# Patient Record
Sex: MALE | Race: WHITE | Employment: OTHER | URBAN - NONMETROPOLITAN AREA
[De-identification: names, ages, dates, MRNs, and addresses within clinical notes are randomized per-mention and may not be internally consistent; named-entity substitution may affect disease eponyms.]

---

## 2021-12-03 ENCOUNTER — TRANSCRIBE ORDER (OUTPATIENT)
Dept: REGISTRATION | Age: 81
End: 2021-12-03

## 2021-12-03 ENCOUNTER — HOSPITAL ENCOUNTER (OUTPATIENT)
Dept: VASCULAR SURGERY | Age: 81
Discharge: HOME | End: 2021-12-03
Payer: MEDICARE

## 2021-12-03 DIAGNOSIS — R59.0 VIRCHOW'S NODE: Primary | ICD-10-CM

## 2021-12-03 DIAGNOSIS — R59.0 VIRCHOW'S NODE: ICD-10-CM

## 2021-12-03 PROCEDURE — 93971 EXTREMITY STUDY: CPT

## 2021-12-05 ENCOUNTER — APPOINTMENT (OUTPATIENT)
Dept: CT IMAGING | Age: 81
End: 2021-12-05
Attending: EMERGENCY MEDICINE
Payer: MEDICARE

## 2021-12-05 ENCOUNTER — HOSPITAL ENCOUNTER (EMERGENCY)
Age: 81
Discharge: ANOTHER HOSPITAL | End: 2021-12-06
Attending: EMERGENCY MEDICINE | Admitting: EMERGENCY MEDICINE
Payer: MEDICARE

## 2021-12-05 DIAGNOSIS — S70.11XA HEMATOMA OF ILIOPSOAS MUSCLE, RIGHT, INITIAL ENCOUNTER: Primary | ICD-10-CM

## 2021-12-05 DIAGNOSIS — R79.1 PROLONGED INR: ICD-10-CM

## 2021-12-05 LAB
ALBUMIN SERPL-MCNC: 3.3 G/DL (ref 3.5–5)
ALBUMIN/GLOB SERPL: 0.9 {RATIO} (ref 1.1–2.2)
ALP SERPL-CCNC: 87 U/L (ref 45–117)
ALT SERPL-CCNC: 18 U/L (ref 12–78)
ANION GAP SERPL CALC-SCNC: 9 MMOL/L (ref 5–15)
APPEARANCE UR: CLEAR
AST SERPL W P-5'-P-CCNC: 22 U/L (ref 15–37)
BASOPHILS # BLD: 0.1 K/UL (ref 0–0.2)
BASOPHILS NFR BLD: 1 % (ref 0–2.5)
BILIRUB SERPL-MCNC: 0.7 MG/DL (ref 0.2–1)
BILIRUB UR QL CFM: POSITIVE
BILIRUB UR QL CFM: POSITIVE
BUN SERPL-MCNC: 13 MG/DL (ref 6–20)
BUN/CREAT SERPL: 12 (ref 12–20)
CA-I BLD-MCNC: 8.6 MG/DL (ref 8.5–10.1)
CHLORIDE SERPL-SCNC: 96 MMOL/L (ref 97–108)
CO2 SERPL-SCNC: 32 MMOL/L (ref 21–32)
COLOR UR: ABNORMAL
CREAT SERPL-MCNC: 1.09 MG/DL (ref 0.7–1.3)
EOSINOPHIL # BLD: 0.2 K/UL (ref 0–0.7)
EOSINOPHIL NFR BLD: 1 % (ref 0.9–2.9)
ERYTHROCYTE [DISTWIDTH] IN BLOOD BY AUTOMATED COUNT: 12.3 % (ref 11.5–14.5)
GLOBULIN SER CALC-MCNC: 3.5 G/DL (ref 2–4)
GLUCOSE SERPL-MCNC: 115 MG/DL (ref 65–100)
GLUCOSE UR STRIP.AUTO-MCNC: NEGATIVE MG/DL
HCT VFR BLD AUTO: 37.1 % (ref 41–53)
HGB BLD-MCNC: 12.3 G/DL (ref 13–16)
HGB UR QL STRIP: NEGATIVE
KETONES UR QL STRIP.AUTO: NEGATIVE MG/DL
LEUKOCYTE ESTERASE UR QL STRIP.AUTO: NEGATIVE
LYMPHOCYTES # BLD: 1.4 K/UL (ref 1–4.8)
LYMPHOCYTES NFR BLD: 8 % (ref 20.5–51.1)
MCH RBC QN AUTO: 30.6 PG (ref 31–34)
MCHC RBC AUTO-ENTMCNC: 33.1 G/DL (ref 31–36)
MCV RBC AUTO: 92.4 FL (ref 80–100)
MONOCYTES # BLD: 0.9 K/UL (ref 0–0.8)
MONOCYTES NFR BLD: 5 % (ref 3.1–13.9)
NEUTS SEG # BLD: 14.5 K/UL (ref 1.8–7.7)
NEUTS SEG NFR BLD: 85 % (ref 42–75)
NITRITE UR QL STRIP.AUTO: NEGATIVE
PH UR STRIP: 6 [PH] (ref 5–8)
PLATELET # BLD AUTO: 457 K/UL
PMV BLD AUTO: 8.6 FL (ref 6.5–11.5)
POTASSIUM SERPL-SCNC: 3.1 MMOL/L (ref 3.5–5.1)
PROT SERPL-MCNC: 6.8 G/DL (ref 6.4–8.2)
PROT UR STRIP-MCNC: NEGATIVE MG/DL
RBC # BLD AUTO: 4.01 M/UL
SODIUM SERPL-SCNC: 137 MMOL/L (ref 136–145)
SP GR UR REFRACTOMETRY: 1.02 (ref 1–1.03)
UROBILINOGEN UR QL STRIP.AUTO: 1 EU/DL (ref 0.2–1)
WBC # BLD AUTO: 17.1 K/UL (ref 4.4–11.3)

## 2021-12-05 PROCEDURE — 74011250636 HC RX REV CODE- 250/636: Performed by: EMERGENCY MEDICINE

## 2021-12-05 PROCEDURE — 74011000636 HC RX REV CODE- 636: Performed by: EMERGENCY MEDICINE

## 2021-12-05 PROCEDURE — 80053 COMPREHEN METABOLIC PANEL: CPT

## 2021-12-05 PROCEDURE — 85025 COMPLETE CBC W/AUTO DIFF WBC: CPT

## 2021-12-05 PROCEDURE — 74177 CT ABD & PELVIS W/CONTRAST: CPT

## 2021-12-05 PROCEDURE — 99285 EMERGENCY DEPT VISIT HI MDM: CPT

## 2021-12-05 PROCEDURE — 74011250637 HC RX REV CODE- 250/637: Performed by: EMERGENCY MEDICINE

## 2021-12-05 PROCEDURE — 81003 URINALYSIS AUTO W/O SCOPE: CPT

## 2021-12-05 PROCEDURE — 96374 THER/PROPH/DIAG INJ IV PUSH: CPT

## 2021-12-05 RX ORDER — HYDROMORPHONE HYDROCHLORIDE 1 MG/ML
0.5 INJECTION, SOLUTION INTRAMUSCULAR; INTRAVENOUS; SUBCUTANEOUS ONCE
Status: COMPLETED | OUTPATIENT
Start: 2021-12-05 | End: 2021-12-05

## 2021-12-05 RX ORDER — ACETAMINOPHEN 325 MG/1
650 TABLET ORAL ONCE
Status: DISCONTINUED | OUTPATIENT
Start: 2021-12-05 | End: 2021-12-06 | Stop reason: HOSPADM

## 2021-12-05 RX ADMIN — HYDROMORPHONE HYDROCHLORIDE 0.5 MG: 1 INJECTION, SOLUTION INTRAMUSCULAR; INTRAVENOUS; SUBCUTANEOUS at 22:36

## 2021-12-05 RX ADMIN — HYDROMORPHONE HYDROCHLORIDE 0.5 MG: 1 INJECTION, SOLUTION INTRAMUSCULAR; INTRAVENOUS; SUBCUTANEOUS at 22:00

## 2021-12-05 RX ADMIN — IOPAMIDOL 75 ML: 755 INJECTION, SOLUTION INTRAVENOUS at 23:20

## 2021-12-06 ENCOUNTER — HOSPITAL ENCOUNTER (INPATIENT)
Age: 81
LOS: 4 days | Discharge: HOME | DRG: 813 | End: 2021-12-10
Attending: FAMILY MEDICINE | Admitting: HOSPITALIST
Payer: MEDICARE

## 2021-12-06 VITALS
TEMPERATURE: 98.1 F | WEIGHT: 158 LBS | RESPIRATION RATE: 18 BRPM | HEIGHT: 68 IN | SYSTOLIC BLOOD PRESSURE: 146 MMHG | BODY MASS INDEX: 23.95 KG/M2 | OXYGEN SATURATION: 95 % | DIASTOLIC BLOOD PRESSURE: 96 MMHG | HEART RATE: 63 BPM

## 2021-12-06 PROBLEM — T14.8XXA INTRAMUSCULAR HEMATOMA: Status: ACTIVE | Noted: 2021-12-06

## 2021-12-06 PROBLEM — D68.9 COAGULOPATHY (HCC): Status: ACTIVE | Noted: 2021-12-06

## 2021-12-06 LAB
INR PPP: 10.7 (ref 0.9–1.1)
INR PPP: 4.1 (ref 0.9–1.1)
PROTHROMBIN TIME: 103.2 SEC (ref 9–11.1)
PROTHROMBIN TIME: 39.8 SEC (ref 9–11.1)

## 2021-12-06 PROCEDURE — 96376 TX/PRO/DX INJ SAME DRUG ADON: CPT

## 2021-12-06 PROCEDURE — 85610 PROTHROMBIN TIME: CPT

## 2021-12-06 PROCEDURE — 65270000029 HC RM PRIVATE

## 2021-12-06 PROCEDURE — 74011250637 HC RX REV CODE- 250/637: Performed by: HOSPITALIST

## 2021-12-06 PROCEDURE — 36415 COLL VENOUS BLD VENIPUNCTURE: CPT

## 2021-12-06 PROCEDURE — 74011250636 HC RX REV CODE- 250/636: Performed by: HOSPITALIST

## 2021-12-06 PROCEDURE — 74011000258 HC RX REV CODE- 258: Performed by: HOSPITALIST

## 2021-12-06 PROCEDURE — 74011250636 HC RX REV CODE- 250/636: Performed by: EMERGENCY MEDICINE

## 2021-12-06 PROCEDURE — 74011250637 HC RX REV CODE- 250/637: Performed by: EMERGENCY MEDICINE

## 2021-12-06 RX ORDER — POTASSIUM CHLORIDE 750 MG/1
40 TABLET, FILM COATED, EXTENDED RELEASE ORAL 2 TIMES DAILY
Status: COMPLETED | OUTPATIENT
Start: 2021-12-06 | End: 2021-12-06

## 2021-12-06 RX ORDER — WARFARIN 2.5 MG/1
2.5 TABLET ORAL
COMMUNITY

## 2021-12-06 RX ORDER — METOPROLOL SUCCINATE 25 MG/1
25 TABLET, EXTENDED RELEASE ORAL DAILY
COMMUNITY

## 2021-12-06 RX ORDER — POTASSIUM CHLORIDE 750 MG/1
10 TABLET, FILM COATED, EXTENDED RELEASE ORAL DAILY
COMMUNITY

## 2021-12-06 RX ORDER — SODIUM CHLORIDE 0.9 % (FLUSH) 0.9 %
5-40 SYRINGE (ML) INJECTION EVERY 8 HOURS
Status: DISCONTINUED | OUTPATIENT
Start: 2021-12-06 | End: 2021-12-10 | Stop reason: HOSPADM

## 2021-12-06 RX ORDER — METOPROLOL TARTRATE 25 MG/1
25 TABLET, FILM COATED ORAL DAILY
Status: DISCONTINUED | OUTPATIENT
Start: 2021-12-06 | End: 2021-12-06

## 2021-12-06 RX ORDER — ONDANSETRON 2 MG/ML
4 INJECTION INTRAMUSCULAR; INTRAVENOUS
Status: DISCONTINUED | OUTPATIENT
Start: 2021-12-06 | End: 2021-12-10 | Stop reason: HOSPADM

## 2021-12-06 RX ORDER — DOFETILIDE 0.25 MG/1
250 CAPSULE ORAL 2 TIMES DAILY
COMMUNITY

## 2021-12-06 RX ORDER — MAGNESIUM CHLORIDE 71.5 G/G
143 TABLET ORAL DAILY
COMMUNITY

## 2021-12-06 RX ORDER — METOPROLOL SUCCINATE 25 MG/1
25 TABLET, EXTENDED RELEASE ORAL DAILY
Status: DISCONTINUED | OUTPATIENT
Start: 2021-12-07 | End: 2021-12-10 | Stop reason: HOSPADM

## 2021-12-06 RX ORDER — GEMFIBROZIL 600 MG/1
600 TABLET, FILM COATED ORAL 2 TIMES DAILY
COMMUNITY

## 2021-12-06 RX ORDER — DOFETILIDE 0.12 MG/1
250 CAPSULE ORAL 2 TIMES DAILY
Status: DISCONTINUED | OUTPATIENT
Start: 2021-12-06 | End: 2021-12-10 | Stop reason: HOSPADM

## 2021-12-06 RX ORDER — FUROSEMIDE 20 MG/1
20 TABLET ORAL DAILY
COMMUNITY

## 2021-12-06 RX ORDER — METOPROLOL TARTRATE 25 MG/1
TABLET, FILM COATED ORAL DAILY
Status: ON HOLD | COMMUNITY
End: 2021-12-06

## 2021-12-06 RX ORDER — WARFARIN 7.5 MG/1
7.5 TABLET ORAL AS NEEDED
Status: ON HOLD | COMMUNITY
End: 2021-12-06

## 2021-12-06 RX ORDER — GABAPENTIN 300 MG/1
300 CAPSULE ORAL 2 TIMES DAILY
Status: DISCONTINUED | OUTPATIENT
Start: 2021-12-06 | End: 2021-12-10 | Stop reason: HOSPADM

## 2021-12-06 RX ORDER — GEMFIBROZIL 600 MG/1
600 TABLET, FILM COATED ORAL 2 TIMES DAILY WITH MEALS
Status: DISCONTINUED | OUTPATIENT
Start: 2021-12-06 | End: 2021-12-10 | Stop reason: HOSPADM

## 2021-12-06 RX ORDER — NITROFURANTOIN 25; 75 MG/1; MG/1
100 CAPSULE ORAL 2 TIMES DAILY
Status: ON HOLD | COMMUNITY
End: 2021-12-06

## 2021-12-06 RX ORDER — SACUBITRIL AND VALSARTAN 49; 51 MG/1; MG/1
1 TABLET, FILM COATED ORAL DAILY
COMMUNITY

## 2021-12-06 RX ORDER — OXYCODONE AND ACETAMINOPHEN 5; 325 MG/1; MG/1
1 TABLET ORAL
Status: DISCONTINUED | OUTPATIENT
Start: 2021-12-06 | End: 2021-12-07

## 2021-12-06 RX ORDER — ACETAMINOPHEN 650 MG/1
650 SUPPOSITORY RECTAL
Status: DISCONTINUED | OUTPATIENT
Start: 2021-12-06 | End: 2021-12-10 | Stop reason: HOSPADM

## 2021-12-06 RX ORDER — ACETAMINOPHEN 325 MG/1
650 TABLET ORAL
Status: DISCONTINUED | OUTPATIENT
Start: 2021-12-06 | End: 2021-12-10 | Stop reason: HOSPADM

## 2021-12-06 RX ORDER — WARFARIN SODIUM 5 MG/1
5 TABLET ORAL
COMMUNITY

## 2021-12-06 RX ORDER — OXYCODONE HYDROCHLORIDE 5 MG/1
5 TABLET ORAL
COMMUNITY

## 2021-12-06 RX ORDER — FUROSEMIDE 20 MG/1
20 TABLET ORAL DAILY
Status: DISCONTINUED | OUTPATIENT
Start: 2021-12-07 | End: 2021-12-10 | Stop reason: HOSPADM

## 2021-12-06 RX ORDER — GABAPENTIN 300 MG/1
300 CAPSULE ORAL 2 TIMES DAILY
COMMUNITY

## 2021-12-06 RX ORDER — HYDROMORPHONE HYDROCHLORIDE 1 MG/ML
0.5 INJECTION, SOLUTION INTRAMUSCULAR; INTRAVENOUS; SUBCUTANEOUS ONCE
Status: COMPLETED | OUTPATIENT
Start: 2021-12-06 | End: 2021-12-06

## 2021-12-06 RX ORDER — POLYETHYLENE GLYCOL 3350 17 G/17G
17 POWDER, FOR SOLUTION ORAL DAILY PRN
Status: DISCONTINUED | OUTPATIENT
Start: 2021-12-06 | End: 2021-12-10 | Stop reason: HOSPADM

## 2021-12-06 RX ORDER — MORPHINE SULFATE 2 MG/ML
2 INJECTION, SOLUTION INTRAMUSCULAR; INTRAVENOUS
Status: DISCONTINUED | OUTPATIENT
Start: 2021-12-06 | End: 2021-12-10 | Stop reason: HOSPADM

## 2021-12-06 RX ORDER — MORPHINE SULFATE 2 MG/ML
1 INJECTION, SOLUTION INTRAMUSCULAR; INTRAVENOUS
Status: DISCONTINUED | OUTPATIENT
Start: 2021-12-06 | End: 2021-12-06

## 2021-12-06 RX ORDER — SODIUM CHLORIDE 0.9 % (FLUSH) 0.9 %
5-40 SYRINGE (ML) INJECTION AS NEEDED
Status: DISCONTINUED | OUTPATIENT
Start: 2021-12-06 | End: 2021-12-10 | Stop reason: HOSPADM

## 2021-12-06 RX ORDER — ONDANSETRON 4 MG/1
4 TABLET, ORALLY DISINTEGRATING ORAL
Status: DISCONTINUED | OUTPATIENT
Start: 2021-12-06 | End: 2021-12-10 | Stop reason: HOSPADM

## 2021-12-06 RX ADMIN — OXYCODONE AND ACETAMINOPHEN 1 TABLET: 5; 325 TABLET ORAL at 14:10

## 2021-12-06 RX ADMIN — POTASSIUM CHLORIDE 40 MEQ: 750 TABLET, FILM COATED, EXTENDED RELEASE ORAL at 19:23

## 2021-12-06 RX ADMIN — PHYTONADIONE 2.5 MG: 10 INJECTION, EMULSION INTRAMUSCULAR; INTRAVENOUS; SUBCUTANEOUS at 02:36

## 2021-12-06 RX ADMIN — DOFETILIDE 250 MCG: 0.12 CAPSULE ORAL at 19:23

## 2021-12-06 RX ADMIN — POTASSIUM CHLORIDE 40 MEQ: 750 TABLET, FILM COATED, EXTENDED RELEASE ORAL at 14:10

## 2021-12-06 RX ADMIN — Medication 10 ML: at 19:26

## 2021-12-06 RX ADMIN — SACUBITRIL AND VALSARTAN 1 TABLET: 24; 26 TABLET, FILM COATED ORAL at 19:24

## 2021-12-06 RX ADMIN — GABAPENTIN 300 MG: 300 CAPSULE ORAL at 21:18

## 2021-12-06 RX ADMIN — HYDROMORPHONE HYDROCHLORIDE 0.5 MG: 1 INJECTION, SOLUTION INTRAMUSCULAR; INTRAVENOUS; SUBCUTANEOUS at 05:43

## 2021-12-06 RX ADMIN — PHYTONADIONE 2.5 MG: 10 INJECTION, EMULSION INTRAMUSCULAR; INTRAVENOUS; SUBCUTANEOUS at 19:23

## 2021-12-06 RX ADMIN — OXYCODONE AND ACETAMINOPHEN 1 TABLET: 5; 325 TABLET ORAL at 19:24

## 2021-12-06 RX ADMIN — GEMFIBROZIL 600 MG: 600 TABLET ORAL at 19:24

## 2021-12-06 RX ADMIN — MORPHINE SULFATE 1 MG: 2 INJECTION, SOLUTION INTRAMUSCULAR; INTRAVENOUS at 12:51

## 2021-12-06 NOTE — H&P
History & Physical    Date of admission: 12/6/2021    Patient name: Chet Yang  MRN: [id number]  YOB: 1940  Age: 80 y.o. Primary care provider:  Rosita Kaur NP     Source of Information: patient, medical records                              Chief complain: right hip/groin pain       History of present illness  Chet Yang is a 80 y.o. male who presents with PMHx of CHF s/p ICD, Paroxysmal atrial fib, Mechanical valve on coumadin, Neuropathy presented to [facility] with right hip pain. Patient states that 1 week ago patient was seen by PCP for fever/chills/Hematuria. Patient was placed on Macrobid BID for 7 days which patient completed on 12/5/21. Patient states that hematuria stopped and was feeling better for 24-48 hours. Patient states since Thursday he started to have right hip pain and was becoming more and more difficult to walk due to severe pain. Pt went to [facility] ER and was found to have INR 10.7. CT abd/pel done showed large Rt Iliacus muscle hematoma and transferred to [facility] for further evaluation and treatment. Past Medical History:   Diagnosis Date    Neuropathy       Past Surgical History:   Procedure Laterality Date    HX HEART VALVE SURGERY      HX PACEMAKER       Prior to Admission medications    Medication Sig Start Date End Date Taking? Authorizing Provider   metoprolol succinate (Toprol XL) 25 mg XL tablet Take 25 mg by mouth daily. Yes Provider, Historical   sacubitriL-valsartan (Entresto) 49-51 mg tab tablet Take 0.5 Tablets by mouth two (2) times a day. Yes Provider, Historical   gemfibroziL (LOPID) 600 mg tablet Take 600 mg by mouth two (2) times a day. With orange juice    Other, MD Shay   magnesium chloride (Slow-Mag) 71.5 mg tablet Take 143 mg by mouth daily. Shay Mcnulty MD   gabapentin (NEURONTIN) 300 mg capsule Take 300 mg by mouth two (2) times a day.     Pricila MD Shay   oxyCODONE IR (ROXICODONE) 5 mg immediate release tablet Take 5 mg by mouth every six (6) hours as needed for Pain. Shay Mcnulty MD   furosemide (LASIX) 20 mg tablet Take  by mouth daily. Shay Mcnulty MD   warfarin (COUMADIN) 5 mg tablet Take 5 mg by mouth as needed. Based on patient lab results     Shay Mcnulty MD   warfarin (COUMADIN) 7.5 mg tablet Take 7.5 mg by mouth as needed. Based on patient lab results    Shay Mcnulty MD   nitrofurantoin, macrocrystal-monohydrate, (MACROBID) 100 mg capsule Take 100 mg by mouth two (2) times a day. Shay Mcnulty MD   potassium chloride SR (KLOR-CON 10) 10 mEq tablet Take 10 mEq by mouth daily. Shay Mcnulty MD   dofetilide (TIKOSYN) 250 mcg capsule Take 250 mcg by mouth two (2) times a day. Shay Mcnulty MD     No Known Allergies   No family history on file. Family history reviewed and non-contributory. Social history  Patient resides  X  Independently      With family care      Assisted living      SNF    Ambulates  X  Independently      With cane       Assisted walker         Alcohol history   X  None     Social     Chronic   Smoking history  X  None     Former smoker     Current smoker     Social History     Tobacco Use   Smoking Status Former Smoker   Smokeless Tobacco Current User   Tobacco Comment    chewing tobacco       Code status  X  Full code     DNR/DNI        Code status discussed with the patient/caregivers. Full Code    Review of systems  The patient denies any fever, chills, chest pain, cough, congestion, recent illness, palpitations, or dysuria. A comprehensive review of systems was negative except for that written in the History of Present Illness. The remainder of the review of systems was reviewed and is noncontributory.     Physical Examination   Visit Vitals  BP (!) 141/73 (BP 1 Location: Right upper arm, BP Patient Position: Semi fowlers)   Pulse 71   Temp 98.3 °F (36.8 °C)   Resp 18   SpO2 91%          O2 Device: None (Room air)    General:  Alert, cooperative, no distress   Head:  Normocephalic, without obvious abnormality, atraumatic   Eyes:  Conjunctivae/corneas clear.  PERRL, EOMs intact   E/N/M/T: Teeth and gums normal  Clear oropharynx   Neck: No carotid bruit   Normal JVP   Lungs:   Symmetrical chest expansion and respiratory effort  Clear to auscultation bilaterally   Chest wall:  No tenderness or deformity   Heart:  Regular rhythm   Sounds normal;   Abdomen:   Soft, no tenderness  Bowel sounds normal     Extremities: Extremities normal, atraumatic, decreased ROM in rt hip  No cyanosis or edema  No DVT signs   Pulses 2+ and symmetric all extremities   Skin: No rashes or ulcers   Musculo-      skeletal: Gait not tested  Normal symmetry, ROM, strength and tone   Neuro: Normal cranial nerves  Normal reflexes and sensation   Psych: Alert, oriented x3  Normal affect, judgement and insight   Geniturinary: deferred     Data Review      24 Hour Results:  Recent Results (from the past 24 hour(s))   URINALYSIS W/ RFLX MICROSCOPIC    Collection Time: 12/05/21  8:30 PM   Result Value Ref Range    Color Yellow/Straw      Appearance Clear Clear      Specific gravity 1.020 1.003 - 1.030      pH (UA) 6.0 5.0 - 8.0      Protein Negative Negative mg/dL    Glucose Negative Negative mg/dL    Ketone Negative Negative mg/dL    Blood Negative Negative      Urobilinogen 1.0 0.2 - 1.0 EU/dL    Nitrites Negative Negative      Leukocyte Esterase Negative Negative      Bilirubin UA, confirm Positive (A) Negative     BILIRUBIN, CONFIRM    Collection Time: 12/05/21  8:30 PM   Result Value Ref Range    Bilirubin UA, confirm Positive (A) Negative     CBC WITH AUTOMATED DIFF    Collection Time: 12/05/21  8:42 PM   Result Value Ref Range    WBC 17.1 (H) 4.4 - 11.3 K/uL    RBC 4.01 M/uL    HGB 12.3 (L) 13.0 - 16.0 g/dL    HCT 37.1 (L) 41 - 53 %    MCV 92.4 80 - 100 FL    MCH 30.6 (L) 31 - 34 PG    MCHC 33.1 31.0 - 36.0 g/dL    RDW 12.3 11.5 - 14.5 % PLATELET 570 K/uL    MPV 8.6 6.5 - 11.5 FL    NEUTROPHILS 85 (H) 42 - 75 %    LYMPHOCYTES 8 (L) 20.5 - 51.1 %    MONOCYTES 5 3.1 - 13.9 %    EOSINOPHILS 1 0.9 - 2.9 %    BASOPHILS 1 0.0 - 2.5 %    ABS. NEUTROPHILS 14.5 (H) 1.8 - 7.7 K/UL    ABS. LYMPHOCYTES 1.4 1.0 - 4.8 K/UL    ABS. MONOCYTES 0.9 (H) 0.0 - 0.8 K/UL    ABS. EOSINOPHILS 0.2 0.0 - 0.7 K/UL    ABS. BASOPHILS 0.1 0.0 - 0.2 K/UL   METABOLIC PANEL, COMPREHENSIVE    Collection Time: 12/05/21  8:42 PM   Result Value Ref Range    Sodium 137 136 - 145 mmol/L    Potassium 3.1 (L) 3.5 - 5.1 mmol/L    Chloride 96 (L) 97 - 108 mmol/L    CO2 32 21 - 32 mmol/L    Anion gap 9 5 - 15 mmol/L    Glucose 115 (H) 65 - 100 mg/dL    BUN 13 6 - 20 mg/dL    Creatinine 1.09 0.70 - 1.30 mg/dL    BUN/Creatinine ratio 12 12 - 20      GFR est AA >60 >60 ml/min/1.73m2    GFR est non-AA >60 >60 ml/min/1.73m2    Calcium 8.6 8.5 - 10.1 mg/dL    Bilirubin, total 0.7 0.2 - 1.0 mg/dL    AST (SGOT) 22 15 - 37 U/L    ALT (SGPT) 18 12 - 78 U/L    Alk. phosphatase 87 45 - 117 U/L    Protein, total 6.8 6.4 - 8.2 g/dL    Albumin 3.3 (L) 3.5 - 5.0 g/dL    Globulin 3.5 2.0 - 4.0 g/dL    A-G Ratio 0.9 (L) 1.1 - 2.2     PROTHROMBIN TIME + INR    Collection Time: 12/06/21  1:35 AM   Result Value Ref Range    Prothrombin time 103.2 (H) 9.0 - 11.1 sec    INR 10.7 (HH) 0.9 - 1.1       Recent Labs     12/05/21  2042   WBC 17.1*   HGB 12.3*   HCT 37.1*        Recent Labs     12/06/21  0135 12/05/21  2042   NA  --  137   K  --  3.1*   CL  --  96*   CO2  --  32   GLU  --  115*   BUN  --  13   CREA  --  1.09   CA  --  8.6   ALB  --  3.3*   TBILI  --  0.7   ALT  --  18   INR 10.7*  --        Imaging  CT abd/pel:    1. Large right iliacus muscle hematoma. Correlation with hematologic laboratory  evaluation/hematocrit recommended. 2.  Cholelithiasis. 3.  Bilateral pleural effusions. 4.  Cardiomegaly. 5.  Additional incidental and chronic findings as above.       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)  - s/p Vit K 2.5mg X 1 at [facility], will give another dose   - Monitor CBC q12  - check INR    Mechanical Valve  - INR 10.7  - hold coumadin until INR < 4    Hypokalemia  - replaced    Leukocytosis  - likely reactive   - monitor    Compensated CHF, systolic, s/p ICD  - c/w coreg/Entresto/lasix    P.AFib  - on Tikosyn   - hold coumadin due to INR >10      Diet: cardiac  Activity: as tolerated with PT/OT   DVT prophylaxis: SCDs  Isolation precautions: none  Consultations: none  Anticipated disposition: 3-4 days    NOK: Swathi Smallwoodner (Spouse) case discussed with patient and POA at bedtime.    FULL CODE         Signed by: Gómez Lopez MD    December 6, 2021 at 2:39 PM

## 2021-12-06 NOTE — ED NOTES
TRANSFER - OUT REPORT:    Verbal report given to Zackary RN(name) on Ryan Arshad  being transferred to [address] [place] [address] (unit) for urgent transfer       Report consisted of patients Situation, Background, Assessment and   Recommendations(SBAR). Information from the following report(s) ED Summary was reviewed with the receiving nurse. Lines:   Peripheral IV 12/05/21 Anterior; Right Forearm (Active)        Opportunity for questions and clarification was provided.       Patient transported with:   Monitor

## 2021-12-06 NOTE — ED NOTES
NAD noted at the present time; pt resting in bed with eyes open; bed in lowest position; call button within reach; denies any needs at the present time.

## 2021-12-06 NOTE — PROGRESS NOTES
Physical Therapy 12//6/21    PT order acknowledged, chart reviewed, INR 10.7 - admitted with lower abd pain into right hip/leg - large right illiacus muscle hematoma - will hold PT and complete PT eval when appropriate.     Min Fernández, PT

## 2021-12-06 NOTE — ED PROVIDER NOTES
EMERGENCY DEPARTMENT HISTORY AND PHYSICAL EXAM  ?    Date: 12/5/2021  Patient Name: Kendrick Lester    History of Presenting Illness    Patient presents with:  Groin Pain      History Provided By: Patient    HPI: Kendrick Lester, 80 y.o. male with a past medical history significant for mechanical heart valve presents to the ED with cc of lower abdominal pain for 1 to 2 weeks. Pain is progressively worsened to the point where it is difficult to ambulate. Even turning onto the right lateral decubitus position is painful. He feels pain radiating into the right hip and leg. He has been treated with an antibiotic for UTI. He has had scrotal swelling and left extremity swelling which prompted a duplex ultrasound, subsequently negative for DVT. He reports that the scrotal swelling is improved. There are no other complaints, changes, or physical findings at this time. PCP: Aries Moncada NP    Current Facility-Administered Medications:  acetaminophen (TYLENOL) tablet 650 mg, 650 mg, Oral, ONCE, , Paulino Harman MD        Past History    Past Medical History:  History reviewed. No pertinent past medical history. Past Surgical History:  History reviewed. No pertinent surgical history. Family History:  History reviewed. No pertinent family history.       Social History:  Social History   Tobacco Use     Smoking status: Former Smoker     Smokeless tobacco: Current User     Tobacco comment: chewing tobacco   Alcohol use: Not Currently   Drug use: Never      Allergies:  No Known Allergies      Review of Systems  [unfilled]    Physical Exam  [unfilled]    Diagnostic Study Results    Labs -  Recent Results (from the past 12 hour(s))  -URINALYSIS W/ RFLX MICROSCOPIC:   Collection Time: 12/05/21  8:30 PM      Result                      Value             Ref Range          Color                       Yellow/Straw                         Appearance                  Clear             Clear Specific gravity            1.020             1.003 - 1.03*      pH (UA)                     6.0               5.0 - 8.0          Protein                     Negative          Negative mg/*      Glucose                     Negative          Negative mg/*      Ketone                      Negative          Negative mg/*      Blood                       Negative          Negative           Urobilinogen                1.0               0.2 - 1.0 EU*      Nitrites                    Negative          Negative           Leukocyte Esterase          Negative          Negative           Bilirubin UA, confirm       Positive (A)      Negative       -BILIRUBIN, CONFIRM:   Collection Time: 12/05/21  8:30 PM      Result                      Value             Ref Range          Bilirubin UA, confirm       Positive (A)      Negative       -CBC WITH AUTOMATED DIFF:   Collection Time: 12/05/21  8:42 PM      Result                      Value             Ref Range          WBC                         17.1 (H)          4.4 - 11.3 K*      RBC                         4.01              M/uL               HGB                         12.3 (L)          13.0 - 16.0 *      HCT                         37.1 (L)          41 - 53 %          MCV                         92.4              80 - 100 FL        MCH                         30.6 (L)          31 - 34 PG         MCHC                        33.1              31.0 - 36.0 *      RDW                         12.3              11.5 - 14.5 %      PLATELET                    457               K/uL               MPV                         8.6               6.5 - 11.5 FL      NEUTROPHILS                 85 (H)            42 - 75 %          LYMPHOCYTES                 8 (L)             20.5 - 51.1 %      MONOCYTES                   5                 3.1 - 13.9 %       EOSINOPHILS                 1                 0.9 - 2.9 %        BASOPHILS                   1                 0.0 - 2.5 %        ABS. NEUTROPHILS            14.5 (H)          1.8 - 7.7 K/*      ABS. LYMPHOCYTES            1.4               1.0 - 4.8 K/*      ABS. MONOCYTES              0.9 (H)           0.0 - 0.8 K/*      ABS. EOSINOPHILS            0.2               0.0 - 0.7 K/*      ABS. BASOPHILS              0.1               0.0 - 0.2 K/*  -METABOLIC PANEL, COMPREHENSIVE:   Collection Time: 12/05/21  8:42 PM      Result                      Value             Ref Range          Sodium                      137               136 - 145 mm*      Potassium                   3.1 (L)           3.5 - 5.1 mm*      Chloride                    96 (L)            97 - 108 mmo*      CO2                         32                21 - 32 mmol*      Anion gap                   9                 5 - 15 mmol/L      Glucose                     115 (H)           65 - 100 mg/*      BUN                         13                6 - 20 mg/dL       Creatinine                  1.09              0.70 - 1.30 *      BUN/Creatinine ratio        12                12 - 20            GFR est AA                  >60               >60 ml/min/1*      GFR est non-AA              >60               >60 ml/min/1*      Calcium                     8.6               8.5 - 10.1 m*      Bilirubin, total            0.7               0.2 - 1.0 mg*      AST (SGOT)                  22                15 - 37 U/L        ALT (SGPT)                  18                12 - 78 U/L        Alk. phosphatase            87                45 - 117 U/L       Protein, total              6.8               6.4 - 8.2 g/*      Albumin                     3.3 (L)           3.5 - 5.0 g/*      Globulin                    3.5               2.0 - 4.0 g/*      A-G Ratio                   0.9 (L)           1.1 - 2.2        Radiologic Studies -   CT ABD PELV W CONT  Final Result      1. Large right iliacus muscle hematoma. Correlation with hematologic laboratory   evaluation/hematocrit recommended.    2. Cholelithiasis. 3.  Bilateral pleural effusions. 4.  Cardiomegaly. 5.  Additional incidental and chronic findings as above. CT Results  (Last 48 hours)             12/05/21 2310  CT ABD PELV W CONT Final result   Impression:      1. Large right iliacus muscle hematoma. Correlation with hematologic   laboratory   evaluation/hematocrit recommended. 2.  Cholelithiasis. 3.  Bilateral pleural effusions. 4.  Cardiomegaly. 5.  Additional incidental and chronic findings as above. Narrative:  CT ABD PELV W CONT     12/5/2021 11:10 PM ; SVR       Indication: 80years old; Male ; Pelvic pain ;      Technique: Axial CT imaging of the abdomen and pelvis was performed   according to   standard protocol. One or more of the following dose reduction techniques   were   used to: Automatic exposure control, adjustment of the mA and/or kV   according to   patient size, use of the aortic reconstruction techniques. Intravenous contrast: 100 ml of Isovue-370      Oral contrast: Oral contrast was given according to standard protocol. Dose reduction: All CT scans at this facility are performed using dose   reduction   optimization techniques as appropriate to the performed exam including the     following: Automatic exposure control; adjustment of the mA and/or kV   according   to patient size; or the use of reconstruction techniques. Comparison: None      Findings:      Lower thorax: Bilateral pleural effusions. Lower lobe atelectasis. Cardiomegaly. Electronic cardiac leads. Prosthetic valve. Liver: Unremarkable. Bile ducts: No biliary ductal dilatation is evident. Gallbladder: Cholelithiasis. Pancreas: Unremarkable. Spleen: Unremarkable. Adrenals: Unremarkable. Kidneys and ureters: Right renal cyst.      Bladder: Decompressed. Bowel: The gastric lumen is incompletely distended. There are no dilated   small   bowel loops.  The appendix is not identified however, there are no right   lower   quadrant inflammatory changes. There are no pericolonic inflammatory   changes. Lymph nodes: No pathologic adenopathy. Vessels: There are atherosclerotic calcifications. The remaining vascular   structures are otherwise unremarkable on noncontrast evaluation. Pelvic viscera: Unremarkable. Peritoneum/retroperitoneum: Right iliacus muscle hematoma. Abdominal wall/soft tissues: Unremarkable. Bones: There are age-appropriate osseous degenerative changes. There are   no   osseous destructive findings. Lines and devices: There are no indwelling lines or devices identified. CXR Results  (Last 48 hours)   None       Medical Decision Making and ED Course  I am the first provider for this patient. I reviewed the vital signs, available nursing notes, past medical history, past surgical history, family history and social history. Vital Signs-Reviewed the patient's vital signs. Empty flowsheet group. Records Reviewed: Nursing Notes    Provider Notes (Medical Decision Making): The patient presents with pelvic pain with a differential diagnosis of avascular necrosis of the femoral head, musculoskeletal pain, sciatica. ED Course:   Patient reports previous WBC was 23,000. Now WBC is 17,000. INR is 10. CT scan shows a large right iliopsoas muscle hematoma. Swelling of the scrotum and left leg may be due to the hematoma. Initial assessment performed. The patients presenting problems have been discussed, and they are in agreement with the care plan formulated and outlined with them. I have encouraged them to ask questions as they arise throughout their visit.               CRITICAL CARE NOTE :  1:26 AM  Amount of Critical Care Time: ___40_(minutes)__    IMPENDING DETERIORATION -Cardiovascular  ASSOCIATED RISK FACTORS - Vascular Compromise  MANAGEMENT- Bedside Assessment, Supervision of Care and Transfer  INTERPRETATION -  CT Scan  INTERVENTIONS - Metobolic interventions  CASE REVIEW - Hospitalist/general surgeon  TREATMENT RESPONSE -Stable  PERFORMED BY - Self    NOTES   :  I have spent critical care time involved in lab review, consultations with specialist, family decision- making, bedside attention and documentation. This time excludes time spent in any separate billed procedures. During this entire length of time I was immediately available to the patient . Zac Reed MD        Disposition    Transfer to another facility        Diagnosis    Clinical Impression: Right iliopsoas muscle hematoma, prolonged INR. Attestations:  I discussed case with Dr. Faisal Carroll, general surgery. Zac Reed MD    Please note that this dictation was completed with [email or web address], the computer voice recognition software. Quite often unanticipated grammatical, syntax, homophones, and other interpretive errors are inadvertently transcribed by the computer software. Please disregard these errors. Please excuse any errors that have escaped final proofreading. Thank you. History reviewed. No pertinent past medical history. History reviewed. No pertinent surgical history. History reviewed. No pertinent family history.     Social History     Socioeconomic History    Marital status: UNKNOWN     Spouse name: Not on file    Number of children: Not on file    Years of education: Not on file    Highest education level: Not on file   Occupational History    Not on file   Tobacco Use    Smoking status: Former Smoker    Smokeless tobacco: Current User    Tobacco comment: chewing tobacco   Substance and Sexual Activity    Alcohol use: Not Currently    Drug use: Never    Sexual activity: Not Currently   Other Topics Concern    Not on file   Social History Narrative    Not on file     Social Determinants of Health     Financial Resource Strain:     Difficulty of Paying Living Expenses: Not on file Food Insecurity:     Worried About Running Out of Food in the Last Year: Not on file    Rajendra of Food in the Last Year: Not on file   Transportation Needs:     Lack of Transportation (Medical): Not on file    Lack of Transportation (Non-Medical): Not on file   Physical Activity:     Days of Exercise per Week: Not on file    Minutes of Exercise per Session: Not on file   Stress:     Feeling of Stress : Not on file   Social Connections:     Frequency of Communication with Friends and Family: Not on file    Frequency of Social Gatherings with Friends and Family: Not on file    Attends Jain Services: Not on file    Active Member of [address] [place] [address] [employer] or Organizations: Not on file    Attends Club or Organization Meetings: Not on file    Marital Status: Not on file   Intimate Partner Violence:     Fear of Current or Ex-Partner: Not on file    Emotionally Abused: Not on file    Physically Abused: Not on file    Sexually Abused: Not on file   Housing Stability:     Unable to Pay for Housing in the Last Year: Not on file    Number of [place] in the Last Year: Not on file    Unstable Housing in the Last Year: Not on file         ALLERGIES: Patient has no known allergies. Review of Systems   Constitutional: Negative. HENT: Negative. Respiratory: Negative. Cardiovascular: Negative. Gastrointestinal: Negative. Genitourinary: Negative. Musculoskeletal:        Right hip pain and right thigh pain   Skin: Negative. Neurological: Negative. Hematological: Negative. Vitals:    12/05/21 2005 12/05/21 2151   BP: (!) 145/91 128/69   Pulse: 65    Resp: 19    Temp: 98.7 °F (37.1 °C)    SpO2: 96%    Weight: 71.7 kg (158 lb)    Height: 5' 8\" (1.727 m)             Physical Exam  Vitals and nursing note reviewed. Constitutional:       General: He is in acute distress. Appearance: He is not diaphoretic. HENT:      Head: Normocephalic and atraumatic.       Nose: Nose normal. Mouth/Throat:      Mouth: Mucous membranes are moist.      Pharynx: Oropharynx is clear. Eyes:      Conjunctiva/sclera: Conjunctivae normal.      Pupils: Pupils are equal, round, and reactive to light. Cardiovascular:      Rate and Rhythm: Regular rhythm. Bradycardia present. Pulses: Normal pulses. Heart sounds: Normal heart sounds. Pulmonary:      Effort: Pulmonary effort is normal.      Breath sounds: Normal breath sounds. Abdominal:      General: Abdomen is flat. Bowel sounds are normal.      Palpations: Abdomen is soft. Genitourinary:     Comments: Enlarged left testicle, mild tenderness  Musculoskeletal:      Cervical back: Normal range of motion and neck supple. Comments: Limited range of motion of the right hip   Skin:     General: Skin is warm and dry. Capillary Refill: Capillary refill takes less than 2 seconds. Neurological:      General: No focal deficit present. Mental Status: He is alert and oriented to person, place, and time.           MDM  Number of Diagnoses or Management Options     Amount and/or Complexity of Data Reviewed  Clinical lab tests: reviewed  Tests in the radiology section of CPT®: reviewed    Risk of Complications, Morbidity, and/or Mortality  Presenting problems: high  Diagnostic procedures: high  Management options: high    Patient Progress  Patient progress: stable         Procedures

## 2021-12-06 NOTE — ED NOTES
NAD noted at the present time; pt resting in bed with eyes closed; bed in lowest position; call button within reach; denies any needs at the present time.

## 2021-12-06 NOTE — PROGRESS NOTES
Admission Medication Reconciliation:    Information obtained from:  [facility]:  NO    Comments/Recommendations: Spoke with patient with wife and  present with patient permission. Discussed use of PTA medications including prescription, OTC, vitamins, supplements, inhaled, topical, nasal, injectable, otic and ophthalmic medications. Verified doses of medications with outpatient pharmacy where patient has them filled. Updated PTA meds/reviewed patient's allergies. 1)  Of note, patient confirmed his most recent warfarin regimen as 2.5 mg on Tues and Fri and 5 mg daily all other days of the week (Mon, Wed, Thu, Sat, Sun). Patient reports he was on Entresto PTA. Dose per outpt pharmacy 49-51 mg tabs BID. Pt reports he was recently told by outpt provider to cut dose in half, so has only been taking 1 tablet daily. Pt reports he takes dofetilide at 11 AM and 11 PM daily. 2)  Medication changes (since last review): Added  - None    Adjusted  - Warfarin (from 2.5 mg, 7.5 mg to 2.5 mg twice weekly and 5 mg rest of the week as above)    Removed  - Macrobid    3)  Wife reports patient previously had reaction to Percocet- \"made him crazy\". Patient recently prescribed oxycodone, states has had no issues with it. ¹[employer] pharmacy benefit data reflects medications filled and processed through the patient's insurance, however   this data does NOT capture whether the medication was picked up or is currently being taken by the patient. Allergies:  Patient has no known allergies. Significant PMH/Disease States:   Past Medical History:   Diagnosis Date    Neuropathy      Chief Complaint for this Admission:  No chief complaint on file. Prior to Admission Medications:   Prior to Admission Medications   Prescriptions Last Dose Informant Taking?    dofetilide (TIKOSYN) 250 mcg capsule   Yes   Sig: Take 250 mcg by mouth two (2) times a day.   furosemide (LASIX) 20 mg tablet   Yes   Sig: Take 20 mg by mouth daily. gabapentin (NEURONTIN) 300 mg capsule   Yes   Sig: Take 300 mg by mouth two (2) times a day. gemfibroziL (LOPID) 600 mg tablet   Yes   Sig: Take 600 mg by mouth two (2) times a day. With orange juice   magnesium chloride (Slow-Mag) 71.5 mg tablet   Yes   Sig: Take 143 mg by mouth daily. metoprolol succinate (Toprol XL) 25 mg XL tablet   Yes   Sig: Take 25 mg by mouth daily. oxyCODONE IR (ROXICODONE) 5 mg immediate release tablet   Yes   Sig: Take 5 mg by mouth every six (6) hours as needed for Pain.   potassium chloride SR (KLOR-CON 10) 10 mEq tablet   Yes   Sig: Take 10 mEq by mouth daily. sacubitriL-valsartan (Entresto) 49-51 mg tab tablet   Yes   Sig: Take 1 Tablet by mouth daily. warfarin (COUMADIN) 2.5 mg tablet   Yes   Sig: Take 2.5 mg by mouth. 2 x weekly on Tues, Fri   warfarin (COUMADIN) 5 mg tablet   Yes   Sig: Take 5 mg by mouth. 5 x weekly on Mon, Wed, Thur, Sat Sun      Facility-Administered Medications: None     Please contact the main inpatient pharmacy with any questions or concerns at [phone] and we will direct you to the clinical pharmacist covering this patient's care while in-house.    VIVIANA Matthews

## 2021-12-06 NOTE — ED NOTES
Pt is resting in room with eyes opened wife at bedside. NAD observed and no new complaints voiced at this time. Will continue to monitor.

## 2021-12-06 NOTE — PROGRESS NOTES
TRANSFER - IN REPORT:    Verbal report received from [address] M[address] Trejo RN(name) on Kendrick [place]  being received from [facility](unit) for routine progression of care      Report consisted of patients Situation, Background, Assessment and   Recommendations(SBAR). Information from the following report(s) ED Summary was reviewed with the receiving nurse. Opportunity for questions and clarification was provided. Assessment completed upon patients arrival to unit and care assumed.

## 2021-12-06 NOTE — PROGRESS NOTES
The following herbal, alternative, and/or nutritional/dietary supplement product(s) has been discontinued  per [employer] approved policy:    Magnesium chloride 143 mg daily (medication name/dose/frequency)    Please reorder upon discharge if appropriate.

## 2021-12-06 NOTE — PROGRESS NOTES
Occupational Therapy    Acknowledge OT order and completed chart review. Patient admitted with lower abd pain into R hip and leg. INR 10.7, contraindicated for participation in OT services. Will hold and continue to follow for OT eval as appropriate.        Thank you,  Darshan Jimenez, OT

## 2021-12-07 ENCOUNTER — APPOINTMENT (OUTPATIENT)
Dept: VASCULAR SURGERY | Age: 81
DRG: 813 | End: 2021-12-07
Attending: NURSE PRACTITIONER
Payer: MEDICARE

## 2021-12-07 LAB
ALBUMIN SERPL-MCNC: 2.6 G/DL (ref 3.5–5)
ALBUMIN/GLOB SERPL: 0.8 {RATIO} (ref 1.1–2.2)
ALP SERPL-CCNC: 78 U/L (ref 45–117)
ALT SERPL-CCNC: 21 U/L (ref 12–78)
ANION GAP SERPL CALC-SCNC: 8 MMOL/L (ref 5–15)
AST SERPL-CCNC: 36 U/L (ref 15–37)
BASOPHILS # BLD: 0.1 K/UL (ref 0–0.1)
BASOPHILS NFR BLD: 0 % (ref 0–1)
BILIRUB SERPL-MCNC: 1 MG/DL (ref 0.2–1)
BUN SERPL-MCNC: 16 MG/DL (ref 6–20)
BUN/CREAT SERPL: 18 (ref 12–20)
CALCIUM SERPL-MCNC: 8.6 MG/DL (ref 8.5–10.1)
CHLORIDE SERPL-SCNC: 102 MMOL/L (ref 97–108)
CO2 SERPL-SCNC: 26 MMOL/L (ref 21–32)
COMMENT, HOLDF: NORMAL
CREAT SERPL-MCNC: 0.89 MG/DL (ref 0.7–1.3)
DIFFERENTIAL METHOD BLD: ABNORMAL
EOSINOPHIL # BLD: 0 K/UL (ref 0–0.4)
EOSINOPHIL NFR BLD: 0 % (ref 0–7)
ERYTHROCYTE [DISTWIDTH] IN BLOOD BY AUTOMATED COUNT: 13.4 % (ref 11.5–14.5)
ERYTHROCYTE [DISTWIDTH] IN BLOOD BY AUTOMATED COUNT: 13.7 % (ref 11.5–14.5)
GLOBULIN SER CALC-MCNC: 3.3 G/DL (ref 2–4)
GLUCOSE SERPL-MCNC: 93 MG/DL (ref 65–100)
HCT VFR BLD AUTO: 29.3 % (ref 36.6–50.3)
HCT VFR BLD AUTO: 30.5 % (ref 36.6–50.3)
HGB BLD-MCNC: 10.2 G/DL (ref 12.1–17)
HGB BLD-MCNC: 9.8 G/DL (ref 12.1–17)
IMM GRANULOCYTES # BLD AUTO: 0.2 K/UL (ref 0–0.04)
IMM GRANULOCYTES NFR BLD AUTO: 1 % (ref 0–0.5)
INR PPP: 1.5 (ref 0.9–1.1)
LYMPHOCYTES # BLD: 1.1 K/UL (ref 0.8–3.5)
LYMPHOCYTES NFR BLD: 6 % (ref 12–49)
MCH RBC QN AUTO: 30.7 PG (ref 26–34)
MCH RBC QN AUTO: 30.9 PG (ref 26–34)
MCHC RBC AUTO-ENTMCNC: 33.4 G/DL (ref 30–36.5)
MCHC RBC AUTO-ENTMCNC: 33.4 G/DL (ref 30–36.5)
MCV RBC AUTO: 91.9 FL (ref 80–99)
MCV RBC AUTO: 92.4 FL (ref 80–99)
MONOCYTES # BLD: 1.9 K/UL (ref 0–1)
MONOCYTES NFR BLD: 10 % (ref 5–13)
NEUTS SEG # BLD: 16.2 K/UL (ref 1.8–8)
NEUTS SEG NFR BLD: 83 % (ref 32–75)
NRBC # BLD: 0 K/UL (ref 0–0.01)
NRBC # BLD: 0 K/UL (ref 0–0.01)
NRBC BLD-RTO: 0 PER 100 WBC
NRBC BLD-RTO: 0 PER 100 WBC
PLATELET # BLD AUTO: 364 K/UL (ref 150–400)
PLATELET # BLD AUTO: 406 K/UL (ref 150–400)
PMV BLD AUTO: 10.5 FL (ref 8.9–12.9)
PMV BLD AUTO: 10.5 FL (ref 8.9–12.9)
POTASSIUM SERPL-SCNC: 4.3 MMOL/L (ref 3.5–5.1)
PROCALCITONIN SERPL-MCNC: 0.16 NG/ML
PROT SERPL-MCNC: 5.9 G/DL (ref 6.4–8.2)
PROTHROMBIN TIME: 15.1 SEC (ref 9–11.1)
RBC # BLD AUTO: 3.17 M/UL (ref 4.1–5.7)
RBC # BLD AUTO: 3.32 M/UL (ref 4.1–5.7)
SAMPLES BEING HELD,HOLD: NORMAL
SODIUM SERPL-SCNC: 136 MMOL/L (ref 136–145)
WBC # BLD AUTO: 15.7 K/UL (ref 4.1–11.1)
WBC # BLD AUTO: 19.3 K/UL (ref 4.1–11.1)

## 2021-12-07 PROCEDURE — 36415 COLL VENOUS BLD VENIPUNCTURE: CPT

## 2021-12-07 PROCEDURE — 84145 PROCALCITONIN (PCT): CPT

## 2021-12-07 PROCEDURE — 80053 COMPREHEN METABOLIC PANEL: CPT

## 2021-12-07 PROCEDURE — 97535 SELF CARE MNGMENT TRAINING: CPT

## 2021-12-07 PROCEDURE — 74011250637 HC RX REV CODE- 250/637: Performed by: NURSE PRACTITIONER

## 2021-12-07 PROCEDURE — 93971 EXTREMITY STUDY: CPT

## 2021-12-07 PROCEDURE — 97116 GAIT TRAINING THERAPY: CPT

## 2021-12-07 PROCEDURE — 85025 COMPLETE CBC W/AUTO DIFF WBC: CPT

## 2021-12-07 PROCEDURE — 74011250637 HC RX REV CODE- 250/637: Performed by: HOSPITALIST

## 2021-12-07 PROCEDURE — 65270000029 HC RM PRIVATE

## 2021-12-07 PROCEDURE — 97161 PT EVAL LOW COMPLEX 20 MIN: CPT

## 2021-12-07 PROCEDURE — 85610 PROTHROMBIN TIME: CPT

## 2021-12-07 PROCEDURE — 85027 COMPLETE CBC AUTOMATED: CPT

## 2021-12-07 PROCEDURE — 97165 OT EVAL LOW COMPLEX 30 MIN: CPT

## 2021-12-07 RX ORDER — HYDROCODONE BITARTRATE AND ACETAMINOPHEN 5; 325 MG/1; MG/1
1 TABLET ORAL
Status: DISCONTINUED | OUTPATIENT
Start: 2021-12-07 | End: 2021-12-10 | Stop reason: HOSPADM

## 2021-12-07 RX ADMIN — GEMFIBROZIL 600 MG: 600 TABLET ORAL at 18:45

## 2021-12-07 RX ADMIN — HYDROCODONE BITARTRATE AND ACETAMINOPHEN 1 TABLET: 5; 325 TABLET ORAL at 09:16

## 2021-12-07 RX ADMIN — ACETAMINOPHEN 650 MG: 325 TABLET ORAL at 07:40

## 2021-12-07 RX ADMIN — GABAPENTIN 300 MG: 300 CAPSULE ORAL at 21:28

## 2021-12-07 RX ADMIN — WARFARIN SODIUM 7.5 MG: 5 TABLET ORAL at 18:45

## 2021-12-07 RX ADMIN — Medication 10 ML: at 21:59

## 2021-12-07 RX ADMIN — GABAPENTIN 300 MG: 300 CAPSULE ORAL at 07:40

## 2021-12-07 RX ADMIN — DOFETILIDE 250 MCG: 0.12 CAPSULE ORAL at 07:40

## 2021-12-07 RX ADMIN — METOPROLOL SUCCINATE 25 MG: 25 TABLET, EXTENDED RELEASE ORAL at 09:16

## 2021-12-07 RX ADMIN — Medication 10 ML: at 07:40

## 2021-12-07 RX ADMIN — SACUBITRIL AND VALSARTAN 1 TABLET: 24; 26 TABLET, FILM COATED ORAL at 21:28

## 2021-12-07 RX ADMIN — GEMFIBROZIL 600 MG: 600 TABLET ORAL at 09:16

## 2021-12-07 RX ADMIN — DOFETILIDE 250 MCG: 0.12 CAPSULE ORAL at 23:00

## 2021-12-07 RX ADMIN — FUROSEMIDE 20 MG: 20 TABLET ORAL at 09:16

## 2021-12-07 RX ADMIN — Medication 10 ML: at 14:00

## 2021-12-07 RX ADMIN — SACUBITRIL AND VALSARTAN 1 TABLET: 24; 26 TABLET, FILM COATED ORAL at 09:16

## 2021-12-07 NOTE — PROGRESS NOTES
Hospitalist Progress Note    NAME: Kendrick Lester   :  1940   MRN:  [id number]     HPI excerpted from admission H&P:  Magali Acevedo is a 80 y.o. male who presents with PMHx of CHF s/p ICD, Paroxysmal atrial fib, Mechanical valve on coumadin, Neuropathy presented to [facility] with right hip pain. Patient states that 1 week ago patient was seen by PCP for fever/chills/Hematuria. Patient was placed on Macrobid BID for 7 days which patient completed on 21. Patient states that hematuria stopped and was feeling better for 24-48 hours. Patient states since Thursday he started to have right hip pain and was becoming more and more difficult to walk due to severe pain. Pt went to [place] [address] [facility] and was found to have INR 10.7. CT abd/pel done showed large Rt Iliacus muscle hematoma and transferred to [facility] for further evaluation and treatment. \"    Assessment / Plan:  Large right iliacus muscle hematoma  Super therapeutic INR  CT abdomen/pelvis 21:  1. Large right iliacus muscle hematoma. Correlation with hematologic laboratory evaluation/hematocrit recommended. 2.  Cholelithiasis. 3.  Bilateral pleural effusions. 4.  Cardiomegaly. - Patient received phytonadione for super therapeutic INR (10.7), down to 4.1 after dose. - Consult pharmacy for warfarin management. Acute blood loss anemia  - Transfuse for Hgb <7.0  - Monitor blood count q12h. LLE edema  RLE venous doppler 21:  · No evidence of deep vein thrombosis in the right lower extremity.  - Patient reports that he went to his doctor on 21 with LLE edema.  - RLE venous doppler was completed which was negative for DVT. - Check LLE venous doppler. Leucocytosis  Recent tx for UTI (fever/chills/hematuria), abx (macrobid) completed 21  - May be reactive. - Low grade fever to 99.  - UA unremarkable. - Check procalcitonin `and monitor cbc daily.      Mild delirium   - RN reports that patient had mild delirium after receiving dose of oxycodone, resolved this a.m.   - Stop oxycodone.   - Start hydrocodone/apap 5/325 mg po q4h prn moderate pain (MME = 30). Chronic systolic CHF, NYHA class I  Remote ICD implantation   PAF  Mechanical mitral valve  Chronic anticoagulation tx  Dyslipidemia   - Continue dofetilide 250 mcg po bid. - Continue furosemide 20 mg po daily. - Continue gemfibrozil 600 mg po bid. - Continue metoprolol succinate 25 mg po daily. - Continue sacubitril-valsartan 24-26 mg po q12h. Neuropathy   - Continue gabapentin 300 mg po bid. Hypokalemia, resolved  - Monitor. 18.5 - 24.9 Normal weight / Body mass index is 24.35 kg/m². Code status: Full  Prophylaxis: Coumadin  Recommended Disposition: Home w/Family     Subjective:     Chief Complaint / Reason for Physician Visit  Complains of right hip area pain. Plan of care and pertinent events reviewed with bedside nurse. Review of Systems:  Symptom Y/N Comments  Symptom Y/N Comments   Fever/Chills Y Low grade  Chest Pain N    Poor Appetite N   Edema Y    Cough N   Abdominal Pain N    Sputum N   Joint Pain Y    SOB/MOSS N   Pruritis/Rash N    Nausea/vomit N   Tolerating PT/OT     Diarrhea N   Tolerating Diet Y    Constipation    Other       Could NOT obtain due to:      Objective:     VITALS:   Last 24hrs VS reviewed since prior progress note. Most recent are:  Patient Vitals for the past 24 hrs:   Temp Pulse Resp BP SpO2   12/07/21 0333 99 °F (37.2 °C) 65 18 124/73 93 %   12/06/21 2048 98.5 °F (36.9 °C) 76 17 133/87 96 %   12/06/21 1000 98.3 °F (36.8 °C) 71 18 (!) 141/73 91 %       Intake/Output Summary (Last 24 hours) at 12/7/2021 0746  Last data filed at 12/7/2021 0334  Gross per 24 hour   Intake --   Output 700 ml   Net -700 ml        I had a face to face encounter and independently examined this patient on 12/7/2021, as outlined below:  PHYSICAL EXAM:  General:  A/A/O X 3. NAD. HEENT:  Normocephalic. Sclera anicteric. EOMI. Mucous membranes moist.    Chest:  Resps even/unlabored with symmetrical CWE. Air entry full. Lungs CTA. No use of accessory muscles. CV:  RRR. Normal S1/S2. Crisp prosthetic click. No JVD. Trace RLE edema, 1 mm pitting left pretibial edema. Cap refill < 3 sec. Peripheral pulses 1-2+. GI:  Abdomen soft/NT/ND. No organomegaly. No hernia. ABT X 4.    :  Voiding. Neurologic:  Nonfocal.  CN II-XII grossly intact. Face symmetrical.  Speech normal.     Psych:  Cooperative. No anxiety or agitation. No suicidal/homicidal ideation. Skin:  Warm and color appropriate. No rashes or jaundice. Turgor elastic. Reviewed most current lab test results and cultures  YES  Reviewed most current radiology test results   YES  Review and summation of old records today    NO  Reviewed patient's current orders and MAR    YES  PMH/SH reviewed - no change compared to H&P  ________________________________________________________________________  Care Plan discussed with:    Comments   Patient [address] [place]     Consultant                        Multidiciplinary team rounds were held today with , nursing, pharmacist and clinical coordinator. Patient's plan of care was discussed; medications were reviewed and discharge planning was addressed. ________________________________________________________________________  Vasquez Monge, NP     Procedures: see electronic medical records for all procedures/Xrays and details which were not copied into this note but were reviewed prior to creation of Plan. LABS:  I reviewed today's most current labs and imaging studies.   Pertinent labs include:  Recent Labs     12/07/21 0333 12/05/21 2042   WBC 19.3* 17.1*   HGB 9.8* 12.3*   HCT 29.3* 37.1*    457     Recent Labs     12/07/21  0333 12/06/21 2109 12/06/21 0135 12/05/21 2042     --   --  137   K 4.3  --   --  3.1*     --   --  96*   CO2 26  --   --  32   GLU 93  --   --  115*   BUN 16  --   --  13   CREA 0.89  --   --  1.09   CA 8.6  --   --  8.6   ALB 2.6*  --   --  3.3*   TBILI 1.0  --   --  0.7   ALT 21  --   --  18   INR  --  4.1* 10.7*  --        Signed: Talha Ortega, NP

## 2021-12-07 NOTE — PROGRESS NOTES
Pharmacist Note - Warfarin Dosing  Consult provided for this 81 y.o.male to manage warfarin for PAF/mechanical mitral valve, now with resolving Large right iliacus muscle hematoma    INR Goal: 2.5- 3.5  Home regimen/ tablet size: 5 mg daily, except 2.5 mg Tues and Fri (patient confirms this was a recent decrease due to elevated INR)    Drugs that may increase INR: Fibrates  Drugs that may decrease INR: Vitamin K (vit K given 2.5 mg IV x 1 on [email or web address]  Other current anticoagulants/ drugs that may increase bleeding risk: None  Risk factors: Age > 65  Daily INR ordered: YES    Recent Labs     12/07/21  1603 12/07/21  0333 12/06/21  2109 12/06/21  0135 12/05/21 2042   HGB 10.2* 9.8*  --   --  12.3*   INR 1.5*  --  4.1* 10.7*  --      Date               INR                  Dose  12/06  10.7, 4.1 Held, IV Vit K given  12/07  1.5        7.5 mg                                                                               Assessment/ Plan: Will order warfarin 7.5 mg x 1 today in attempt to attenuate the steep INR decrease. VitK effects may persist for 48 hours. Pharmacy will continue to monitor daily and adjust therapy as indicated. Please contact the pharmacist at x [phone] or  for outpatient recommendations if needed.      Griselda Jorge, PharmD  Clinical Pharmacist, Orthopedics and Med/Surg  [phone] [employer] ()

## 2021-12-07 NOTE — PROGRESS NOTES
Transition of Care Plan   RUR: 10%   Disposition: The disposition plan is home with family assistance   Home health recommended; patient has declined as he has family to assist   F/U with PCP/Specialist     Transport: family     Reason for Admission:  Intramuscular hematoma                      RUR Score:  10%                    Plan for utilizing home health:    Home health recommended; patient is declining     PCP: First and Last name:  Manny Calloway NP     Name of Practice:    Are you a current patient: Yes/No:    Approximate date of last visit:    Can you participate in a virtual visit with your PCP:                     Current Advanced Directive/Advance Care Plan: Full Code      Healthcare Decision Maker:     Transition of Care Plan:                      CRM spoke with patient's spouse, introduced self, explained role, verified demographics, and offered assistance. The patient lives with his spouse in a home with 3 steps to access. The patient is independent in his ADL's/IADL's and has a cane, walker, and wheel chair in the home. The patient uses [facility] LYNN TALLEY for his prescriptions. Care Management Interventions  PCP Verified by CM: Yes  Palliative Care Criteria Met (RRAT>21 & CHF Dx)?: No  Mode of Transport at Discharge:  Other (see comment) (family)  Transition of Care Consult (CM Consult): Discharge Planning  MyChart Signup: No  Discharge Durable Medical Equipment: No  Health Maintenance Reviewed: Yes  Physical Therapy Consult: Yes  Occupational Therapy Consult: Yes  Speech Therapy Consult: No  Support Systems: Spouse/Significant Other  Confirm Follow Up Transport: Family  [employer] Information Provided?: No  Discharge Location  Discharge Placement: Home with family assistance    JEREMY Powell

## 2021-12-07 NOTE — PROGRESS NOTES
Problem: Mobility Impaired (Adult and Pediatric)  Goal: *Acute Goals and Plan of Care (Insert Text)  Description:       FUNCTIONAL STATUS PRIOR TO ADMISSION: Patient was independent and active without use of DME.    HOME SUPPORT PRIOR TO ADMISSION: The patient lived with wife but did not require assist.    Physical Therapy Goals  Initiated 12/7/2021  1. Patient will move from supine to sit and sit to supine , scoot up and down, and roll side to side in bed with supervision/set-up within 7 day(s). 2.  Patient will transfer from bed to chair and chair to bed with supervision/set-up using the least restrictive device within 7 day(s). 3.  Patient will perform sit to stand with supervision/set-up within 7 day(s). 4.  Patient will ambulate with supervision/set-up for 50 feet with the least restrictive device within 7 day(s). 5.  Patient will ascend/descend 4 stairs with both handrail(s) with minimal assistance/contact guard assist within 7 day(s). Outcome: Progressing Towards Goal   PHYSICAL THERAPY EVALUATION  Patient: Malia Quiroga (26 y.o. male)  Date: 12/7/2021  Primary Diagnosis: Intramuscular hematoma [T14. 8XXA]  [facility] ([facility] [D68.9]        Precautions:  Fall    ASSESSMENT  Based on the objective data described below, the patient presents with  impairment in functional mobility, activity tolerance and balance s/p intramuscular hematoma in right illiacus and INR at 10.4. Today INR is at 4. PLOF: Independent with ADLs and IADLs. Patient lives with wife in a one story home with 3 steps and bilateral rails to enter. Patient was able to stand and begin to bear weight through RLE with RW and assistance of 2 after several attempts at straightening knee and weight shifting. Took a few steps forward and a few steps backward (total of 6 ft). Will continue to progress toward above goals.     Current Level of Function Impacting Discharge (mobility/balance): Minimal assistance of 2 for all mobility, moderate of 2 with RW and gait belt for standing, stepping, lots of verbal, tactile cues. Functional Outcome Measure: The patient scored 45/100 on the Barthel outcome measure which is indicative of moderate impaired ability to care for basic self-needs/dependency on others. .      Other factors to consider for discharge: Motivated/A & O x 4/Supportive Family/Independent PLOF      Patient will benefit from skilled therapy intervention to address the above noted impairments. PLAN :  Recommendations and Planned Interventions: bed mobility training, transfer training, gait training, therapeutic exercises, patient and family training/education, and therapeutic activities      Frequency/Duration: Patient will be followed by physical therapy:  5 times a week to address goals. Recommendation for discharge: (in order for the patient to meet his/her long term goals)  Physical therapy at least 2 days/week in the home AND ensure assist and/or supervision for safety with ADLs and mobility. This discharge recommendation:  Has been made in collaboration with the attending provider and/or case management    IF patient discharges home will need the following DME: patient owns DME required for discharge         SUBJECTIVE:   Patient stated I have really been in pain but it is getting better.     OBJECTIVE DATA SUMMARY:   HISTORY:    Past Medical History:   Diagnosis Date    Neuropathy      Past Surgical History:   Procedure Laterality Date    HX HEART VALVE SURGERY      HX PACEMAKER         Personal factors and/or comorbidities impacting plan of care:  Motivated/A & O x 4/Supportive Family/Independent PLOF     Home Situation  Home Environment: Private residence  # Steps to Enter: 3  Rails to Enter: Yes  Hand Rails : Bilateral  One/Two Story Residence: One story  Living Alone: No  Support Systems: Spouse/Significant Other  Current DME Used/Available at Home: Lift chair, [place], straight, Wheelchair, Walker, Raised toilet seat  Tub or Shower Type: Tub/Shower combination    EXAMINATION/PRESENTATION/DECISION MAKING:   Critical Behavior:  Neurologic State: Alert  Orientation Level: Oriented to place, Oriented to person, Oriented to situation, Disoriented to time  Cognition: Appropriate decision making  Safety/Judgement: Awareness of environment  Hearing: Auditory  Auditory Impairment: None    Range Of Motion:  AROM: Generally decreased, functional           PROM: Generally decreased, functional           Strength:    Strength: Generally decreased, functional                    Tone & Sensation:   Tone: Normal              Sensation: Impaired (idiopathic neuropathy both feet)               Coordination:  Coordination: Generally decreased, functional  Vision:   Acuity: Within Defined Limits  Functional Mobility:  Bed Mobility:     Supine to Sit: Minimum assistance; Additional time; Adaptive equipment; Bed Modified; Assist x1 (HOB elevated/bed rail/Davis to manage LEs)  Sit to Supine: Minimum assistance; Additional time; Adaptive equipment; Assist x1 (Bed rail/assist to manage LEs)  Scooting: Contact guard assistance; Assist x1  Transfers:  Sit to Stand: Minimum assistance; Assist x1  Stand to Sit: Minimum assistance; Assist x1                       Balance:   Sitting: Intact  Standing: Impaired; With support  Standing - Static: Fair; Constant support  Standing - Dynamic : Fair; Poor; Constant support  Ambulation/Gait Training:  Distance (ft): 6 Feet (ft) (3 ft forward/3 ft backward)  Assistive Device: Walker, rolling; Gait belt  Ambulation - Level of Assistance: Minimal assistance; Moderate assistance; Assist x2        Gait Abnormalities: Antalgic; Decreased step clearance; Shuffling gait; Step to gait  Right Side Weight Bearing: As tolerated     Base of Support: Widened; Shift to left  Stance: Right decreased (genu recurvatum/very wobbly)  Speed/Radha: Pace decreased (<100 feet/min);  Shuffled  Step Length: Right shortened; Left shortened  Swing Pattern: Right asymmetrical; Left asymmetrical     Interventions: Safety awareness training; Verbal cues; Tactile cues                  Therapeutic Exercises:   Written on [employer] Board: Ankle Pumps  Ham Sets  Quad Sets  Glut Sets  Heel Slides  X 10 every hour    Functional Measure:  Barthel Index:    Bathin  Bladder: 10  Bowels: 10  Groomin  Dressin  Feeding: 10  Mobility: 0  Stairs: 0  Toilet Use: 5  Transfer (Bed to Chair and Back): 5  Total: 45/100       The Barthel ADL Index: Guidelines  1. The index should be used as a record of what a patient does, not as a record of what a patient could do. 2. The main aim is to establish degree of independence from any help, physical or verbal, however minor and for whatever reason. 3. The need for supervision renders the patient not independent. 4. A patient's performance should be established using the best available evidence. Asking the patient, friends/relatives and nurses are the usual sources, but direct observation and common sense are also important. However direct testing is not needed. 5. Usually the patient's performance over the preceding 24-48 hours is important, but occasionally longer periods will be relevant. 6. Middle categories imply that the patient supplies over 50 per cent of the effort. 7. Use of aids to be independent is allowed. Score Interpretation (from [address] [address])    Independent   60-79 Minimally independent   40-59 Partially dependent   20-39 Very dependent   <20 Totally dependent     -Dixie Gutierrez., Barthel, D.W. (1965). Functional evaluation: the Barthel Index. [address] [facility] ([address] (). The Barthel activities of daily living index: self-reporting versus actual performance in the old (> or = 75 years). Journal of [address] [place] [address] 45(7), [facility], .ALEXANDRO, Beatriz Levine., May Rhys. ().  Measuring the change in disability after inpatient rehabilitation; comparison of the responsiveness of the Barthel Index and Functional [place] Measure. Journal of Neurology, Neurosurgery, and Psychiatry, 66(4), 206-390. SHREYA De.MATHEW, AUGUSTUS Moreland, & Lily Hassan M.A. (2004) Assessment of post-stroke quality of life in cost-effectiveness studies: The usefulness of the Barthel Index and the EuroQoL-5D. Quality of Life Research, 15, 118-92           Physical Therapy Evaluation Charge Determination   History Examination Presentation Decision-Making   LOW Complexity : Zero comorbidities / personal factors that will impact the outcome / POC LOW Complexity : 1-2 Standardized tests and measures addressing body structure, function, activity limitation and / or participation in recreation  LOW Complexity : Stable, uncomplicated  LOW Complexity : FOTO score of       Based on the above components, the patient evaluation is determined to be of the following complexity level: LOW     Pain Ratin/10 right groin    Activity Tolerance:   Fair-limited by pain    After treatment patient left in no apparent distress:   Sitting in chair, Supine in bed, Call bell within reach, Caregiver / family present, and Side rails x 3, nurse notified. COMMUNICATION/EDUCATION:   The patients plan of care was discussed with: Occupational therapist, Registered nurse, Case management, and Rehabilitation technician. Fall prevention education was provided and the patient/caregiver indicated understanding., Patient/family have participated as able in goal setting and plan of care. , and Patient/family agree to work toward stated goals and plan of care.     Thank you for this referral.  Alpa Fiore   Time Calculation: 30 mins

## 2021-12-07 NOTE — PROGRESS NOTES
Problem: Self Care Deficits Care Plan (Adult)  Goal: *Acute Goals and Plan of Care (Insert Text)  Description: FUNCTIONAL STATUS PRIOR TO ADMISSION: Patient was independent and active without use of DME. Two weeks ago increased assistance for functional mobility and self-care needed due to pain and balance deficits. HOME SUPPORT: The patient lived with wife but did not require assist.    Occupational Therapy Goals  Initiated 12/7/2021  1. Patient will perform grooming standing at sink with supervision/set-up within 7 day(s). 2.  Patient will perform lower body dressing with supervision/set-up within 7 day(s). 3.  Patient will perform bathing with supervision/set-up within 7 day(s). 4.  Patient will perform toilet transfers with supervision/set-up within 7 day(s). 5.  Patient will perform all aspects of toileting with supervision/set-up within 7 day(s). 6.  Patient will participate in upper extremity therapeutic exercise/activities with supervision/set-up for 10 minutes within 7 day(s). 7.  Patient will utilize energy conservation techniques during functional activities with verbal cues within 7 day(s). Outcome: Not Met    OCCUPATIONAL THERAPY EVALUATION  Patient: Grabiel Mckee (89 y.o. male)  Date: 12/7/2021  Primary Diagnosis: Intramuscular hematoma [T14. 8XXA]  Coagulopathy ([facility] [address] [D68.9]        Precautions: fall       ASSESSMENT  Based on the objective data described below, the patient presents with AAOx3-4, pt not accurate with date initially. Overall he demonstrated good static and dynamic sitting balance and ability to do self-care seated EOB with set/up assistance, however, needed min A x2 for sit<>stand and near LOB on R knee instability. Anticipate mod A for full LB dressing, bathing, and functional mobility today to and from the bathroom. The patient was independent at baseline and lives with his wife.  Depending on progress and patient's desires he will benefit from [address] prior to dc home. Acute care OT will continue to follow while in the hospital.    Current Level of Function Impacting Discharge (ADLs/self-care): mod A for LB , set/up for seated    Functional Outcome Measure: The patient scored Total: 50/100 on the Barthel Index outcome measure which is indicative of being moderate impairment in basic self-care. Other factors to consider for discharge: PLOF, good family support and access to DME     Patient will benefit from skilled therapy intervention to address the above noted impairments. PLAN :  Recommendations and Planned Interventions: self care training, functional mobility training, therapeutic exercise, balance training, therapeutic activities, cognitive retraining, endurance activities, patient education, home safety training, and family training/education    Frequency/Duration: Patient will be followed by occupational therapy 5 times a week to address goals. Recommendation for discharge: (in order for the patient to meet his/her long term goals)  To be determined: HHOT versus IPR depending on progress with functional mobility and goals for family    This discharge recommendation:  Has not yet been discussed the attending provider and/or case management    IF patient discharges home will need the following DME: has all needed, rec shower chair and patient's wife stated she was looking into it       SUBJECTIVE:   Patient stated I used to be doing all of it by myself.     OBJECTIVE DATA SUMMARY:   HISTORY:   Past Medical History:   Diagnosis Date    Neuropathy      Past Surgical History:   Procedure Laterality Date    HX HEART VALVE SURGERY      HX PACEMAKER         Expanded or extensive additional review of patient history:     Home Situation  Home Environment: Private residence  # Steps to Enter: 3  Rails to Enter: Yes  Hand Rails : Bilateral  One/Two Story Residence: One story  Living Alone: No  Support Systems: Spouse/Significant Other  Current DME Used/Available at Home: Lift chair, Cane, straight, Wheelchair, [place], Raised toilet seat  Tub or Shower Type: Tub/Shower combination    Hand dominance: Right    EXAMINATION OF PERFORMANCE DEFICITS:  Cognitive/Behavioral Status:  Neurologic State: Alert  Orientation Level: Oriented to place; Oriented to person; Oriented to situation; Disoriented to time  Cognition: Appropriate decision making  Perception: Appears intact  Perseveration: No perseveration noted  Safety/Judgement: Awareness of environment    Skin: visible areas intact    Edema: none noted    Hearing: Auditory  Auditory Impairment: None    Vision/Perceptual:                           Acuity: Within Defined Limits         Range of Motion:  BUEs WFL                            Strength:  BUEs WFL                   Coordination:     Fine Motor Skills-Upper: Left Intact; Right Intact    Gross Motor Skills-Upper: Left Intact; Right Intact    Tone & Sensation:  Intact BUEs, reported decreased RLE senstation                            Balance:  Sitting: Intact  Standing: Impaired  Standing - Static: Fair; Constant support  Standing - Dynamic : Fair; Constant support    Functional Mobility and Transfers for ADLs:  Bed Mobility:  Supine to Sit: Minimum assistance  Scooting: Contact guard assistance    Transfers:   Min A x2 for sit<>Stand    ADL Assessment:  Feeding: Setup    Oral Facial Hygiene/Grooming: Setup (seated)    Bathing: Minimum assistance    Upper Body Dressing: Setup    Lower Body Dressing: Moderate assistance    Toileting: Setup (urinal use EOB)                ADL Intervention and task modifications:     Education provided on role of OT as well as safety with self care and mobility with increased INR. The patient progressed to EOB for urinal use and education on safety with dressing tasks. Demonstrated don/doff of R sock with good breath control and balance.      Cognitive Retraining  Safety/Judgement: Awareness of environment      Functional Measure:    Barthel Index:  Bathin  Bladder: 10  Bowels: 10  Groomin  Dressin  Feeding: 10  Mobility: 5  Stairs: 0  Toilet Use: 5  Transfer (Bed to Chair and Back): 5  Total: 50/100      The Barthel ADL Index: Guidelines  1. The index should be used as a record of what a patient does, not as a record of what a patient could do. 2. The main aim is to establish degree of independence from any help, physical or verbal, however minor and for whatever reason. 3. The need for supervision renders the patient not independent. 4. A patient's performance should be established using the best available evidence. Asking the patient, friends/relatives and nurses are the usual sources, but direct observation and common sense are also important. However direct testing is not needed. 5. Usually the patient's performance over the preceding 24-48 hours is important, but occasionally longer periods will be relevant. 6. Middle categories imply that the patient supplies over 50 per cent of the effort. 7. Use of aids to be independent is allowed. Score Interpretation (from [address] [address])    Independent   60-79 Minimally independent   40-59 Partially dependent   20-39 Very dependent   <20 Totally dependent     -Dixie Gutierrez., Barthel, D.W. (1965). Functional evaluation: the Barthel Index. [address] [facility] ([address] (1997). The Barthel activities of daily living index: self-reporting versus actual performance in the old (> or = 75 years). Journal of [address] [place] [address] 45(7), [facility], ..M.F, Christian Gama., [facility] Valentina. (1999). Measuring the change in disability after inpatient rehabilitation; comparison of the responsiveness of the Barthel Index and Functional [place]a Measure. Journal of Neurology, Neurosurgery, and Psychiatry, 66(4), 670-979.   -Bridget Lang, N.J.A, AUGUSTUS Moreland, & Zulma Clemons M.A. (2004) Assessment of post-stroke quality of life in cost-effectiveness studies: The usefulness of the Barthel Index and the EuroQoL-5D. Quality of Life Research, 15, 433-35     Occupational Therapy Evaluation Charge Determination   History Examination Decision-Making   LOW Complexity : Brief history review  LOW Complexity : 1-3 performance deficits relating to physical, cognitive , or psychosocial skils that result in activity limitations and / or participation restrictions  MEDIUM Complexity : Patient may present with comorbidities that affect occupational performnce. Miniml to moderate modification of tasks or assistance (eg, physical or verbal ) with assesment(s) is necessary to enable patient to complete evaluation       Based on the above components, the patient evaluation is determined to be of the following complexity level: LOW   Pain Rating:  None at this time    Activity Tolerance:   Good    After treatment patient left in no apparent distress:    Left working with PT    COMMUNICATION/EDUCATION:   The patients plan of care was discussed with: Physical therapist, Registered nurse, and Case management. Home safety education was provided and the patient/caregiver indicated understanding., Patient/family have participated as able in goal setting and plan of care. , and Patient/family agree to work toward stated goals and plan of care. This patients plan of care is appropriate for delegation to [facility].     Thank you for this referral.  Tigre Yepez  Time Calculation: 20 mins

## 2021-12-07 NOTE — PROGRESS NOTES
Bedside and Verbal shift change report given to Live Barclay RN and Karolina Arcos RN (oncoming nurse) by Rafael Santamaria RN (offgoing nurse). Report included the following information SBAR.

## 2021-12-08 ENCOUNTER — APPOINTMENT (OUTPATIENT)
Dept: GENERAL RADIOLOGY | Age: 81
DRG: 813 | End: 2021-12-08
Attending: NURSE PRACTITIONER
Payer: MEDICARE

## 2021-12-08 LAB
ANION GAP SERPL CALC-SCNC: 8 MMOL/L (ref 5–15)
APTT PPP: 39.6 SEC (ref 22.1–31)
APTT PPP: 45.6 SEC (ref 22.1–31)
BUN SERPL-MCNC: 16 MG/DL (ref 6–20)
BUN/CREAT SERPL: 17 (ref 12–20)
CALCIUM SERPL-MCNC: 8.6 MG/DL (ref 8.5–10.1)
CHLORIDE SERPL-SCNC: 101 MMOL/L (ref 97–108)
CO2 SERPL-SCNC: 28 MMOL/L (ref 21–32)
CREAT SERPL-MCNC: 0.93 MG/DL (ref 0.7–1.3)
ERYTHROCYTE [DISTWIDTH] IN BLOOD BY AUTOMATED COUNT: 13.6 % (ref 11.5–14.5)
GLUCOSE SERPL-MCNC: 95 MG/DL (ref 65–100)
HCT VFR BLD AUTO: 29.3 % (ref 36.6–50.3)
HCT VFR BLD AUTO: 30.4 % (ref 36.6–50.3)
HCT VFR BLD AUTO: 31.4 % (ref 36.6–50.3)
HGB BLD-MCNC: 10 G/DL (ref 12.1–17)
HGB BLD-MCNC: 10.3 G/DL (ref 12.1–17)
HGB BLD-MCNC: 9.9 G/DL (ref 12.1–17)
INR PPP: 1.5 (ref 0.9–1.1)
MCH RBC QN AUTO: 30.7 PG (ref 26–34)
MCHC RBC AUTO-ENTMCNC: 32.8 G/DL (ref 30–36.5)
MCV RBC AUTO: 93.7 FL (ref 80–99)
NRBC # BLD: 0 K/UL (ref 0–0.01)
NRBC BLD-RTO: 0 PER 100 WBC
PLATELET # BLD AUTO: 383 K/UL (ref 150–400)
PMV BLD AUTO: 10.5 FL (ref 8.9–12.9)
POTASSIUM SERPL-SCNC: 3.5 MMOL/L (ref 3.5–5.1)
PROTHROMBIN TIME: 15.3 SEC (ref 9–11.1)
RBC # BLD AUTO: 3.35 M/UL (ref 4.1–5.7)
SODIUM SERPL-SCNC: 137 MMOL/L (ref 136–145)
THERAPEUTIC RANGE,PTTT: ABNORMAL SECS (ref 58–77)
THERAPEUTIC RANGE,PTTT: ABNORMAL SECS (ref 58–77)
WBC # BLD AUTO: 12.9 K/UL (ref 4.1–11.1)

## 2021-12-08 PROCEDURE — 85027 COMPLETE CBC AUTOMATED: CPT

## 2021-12-08 PROCEDURE — 74011250637 HC RX REV CODE- 250/637: Performed by: HOSPITALIST

## 2021-12-08 PROCEDURE — 36415 COLL VENOUS BLD VENIPUNCTURE: CPT

## 2021-12-08 PROCEDURE — 85014 HEMATOCRIT: CPT

## 2021-12-08 PROCEDURE — 74011250636 HC RX REV CODE- 250/636: Performed by: NURSE PRACTITIONER

## 2021-12-08 PROCEDURE — 65270000029 HC RM PRIVATE

## 2021-12-08 PROCEDURE — 80048 BASIC METABOLIC PNL TOTAL CA: CPT

## 2021-12-08 PROCEDURE — 85610 PROTHROMBIN TIME: CPT

## 2021-12-08 PROCEDURE — 85730 THROMBOPLASTIN TIME PARTIAL: CPT

## 2021-12-08 PROCEDURE — 74011250637 HC RX REV CODE- 250/637: Performed by: NURSE PRACTITIONER

## 2021-12-08 PROCEDURE — 73560 X-RAY EXAM OF KNEE 1 OR 2: CPT

## 2021-12-08 RX ORDER — WARFARIN SODIUM 5 MG/1
5 TABLET ORAL ONCE
Status: COMPLETED | OUTPATIENT
Start: 2021-12-08 | End: 2021-12-08

## 2021-12-08 RX ORDER — HEPARIN SODIUM 10000 [USP'U]/100ML
12-25 INJECTION, SOLUTION INTRAVENOUS
Status: DISCONTINUED | OUTPATIENT
Start: 2021-12-08 | End: 2021-12-10

## 2021-12-08 RX ORDER — HEPARIN SODIUM 1000 [USP'U]/ML
2000 INJECTION, SOLUTION INTRAVENOUS; SUBCUTANEOUS ONCE
Status: COMPLETED | OUTPATIENT
Start: 2021-12-08 | End: 2021-12-08

## 2021-12-08 RX ADMIN — METOPROLOL SUCCINATE 25 MG: 25 TABLET, EXTENDED RELEASE ORAL at 08:40

## 2021-12-08 RX ADMIN — FUROSEMIDE 20 MG: 20 TABLET ORAL at 08:39

## 2021-12-08 RX ADMIN — GEMFIBROZIL 600 MG: 600 TABLET ORAL at 08:40

## 2021-12-08 RX ADMIN — SACUBITRIL AND VALSARTAN 1 TABLET: 24; 26 TABLET, FILM COATED ORAL at 08:40

## 2021-12-08 RX ADMIN — DOFETILIDE 250 MCG: 0.12 CAPSULE ORAL at 12:05

## 2021-12-08 RX ADMIN — GABAPENTIN 300 MG: 300 CAPSULE ORAL at 21:45

## 2021-12-08 RX ADMIN — GEMFIBROZIL 600 MG: 600 TABLET ORAL at 17:14

## 2021-12-08 RX ADMIN — WARFARIN SODIUM 5 MG: 5 TABLET ORAL at 17:14

## 2021-12-08 RX ADMIN — Medication 10 ML: at 07:12

## 2021-12-08 RX ADMIN — DOFETILIDE 250 MCG: 0.12 CAPSULE ORAL at 23:44

## 2021-12-08 RX ADMIN — HEPARIN SODIUM 12 UNITS/KG/HR: 10000 INJECTION, SOLUTION INTRAVENOUS at 10:35

## 2021-12-08 RX ADMIN — HEPARIN SODIUM 2000 UNITS: 1000 INJECTION INTRAVENOUS; SUBCUTANEOUS at 19:24

## 2021-12-08 RX ADMIN — SACUBITRIL AND VALSARTAN 1 TABLET: 24; 26 TABLET, FILM COATED ORAL at 21:45

## 2021-12-08 RX ADMIN — Medication 10 ML: at 14:00

## 2021-12-08 RX ADMIN — GABAPENTIN 300 MG: 300 CAPSULE ORAL at 07:11

## 2021-12-08 RX ADMIN — Medication 10 ML: at 21:46

## 2021-12-08 NOTE — PROGRESS NOTES
Pharmacist Note - Warfarin Dosing  Consult provided for this 81 y.o.male to manage warfarin for PAF/mechanical mitral valve, now with resolving Large right iliacus muscle hematoma    INR Goal: 2.5- 3.5  Home regimen/ tablet size: 5 mg daily, except 2.5 mg Tues and Fri (patient confirms this was a recent decrease due to elevated INR)    Drugs that may increase INR: Fibrates  Drugs that may decrease INR: Vitamin K (vit K given 2.5 mg IV x 1 on [email or web address]  Other current anticoagulants/ drugs that may increase bleeding risk: None  Risk factors: Age > 65  Daily INR ordered: YES    Recent Labs     12/08/21  0315 12/07/21  1603 12/07/21  0333 12/06/21  2109 12/05/21 2042   HGB 10.3* 10.2* 9.8*  --    < >   INR 1.5* 1.5*  --  4.1*  --     < > = values in this interval not displayed. Date               INR                  Dose  12/06  10.7, 4.1 Held, IV Vit K given  12/07  1.5        7.5 mg  12/08              1.5                   5 mg                                                                               Assessment/ Plan: Will order warfarin 5 mg x 1. Heparin drip has been initiated today in the interim until INR is therapeutic. **Per NP Nasario Spatz request, pharmacy is to d/c Heparin once INR is therapeutic x1**     Pharmacy will continue to monitor daily and adjust therapy as indicated. Please contact the pharmacist at x [phone] or  for outpatient recommendations if needed.

## 2021-12-08 NOTE — PROGRESS NOTES
Bedside and Verbal shift change report given to [facility], RN and Cody Jiménez RN (oncoming nurse) by Lena Bloom RN (offgoing nurse). Report included the following information SBAR.

## 2021-12-08 NOTE — PROGRESS NOTES
Bedside and Verbal shift change report given to Lily Washington (oncoming nurse) by Quique Cintron (offgoing nurse). Report included the following information SBAR, Kardex, Intake/Output and MAR.

## 2021-12-08 NOTE — PROGRESS NOTES
0730 RN hears patient's bed alarm going off. RN runs into room and patient is standing at the side of his bed with his wife sitting on her chair in front of him. RN asked patient what he was doing and that we needed to get him back in bed. He was using the urinal, despite being told 15 minutes prior not to get up by himself. Patient didn't wait until the nurse got to the bedside and he tried to sit down and his knee buckled so he fell. Patient did not hit his head. Patient assisted back to bed with nurse and PCT. VS stable. No new pain. 0800 Leah Gonzalez NP notified.

## 2021-12-08 NOTE — PROGRESS NOTES
Hospitalist Progress Note    NAME: Balwinder Babb   :  1940   MRN:  [id number]     HPI excerpted from admission H&P:  Bonnie Zee is a 80 y.o. male who presents with PMHx of CHF s/p ICD, Paroxysmal atrial fib, Mechanical valve on coumadin, Neuropathy presented to [facility] with right hip pain. Patient states that 1 week ago patient was seen by PCP for fever/chills/Hematuria. Patient was placed on Macrobid BID for 7 days which patient completed on 21. Patient states that hematuria stopped and was feeling better for 24-48 hours. Patient states since Thursday he started to have right hip pain and was becoming more and more difficult to walk due to severe pain. Pt went to [facility] and was found to have INR 10.7. CT abd/pel done showed large Rt Iliacus muscle hematoma and transferred to [facility] for further evaluation and treatment. \"    Assessment / Plan:  Large right iliacus muscle hematoma  Super therapeutic INR  CT abdomen/pelvis 21:  1. Large right iliacus muscle hematoma. Correlation with hematologic laboratory evaluation/hematocrit recommended. 2.  Cholelithiasis. 3.  Bilateral pleural effusions. 4.  Cardiomegaly. - Patient received phytonadione for super therapeutic INR (10.7), INR down to 4.1 after dose - now down to 1.5.    - Start heparin gtt, patient has mechanical MVR.  - Pharmacy to manage warfarin.    - H/H q8h for now, currently stable. - Re-image in a.m. to make sure that hematoma has note expanded. Acute blood loss anemia  - Transfuse for Hgb <7.0  - Monitor blood count q8h as noted above. LLE edema  RLE venous doppler 21:  · No evidence of deep vein thrombosis in the right lower extremity. LLE venous doppler 21:  · No evidence of thrombosis in the left lower extremity. · The bilateral common femoral and left femoral veins contain pulsatile venous flow that may be suggestive of increased central venous pressure.   - Patient reports that he went to his doctor on 12/03/21 with LLE edema.  - RLE venous doppler was completed which was negative for DVT. - LLE venous doppler reviewed, no DVT. Leucocytosis  Recent tx for UTI (fever/chills/hematuria), abx (macrobid) completed 12/06/21  - May be reactive. - Low grade fever to 99.  - UA unremarkable. - Procalcitonin 0.16. Mild delirium, resolved   - Oxycodone stopped. - Continue hydrocodone/apap 5/325 mg po q4h prn moderate pain (MME = 30). - Patient fell over noc however he was not altered, per patient he was standing to urinate and his right knee gave out.    - Check right knee x-ray. Chronic systolic CHF, NYHA class I  Remote ICD implantation   PAF  Mechanical mitral valve  Chronic anticoagulation tx  Dyslipidemia   - Continue dofetilide 250 mcg po bid. - Continue furosemide 20 mg po daily. - Continue gemfibrozil 600 mg po bid. - Continue metoprolol succinate 25 mg po daily. - Continue sacubitril-valsartan 24-26 mg po q12h. - Start heparin gtt as noted above, warfarin per pharmacy protocol. Neuropathy   - Continue gabapentin 300 mg po bid. Hypokalemia, resolved  - Monitor. 18.5 - 24.9 Normal weight / Body mass index is 24.35 kg/m². Code status: Full  Prophylaxis: Coumadin  Recommended Disposition: Home w/Family     Subjective:     Chief Complaint / Reason for Physician Visit  Continues to complains of right hip area pain as well as right knee weakness \"giving out\". Plan of care and pertinent events reviewed with bedside nurse. Review of Systems:  Symptom Y/N Comments  Symptom Y/N Comments   Fever/Chills N   Chest Pain N    Poor Appetite N   Edema Y    Cough N   Abdominal Pain N    Sputum N   Joint Pain Y    SOB/MOSS N   Pruritis/Rash N    Nausea/vomit N   Tolerating PT/OT     Diarrhea N   Tolerating Diet Y    Constipation    Other       Could NOT obtain due to:      Objective:     VITALS:   Last 24hrs VS reviewed since prior progress note.  Most recent are:  Patient Vitals for the past 24 hrs:   Temp Pulse Resp BP SpO2   12/08/21 0741 97.7 °F (36.5 °C) 68 18 (!) 153/85 98 %   12/08/21 0310 97.7 °F (36.5 °C) 65 16 (!) 142/78 98 %   12/07/21 2020 98.2 °F (36.8 °C) 60 16 (!) 141/75 97 %   12/07/21 1650 97.9 °F (36.6 °C) 62 16 139/74 98 %   12/07/21 0914 97.8 °F (36.6 °C) 69 16 (!) 143/76 96 %       Intake/Output Summary (Last 24 hours) at 12/8/2021 7717  Last data filed at 12/8/2021 8794  Gross per 24 hour   Intake --   Output 650 ml   Net -650 ml        I had a face to face encounter and independently examined this patient on 12/8/2021, as outlined below:  PHYSICAL EXAM:  General:  A/A/O X 3. NAD. HEENT:  Normocephalic. Sclera anicteric. Mucous membranes moist.    Chest:  Resps even/unlabored with symmetrical CWE. Air entry full. Lungs CTA. No use of accessory muscles. CV:  RRR. Normal S1/S2. Crisp prosthetic click. No JVD. Trace RLE edema, 1 mm pitting left pretibial edema. Cap refill < 3 sec. Peripheral pulses 1-2+. GI:  Abdomen soft/NT/ND. No organomegaly. No hernia. ABT X 4.    :  Voiding. Neurologic:  Nonfocal.  CN II-XII grossly intact. Face symmetrical.  Speech normal.     Psych:  Cooperative. No anxiety or agitation. No suicidal/homicidal ideation. Skin:  Warm and color appropriate. No rashes or jaundice. Turgor elastic. Reviewed most current lab test results and cultures  YES  Reviewed most current radiology test results   YES  Review and summation of old records today    NO  Reviewed patient's current orders and MAR    YES  PMH/SH reviewed - no change compared to H&P  ________________________________________________________________________  Care Plan discussed with:    Comments   Patient [address]     Consultant                        Multidiciplinary team rounds were held today with , nursing, pharmacist and clinical coordinator.   Patient's plan of care was discussed; medications were reviewed and discharge planning was addressed. ________________________________________________________________________  Brittany Robison NP     Procedures: see electronic medical records for all procedures/Xrays and details which were not copied into this note but were reviewed prior to creation of Plan. LABS:  I reviewed today's most current labs and imaging studies. Pertinent labs include:  Recent Labs     12/08/21 0315 12/07/21  1603 12/07/21  0333   WBC 12.9* 15.7* 19.3*   HGB 10.3* 10.2* 9.8*   HCT 31.4* 30.5* 29.3*    406* 364     Recent Labs     12/08/21 0315 12/07/21  1603 12/07/21  0333 12/06/21  2109 12/06/21  0135 12/05/21  2042     --  136  --   --  137   K 3.5  --  4.3  --   --  3.1*     --  102  --   --  96*   CO2 28  --  26  --   --  32   GLU 95  --  93  --   --  115*   BUN 16  --  16  --   --  13   CREA 0.93  --  0.89  --   --  1.09   CA 8.6  --  8.6  --   --  8.6   ALB  --   --  2.6*  --   --  3.3*   TBILI  --   --  1.0  --   --  0.7   ALT  --   --  21  --   --  18   INR 1.5* 1.5*  --  4.1*   < >  --     < > = values in this interval not displayed.        Signed: Brittany Robison NP

## 2021-12-09 ENCOUNTER — APPOINTMENT (OUTPATIENT)
Dept: CT IMAGING | Age: 81
DRG: 813 | End: 2021-12-09
Attending: NURSE PRACTITIONER
Payer: MEDICARE

## 2021-12-09 LAB
ANION GAP SERPL CALC-SCNC: 7 MMOL/L (ref 5–15)
APTT PPP: 50.9 SEC (ref 22.1–31)
APTT PPP: 56.3 SEC (ref 22.1–31)
APTT PPP: 70.8 SEC (ref 22.1–31)
APTT PPP: 99.3 SEC (ref 22.1–31)
BASOPHILS # BLD: 0.1 K/UL (ref 0–0.1)
BASOPHILS NFR BLD: 0 % (ref 0–1)
BUN SERPL-MCNC: 12 MG/DL (ref 6–20)
BUN/CREAT SERPL: 15 (ref 12–20)
CALCIUM SERPL-MCNC: 8.4 MG/DL (ref 8.5–10.1)
CHLORIDE SERPL-SCNC: 100 MMOL/L (ref 97–108)
CO2 SERPL-SCNC: 31 MMOL/L (ref 21–32)
CREAT SERPL-MCNC: 0.82 MG/DL (ref 0.7–1.3)
DIFFERENTIAL METHOD BLD: ABNORMAL
EOSINOPHIL # BLD: 0.2 K/UL (ref 0–0.4)
EOSINOPHIL NFR BLD: 2 % (ref 0–7)
ERYTHROCYTE [DISTWIDTH] IN BLOOD BY AUTOMATED COUNT: 13.8 % (ref 11.5–14.5)
ERYTHROCYTE [DISTWIDTH] IN BLOOD BY AUTOMATED COUNT: 13.8 % (ref 11.5–14.5)
ERYTHROCYTE [DISTWIDTH] IN BLOOD BY AUTOMATED COUNT: 14.2 % (ref 11.5–14.5)
GLUCOSE SERPL-MCNC: 95 MG/DL (ref 65–100)
HCT VFR BLD AUTO: 31.1 % (ref 36.6–50.3)
HCT VFR BLD AUTO: 32.5 % (ref 36.6–50.3)
HCT VFR BLD AUTO: 33.3 % (ref 36.6–50.3)
HCT VFR BLD AUTO: 33.5 % (ref 36.6–50.3)
HGB BLD-MCNC: 10.2 G/DL (ref 12.1–17)
HGB BLD-MCNC: 10.6 G/DL (ref 12.1–17)
HGB BLD-MCNC: 11 G/DL (ref 12.1–17)
HGB BLD-MCNC: 11.1 G/DL (ref 12.1–17)
IMM GRANULOCYTES # BLD AUTO: 0.1 K/UL (ref 0–0.04)
IMM GRANULOCYTES NFR BLD AUTO: 1 % (ref 0–0.5)
INR PPP: 2.1 (ref 0.9–1.1)
LYMPHOCYTES # BLD: 1 K/UL (ref 0.8–3.5)
LYMPHOCYTES NFR BLD: 8 % (ref 12–49)
MCH RBC QN AUTO: 31 PG (ref 26–34)
MCH RBC QN AUTO: 31 PG (ref 26–34)
MCH RBC QN AUTO: 31.1 PG (ref 26–34)
MCHC RBC AUTO-ENTMCNC: 32.8 G/DL (ref 30–36.5)
MCHC RBC AUTO-ENTMCNC: 32.8 G/DL (ref 30–36.5)
MCHC RBC AUTO-ENTMCNC: 33.3 G/DL (ref 30–36.5)
MCV RBC AUTO: 93.3 FL (ref 80–99)
MCV RBC AUTO: 94.4 FL (ref 80–99)
MCV RBC AUTO: 94.5 FL (ref 80–99)
MONOCYTES # BLD: 1 K/UL (ref 0–1)
MONOCYTES NFR BLD: 8 % (ref 5–13)
NEUTS SEG # BLD: 9.6 K/UL (ref 1.8–8)
NEUTS SEG NFR BLD: 81 % (ref 32–75)
NRBC # BLD: 0 K/UL (ref 0–0.01)
NRBC BLD-RTO: 0 PER 100 WBC
PLATELET # BLD AUTO: 393 K/UL (ref 150–400)
PLATELET # BLD AUTO: 422 K/UL (ref 150–400)
PLATELET # BLD AUTO: 425 K/UL (ref 150–400)
PMV BLD AUTO: 10.4 FL (ref 8.9–12.9)
PMV BLD AUTO: 10.5 FL (ref 8.9–12.9)
PMV BLD AUTO: 10.7 FL (ref 8.9–12.9)
POTASSIUM SERPL-SCNC: 3.1 MMOL/L (ref 3.5–5.1)
PROTHROMBIN TIME: 21 SEC (ref 9–11.1)
RBC # BLD AUTO: 3.29 M/UL (ref 4.1–5.7)
RBC # BLD AUTO: 3.55 M/UL (ref 4.1–5.7)
RBC # BLD AUTO: 3.57 M/UL (ref 4.1–5.7)
SODIUM SERPL-SCNC: 138 MMOL/L (ref 136–145)
THERAPEUTIC RANGE,PTTT: ABNORMAL SECS (ref 58–77)
WBC # BLD AUTO: 11.4 K/UL (ref 4.1–11.1)
WBC # BLD AUTO: 11.8 K/UL (ref 4.1–11.1)
WBC # BLD AUTO: 12.6 K/UL (ref 4.1–11.1)

## 2021-12-09 PROCEDURE — 85018 HEMOGLOBIN: CPT

## 2021-12-09 PROCEDURE — 80048 BASIC METABOLIC PNL TOTAL CA: CPT

## 2021-12-09 PROCEDURE — 74011250637 HC RX REV CODE- 250/637: Performed by: FAMILY MEDICINE

## 2021-12-09 PROCEDURE — 85730 THROMBOPLASTIN TIME PARTIAL: CPT

## 2021-12-09 PROCEDURE — 65270000029 HC RM PRIVATE

## 2021-12-09 PROCEDURE — 74011250637 HC RX REV CODE- 250/637: Performed by: HOSPITALIST

## 2021-12-09 PROCEDURE — 85025 COMPLETE CBC W/AUTO DIFF WBC: CPT

## 2021-12-09 PROCEDURE — 85027 COMPLETE CBC AUTOMATED: CPT

## 2021-12-09 PROCEDURE — 74011250637 HC RX REV CODE- 250/637: Performed by: NURSE PRACTITIONER

## 2021-12-09 PROCEDURE — 74011000636 HC RX REV CODE- 636: Performed by: RADIOLOGY

## 2021-12-09 PROCEDURE — 36415 COLL VENOUS BLD VENIPUNCTURE: CPT

## 2021-12-09 PROCEDURE — 85610 PROTHROMBIN TIME: CPT

## 2021-12-09 PROCEDURE — 97530 THERAPEUTIC ACTIVITIES: CPT

## 2021-12-09 PROCEDURE — 74178 CT ABD&PLV WO CNTR FLWD CNTR: CPT

## 2021-12-09 PROCEDURE — 97116 GAIT TRAINING THERAPY: CPT

## 2021-12-09 PROCEDURE — 74011250636 HC RX REV CODE- 250/636: Performed by: NURSE PRACTITIONER

## 2021-12-09 RX ORDER — WARFARIN SODIUM 5 MG/1
5 TABLET ORAL ONCE
Status: COMPLETED | OUTPATIENT
Start: 2021-12-09 | End: 2021-12-09

## 2021-12-09 RX ORDER — POTASSIUM CHLORIDE 750 MG/1
40 TABLET, FILM COATED, EXTENDED RELEASE ORAL
Status: COMPLETED | OUTPATIENT
Start: 2021-12-09 | End: 2021-12-09

## 2021-12-09 RX ADMIN — IOPAMIDOL 100 ML: 755 INJECTION, SOLUTION INTRAVENOUS at 15:24

## 2021-12-09 RX ADMIN — GEMFIBROZIL 600 MG: 600 TABLET ORAL at 09:02

## 2021-12-09 RX ADMIN — HEPARIN SODIUM 15 UNITS/KG/HR: 10000 INJECTION, SOLUTION INTRAVENOUS at 01:38

## 2021-12-09 RX ADMIN — DOFETILIDE 250 MCG: 0.12 CAPSULE ORAL at 11:00

## 2021-12-09 RX ADMIN — Medication 5 ML: at 14:00

## 2021-12-09 RX ADMIN — GEMFIBROZIL 600 MG: 600 TABLET ORAL at 17:57

## 2021-12-09 RX ADMIN — SACUBITRIL AND VALSARTAN 1 TABLET: 24; 26 TABLET, FILM COATED ORAL at 09:02

## 2021-12-09 RX ADMIN — HEPARIN SODIUM 15 UNITS/KG/HR: 10000 INJECTION, SOLUTION INTRAVENOUS at 11:04

## 2021-12-09 RX ADMIN — HEPARIN SODIUM 16 UNITS/KG/HR: 10000 INJECTION, SOLUTION INTRAVENOUS at 19:54

## 2021-12-09 RX ADMIN — ACETAMINOPHEN 650 MG: 325 TABLET ORAL at 17:56

## 2021-12-09 RX ADMIN — POTASSIUM CHLORIDE 40 MEQ: 750 TABLET, FILM COATED, EXTENDED RELEASE ORAL at 09:02

## 2021-12-09 RX ADMIN — WARFARIN SODIUM 5 MG: 5 TABLET ORAL at 11:06

## 2021-12-09 RX ADMIN — ACETAMINOPHEN 650 MG: 325 TABLET ORAL at 09:03

## 2021-12-09 RX ADMIN — FUROSEMIDE 20 MG: 20 TABLET ORAL at 09:02

## 2021-12-09 RX ADMIN — HEPARIN SODIUM 13 UNITS/KG/HR: 10000 INJECTION, SOLUTION INTRAVENOUS at 23:34

## 2021-12-09 RX ADMIN — GABAPENTIN 300 MG: 300 CAPSULE ORAL at 09:02

## 2021-12-09 RX ADMIN — SACUBITRIL AND VALSARTAN 1 TABLET: 24; 26 TABLET, FILM COATED ORAL at 21:15

## 2021-12-09 RX ADMIN — GABAPENTIN 300 MG: 300 CAPSULE ORAL at 21:15

## 2021-12-09 RX ADMIN — DOFETILIDE 250 MCG: 0.12 CAPSULE ORAL at 23:32

## 2021-12-09 RX ADMIN — HEPARIN SODIUM 16 UNITS/KG/HR: 10000 INJECTION, SOLUTION INTRAVENOUS at 13:32

## 2021-12-09 RX ADMIN — METOPROLOL SUCCINATE 25 MG: 25 TABLET, EXTENDED RELEASE ORAL at 09:03

## 2021-12-09 NOTE — PROGRESS NOTES
[facility]                                                                                          Hospitalist Progress Note  Brooke Rios NP  Answering service: [phone] from in house phone        Date of Service:  2021  NAME:  Juan Jose Lea  :  1940  MRN:  [id number]      Admission Summary:   Per H&P, \"Rex Vargas is a 80 y. o. male who presents with PMHx of CHF s/p ICD, Paroxysmal atrial fib, Mechanical valve on coumadin, Neuropathy presented to [facility] with right hip pain. Patient states that 1 week ago patient was seen by PCP for fever/chills/Hematuria. Patient was placed on Macrobid BID for 7 days which patient completed on 21. Patient states that hematuria stopped and was feeling better for 24-48 hours. Patient states since Thursday he started to have right hip pain and was becoming more and more difficult to walk due to severe pain. Pt went to [facility] ER and was found to have INR 10.7. CT abd/pel done showed large Rt Iliacus muscle hematoma and transferred to [facility] for further evaluation and treatment. \"    Interval history / Subjective:   I saw the patient this morning on rounds. No acute events overnight. Wife was at the bedside at the moment of the encounter, she was updated at that time as well     Assessment & Plan:     supratherapeutic INR  INR 2.1 today.   Was greater than 10 at presentation  Likely due to medication interaction  Pharmacy dosing warfarin    Large right iliac us muscle hematoma  Likely due to the above  Follow-up CT today    Acute blood loss anemia  Likely due to the above  Hemoglobin remains stable, 10.2 this morning, later follow-up was 11.1  We will continue to monitor    Left lower extremity edema  ultraSound studies negative for DVT    Delirium  Was likely due to opiates  Resolved  Oxycodone is stopped    Chronic systolic congestive heart failure, NYHA class I  Stable, not in exacerbation  Continuing furosemide  Continuing metoprolol  Continuing sacubitril/valsartan    Paroxysmal atrial fibrillation  Heart rate currently controlled  Continuing dofetilide  Continuing metoprolol  Continuing anticoagulation as outlined below  Mechanical mitral valve  Stable  Pharmacy dosing warfarin with goal 2.5-3.5  INR 2.1 today  Neuropathy  Stable  Continuing gabapentin    Code status: Full  DVT prophylaxis: Currently on unfractionated heparin infusion. Bridging back to warfarin, not yet at goal, INR this morning was 2.1. Due to heart valve his INR goal is 2.5-3.5      Care Plan discussed with: Patient/Family, Nurse and   Anticipated Disposition: Home w/Family  Anticipated Discharge: 24 hours to 48 hours     Hospital Problems  Never Reviewed          Codes Class Noted POA    * (Principal) Coagulopathy ([facility] [address] ICD-10-CM: D68.9  ICD-9-CM: 286.9  12/6/2021 Unknown        Intramuscular hematoma ICD-10-CM: T14. 8XXA  ICD-9-CM: 924.9  12/6/2021 Unknown                Review of Systems:   A comprehensive review of systems was negative except for that written in the HPI. Vital Signs:    Last 24hrs VS reviewed since prior progress note. Most recent are:  Visit Vitals  /65 (BP 1 Location: Right upper arm, BP Patient Position: At rest;Semi fowlers)   Pulse 60   Temp 97.8 °F (36.6 °C)   Resp 17   Ht 5' 8\" (1.727 m)   Wt 73 kg (160 lb 15 oz)   SpO2 97%   BMI 24.47 kg/m²       No intake or output data in the 24 hours ending 12/09/21 9855     Physical Examination:     I had a face to face encounter with this patient and independently examined them on 12/9/2021 as outlined below:          Constitutional:  No acute distress, cooperative, pleasant    ENT:  Oral mucosa moist, oropharynx benign. Resp:  CTA bilaterally. No wheezing/rhonchi/rales. No accessory muscle use   CV:  Regular rhythm, normal rate, no murmurs, gallops, rubs    GI:  Soft, non distended, non tender.  normoactive bowel sounds, no hepatosplenomegaly Musculoskeletal:  No edema, warm, 2+ pulses throughout    Neurologic:  Moves all extremities. AAOx3, CN II-XII reviewed            Data Review:    Review and/or order of clinical lab test  Review and/or order of tests in the radiology section of [facility]      Labs:     Recent Labs     12/09/21  1105 12/09/21  0627 12/09/21  0011 12/09/21  0011   WBC 12.6*  --   --  11.8*   HGB 11.1* 10.6*   < > 10.2*   HCT 33.3* 32.5*   < > 31.1*   *  --   --  393    < > = values in this interval not displayed. Recent Labs     12/09/21  0011 12/08/21  0315 12/07/21  0333    137 136   K 3.1* 3.5 4.3    101 102   CO2 31 28 26   BUN 12 16 16   CREA 0.82 0.93 0.89   GLU 95 95 93   CA 8.4* 8.6 8.6     Recent Labs     12/07/21  0333   ALT 21   AP 78   TBILI 1.0   TP 5.9*   ALB 2.6*   GLOB 3.3     Recent Labs     12/09/21  1105 12/09/21  0627 12/09/21  0011 12/09/21  0000 12/08/21  1003 12/08/21  0315 12/07/21  1603   INR  --   --  2.1*  --   --  1.5* 1.5*   PTP  --   --  21.0*  --   --  15.3* 15.1*   APTT 50.9* 70.8*  --  56.3*   < >  --   --     < > = values in this interval not displayed. No results for input(s): FE, TIBC, PSAT, FERR in the last 72 hours. No results found for: FOL, RBCF   No results for input(s): PH, PCO2, PO2 in the last 72 hours. No results for input(s): CPK, CKNDX, TROIQ in the last 72 hours.     No lab exists for component: CPKMB  No results found for: CHOL, CHOLX, CHLST, CHOLV, HDL, HDLP, LDL, LDLC, DLDLP, TGLX, TRIGL, TRIGP, CHHD, CHHDX  No results found for: GLUCPOC  Lab Results   Component Value Date/Time    Color Yellow/Straw 12/05/2021 08:30 PM    Appearance Clear 12/05/2021 08:30 PM    Specific gravity 1.020 12/05/2021 08:30 PM    pH (UA) 6.0 12/05/2021 08:30 PM    Protein Negative 12/05/2021 08:30 PM    Glucose Negative 12/05/2021 08:30 PM    Ketone Negative 12/05/2021 08:30 PM    Urobilinogen 1.0 12/05/2021 08:30 PM    Nitrites Negative 12/05/2021 08:30 PM    Leukocyte Esterase Negative 12/05/2021 08:30 PM         Medications Reviewed:     Current Facility-Administered Medications   Medication Dose Route Frequency    iopamidoL (ISOVUE-370) 76 % injection 100 mL  100 mL IntraVENous RAD ONCE    heparin 25,000 units in D5W 250 ml infusion  12-25 Units/kg/hr IntraVENous TITRATE    HYDROcodone-acetaminophen (NORCO) 5-325 mg per tablet 1 Tablet  1 Tablet Oral Q4H PRN    Warfarin - Pharmacy to Dose   Other Rx Dosing/Monitoring    sodium chloride (NS) flush 5-40 mL  5-40 mL IntraVENous Q8H    sodium chloride (NS) flush 5-40 mL  5-40 mL IntraVENous PRN    acetaminophen (TYLENOL) tablet 650 mg  650 mg Oral Q6H PRN    Or    acetaminophen (TYLENOL) suppository 650 mg  650 mg Rectal Q6H PRN    polyethylene glycol (MIRALAX) packet 17 g  17 g Oral DAILY PRN    ondansetron (ZOFRAN ODT) tablet 4 mg  4 mg Oral Q8H PRN    Or    ondansetron (ZOFRAN) injection 4 mg  4 mg IntraVENous Q6H PRN    dofetilide (TIKOSYN) capsule 250 mcg  250 mcg Oral BID    furosemide (LASIX) tablet 20 mg  20 mg Oral DAILY    gabapentin (NEURONTIN) capsule 300 mg  300 mg Oral BID    gemfibroziL (LOPID) tablet 600 mg  600 mg Oral BID WITH MEALS    morphine injection 2 mg  2 mg IntraVENous Q4H PRN    metoprolol succinate (TOPROL-XL) XL tablet 25 mg  25 mg Oral DAILY    sacubitriL-valsartan (ENTRESTO) 24-26 mg tablet 1 Tablet  1 Tablet Oral Q12H     ______________________________________________________________________  EXPECTED LENGTH OF STAY: 3d 14h  ACTUAL LENGTH OF STAY:          3                 Sahil Barnes NP

## 2021-12-09 NOTE — PROGRESS NOTES
Spiritual Care Partner Volunteer visited patient in [address] [facility] on 12.9.21.     Documented by Harjit Hernandez, Staff , on 12.9.21  Please call LETTY ([phone]) to page  if needed

## 2021-12-09 NOTE — PROGRESS NOTES
Bedside and Verbal shift change report given to [facility] (oncoming nurse) by Jaida Villagomez (offgoing nurse). Report included the following information SBAR, Kardex, Intake/Output and MAR.

## 2021-12-09 NOTE — PROGRESS NOTES
Problem: Mobility Impaired (Adult and Pediatric)  Goal: *Acute Goals and Plan of Care (Insert Text)  Description:       FUNCTIONAL STATUS PRIOR TO ADMISSION: Patient was independent and active without use of DME.    HOME SUPPORT PRIOR TO ADMISSION: The patient lived with wife but did not require assist.    Physical Therapy Goals  Initiated 12/7/2021  1. Patient will move from supine to sit and sit to supine , scoot up and down, and roll side to side in bed with supervision/set-up within 7 day(s). 2.  Patient will transfer from bed to chair and chair to bed with supervision/set-up using the least restrictive device within 7 day(s). 3.  Patient will perform sit to stand with supervision/set-up within 7 day(s). 4.  Patient will ambulate with supervision/set-up for 50 feet with the least restrictive device within 7 day(s). 5.  Patient will ascend/descend 4 stairs with both handrail(s) with minimal assistance/contact guard assist within 7 day(s). Outcome: Progressing Towards Goal  PHYSICAL THERAPY TREATMENT  Patient: Juan Burgess (32 y.o. male)  Date: 12/9/2021  Diagnosis: Intramuscular hematoma [T14. 8XXA]  Coagulopathy ([address] [D68.9]   Coagulopathy ([address]       Precautions: Fall  Chart, physical therapy assessment, plan of care and goals were reviewed. ASSESSMENT  Patient continues with skilled PT services and is progressing towards goals. Pt reports when he fell, his RLE gave out when he stood- did not have RW nor gait belt or NSG assist. He was able to stand at bedside w/ RW and complete pre-gait exercises w/ RW support and w/ CGA. Pt able to proceed to short gait w/in room approx 10 Ft w/CGA. No buckling of LE's noted. Continued to encourage pt to call for NSG assist when ready to get back to bed. Current Level of Function Impacting Discharge (mobility/balance): SBA-CGA. Other factors to consider for discharge: R Iliacus Hematoma; Medical. Mobility below baseline.            PLAN :  Patient continues to benefit from skilled intervention to address the above impairments. Continue treatment per established plan of care. to address goals. Recommendation for discharge: (in order for the patient to meet his/her long term goals)  Physical therapy at least 2 days/week in the home AND ensure assist and/or supervision for safety with mobility and I/ADL's    This discharge recommendation:  Has been made in collaboration with the attending provider and/or case management    IF patient discharges home will need the following DME: patient owns DME required for discharge       SUBJECTIVE:   Patient stated If I get any better I might run right out of here.     OBJECTIVE DATA SUMMARY:   Critical Behavior:  Neurologic State: Alert, Appropriate for age  Orientation Level: Oriented X4  Cognition: Appropriate decision making, Appropriate safety awareness, Follows commands  Safety/Judgement: Awareness of environment  Functional Mobility Training:  Bed Mobility:     Supine to Sit: Contact guard assistance; Minimum assistance; Assist x1  Sit to Supine: Stand-by assistance           Transfers:  Sit to Stand: Contact guard assistance; Assist x1  Stand to Sit: Contact guard assistance; Assist x1                             Balance:  Sitting: Intact  Standing: Impaired; With support  Standing - Static: Constant support; Good; Fair  Standing - Dynamic : Constant support; Fair  Ambulation/Gait Training:  Distance (ft): 10 Feet (ft)  Assistive Device: Gait belt; Walker, rolling  Ambulation - Level of Assistance: Contact guard assistance; Minimal assistance; Assist x1        Gait Abnormalities: Antalgic; Decreased step clearance;  Step to gait  Right Side Weight Bearing: As tolerated  Left Side Weight Bearing: Full  Base of Support: Widened  Stance: Right decreased  Speed/Radha: Slow  Step Length: Left shortened; Right shortened        Interventions: Safety awareness training         Therapeutic Exercises:   Seated: DF/PF, gentle seated marches (min ROM on R)  Pain Rating:  Reports pain/discomfort in R groin, per pt report    Activity Tolerance:   Good    After treatment patient left in no apparent distress:   Supine in bed, Call bell within reach, Bed / chair alarm activated, Caregiver / family present, and Side rails x 3    COMMUNICATION/COLLABORATION:   The patients plan of care was discussed with: Registered nurse.      Alycia Hackett,PTA   Time Calculation: 31 mins

## 2021-12-09 NOTE — PROGRESS NOTES
Pharmacist Note - Warfarin Dosing  Consult provided for this 81 y.o.male to manage warfarin for PAF/mechanical mitral valve, now with resolving Large right iliacus muscle hematoma    INR Goal: 2.5- 3.5  Home regimen/ tablet size: 5 mg daily, except 2.5 mg Tues and Fri (patient confirms this was a recent decrease due to elevated INR)    Drugs that may increase INR: Fibrates  Drugs that may decrease INR: Vitamin K (vit K given 2.5 mg IV x 1 on [email or web address]  Other current anticoagulants/ drugs that may increase bleeding risk: None  Risk factors: Age > 65  Daily INR ordered: YES    Recent Labs     12/09/21  0627 12/09/21  0011 12/08/21  1704 12/08/21  1003 12/08/21  0315 12/07/21  1603 12/07/21  1603   HGB 10.6* 10.2* 10.0*   < > 10.3*   < > 10.2*   INR  --  2.1*  --   --  1.5*  --  1.5*    < > = values in this interval not displayed. Date               INR                  Dose  12/06  10.7, 4.1 Held, IV Vit K given  12/07  1.5        7.5 mg  12/08              1.5                   5 mg  12/09              2.1                   5 mg                                                                               Assessment/ Plan: Will order warfarin 5 mg x 1. Heparin drip has been initiated in the interim until INR is therapeutic. **Per TAWANNA Miller request, pharmacy is to d/c Heparin once INR is therapeutic x1**     Pharmacy will continue to monitor daily and adjust therapy as indicated. Please contact the pharmacist at x [phone] or  for outpatient recommendations if needed.        Heraclio Zepeda, CandidaD

## 2021-12-09 NOTE — PROGRESS NOTES
Bedside and Verbal shift change report given to Brook Mccormack RN (oncoming nurse) by Juwan Allen RN (offgoing nurse). Report included the following information SBAR, Kardex and MAR.

## 2021-12-10 VITALS
OXYGEN SATURATION: 98 % | DIASTOLIC BLOOD PRESSURE: 75 MMHG | HEART RATE: 76 BPM | SYSTOLIC BLOOD PRESSURE: 125 MMHG | BODY MASS INDEX: 24.39 KG/M2 | RESPIRATION RATE: 18 BRPM | WEIGHT: 160.94 LBS | HEIGHT: 68 IN | TEMPERATURE: 97.7 F

## 2021-12-10 LAB
ALBUMIN SERPL-MCNC: 2.4 G/DL (ref 3.5–5)
ALBUMIN/GLOB SERPL: 0.7 {RATIO} (ref 1.1–2.2)
ALP SERPL-CCNC: 72 U/L (ref 45–117)
ALT SERPL-CCNC: 22 U/L (ref 12–78)
ANION GAP SERPL CALC-SCNC: 5 MMOL/L (ref 5–15)
APTT PPP: 83.2 SEC (ref 22.1–31)
AST SERPL-CCNC: 18 U/L (ref 15–37)
BASOPHILS # BLD: 0.1 K/UL (ref 0–0.1)
BASOPHILS NFR BLD: 1 % (ref 0–1)
BILIRUB SERPL-MCNC: 1 MG/DL (ref 0.2–1)
BUN SERPL-MCNC: 10 MG/DL (ref 6–20)
BUN/CREAT SERPL: 12 (ref 12–20)
CALCIUM SERPL-MCNC: 8.4 MG/DL (ref 8.5–10.1)
CHLORIDE SERPL-SCNC: 104 MMOL/L (ref 97–108)
CO2 SERPL-SCNC: 30 MMOL/L (ref 21–32)
CREAT SERPL-MCNC: 0.81 MG/DL (ref 0.7–1.3)
DIFFERENTIAL METHOD BLD: ABNORMAL
EOSINOPHIL # BLD: 0.2 K/UL (ref 0–0.4)
EOSINOPHIL NFR BLD: 2 % (ref 0–7)
ERYTHROCYTE [DISTWIDTH] IN BLOOD BY AUTOMATED COUNT: 14.7 % (ref 11.5–14.5)
GLOBULIN SER CALC-MCNC: 3.3 G/DL (ref 2–4)
GLUCOSE SERPL-MCNC: 95 MG/DL (ref 65–100)
HCT VFR BLD AUTO: 32 % (ref 36.6–50.3)
HGB BLD-MCNC: 10.5 G/DL (ref 12.1–17)
IMM GRANULOCYTES # BLD AUTO: 0.1 K/UL (ref 0–0.04)
IMM GRANULOCYTES NFR BLD AUTO: 1 % (ref 0–0.5)
INR PPP: 2.7 (ref 0.9–1.1)
LYMPHOCYTES # BLD: 0.9 K/UL (ref 0.8–3.5)
LYMPHOCYTES NFR BLD: 8 % (ref 12–49)
MCH RBC QN AUTO: 31 PG (ref 26–34)
MCHC RBC AUTO-ENTMCNC: 32.8 G/DL (ref 30–36.5)
MCV RBC AUTO: 94.4 FL (ref 80–99)
MONOCYTES # BLD: 0.8 K/UL (ref 0–1)
MONOCYTES NFR BLD: 8 % (ref 5–13)
NEUTS SEG # BLD: 8.7 K/UL (ref 1.8–8)
NEUTS SEG NFR BLD: 80 % (ref 32–75)
NRBC # BLD: 0 K/UL (ref 0–0.01)
NRBC BLD-RTO: 0 PER 100 WBC
PLATELET # BLD AUTO: 391 K/UL (ref 150–400)
PMV BLD AUTO: 10.2 FL (ref 8.9–12.9)
POTASSIUM SERPL-SCNC: 3.5 MMOL/L (ref 3.5–5.1)
PROT SERPL-MCNC: 5.7 G/DL (ref 6.4–8.2)
PROTHROMBIN TIME: 27.4 SEC (ref 9–11.1)
RBC # BLD AUTO: 3.39 M/UL (ref 4.1–5.7)
SODIUM SERPL-SCNC: 139 MMOL/L (ref 136–145)
THERAPEUTIC RANGE,PTTT: ABNORMAL SECS (ref 58–77)
WBC # BLD AUTO: 10.7 K/UL (ref 4.1–11.1)

## 2021-12-10 PROCEDURE — 85730 THROMBOPLASTIN TIME PARTIAL: CPT

## 2021-12-10 PROCEDURE — 80053 COMPREHEN METABOLIC PANEL: CPT

## 2021-12-10 PROCEDURE — 74011250637 HC RX REV CODE- 250/637: Performed by: NURSE PRACTITIONER

## 2021-12-10 PROCEDURE — 74011250637 HC RX REV CODE- 250/637: Performed by: HOSPITALIST

## 2021-12-10 PROCEDURE — 85610 PROTHROMBIN TIME: CPT

## 2021-12-10 PROCEDURE — 85025 COMPLETE CBC W/AUTO DIFF WBC: CPT

## 2021-12-10 PROCEDURE — 36415 COLL VENOUS BLD VENIPUNCTURE: CPT

## 2021-12-10 RX ORDER — WARFARIN 2.5 MG/1
2.5 TABLET ORAL ONCE
Status: COMPLETED | OUTPATIENT
Start: 2021-12-10 | End: 2021-12-10

## 2021-12-10 RX ADMIN — GEMFIBROZIL 600 MG: 600 TABLET ORAL at 07:36

## 2021-12-10 RX ADMIN — WARFARIN SODIUM 2.5 MG: 2.5 TABLET ORAL at 13:07

## 2021-12-10 RX ADMIN — DOFETILIDE 250 MCG: 0.12 CAPSULE ORAL at 13:07

## 2021-12-10 RX ADMIN — SACUBITRIL AND VALSARTAN 1 TABLET: 24; 26 TABLET, FILM COATED ORAL at 10:09

## 2021-12-10 RX ADMIN — HYDROCODONE BITARTRATE AND ACETAMINOPHEN 1 TABLET: 5; 325 TABLET ORAL at 10:09

## 2021-12-10 RX ADMIN — GABAPENTIN 300 MG: 300 CAPSULE ORAL at 07:35

## 2021-12-10 RX ADMIN — METOPROLOL SUCCINATE 25 MG: 25 TABLET, EXTENDED RELEASE ORAL at 10:09

## 2021-12-10 RX ADMIN — FUROSEMIDE 20 MG: 20 TABLET ORAL at 10:09

## 2021-12-10 NOTE — CONSULTS
Urology Consult    Patient: Rafael Mccracken MRN: [id number]  SSN: [id number]    YOB: 1940  Age: 80 y.o. Sex: male          Date of Consultation:  December 10, 2021  Requesting Physician: Alexandra Trinidad MD  Reason for Consultation:  Johnny renal masses          History of Present Illness:  Rafael Mccracken is a 80 y.o. male admitted 12/6/2021 to the hospital for Intramuscular hematoma [T14. 8XXA]  Coagulopathy ([address] [D68.9]. He presented to [address] with right hip pain. Patient states that 1 week ago patient was seen by PCP for fever/chills/Hematuria. Patient was placed on Macrobid BID for 7 days which patient completed on 12/5/21. Patient states that hematuria stopped and was feeling better for 24-48 hours. Patient states since Thursday he started to have right hip pain and was becoming more and more difficult to walk due to severe pain. Pt went to [facility] ER and was found to have INR 10.7. CT abd/pel done showed large Rt Iliacus muscle hematoma and transferred to [facility] for further evaluation and treatment. Pt also noted to have multiple bilateral renal masses concerning for neoplasm . Urology has been consulted for neoplasm     INR 2.7 ( 10.7)   Cr  0.81 (0.82)   WBC 10.7 (11.4)   CT abd showed Multiple bilateral enhancing renal masses suspicious for renal neoplasm. Given multiplicity, hereditary forms of papillary or clear cell renal carcinoma can be considered. Pt is not an established pt of  . He states several weeks ago he had scrotal swelling associated with a UTI and was placed on multiple abx . He had planned on seeing Dr. Mac Ledezma but was admitted here instead . He noted R Hip to RLQ pain which was noted to be a R Iliacus hematoma . Pt is on chronic  DOAC for valve replacement now held . Discussed with pt and wife finding on CT of bilateral renal masses  and concern for cancer .   Exp;lained to wife and pt that due to his hematoma and elevated INR  renal biopsy will be deferred until he is more stable . Pt agreed with plan  Will arrange appointment to follow up     Past Medical History:   Diagnosis Date    Neuropathy         Past Surgical History:   Procedure Laterality Date    HX HEART VALVE SURGERY      HX PACEMAKER         No Known Allergies     Prior to Admission medications    Medication Sig Start Date End Date Taking? Authorizing Provider   gemfibroziL (LOPID) 600 mg tablet Take 600 mg by mouth two (2) times a day. With orange juice   Yes Other, MD Shay   magnesium chloride (Slow-Mag) 71.5 mg tablet Take 143 mg by mouth daily. Yes Pricila, MD Shay   gabapentin (NEURONTIN) 300 mg capsule Take 300 mg by mouth two (2) times a day. Yes Pricila, MD Shay   oxyCODONE IR (ROXICODONE) 5 mg immediate release tablet Take 5 mg by mouth every six (6) hours as needed for Pain. Yes Pricila, MD Shay   furosemide (LASIX) 20 mg tablet Take 20 mg by mouth daily. Yes Pricila, MD Shay   warfarin (COUMADIN) 5 mg tablet Take 5 mg by mouth. 5 x weekly on Mon, Wed, Thur, Sat Sun   Yes Other, MD Shay   potassium chloride SR (KLOR-CON 10) 10 mEq tablet Take 10 mEq by mouth daily. Yes Shay Mcnulty MD   dofetilide (TIKOSYN) 250 mcg capsule Take 250 mcg by mouth two (2) times a day. Yes Pricila, MD Shay   metoprolol succinate (Toprol XL) 25 mg XL tablet Take 25 mg by mouth daily. Yes Provider, Historical   sacubitriL-valsartan (Entresto) 49-51 mg tab tablet Take 1 Tablet by mouth daily. Yes Provider, Historical   warfarin (COUMADIN) 2.5 mg tablet Take 2.5 mg by mouth. 2 x weekly on Tues, Fri   Yes Provider, Historical       Social History     Tobacco Use    Smoking status: Former Smoker    Smokeless tobacco: Current User    Tobacco comment: chewing tobacco   Substance Use Topics    Alcohol use: Not Currently        No family history on file. ROS:  Admission ROS by Onesimo Miranda MD from 12/6/2021 was reviewed and changes (other than per HPI) include: none.     Physical Exam:    Vital Signs:  Temp (24hrs), Av.9 °F (36.6 °C), Min:97.5 °F (36.4 °C), Max:98.4 °F (36.9 °C)   Blood pressure 124/71, pulse 60, temperature 97.5 °F (36.4 °C), resp. rate 18, height 5' 8\" (1.727 m), weight 73 kg (160 lb 15 oz), SpO2 97 %. I&O's:  No intake/output data recorded. Appearance: well-developed NAD   Conjunctiva/Lids: conjunctivae and lids normal   Pupil/Iris: pupils equal, round   External Ears/Nose: normal no lesions or deformities   Hearing: grossly intact   Neck: supple   Thyroid: no palpable enlargement   Respiratory Effort: breathing easily   Abd.  Aorta: no palpable enlargement   Lower Extremity: no edema   Abdomen/Flank: soft  RLQ tenderness  no CVA tenderness   Liver/Spleen: no organomegaly   Hernia: no ventral hernia   Lymph: no adenopathy   Gait/Station: normal   Digits/Nails: no clubbing cyanosis petechiae   Skin Inspection: warm and dry   Skin Palpation: no SQ nodularity   Sensation: no sensory deficits   Judgment/Insight: intact   Mood/Affect: normal      Lab Review:     CBC   Recent Labs     12/10/21  0549 21  1831 21  1105   WBC 10.7 11.4* 12.6*   HGB 10.5* 11.0* 11.1*   HCT 32.0* 33.5* 33.3*    422* 425*     CMP   Recent Labs     12/10/21  0549 21  0011 21  0315    138 137   K 3.5 3.1* 3.5    100 101   CO2 30 31 28   GLU 95 95 95   BUN 10 12 16   CREA 0.81 0.82 0.93   CA 8.4* 8.4* 8.6   ALB 2.4*  --   --    TBILI 1.0  --   --    ALT 22  --   --        Other   Recent Labs     12/10/21  0549 21  0011 21  0315   INR 2.7* 2.1* 1.5*       UA:   Lab Results   Component Value Date/Time    Color Yellow/Straw 2021 08:30 PM    Appearance Clear 2021 08:30 PM    Specific gravity 1.020 2021 08:30 PM    pH (UA) 6.0 2021 08:30 PM    Protein Negative 2021 08:30 PM    Glucose Negative 2021 08:30 PM    Ketone Negative 2021 08:30 PM    Urobilinogen 1.0 2021 08:30 PM    Nitrites Negative 12/05/2021 08:30 PM    Leukocyte Esterase Negative 12/05/2021 08:30 PM       Cultures:  NA    Imaging:      CT: Results for orders placed during the hospital encounter of 12/06/21    CT ABDOMEN W WO CONT AND PELVIS W CONT    Narrative  EXAM: CT ABDOMEN W WO CONT AND PELVIS W CONT    INDICATION:  Evaluate hematoma    COMPARISON: CT 12/5/2021    TECHNIQUE: Multislice helical CT was performed through the abdomen and pelvis  prior to and after uneventful rapid bolus intravenous contrast administration in  unenhanced, hepatic arterial, portal venous, and equilibrium phases. Oral  contrast was not administered. Contiguous 5 mm axial images were reconstructed  and lung and soft tissue windows were generated. Coronal and sagittal  reformations were generated. CT dose reduction was achieved through use of a  standardized protocol tailored for this examination and automatic exposure  control for dose modulation. CONTRAST:  100 mL Isovue 370 IV. FINDINGS:    VISUALIZED LOWER CHEST: Moderate bilateral pleural effusions with overlying  atelectasis. Aerated lungs are clear. Heart is mildly enlarged with chamber  dilation, near leads, and coronary artery calcifications. Post surgical changes  of mitral valve replacement noted. LIVER: Unremarkable. GALLBLADDER: Intraluminal dependent small calcific gallstones with otherwise  unremarkable appearance. SPLEEN: Unremarkable. PANCREAS: No mass or duct dilation. ADRENALS: Unremarkable. KIDNEYS AND URETERS: There are multiple enhancing masses in both kidneys. In the  right kidney there are 5 definite lesions: 2.3 x 2.4 cm partially exophytic  right upper pole lesion laterally, 0.8 x 1.2 cm cortical lesion upper pole  medially, 1.2 x 1.3 cm lateral interpolar cortical lesion, 1.0 x 1.3 cm lateral  lower pole lesion, and 1.1 x 1.4 cm anterior lower pole lesion. There is a  possible 1.0 x 1.0 cm lesion in the posterior upper pole right kidney as well.   In the left kidney there are at least 4 lesions: 0.8 x 0.8 anterior interpolar  lateral lesion, 0.6 x 0.6 posterior interpolar lateral lesion, 0.6 x 0.8  posterior lower pole lesion, and 0.6 x 0.7 posterior lower pole lesion. Additionally there are likely 3 additional tiny lateral cortical lesions present  (5/62 and 70). Exophytic posterior medial lower pole right renal cyst noted. No  calculus, hydronephrosis, or other abnormality. STOMACH: Unremarkable. SMALL BOWEL: No dilatation or wall thickening. COLON: No dilation or wall thickening. APPENDIX: Unremarkable. PERITONEUM: No ascites or pneumoperitoneum. RETROPERITONEUM: No lymphadenopathy or aortic aneurysm. REPRODUCTIVE ORGANS: Prostate and seminal vesicles are unremarkable. URINARY BLADDER: No mucosal abnormality, mass, or calculus. BONES: No acute fracture or aggressive lesion. ADDITIONAL COMMENTS: Right iliacus hematoma seen previously is unchanged  measuring 6.3 x 8.5 cm (6/111) , previously 6.4 x 8.7 cm (4/4). Impression  1. No significant interval change in right iliacus hematoma. 2. Multiple bilateral enhancing renal masses suspicious for renal neoplasm. Given multiplicity, hereditary forms of papillary or clear cell renal carcinoma  can be considered. 3. Moderate bilateral pleural effusions with overlying atelectasis. Assessment:     Principal Problem:    Coagulopathy ([address] (12/6/2021)    Active Problems:    Intramuscular hematoma (12/6/2021)          Plan:     1. Johnny renal mass - will need further evaluation but due to abnormal coagulapathy and Illacus  Hematoma  Will defer renal biopsy for op follow up     Office to arrange follow up  1/12/22 @ 4 pm [place] office Dr. Fred Healy  Urology will sign off for now     Supervising MD DR. Her     Signed By: Emmie Marroquin.  Sylvie Vergara, TAWANNA  - December 10, 2021

## 2021-12-10 NOTE — PROGRESS NOTES
Bedside and Verbal shift change report given to Leah Bonner RN (oncoming nurse) by Thaddeus Freeman RN (offgoing nurse). Report included the following information SBAR, Kardex and MAR.

## 2021-12-10 NOTE — PROGRESS NOTES
Physical Therapy  12/10/2021    Chart reviewed. Attempted to see pt this afternoon d/t noted d/c. Pt received sitting up in chair, dressed. He and his wife have no questions regarding mobility. Pt reports he having appropriate DME for home use (w/c, RW) and feels comfortable w/ ability to do 3 steps at home. Will follow if there are any changes to d/c.      Alycia Means, PTA

## 2021-12-10 NOTE — DISCHARGE SUMMARY
Discharge Summary       PATIENT ID: Chet Yang  MRN: [id number]   YOB: 1940    DATE OF ADMISSION: 12/6/2021  9:48 AM    DATE OF DISCHARGE: 12/10/2021   PRIMARY CARE PROVIDER: Rosita Kaur NP     ATTENDING PHYSICIAN: Maye Artis MD  DISCHARGING PROVIDER: Bernice Graham NP    To contact this individual call [phone] and ask the  to page. If unavailable ask to be transferred the Adult Hospitalist Department. CONSULTATIONS: IP CONSULT TO UROLOGY    PROCEDURES/SURGERIES: * No surgery found *    [id number] [facility] COURSE:   Per H&P, \"Rex Vargas is a 80 y. o. male who presents with PMHx of CHF s/p ICD, Paroxysmal atrial fib, Mechanical valve on coumadin, Neuropathy presented to [facility] with right hip pain. Patient states that 1 week ago patient was seen by PCP for fever/chills/Hematuria. Patient was placed on Macrobid BID for 7 days which patient completed on 12/5/21. Patient states that hematuria stopped and was feeling better for 24-48 hours. Patient states since Thursday he started to have right hip pain and was becoming more and more difficult to walk due to severe pain. Pt went to [facility] ER and was found to have INR 10.7. CT abd/pel done showed large Rt Iliacus muscle hematoma and transferred to [facility] for further evaluation and treatment. \"  In the course of his time at our facility hematoma remained stable. Patient was started on heparin infusion, warfarin was held. With stability of hematoma he was restarted on warfarin with INR improved to 2.7. Heparin was discontinued. Follow-up CT revealed hematoma stable, unchanged from 5 days previous. Otherwise however there were small masses noted on kidneys, mostly in the right.   This was discussed with radiology why these were not seen on previous CT, likely explanation was timing of the contrast.  Urology was consulted, for outpatient follow-up, appointment was established        Zoey Moraes / PLAN: supratherapeutic INR  INR 2.7 today. Was greater than 10 at presentation  Likely due to medication interaction  Resolved     Large right iliac us muscle hematoma  Likely due to the above  Follow-up CT with no change in hematoma. KIDNEYS AND URETERS: There are multiple enhancing masses in both kidneys. In the  right kidney there are 5 definite lesions: 2.3 x 2.4 cm partially exophytic  right upper pole lesion laterally, 0.8 x 1.2 cm cortical lesion upper pole  medially, 1.2 x 1.3 cm lateral interpolar cortical lesion, 1.0 x 1.3 cm lateral  lower pole lesion, and 1.1 x 1.4 cm anterior lower pole lesion. There is a  possible 1.0 x 1.0 cm lesion in the posterior upper pole right kidney as well. In the left kidney there are at least 4 lesions: 0.8 x 0.8 anterior interpolar  lateral lesion, 0.6 x 0.6 posterior interpolar lateral lesion, 0.6 x 0.8  posterior lower pole lesion, and 0.6 x 0.7 posterior lower pole lesion. Additionally there are likely 3 additional tiny lateral cortical lesions present  (5/62 and 70). Exophytic posterior medial lower pole right renal cyst noted. No  calculus, hydronephrosis, or other abnormality.   Urology was consulted, for outpatient follow-up to continue work-up     Acute blood loss anemia  Likely due to the above  Hemoglobin remains stable, 10.5 at discharge       Left lower extremity edema  ultraSound studies negative for DVT     Delirium  Was likely due to opiates  Resolved  Oxycodone is stopped     Chronic systolic congestive heart failure, NYHA class I  Stable, not in exacerbation  Continuing furosemide  Continuing metoprolol  Continuing sacubitril/valsartan     Paroxysmal atrial fibrillation  Heart rate currently controlled  Continuing dofetilide  Continuing metoprolol  Continuing anticoagulation as outlined below    Mechanical mitral valve  Stable  Pharmacy dosing warfarin with goal 2.5-3.5  INR 2.7    Neuropathy  Stable  Continuing gabapentin       PENDING TEST RESULTS:   At the time of discharge the following test results are still pending:     FOLLOW UP APPOINTMENTS:    Follow-up Information     Follow up With Specialties Details Why Contact Info    Dr. Romina Mackay on 1/12/2022 at 4pm discuss CT results  [address]    Monet Valencia NP Nurse Practitioner In 1 week  [address]  [phone]             ADDITIONAL CARE RECOMMENDATIONS:     DIET: Cardiac Diet      ACTIVITY: Activity as tolerated    WOUND CARE: na    EQUIPMENT needed: na      DISCHARGE MEDICATIONS:  Current Discharge Medication List      CONTINUE these medications which have NOT CHANGED    Details   gemfibroziL (LOPID) 600 mg tablet Take 600 mg by mouth two (2) times a day. With orange juice      magnesium chloride (Slow-Mag) 71.5 mg tablet Take 143 mg by mouth daily. gabapentin (NEURONTIN) 300 mg capsule Take 300 mg by mouth two (2) times a day. oxyCODONE IR (ROXICODONE) 5 mg immediate release tablet Take 5 mg by mouth every six (6) hours as needed for Pain. furosemide (LASIX) 20 mg tablet Take 20 mg by mouth daily. !! warfarin (COUMADIN) 5 mg tablet Take 5 mg by mouth. 5 x weekly on Mon, Wed, Thur, Sat Sun      potassium chloride SR (KLOR-CON 10) 10 mEq tablet Take 10 mEq by mouth daily. dofetilide (TIKOSYN) 250 mcg capsule Take 250 mcg by mouth two (2) times a day. metoprolol succinate (Toprol XL) 25 mg XL tablet Take 25 mg by mouth daily. sacubitriL-valsartan (Entresto) 49-51 mg tab tablet Take 1 Tablet by mouth daily. !! warfarin (COUMADIN) 2.5 mg tablet Take 2.5 mg by mouth. 2 x weekly on Tues, Fri       !! - Potential duplicate medications found. Please discuss with provider. NOTIFY YOUR PHYSICIAN FOR ANY OF THE FOLLOWING:   Fever over 101 degrees for 24 hours.    Chest pain, shortness of breath, fever, chills, nausea, vomiting, diarrhea, change in mentation, falling, weakness, bleeding. Severe pain or pain not relieved by medications. Or, any other signs or symptoms that you may have questions about. DISPOSITION:    Home With:   OT  PT  HH  RN       Long term SNF/Inpatient Rehab   x Independent/assisted living    Hospice    Other:       PATIENT CONDITION AT DISCHARGE:     Functional status    Poor     Deconditioned    x Independent      Cognition    x Lucid     Forgetful     Dementia      Catheters/lines (plus indication)    [place]     PICC     PEG    x None      Code status    x Full code     DNR      PHYSICAL EXAMINATION AT DISCHARGE:   Refer to Progress Note      CHRONIC MEDICAL DIAGNOSES:  Problem List as of 12/10/2021 Never Reviewed          Codes Class Noted - Resolved    * (Principal) Coagulopathy ([facility] [address] ICD-10-CM: D68.9  ICD-9-CM: 286.9  12/6/2021 - Present        Intramuscular hematoma ICD-10-CM: T14. 8XXA  ICD-9-CM: 924.9  12/6/2021 - Present              Greater than 30 minutes were spent with the patient on counseling and coordination of care    Signed:   Javi Alexandre NP  12/10/2021  1:17 PM

## 2021-12-10 NOTE — PROGRESS NOTES
Patient discharged to home. At the time of discharge the patient was alert and oriented, he denied pain, and showed no s/s of distress. Discharge instructions provided. PIV removed with pressure dressing applied.

## 2021-12-10 NOTE — DISCHARGE INSTRUCTIONS
Discharge Instructions       PATIENT ID: Rj Montes  MRN: [id number]   YOB: 1940    DATE OF ADMISSION: 12/6/2021  9:48 AM    DATE OF DISCHARGE: 12/10/2021    PRIMARY CARE PROVIDER: Beau Willis NP     ATTENDING PHYSICIAN: Fatou Smith MD  DISCHARGING PROVIDER: Patel Myrick NP    To contact this individual call [phone] and ask the  to page. If unavailable ask to be transferred the Adult Hospitalist Department. DISCHARGE DIAGNOSES coagulopathy, hematoma    CONSULTATIONS: IP CONSULT TO UROLOGY    PROCEDURES/SURGERIES: * No surgery found *    PENDING TEST RESULTS:   At the time of discharge the following test results are still pending:     FOLLOW UP APPOINTMENTS:   Follow-up Information     Follow up With Specialties Details Why Contact Info    Dr. Gracie Rapp on 1/12/2022 at 4pm discuss CT results  [address]    Beau Willis NP Nurse Practitioner In 1 week  [address] [place] [address]  [phone]             ADDITIONAL CARE RECOMMENDATIONS: Continue Coumadin 2.5 mg on Tuesday and Friday. 5 mg on Monday, Wednesday, Thursday, Saturday, Sunday  Follow-up with urology at established appointment. Continue to monitor INR as already established    DIET: Cardiac Diet      ACTIVITY: Activity as tolerated    WOUND CARE: na    EQUIPMENT needed: na      DISCHARGE MEDICATIONS:   See Medication Reconciliation Form    · It is important that you take the medication exactly as they are prescribed. · Keep your medication in the bottles provided by the pharmacist and keep a list of the medication names, dosages, and times to be taken in your wallet. · Do not take other medications without consulting your doctor. NOTIFY YOUR PHYSICIAN FOR ANY OF THE FOLLOWING:   Fever over 101 degrees for 24 hours.    Chest pain, shortness of breath, fever, chills, nausea, vomiting, diarrhea, change in mentation, falling, weakness, bleeding. Severe pain or pain not relieved by medications. Or, any other signs or symptoms that you may have questions about.       DISPOSITION:    Home With:   OT  PT  [facility]  RN       SNF/Inpatient Rehab/LTAC   x Independent/assisted living    Hospice    Other:           Signed:   Cecile Goncalves NP  12/10/2021  1:15 PM

## 2021-12-10 NOTE — PROGRESS NOTES
Pharmacist Note - Warfarin Dosing  Consult provided for this 81 y.o.male to manage warfarin for PAF/mechanical mitral valve, now with resolving Large right iliacus muscle hematoma    INR Goal: 2.5- 3.5  Home regimen/ tablet size: 5 mg daily, except 2.5 mg Tues and Fri (patient confirms this was a recent decrease due to elevated INR)    Drugs that may increase INR: Fibrates  Drugs that may decrease INR: Vitamin K (vit K given 2.5 mg IV x 1 on [employer]  Other current anticoagulants/ drugs that may increase bleeding risk: None  Risk factors: Age > 65  Daily INR ordered: YES    Recent Labs     12/10/21  0549 12/09/21  1831 12/09/21  1105 12/09/21  0627 12/09/21  0011 12/08/21  1003 12/08/21  0315   HGB 10.5* 11.0* 11.1*   < > 10.2*   < > 10.3*   INR 2.7*  --   --   --  2.1*  --  1.5*    < > = values in this interval not displayed. Date               INR                  Dose  12/06  10.7, 4.1 Held, IV Vit K given  12/07  1.5        7.5 mg  12/08              1.5                   5 mg  12/09              2.1                   5 mg  12/10  2.7     2.5 mg                                                                                 Assessment/ Plan: Will order warfarin 2.5 mg x 1. Heparin drip has been discontinued due to therapeutic INR x1    **Per TAWANNA Wilson request, pharmacy is to d/c Heparin once INR is therapeutic x1**     Pharmacy will continue to monitor daily and adjust therapy as indicated. Please contact the pharmacist at  for outpatient recommendations if needed.

## 2022-03-19 PROBLEM — D68.9 COAGULOPATHY (HCC): Status: ACTIVE | Noted: 2021-12-06

## 2022-03-19 PROBLEM — T14.8XXA INTRAMUSCULAR HEMATOMA: Status: ACTIVE | Noted: 2021-12-06

## 2022-12-19 ENCOUNTER — HOSPITAL ENCOUNTER (EMERGENCY)
Age: 82
Discharge: HOME | End: 2022-12-19
Attending: EMERGENCY MEDICINE
Payer: MEDICARE

## 2022-12-19 VITALS
HEART RATE: 90 BPM | TEMPERATURE: 97.6 F | DIASTOLIC BLOOD PRESSURE: 95 MMHG | HEIGHT: 68 IN | BODY MASS INDEX: 25.01 KG/M2 | WEIGHT: 165 LBS | OXYGEN SATURATION: 100 % | RESPIRATION RATE: 19 BRPM | SYSTOLIC BLOOD PRESSURE: 126 MMHG

## 2022-12-19 DIAGNOSIS — T45.511A POISONING BY WARFARIN SODIUM, ACCIDENTAL OR UNINTENTIONAL, INITIAL ENCOUNTER: Primary | ICD-10-CM

## 2022-12-19 LAB
ANION GAP SERPL CALC-SCNC: 7 MMOL/L (ref 5–15)
BASOPHILS # BLD: 0.1 K/UL (ref 0–0.1)
BASOPHILS NFR BLD: 1 % (ref 0–1)
BUN SERPL-MCNC: 19 MG/DL (ref 6–20)
BUN/CREAT SERPL: 14 (ref 12–20)
CA-I BLD-MCNC: 8.5 MG/DL (ref 8.5–10.1)
CHLORIDE SERPL-SCNC: 105 MMOL/L (ref 97–108)
CO2 SERPL-SCNC: 26 MMOL/L (ref 21–32)
CREAT SERPL-MCNC: 1.37 MG/DL (ref 0.7–1.3)
DIFFERENTIAL METHOD BLD: NORMAL
EOSINOPHIL # BLD: 0.2 K/UL (ref 0–0.4)
EOSINOPHIL NFR BLD: 2 % (ref 0–7)
ERYTHROCYTE [DISTWIDTH] IN BLOOD BY AUTOMATED COUNT: 14.5 % (ref 11.5–14.5)
GLUCOSE SERPL-MCNC: 105 MG/DL (ref 65–100)
HCT VFR BLD AUTO: 41.8 % (ref 36.6–50.3)
HGB BLD-MCNC: 13.6 G/DL (ref 12.1–17)
IMM GRANULOCYTES # BLD AUTO: 0 K/UL (ref 0–0.04)
IMM GRANULOCYTES NFR BLD AUTO: 0 % (ref 0–0.5)
INR PPP: 8.7 (ref 0.9–1.1)
LYMPHOCYTES # BLD: 1.2 K/UL (ref 0.8–3.5)
LYMPHOCYTES NFR BLD: 14 % (ref 12–49)
MCH RBC QN AUTO: 31.1 PG (ref 26–34)
MCHC RBC AUTO-ENTMCNC: 32.5 G/DL (ref 30–36.5)
MCV RBC AUTO: 95.7 FL (ref 80–99)
MONOCYTES # BLD: 0.7 K/UL (ref 0–1)
MONOCYTES NFR BLD: 8 % (ref 5–13)
NEUTS SEG # BLD: 6.6 K/UL (ref 1.8–8)
NEUTS SEG NFR BLD: 75 % (ref 32–75)
NRBC # BLD: 0 K/UL (ref 0–0.01)
NRBC BLD-RTO: 0 PER 100 WBC
PLATELET # BLD AUTO: 308 K/UL (ref 150–400)
PMV BLD AUTO: 11.6 FL (ref 8.9–12.9)
POTASSIUM SERPL-SCNC: 4 MMOL/L (ref 3.5–5.1)
PROTHROMBIN TIME: 67.2 SEC (ref 11.9–14.6)
RBC # BLD AUTO: 4.37 M/UL (ref 4.1–5.7)
RBC MORPH BLD: NORMAL
SODIUM SERPL-SCNC: 138 MMOL/L (ref 136–145)
WBC # BLD AUTO: 8.8 K/UL (ref 4.1–11.1)

## 2022-12-19 PROCEDURE — 99283 EMERGENCY DEPT VISIT LOW MDM: CPT

## 2022-12-19 PROCEDURE — 85025 COMPLETE CBC W/AUTO DIFF WBC: CPT

## 2022-12-19 PROCEDURE — 80048 BASIC METABOLIC PNL TOTAL CA: CPT

## 2022-12-19 PROCEDURE — 85610 PROTHROMBIN TIME: CPT

## 2022-12-19 PROCEDURE — 74011250637 HC RX REV CODE- 250/637: Performed by: EMERGENCY MEDICINE

## 2022-12-19 PROCEDURE — 36415 COLL VENOUS BLD VENIPUNCTURE: CPT

## 2022-12-19 RX ADMIN — Medication 1 MG: at 16:03

## 2022-12-19 NOTE — ED NOTES
Pt given discharge and follow up instructions. Pt advised to follow with PPC. No further questions at this time.

## 2022-12-19 NOTE — ED PROVIDER NOTES
HPI 45-year-old male who is currently anticoagulated with Coumadin for a mechanical heart valve was treated with an antibiotic last week which resulted in his INR level elevating to 12 yesterday. He notes no active bleeding he denies bleeding of the gums or skin or GI blood loss. Over anticoagulation last year December 2021 resulted in a large hematoma and a hospital admission for several days. No other complaints    Past Medical History:   Diagnosis Date    Neuropathy        Past Surgical History:   Procedure Laterality Date    HX HEART VALVE SURGERY      HX PACEMAKER           No family history on file. Social History     Socioeconomic History    Marital status:      Spouse name: Not on file    Number of children: Not on file    Years of education: Not on file    Highest education level: Not on file   Occupational History    Not on file   Tobacco Use    Smoking status: Former    Smokeless tobacco: Current    Tobacco comments:     chewing tobacco   Substance and Sexual Activity    Alcohol use: Not Currently    Drug use: Never    Sexual activity: Not Currently   Other Topics Concern    Not on file   Social History Narrative    Not on file     Social Determinants of Health     Financial Resource Strain: Not on file   Food Insecurity: Not on file   Transportation Needs: Not on file   Physical Activity: Not on file   Stress: Not on file   Social Connections: Not on file   Intimate Partner Violence: Not on file   Housing Stability: Not on file         ALLERGIES: Patient has no known allergies. Review of Systems   All other systems reviewed and are negative. Vitals:    12/19/22 1430   BP: (!) 126/95   Pulse: 90   Resp: 19   Temp: 97.6 °F (36.4 °C)   SpO2: 100%   Weight: 74.8 kg (165 lb)   Height: 5' 8\" (1.727 m)            Physical Exam  Vitals and nursing note reviewed. Constitutional:       General: He is not in acute distress. Appearance: Normal appearance.  He is not ill-appearing, toxic-appearing or diaphoretic. HENT:      Head: Normocephalic and atraumatic. Nose: Nose normal.      Mouth/Throat:      Mouth: Mucous membranes are moist.   Eyes:      Extraocular Movements: Extraocular movements intact. Conjunctiva/sclera: Conjunctivae normal.   Cardiovascular:      Rate and Rhythm: Normal rate and regular rhythm. Pulses: Normal pulses. Heart sounds: Normal heart sounds. No murmur heard. Pulmonary:      Effort: Pulmonary effort is normal. No respiratory distress. Breath sounds: Normal breath sounds. No wheezing, rhonchi or rales. Abdominal:      General: Bowel sounds are normal. There is no distension. Palpations: Abdomen is soft. There is no mass. Tenderness: There is no abdominal tenderness. There is no right CVA tenderness, left CVA tenderness, guarding or rebound. Hernia: No hernia is present. Musculoskeletal:         General: No swelling, tenderness, deformity or signs of injury. Normal range of motion. Cervical back: Normal range of motion and neck supple. No rigidity or tenderness. Right lower leg: No edema. Left lower leg: No edema. Lymphadenopathy:      Cervical: No cervical adenopathy. Skin:     General: Skin is warm and dry. Capillary Refill: Capillary refill takes less than 2 seconds. Findings: No erythema, lesion or rash. Neurological:      General: No focal deficit present. Mental Status: He is alert and oriented to person, place, and time. Mental status is at baseline. Cranial Nerves: No cranial nerve deficit. Sensory: No sensory deficit. Psychiatric:         Mood and Affect: Mood normal.         Behavior: Behavior normal.         Thought Content: Thought content normal.        MDM  INR level here in the ED is 8.7. Up-to-date reference for management of anticoagulation with Coumadin.   Recommend low-dose oral vitamin K treatment patient given 1 mg orally he is instructed to hold his Coumadin for 2 days and return Wednesday morning for recheck of INR level    Procedures

## 2022-12-19 NOTE — ED TRIAGE NOTES
Pt arrives with abnormal lab results from this morning. Pt states INR is 12 when it was 3.2 last Monday. Pt recently completed antibiotics for sinus infection.

## 2022-12-21 ENCOUNTER — HOSPITAL ENCOUNTER (EMERGENCY)
Age: 82
Discharge: HOME | End: 2022-12-21
Attending: EMERGENCY MEDICINE
Payer: MEDICARE

## 2022-12-21 VITALS
DIASTOLIC BLOOD PRESSURE: 89 MMHG | WEIGHT: 165 LBS | HEIGHT: 68 IN | HEART RATE: 78 BPM | BODY MASS INDEX: 25.01 KG/M2 | OXYGEN SATURATION: 98 % | TEMPERATURE: 97.3 F | RESPIRATION RATE: 18 BRPM | SYSTOLIC BLOOD PRESSURE: 114 MMHG

## 2022-12-21 DIAGNOSIS — Z01.89 ENCOUNTER FOR ROUTINE LABORATORY TESTING: Primary | ICD-10-CM

## 2022-12-21 LAB
INR PPP: 1.5 (ref 0.9–1.1)
PROTHROMBIN TIME: 17.6 SEC (ref 11.9–14.6)

## 2022-12-21 PROCEDURE — 99283 EMERGENCY DEPT VISIT LOW MDM: CPT

## 2022-12-21 PROCEDURE — 85610 PROTHROMBIN TIME: CPT

## 2022-12-21 PROCEDURE — 36415 COLL VENOUS BLD VENIPUNCTURE: CPT

## 2022-12-21 PROCEDURE — 74011250637 HC RX REV CODE- 250/637: Performed by: EMERGENCY MEDICINE

## 2022-12-21 RX ORDER — WARFARIN 7.5 MG/1
7.5 TABLET ORAL
Status: COMPLETED | OUTPATIENT
Start: 2022-12-21 | End: 2022-12-21

## 2022-12-21 RX ADMIN — WARFARIN SODIUM 7.5 MG: 7.5 TABLET ORAL at 08:53

## 2022-12-21 NOTE — ED TRIAGE NOTES
Was here Monday d/t elevated INR given Vitamin K, Coumadin held for 2 days and here today for recheck of INR.

## 2022-12-21 NOTE — ED PROVIDER NOTES
EMERGENCY DEPARTMENT HISTORY AND PHYSICAL EXAM      Date: 12/21/2022  Patient Name: Patricia Guillaume    History of Presenting Illness     Chief Complaint   Patient presents with    Abnormal Lab Results       History Provided By: Patient    HPI: Patricia Guillaume, [de-identified] y.o. male Past medical history significant for mechanical heart valve, patient on warfarin therapy for anticoagulation, patient was seen here 2 days ago for elevated INR, his PCP informed him that it was 12, patient stated he had just finished a course of antibiotics and this usually happens, patient was given an injection of vitamin K and told to not take his Coumadin dosing for 2 days and return to have his INR rechecked, patient normally takes 7.5 mg a day    There are no other complaints, changes, or physical findings at this time. Past History     Past Medical History:  Past Medical History:   Diagnosis Date    A-fib ([address]     Neuropathy        Past Surgical History:  Past Surgical History:   Procedure Laterality Date    HX HEART VALVE SURGERY      HX PACEMAKER         Family History:  History reviewed. No pertinent family history. Social History:  Social History     Tobacco Use    Smoking status: Former    Smokeless tobacco: Current    Tobacco comments:     chewing tobacco   Substance Use Topics    Alcohol use: Not Currently    Drug use: Never       Allergies:  No Known Allergies    PCP: Devendra Zuniga NP    No current facility-administered medications on file prior to encounter. Current Outpatient Medications on File Prior to Encounter   Medication Sig Dispense Refill    gemfibroziL (LOPID) 600 mg tablet Take 600 mg by mouth two (2) times a day. With orange juice      magnesium chloride (Slow-Mag) 71.5 mg tablet Take 143 mg by mouth daily. gabapentin (NEURONTIN) 300 mg capsule Take 300 mg by mouth two (2) times a day.       oxyCODONE IR (ROXICODONE) 5 mg immediate release tablet Take 5 mg by mouth every six (6) hours as needed for Pain.      furosemide (LASIX) 20 mg tablet Take 20 mg by mouth daily. warfarin (COUMADIN) 5 mg tablet Take 5 mg by mouth. 5 x weekly on Mon, Wed, Thur, Sat Sun      potassium chloride SR (KLOR-CON 10) 10 mEq tablet Take 10 mEq by mouth daily. dofetilide (TIKOSYN) 250 mcg capsule Take 250 mcg by mouth two (2) times a day. metoprolol succinate (Toprol XL) 25 mg XL tablet Take 25 mg by mouth daily. sacubitriL-valsartan (Entresto) 49-51 mg tab tablet Take 1 Tablet by mouth daily. warfarin (COUMADIN) 2.5 mg tablet Take 2.5 mg by mouth. 2 x weekly on Tues, Fri         Review of Systems   Review of Systems   Constitutional:  Negative for chills and fever. HENT:  Negative for rhinorrhea and sore throat. Eyes:  Negative for pain and visual disturbance. Respiratory:  Negative for cough and shortness of breath. Cardiovascular:  Negative for chest pain and leg swelling. Gastrointestinal:  Negative for abdominal pain and vomiting. Endocrine: Negative for polydipsia and polyuria. Genitourinary:  Negative for dysuria and hematuria. Musculoskeletal:  Negative for back pain and neck pain. Skin:  Negative for color change and pallor. Neurological:  Negative for weakness and headaches. Psychiatric/Behavioral:  Negative for agitation and suicidal ideas. Physical Exam   Physical Exam  Vitals and nursing note reviewed. Constitutional:       General: He is not in acute distress. Appearance: He is not ill-appearing, toxic-appearing or diaphoretic. HENT:      Head: Normocephalic and atraumatic. Right Ear: Tympanic membrane normal.      Left Ear: Tympanic membrane normal.      Nose: Nose normal. No congestion. Mouth/Throat:      Mouth: Mucous membranes are moist.      Pharynx: Oropharynx is clear. Eyes:      Extraocular Movements: Extraocular movements intact. Conjunctiva/sclera: Conjunctivae normal.      Pupils: Pupils are equal, round, and reactive to light. Cardiovascular:      Rate and Rhythm: Normal rate and regular rhythm. Pulses: Normal pulses. Heart sounds: Normal heart sounds. Pulmonary:      Effort: Pulmonary effort is normal.      Breath sounds: Normal breath sounds. Abdominal:      General: Bowel sounds are normal.      Palpations: Abdomen is soft. Tenderness: There is no abdominal tenderness. Musculoskeletal:         General: No tenderness, deformity or signs of injury. Normal range of motion. Cervical back: Normal range of motion and neck supple. No rigidity or tenderness. Lymphadenopathy:      Cervical: No cervical adenopathy. Skin:     General: Skin is warm and dry. Capillary Refill: Capillary refill takes less than 2 seconds. Findings: No rash. Neurological:      General: No focal deficit present. Mental Status: He is alert and oriented to person, place, and time. Cranial Nerves: No cranial nerve deficit. Sensory: No sensory deficit. Psychiatric:         Mood and Affect: Mood normal.         Behavior: Behavior normal.       Lab and Diagnostic Study Results   Labs -   No results found for this or any previous visit (from the past 12 hour(s)). Radiologic Studies -   @lastxrresult@  CT Results  (Last 48 hours)      None          CXR Results  (Last 48 hours)      None            Medical Decision Making and ED Course   Differential Diagnosis & Medical Decision Making Provider Note:   Anticoagulation therapy DDx GI bleed, clotting, ACS, PE    - I am the first provider for this patient. I reviewed the vital signs, available nursing notes, past medical history, past surgical history, family history and social history. The patients presenting problems have been discussed, and they are in agreement with the care plan formulated and outlined with them. I have encouraged them to ask questions as they arise throughout their visit. Vital Signs-Reviewed the patient's vital signs.   Patient Vitals for the past 12 hrs:   Temp Pulse Resp BP SpO2   12/21/22 0804 -- 77 -- -- 100 %   12/21/22 0803 97.3 °F (36.3 °C) -- 18 118/85 --       ED Course:          Procedures   Performed by: Karlene Onofre MD  Procedures      Disposition   Disposition: Condition stable  DC- Adult Discharges: All of the diagnostic tests were reviewed and questions answered. Diagnosis, care plan and treatment options were discussed. The patient understands the instructions and will follow up as directed. The patients results have been reviewed with them. They have been counseled regarding their diagnosis. The patient verbally convey understanding and agreement of the signs, symptoms, diagnosis, treatment and prognosis and additionally agrees to follow up as recommended with their PCP in 24 - 48 hours. They also agree with the care-plan and convey that all of their questions have been answered. I have also put together some discharge instructions for them that include: 1) educational information regarding their diagnosis, 2) how to care for their diagnosis at home, as well a 3) list of reasons why they would want to return to the ED prior to their follow-up appointment, should their condition change. DISCHARGE PLAN:  1. Current Discharge Medication List        CONTINUE these medications which have NOT CHANGED    Details   gemfibroziL (LOPID) 600 mg tablet Take 600 mg by mouth two (2) times a day. With orange juice      magnesium chloride (Slow-Mag) 71.5 mg tablet Take 143 mg by mouth daily. gabapentin (NEURONTIN) 300 mg capsule Take 300 mg by mouth two (2) times a day. oxyCODONE IR (ROXICODONE) 5 mg immediate release tablet Take 5 mg by mouth every six (6) hours as needed for Pain. furosemide (LASIX) 20 mg tablet Take 20 mg by mouth daily. !! warfarin (COUMADIN) 5 mg tablet Take 5 mg by mouth.  5 x weekly on Mon, Wed, Thur, Sat Sun      potassium chloride SR (KLOR-CON 10) 10 mEq tablet Take 10 mEq by mouth daily.      dofetilide (TIKOSYN) 250 mcg capsule Take 250 mcg by mouth two (2) times a day. metoprolol succinate (Toprol XL) 25 mg XL tablet Take 25 mg by mouth daily. sacubitriL-valsartan (Entresto) 49-51 mg tab tablet Take 1 Tablet by mouth daily. !! warfarin (COUMADIN) 2.5 mg tablet Take 2.5 mg by mouth. 2 x weekly on Tues, Fri       !! - Potential duplicate medications found. Please discuss with provider. 2.   Follow-up Information    None       3. Return to ED if worse   4. Current Discharge Medication List         Remove if admitted/transferred    Diagnosis/Clinical Impression     Clinical Impression: No diagnosis found. Attestations: I, Ju Green MD, am the primary clinician of record. Please note that this dictation was completed with [employer], the [employer] voice recognition software. Quite often unanticipated grammatical, syntax, homophones, and other interpretive errors are inadvertently transcribed by the computer software. Please disregard these errors. Please excuse any errors that have escaped final proofreading. Thank you.

## 2022-12-21 NOTE — DISCHARGE INSTRUCTIONS
Today you were given a dose of warfarin 7.5 mg, your INR today is 1.5, you may start your regular scheduled dosing of Coumadin today when you get home;     Contact Dr. Kelli Boland your [facility] physician to be reevaluated

## 2023-01-04 ENCOUNTER — HOSPITAL ENCOUNTER (EMERGENCY)
Age: 83
Discharge: HOME | DRG: 280 | End: 2023-01-04
Attending: EMERGENCY MEDICINE
Payer: MEDICARE

## 2023-01-04 VITALS
HEART RATE: 88 BPM | BODY MASS INDEX: 25.46 KG/M2 | SYSTOLIC BLOOD PRESSURE: 122 MMHG | WEIGHT: 168 LBS | TEMPERATURE: 98.1 F | RESPIRATION RATE: 18 BRPM | OXYGEN SATURATION: 99 % | DIASTOLIC BLOOD PRESSURE: 74 MMHG | HEIGHT: 68 IN

## 2023-01-04 DIAGNOSIS — D68.9 COAGULOPATHY (HCC): Primary | ICD-10-CM

## 2023-01-04 LAB
ALBUMIN SERPL-MCNC: 3.1 G/DL (ref 3.5–5)
ALBUMIN/GLOB SERPL: 0.9 (ref 1.1–2.2)
ALP SERPL-CCNC: 73 U/L (ref 45–117)
ALT SERPL-CCNC: 9 U/L (ref 12–78)
ANION GAP SERPL CALC-SCNC: 9 MMOL/L (ref 5–15)
AST SERPL W P-5'-P-CCNC: 47 U/L (ref 15–37)
BASOPHILS # BLD: 0 K/UL (ref 0–0.1)
BASOPHILS NFR BLD: 0 % (ref 0–1)
BILIRUB SERPL-MCNC: 0.5 MG/DL (ref 0.2–1)
BUN SERPL-MCNC: 17 MG/DL (ref 6–20)
BUN/CREAT SERPL: 11 (ref 12–20)
CA-I BLD-MCNC: 8.2 MG/DL (ref 8.5–10.1)
CHLORIDE SERPL-SCNC: 105 MMOL/L (ref 97–108)
CO2 SERPL-SCNC: 25 MMOL/L (ref 21–32)
CREAT SERPL-MCNC: 1.56 MG/DL (ref 0.7–1.3)
DIFFERENTIAL METHOD BLD: ABNORMAL
EOSINOPHIL # BLD: 0.2 K/UL (ref 0–0.4)
EOSINOPHIL NFR BLD: 3 % (ref 0–7)
ERYTHROCYTE [DISTWIDTH] IN BLOOD BY AUTOMATED COUNT: 15.8 % (ref 11.5–14.5)
GLOBULIN SER CALC-MCNC: 3.3 G/DL (ref 2–4)
GLUCOSE SERPL-MCNC: 121 MG/DL (ref 65–100)
HCT VFR BLD AUTO: 45.5 % (ref 36.6–50.3)
HGB BLD-MCNC: 14.6 G/DL (ref 12.1–17)
IMM GRANULOCYTES # BLD AUTO: 0 K/UL (ref 0–0.04)
IMM GRANULOCYTES NFR BLD AUTO: 0 % (ref 0–0.5)
INR PPP: >9.6 (ref 0.9–1.1)
LYMPHOCYTES # BLD: 0.9 K/UL (ref 0.8–3.5)
LYMPHOCYTES NFR BLD: 13 % (ref 12–49)
MCH RBC QN AUTO: 30.5 PG (ref 26–34)
MCHC RBC AUTO-ENTMCNC: 32.1 G/DL (ref 30–36.5)
MCV RBC AUTO: 95.2 FL (ref 80–99)
MONOCYTES # BLD: 0.6 K/UL (ref 0–1)
MONOCYTES NFR BLD: 8 % (ref 5–13)
NEUTS SEG # BLD: 5.3 K/UL (ref 1.8–8)
NEUTS SEG NFR BLD: 76 % (ref 32–75)
NRBC # BLD: 0 K/UL (ref 0–0.01)
NRBC BLD-RTO: 0 PER 100 WBC
PLATELET # BLD AUTO: 228 K/UL (ref 150–400)
PMV BLD AUTO: 11.6 FL (ref 8.9–12.9)
POTASSIUM SERPL-SCNC: 4 MMOL/L (ref 3.5–5.1)
POTASSIUM SERPL-SCNC: 7.9 MMOL/L (ref 3.5–5.1)
PROT SERPL-MCNC: 6.4 G/DL (ref 6.4–8.2)
PROTHROMBIN TIME: 72.8 SEC (ref 11.9–14.6)
RBC # BLD AUTO: 4.78 M/UL (ref 4.1–5.7)
SODIUM SERPL-SCNC: 139 MMOL/L (ref 136–145)
WBC # BLD AUTO: 7.1 K/UL (ref 4.1–11.1)

## 2023-01-04 PROCEDURE — 84132 ASSAY OF SERUM POTASSIUM: CPT

## 2023-01-04 PROCEDURE — 85610 PROTHROMBIN TIME: CPT

## 2023-01-04 PROCEDURE — 85025 COMPLETE CBC W/AUTO DIFF WBC: CPT

## 2023-01-04 PROCEDURE — 74011250637 HC RX REV CODE- 250/637: Performed by: EMERGENCY MEDICINE

## 2023-01-04 PROCEDURE — 36415 COLL VENOUS BLD VENIPUNCTURE: CPT

## 2023-01-04 PROCEDURE — 99284 EMERGENCY DEPT VISIT MOD MDM: CPT

## 2023-01-04 PROCEDURE — 93005 ELECTROCARDIOGRAM TRACING: CPT

## 2023-01-04 RX ADMIN — Medication 5 MG: at 12:22

## 2023-01-04 NOTE — ED PROVIDER NOTES
EMERGENCY DEPARTMENT HISTORY AND PHYSICAL EXAM      Date: 1/4/2023  Patient Name: Dania Parsons    History of Presenting Illness     Chief Complaint   Patient presents with    Abnormal Lab Results       History Provided By: Patient    HPI: Dania Parsons, [de-identified] y.o. male with elevated IINR and severe fluctuations over the past 2 weeks was 4.3 several week ago, was seen and INR was >12, given Vitamin K with drop to 1.3. States abx or sinus infection. No active infection. Lost appetite. States last time was eating greens but no appetite recently    Goal of 2.5-3.5 w heart valve. Takes 7.5mg W and Sunday and 5mg other days. There are no other complaints, changes, or physical findings at this time. Past History     Past Medical History:  Past Medical History:   Diagnosis Date    A-fib ([address]     Neuropathy        Past Surgical History:  Past Surgical History:   Procedure Laterality Date    HX HEART VALVE SURGERY      HX PACEMAKER         Family History:  History reviewed. No pertinent family history. Social History:  Social History     Tobacco Use    Smoking status: Former    Smokeless tobacco: Current    Tobacco comments:     chewing tobacco   Substance Use Topics    Alcohol use: Not Currently    Drug use: Never       Allergies:  No Known Allergies    PCP: Monet Valencia NP    No current facility-administered medications on file prior to encounter. Current Outpatient Medications on File Prior to Encounter   Medication Sig Dispense Refill    gemfibroziL (LOPID) 600 mg tablet Take 600 mg by mouth two (2) times a day. With orange juice      magnesium chloride (Slow-Mag) 71.5 mg tablet Take 143 mg by mouth daily. gabapentin (NEURONTIN) 300 mg capsule Take 300 mg by mouth two (2) times a day. oxyCODONE IR (ROXICODONE) 5 mg immediate release tablet Take 5 mg by mouth every six (6) hours as needed for Pain. furosemide (LASIX) 20 mg tablet Take 20 mg by mouth daily.       potassium chloride SR (KLOR-CON 10) 10 mEq tablet Take 10 mEq by mouth daily. dofetilide (TIKOSYN) 250 mcg capsule Take 250 mcg by mouth two (2) times a day. metoprolol succinate (Toprol XL) 25 mg XL tablet Take 25 mg by mouth daily. sacubitriL-valsartan (Entresto) 49-51 mg tab tablet Take 1 Tablet by mouth daily. Review of Systems   Review of Systems   Constitutional: Negative. Negative for chills, fatigue and fever. HENT: Negative. Negative for congestion, ear pain, nosebleeds and sore throat. Eyes: Negative. Negative for pain, discharge and visual disturbance. Respiratory: Negative. Negative for cough, chest tightness and shortness of breath. Cardiovascular: Negative. Negative for chest pain and leg swelling. Gastrointestinal: Negative. Negative for abdominal pain, blood in stool, constipation, diarrhea, nausea and vomiting. Endocrine: Negative. Genitourinary: Negative. Negative for difficulty urinating, dysuria and flank pain. Musculoskeletal: Negative. Negative for back pain and myalgias. Skin: Negative. Negative for rash and wound. Allergic/Immunologic: Negative. Neurological: Negative. Negative for dizziness, syncope, weakness, numbness and headaches. Hematological: Negative. Does not bruise/bleed easily. Psychiatric/Behavioral: Negative. Negative for agitation, confusion, hallucinations and suicidal ideas. All other systems reviewed and are negative. Physical Exam   Physical Exam  Vitals and nursing note reviewed. Constitutional:       Appearance: Normal appearance. HENT:      Head: Normocephalic and atraumatic. Right Ear: External ear normal.      Left Ear: External ear normal.      Nose: Nose normal.      Mouth/Throat:      Mouth: Mucous membranes are moist.   Eyes:      Conjunctiva/sclera: Conjunctivae normal.   Cardiovascular:      Rate and Rhythm: Normal rate and regular rhythm. Pulses: Normal pulses. Heart sounds: Normal heart sounds. Pulmonary:      Effort: Pulmonary effort is normal.      Breath sounds: Normal breath sounds. Abdominal:      Palpations: Abdomen is soft. Musculoskeletal:         General: Normal range of motion. Cervical back: Normal range of motion. Skin:     General: Skin is warm and dry. Capillary Refill: Capillary refill takes less than 2 seconds. Neurological:      General: No focal deficit present. Mental Status: He is alert. Psychiatric:         Mood and Affect: Mood normal.         Behavior: Behavior normal.         Thought Content: Thought content normal.        Lab and Diagnostic Study Results   Labs -     Recent Results (from the past 24 hour(s))   EKG, 12 LEAD, INITIAL    Collection Time: 01/04/23 12:30 PM   Result Value Ref Range    Ventricular Rate 77 BPM    Atrial Rate 0 BPM    P-R Interval 184 ms    QRS Duration 157 ms    Q-T Interval 422 ms    QTC Calculation (Bezet) 478 ms    Calculated P Axis 0 degrees    Calculated R Axis -36 degrees    Calculated T Axis 98 degrees    Diagnosis       Ventricular-paced rhythm  No further analysis attempted due to paced rhythm  Baseline wander in lead(s) V1,V2     POTASSIUM    Collection Time: 01/04/23  1:01 PM   Result Value Ref Range    Potassium 4.0 3.5 - 5.1 mmol/L            Medical Decision Making and ED Course   Differential Diagnosis & Medical Decision Making Provider Note:   Elevated INR >9.6 here. Hold Coumadin tomorrow. Given Vit K hjere. He does not wish to stay and will return for recheck tomorrow. Return emergently for any signs of bleeding.     - I am the first provider for this patient. I reviewed the vital signs, available nursing notes, past medical history, past surgical history, family history and social history. The patients presenting problems have been discussed, and they are in agreement with the care plan formulated and outlined with them. I have encouraged them to ask questions as they arise throughout their visit.     Vital Signs-Reviewed the patient's vital signs. No data found. ED Course:   ED Course as of 01/05/23 1222   Wed Jan 04, 2023   1200 Potassium(!!): 7.9 []      ED Course User Index  [] Alejo Díaz MD          Disposition   Disposition: Condition stable  DC- Adult Discharges: All of the diagnostic tests were reviewed and questions answered. Diagnosis, care plan and treatment options were discussed. The patient understands the instructions and will follow up as directed. The patients results have been reviewed with them. They have been counseled regarding their diagnosis. The patient verbally convey understanding and agreement of the signs, symptoms, diagnosis, treatment and prognosis and additionally agrees to follow up as recommended with their PCP in 24 - 48 hours. They also agree with the care-plan and convey that all of their questions have been answered. I have also put together some discharge instructions for them that include: 1) educational information regarding their diagnosis, 2) how to care for their diagnosis at home, as well a 3) list of reasons why they would want to return to the ED prior to their follow-up appointment, should their condition change. DC-The patient was given verbal follow-up instructions  DC- Pain Control DC Home plan: Referral Family Medicine/PCP      DISCHARGE PLAN:  1. Current Discharge Medication List        CONTINUE these medications which have NOT CHANGED    Details   gemfibroziL (LOPID) 600 mg tablet Take 600 mg by mouth two (2) times a day. With orange juice      magnesium chloride (Slow-Mag) 71.5 mg tablet Take 143 mg by mouth daily. gabapentin (NEURONTIN) 300 mg capsule Take 300 mg by mouth two (2) times a day. oxyCODONE IR (ROXICODONE) 5 mg immediate release tablet Take 5 mg by mouth every six (6) hours as needed for Pain. furosemide (LASIX) 20 mg tablet Take 20 mg by mouth daily.       !! warfarin (COUMADIN) 5 mg tablet Take 5 mg by mouth. 5 x weekly on Mon, Wed, Thur, Sat Sun      potassium chloride SR (KLOR-CON 10) 10 mEq tablet Take 10 mEq by mouth daily. dofetilide (TIKOSYN) 250 mcg capsule Take 250 mcg by mouth two (2) times a day. metoprolol succinate (Toprol XL) 25 mg XL tablet Take 25 mg by mouth daily. sacubitriL-valsartan (Entresto) 49-51 mg tab tablet Take 1 Tablet by mouth daily. !! warfarin (COUMADIN) 2.5 mg tablet Take 2.5 mg by mouth. 2 x weekly on Tues, Fri       !! - Potential duplicate medications found. Please discuss with provider. HOLD COUMADIN 24-48 hours  2. Follow up in ED tomorrow for recheck INR    3. Return to ED if worse   4. Discharge Medication List as of 1/4/2023  1:35 PM           Diagnosis/Clinical Impression     Clinical Impression:   1. Coagulopathy [facility])        Attestations: Angela Portillo MD, am the primary clinician of record. Please note that this dictation was completed with [employer], the [employer] voice recognition software. Quite often unanticipated grammatical, syntax, homophones, and other interpretive errors are inadvertently transcribed by the computer software. Please disregard these errors. Please excuse any errors that have escaped final proofreading. Thank you.

## 2023-01-04 NOTE — DISCHARGE INSTRUCTIONS
Please return after 24 hours for recheck of your INR. Return immediately for any concerns for bleeding or any strokelike symptoms.

## 2023-01-04 NOTE — ED TRIAGE NOTES
Patient reports he had repeat labwork done  for his INR yesterday and was called this morning with a result of 10 for his INR patient has had nbo change in his medication but has recently had fluctuations in his INR

## 2023-01-05 ENCOUNTER — APPOINTMENT (OUTPATIENT)
Dept: GENERAL RADIOLOGY | Age: 83
DRG: 280 | End: 2023-01-05
Attending: EMERGENCY MEDICINE
Payer: MEDICARE

## 2023-01-05 ENCOUNTER — HOSPITAL ENCOUNTER (INPATIENT)
Age: 83
LOS: 1 days | Discharge: HOME | DRG: 280 | End: 2023-01-08
Attending: EMERGENCY MEDICINE | Admitting: INTERNAL MEDICINE
Payer: MEDICARE

## 2023-01-05 DIAGNOSIS — R77.8 ELEVATED TROPONIN: ICD-10-CM

## 2023-01-05 DIAGNOSIS — I50.9 ACUTE ON CHRONIC CONGESTIVE HEART FAILURE, UNSPECIFIED HEART FAILURE TYPE (HCC): Primary | ICD-10-CM

## 2023-01-05 PROBLEM — R79.89 ELEVATED TROPONIN: Status: ACTIVE | Noted: 2023-01-05

## 2023-01-05 LAB
ANION GAP SERPL CALC-SCNC: 9 MMOL/L (ref 5–15)
ATRIAL RATE: 0 BPM
BASOPHILS # BLD: 0.1 K/UL (ref 0–0.1)
BASOPHILS NFR BLD: 1 % (ref 0–1)
BNP SERPL-MCNC: ABNORMAL PG/ML
BUN SERPL-MCNC: 17 MG/DL (ref 6–20)
BUN/CREAT SERPL: 11 (ref 12–20)
CA-I BLD-MCNC: 8.6 MG/DL (ref 8.5–10.1)
CALCULATED P AXIS, ECG09: 0 DEGREES
CALCULATED R AXIS, ECG10: -36 DEGREES
CALCULATED T AXIS, ECG11: 98 DEGREES
CHLORIDE SERPL-SCNC: 105 MMOL/L (ref 97–108)
CO2 SERPL-SCNC: 26 MMOL/L (ref 21–32)
CREAT SERPL-MCNC: 1.6 MG/DL (ref 0.7–1.3)
DIAGNOSIS, 93000: NORMAL
DIFFERENTIAL METHOD BLD: ABNORMAL
EOSINOPHIL # BLD: 0.2 K/UL (ref 0–0.4)
EOSINOPHIL NFR BLD: 3 % (ref 0–7)
ERYTHROCYTE [DISTWIDTH] IN BLOOD BY AUTOMATED COUNT: 15.8 % (ref 11.5–14.5)
GLUCOSE SERPL-MCNC: 109 MG/DL (ref 65–100)
HCT VFR BLD AUTO: 44.9 % (ref 36.6–50.3)
HGB BLD-MCNC: 14.4 G/DL (ref 12.1–17)
IMM GRANULOCYTES # BLD AUTO: 0 K/UL (ref 0–0.04)
IMM GRANULOCYTES NFR BLD AUTO: 0 % (ref 0–0.5)
INR PPP: 2 (ref 0.9–1.1)
LYMPHOCYTES # BLD: 0.7 K/UL (ref 0.8–3.5)
LYMPHOCYTES NFR BLD: 10 % (ref 12–49)
MCH RBC QN AUTO: 30.7 PG (ref 26–34)
MCHC RBC AUTO-ENTMCNC: 32.1 G/DL (ref 30–36.5)
MCV RBC AUTO: 95.7 FL (ref 80–99)
MONOCYTES # BLD: 0.7 K/UL (ref 0–1)
MONOCYTES NFR BLD: 10 % (ref 5–13)
NEUTS SEG # BLD: 5.2 K/UL (ref 1.8–8)
NEUTS SEG NFR BLD: 76 % (ref 32–75)
NRBC # BLD: 0 K/UL (ref 0–0.01)
NRBC BLD-RTO: 0 PER 100 WBC
P-R INTERVAL, ECG05: 184 MS
PLATELET # BLD AUTO: 216 K/UL (ref 150–400)
PMV BLD AUTO: 11.2 FL (ref 8.9–12.9)
POTASSIUM SERPL-SCNC: 4.3 MMOL/L (ref 3.5–5.1)
PROTHROMBIN TIME: 22.2 SEC (ref 11.9–14.6)
Q-T INTERVAL, ECG07: 422 MS
QRS DURATION, ECG06: 157 MS
QTC CALCULATION (BEZET), ECG08: 478 MS
RBC # BLD AUTO: 4.69 M/UL (ref 4.1–5.7)
RBC MORPH BLD: ABNORMAL
SODIUM SERPL-SCNC: 140 MMOL/L (ref 136–145)
TROPONIN-HIGH SENSITIVITY: 114 NG/L (ref 0–76)
TROPONIN-HIGH SENSITIVITY: 150 NG/L (ref 0–76)
TROPONIN-HIGH SENSITIVITY: 165 NG/L (ref 0–76)
VENTRICULAR RATE, ECG03: 77 BPM
WBC # BLD AUTO: 6.9 K/UL (ref 4.1–11.1)

## 2023-01-05 PROCEDURE — 83880 ASSAY OF NATRIURETIC PEPTIDE: CPT

## 2023-01-05 PROCEDURE — 71045 X-RAY EXAM CHEST 1 VIEW: CPT

## 2023-01-05 PROCEDURE — G0378 HOSPITAL OBSERVATION PER HR: HCPCS

## 2023-01-05 PROCEDURE — 74011250636 HC RX REV CODE- 250/636: Performed by: EMERGENCY MEDICINE

## 2023-01-05 PROCEDURE — 84484 ASSAY OF TROPONIN QUANT: CPT

## 2023-01-05 PROCEDURE — 85610 PROTHROMBIN TIME: CPT

## 2023-01-05 PROCEDURE — 36415 COLL VENOUS BLD VENIPUNCTURE: CPT

## 2023-01-05 PROCEDURE — 93005 ELECTROCARDIOGRAM TRACING: CPT

## 2023-01-05 PROCEDURE — 85025 COMPLETE CBC W/AUTO DIFF WBC: CPT

## 2023-01-05 PROCEDURE — 77010033678 HC OXYGEN DAILY

## 2023-01-05 PROCEDURE — 80048 BASIC METABOLIC PNL TOTAL CA: CPT

## 2023-01-05 PROCEDURE — 94760 N-INVAS EAR/PLS OXIMETRY 1: CPT

## 2023-01-05 PROCEDURE — 74011000250 HC RX REV CODE- 250: Performed by: EMERGENCY MEDICINE

## 2023-01-05 PROCEDURE — 96374 THER/PROPH/DIAG INJ IV PUSH: CPT

## 2023-01-05 PROCEDURE — 99285 EMERGENCY DEPT VISIT HI MDM: CPT

## 2023-01-05 RX ORDER — SODIUM CHLORIDE 0.9 % (FLUSH) 0.9 %
5-40 SYRINGE (ML) INJECTION EVERY 8 HOURS
Status: DISCONTINUED | OUTPATIENT
Start: 2023-01-05 | End: 2023-01-06

## 2023-01-05 RX ORDER — SODIUM CHLORIDE 0.9 % (FLUSH) 0.9 %
5-40 SYRINGE (ML) INJECTION AS NEEDED
Status: DISCONTINUED | OUTPATIENT
Start: 2023-01-05 | End: 2023-01-08 | Stop reason: HOSPADM

## 2023-01-05 RX ORDER — ONDANSETRON 2 MG/ML
4 INJECTION INTRAMUSCULAR; INTRAVENOUS
Status: DISCONTINUED | OUTPATIENT
Start: 2023-01-05 | End: 2023-01-06

## 2023-01-05 RX ORDER — ONDANSETRON 4 MG/1
4 TABLET, ORALLY DISINTEGRATING ORAL
Status: DISCONTINUED | OUTPATIENT
Start: 2023-01-05 | End: 2023-01-06

## 2023-01-05 RX ORDER — ENOXAPARIN SODIUM 100 MG/ML
40 INJECTION SUBCUTANEOUS DAILY
Status: DISCONTINUED | OUTPATIENT
Start: 2023-01-06 | End: 2023-01-06

## 2023-01-05 RX ORDER — POLYETHYLENE GLYCOL 3350 17 G/17G
17 POWDER, FOR SOLUTION ORAL DAILY PRN
Status: DISCONTINUED | OUTPATIENT
Start: 2023-01-05 | End: 2023-01-06

## 2023-01-05 RX ORDER — ACETAMINOPHEN 325 MG/1
650 TABLET ORAL
Status: DISCONTINUED | OUTPATIENT
Start: 2023-01-05 | End: 2023-01-06

## 2023-01-05 RX ORDER — FUROSEMIDE 10 MG/ML
20 INJECTION INTRAMUSCULAR; INTRAVENOUS
Status: COMPLETED | OUTPATIENT
Start: 2023-01-05 | End: 2023-01-05

## 2023-01-05 RX ORDER — ACETAMINOPHEN 650 MG/1
650 SUPPOSITORY RECTAL
Status: DISCONTINUED | OUTPATIENT
Start: 2023-01-05 | End: 2023-01-06

## 2023-01-05 RX ADMIN — Medication 10 ML: at 22:11

## 2023-01-05 RX ADMIN — FUROSEMIDE 20 MG: 10 INJECTION, SOLUTION INTRAMUSCULAR; INTRAVENOUS at 16:51

## 2023-01-05 NOTE — ED NOTES
Assumed care of pt. Received report from [facility]. Resting on stretcher.

## 2023-01-05 NOTE — ED TRIAGE NOTES
Patient was seen yesterday for elevated INT and received vitamin K and was told to return today for recheck of INR levels

## 2023-01-05 NOTE — ED PROVIDER NOTES
HPI 82-year-old male with a history of intermittent atrial fibrillation ?aortic artificial heart valve replacement, congestive heart failure neuropathy pacemaker. Recent difficulty managing INR Coumadin therapy for artificial heart valve with marked elevation yesterday of INR 9.6-given 5 mg of vitamin K po yesterday. To return to ED today for recheck. On arrival in room patient appeared dyspneic he notes increased dyspnea lower extremity edema orthopnea and vague chest discomfort last night. Past Medical History:   Diagnosis Date    A-fib ([address]     Neuropathy        Past Surgical History:   Procedure Laterality Date    HX HEART VALVE SURGERY      HX PACEMAKER           History reviewed. No pertinent family history. Social History     Socioeconomic History    Marital status:      Spouse name: Not on file    Number of children: Not on file    Years of education: Not on file    Highest education level: Not on file   Occupational History    Not on file   Tobacco Use    Smoking status: Former    Smokeless tobacco: Current    Tobacco comments:     chewing tobacco   Substance and Sexual Activity    Alcohol use: Not Currently    Drug use: Never    Sexual activity: Not Currently   Other Topics Concern    Not on file   Social History Narrative    Not on file     Social Determinants of Health     Financial Resource Strain: Not on file   Food Insecurity: Not on file   Transportation Needs: Not on file   Physical Activity: Not on file   Stress: Not on file   Social Connections: Not on file   Intimate Partner Violence: Not on file   Housing Stability: Not on file         ALLERGIES: Patient has no known allergies. Review of Systems   Constitutional:  Negative for appetite change, chills, diaphoresis and fever. HENT:  Negative for ear pain, facial swelling, sinus pain and trouble swallowing. Eyes:  Negative for redness and visual disturbance.    Respiratory:  Positive for chest tightness and shortness of breath. Negative for cough, choking, wheezing and stridor. Cardiovascular:  Positive for leg swelling. Negative for chest pain and palpitations. Gastrointestinal:  Negative for abdominal distention, abdominal pain, blood in stool, diarrhea, nausea and vomiting. Endocrine: Negative for polydipsia and polyuria. Genitourinary:  Negative for difficulty urinating, dysuria, flank pain, frequency, hematuria and urgency. Musculoskeletal: Negative. Allergic/Immunologic: Negative. Neurological: Negative. Psychiatric/Behavioral: Negative. Vitals:    01/05/23 1321   BP: 110/79   Pulse: 76   Resp: 18   Temp: 98.9 °F (37.2 °C)   SpO2: 97%   Weight: 76.2 kg (168 lb)   Height: 5' 8\" (1.727 m)          Chronically ill elderly white male appears mildly dyspneic at rest  Physical Exam  Vitals and nursing note reviewed. Constitutional:       General: He is not in acute distress. Appearance: He is not ill-appearing, toxic-appearing or diaphoretic. HENT:      Head: Normocephalic and atraumatic. Right Ear: Tympanic membrane normal.      Nose: Nose normal.      Mouth/Throat:      Mouth: Mucous membranes are moist.   Eyes:      Extraocular Movements: Extraocular movements intact. Conjunctiva/sclera: Conjunctivae normal.      Pupils: Pupils are equal, round, and reactive to light. Neck:      Comments: JVD 2 cm at 45 degrees  Cardiovascular:      Rate and Rhythm: Normal rate and regular rhythm. Pulses: Normal pulses. Heart sounds: Normal heart sounds. No murmur heard. Pulmonary:      Effort: Pulmonary effort is normal. No respiratory distress. Breath sounds: Rales present. No wheezing or rhonchi. Comments: Rales in the bases bilaterally  Abdominal:      General: Bowel sounds are normal. There is no distension. Palpations: Abdomen is soft. There is no mass. Tenderness: There is no abdominal tenderness.  There is no right CVA tenderness, left CVA tenderness, guarding or rebound. Hernia: No hernia is present. Musculoskeletal:         General: No swelling, tenderness, deformity or signs of injury. Normal range of motion. Cervical back: Normal range of motion and neck supple. No rigidity or tenderness. Right lower leg: Edema present. Left lower leg: Edema present. Comments: Approximately 2+ edema lower extremities bilaterally no calf tenderness negative Homans   Lymphadenopathy:      Cervical: No cervical adenopathy. Skin:     General: Skin is warm and dry. Capillary Refill: Capillary refill takes less than 2 seconds. Findings: No erythema, lesion or rash. Neurological:      General: No focal deficit present. Mental Status: He is alert and oriented to person, place, and time. Mental status is at baseline. Cranial Nerves: No cranial nerve deficit. Sensory: No sensory deficit. Motor: No weakness. Coordination: Coordination normal.   Psychiatric:         Mood and Affect: Mood normal.         Behavior: Behavior normal.         Thought Content: Thought content normal.        Medical Decision Making  Amount and/or Complexity of Data Reviewed  Labs: ordered. Radiology: ordered. ECG/medicine tests: ordered. Risk  OTC drugs. Prescription drug management. Decision regarding hospitalization. 24-year-old male long cardiac history pacemaker artificial valve replacement congestive heart failure difficulty regulating Coumadin therapy returns today complaining of shortness of breath mild chest discomfort last night none today exam suggest heart failure. BNP of 31,000 elevated troponin at 150 repeat troponin 114 nonevolving.   Treated with IV Lasix with some improvement transient mild desaturation oxygen saturation to 92% will place on O2 telemetry and plan for observation    Procedures  EKG performed at 1407 interpreted at 1410 shows a ventricular paced rhythm at 75 QRS duration is prolonged QRS axis is rightward no acute injury pattern is noted but difficult interpretation due to paced rhythm    Discussed with hospitalist Dr. Abram De La Fuente

## 2023-01-06 ENCOUNTER — APPOINTMENT (OUTPATIENT)
Dept: VASCULAR SURGERY | Age: 83
DRG: 280 | End: 2023-01-06
Attending: NURSE PRACTITIONER
Payer: MEDICARE

## 2023-01-06 LAB
ANION GAP SERPL CALC-SCNC: 9 MMOL/L (ref 5–15)
ATRIAL RATE: 0 BPM
BASOPHILS # BLD: 0.1 K/UL (ref 0–0.1)
BASOPHILS NFR BLD: 1 % (ref 0–1)
BUN SERPL-MCNC: 17 MG/DL (ref 6–20)
BUN/CREAT SERPL: 11 (ref 12–20)
CA-I BLD-MCNC: 8.3 MG/DL (ref 8.5–10.1)
CALCULATED P AXIS, ECG09: 0 DEGREES
CALCULATED R AXIS, ECG10: -114 DEGREES
CALCULATED T AXIS, ECG11: 95 DEGREES
CHLORIDE SERPL-SCNC: 105 MMOL/L (ref 97–108)
CO2 SERPL-SCNC: 28 MMOL/L (ref 21–32)
CREAT SERPL-MCNC: 1.55 MG/DL (ref 0.7–1.3)
DIAGNOSIS, 93000: NORMAL
DIFFERENTIAL METHOD BLD: ABNORMAL
ECHO AO ROOT DIAM: 3.4 CM
ECHO AO ROOT INDEX: 1.79 CM/M2
ECHO AV AREA PEAK VELOCITY: 2.6 CM2
ECHO AV AREA VTI: 2.2 CM2
ECHO AV AREA/BSA PEAK VELOCITY: 1.4 CM2/M2
ECHO AV AREA/BSA VTI: 1.2 CM2/M2
ECHO AV MEAN GRADIENT: 2 MMHG
ECHO AV MEAN VELOCITY: 0.6 M/S
ECHO AV PEAK GRADIENT: 3 MMHG
ECHO AV PEAK VELOCITY: 0.8 M/S
ECHO AV VELOCITY RATIO: 0.63
ECHO AV VTI: 15.2 CM
ECHO EST RA PRESSURE: 15 MMHG
ECHO LA DIAMETER INDEX: 3.11 CM/M2
ECHO LA DIAMETER: 5.9 CM
ECHO LA TO AORTIC ROOT RATIO: 1.74
ECHO LA VOL 2C: 82 ML (ref 18–58)
ECHO LA VOL 4C: 108 ML (ref 18–58)
ECHO LA VOL BP: 94 ML (ref 18–58)
ECHO LA VOL/BSA BIPLANE: 49 ML/M2 (ref 16–34)
ECHO LA VOLUME AREA LENGTH: 106 ML
ECHO LA VOLUME INDEX A2C: 43 ML/M2 (ref 16–34)
ECHO LA VOLUME INDEX A4C: 57 ML/M2 (ref 16–34)
ECHO LA VOLUME INDEX AREA LENGTH: 56 ML/M2 (ref 16–34)
ECHO LV E' LATERAL VELOCITY: 8 CM/S
ECHO LV E' SEPTAL VELOCITY: 5 CM/S
ECHO LV EDV A2C: 118 ML
ECHO LV EDV A4C: 154 ML
ECHO LV EDV BP: 139 ML (ref 67–155)
ECHO LV EDV INDEX A4C: 81 ML/M2
ECHO LV EDV INDEX BP: 73 ML/M2
ECHO LV EDV NDEX A2C: 62 ML/M2
ECHO LV EJECTION FRACTION A2C: 5 %
ECHO LV EJECTION FRACTION A4C: 22 %
ECHO LV EJECTION FRACTION BIPLANE: 16 % (ref 55–100)
ECHO LV ESV A2C: 112 ML
ECHO LV ESV A4C: 121 ML
ECHO LV ESV BP: 116 ML (ref 22–58)
ECHO LV ESV INDEX A2C: 59 ML/M2
ECHO LV ESV INDEX A4C: 64 ML/M2
ECHO LV ESV INDEX BP: 61 ML/M2
ECHO LV FRACTIONAL SHORTENING: 4 % (ref 28–44)
ECHO LV GLOBAL LONGITUDINAL STRAIN (GLS): -3.6 %
ECHO LV GLOBAL LONGITUDINAL STRAIN (GLS): -4.1 %
ECHO LV GLOBAL LONGITUDINAL STRAIN (GLS): -4.1 %
ECHO LV GLOBAL LONGITUDINAL STRAIN (GLS): -4.5 %
ECHO LV INTERNAL DIMENSION DIASTOLE INDEX: 2.84 CM/M2
ECHO LV INTERNAL DIMENSION DIASTOLIC: 5.4 CM (ref 4.2–5.9)
ECHO LV INTERNAL DIMENSION SYSTOLIC INDEX: 2.74 CM/M2
ECHO LV INTERNAL DIMENSION SYSTOLIC: 5.2 CM
ECHO LV IVSD: 1 CM (ref 0.6–1)
ECHO LV MASS 2D: 193.3 G (ref 88–224)
ECHO LV MASS INDEX 2D: 101.7 G/M2 (ref 49–115)
ECHO LV POSTERIOR WALL DIASTOLIC: 0.9 CM (ref 0.6–1)
ECHO LV RELATIVE WALL THICKNESS RATIO: 0.33
ECHO LVOT AREA: 4.5 CM2
ECHO LVOT AV VTI INDEX: 0.49
ECHO LVOT DIAM: 2.4 CM
ECHO LVOT MEAN GRADIENT: 0 MMHG
ECHO LVOT PEAK GRADIENT: 1 MMHG
ECHO LVOT PEAK VELOCITY: 0.5 M/S
ECHO LVOT STROKE VOLUME INDEX: 17.8 ML/M2
ECHO LVOT SV: 33.9 ML
ECHO LVOT VTI: 7.5 CM
ECHO MV AREA VTI: 1.5 CM2
ECHO MV E DECELERATION TIME (DT): 89.4 MS
ECHO MV E VELOCITY: 1.26 M/S
ECHO MV E/E' LATERAL: 15.75
ECHO MV E/E' RATIO (AVERAGED): 20.48
ECHO MV E/E' SEPTAL: 25.2
ECHO MV LVOT VTI INDEX: 2.93
ECHO MV MAX VELOCITY: 1.4 M/S
ECHO MV MEAN GRADIENT: 3 MMHG
ECHO MV MEAN VELOCITY: 0.7 M/S
ECHO MV PEAK GRADIENT: 8 MMHG
ECHO MV VTI: 22 CM
ECHO RIGHT VENTRICULAR SYSTOLIC PRESSURE (RVSP): 51 MMHG
ECHO RV INTERNAL DIMENSION: 4.4 CM
ECHO RV TAPSE: 0.5 CM (ref 1.7–?)
ECHO TV REGURGITANT MAX VELOCITY: 3.02 M/S
ECHO TV REGURGITANT PEAK GRADIENT: 37 MMHG
EOSINOPHIL # BLD: 0.2 K/UL (ref 0–0.4)
EOSINOPHIL NFR BLD: 3 % (ref 0–7)
ERYTHROCYTE [DISTWIDTH] IN BLOOD BY AUTOMATED COUNT: 15.6 % (ref 11.5–14.5)
GLUCOSE SERPL-MCNC: 99 MG/DL (ref 65–100)
HCT VFR BLD AUTO: 43.3 % (ref 36.6–50.3)
HGB BLD-MCNC: 13.8 G/DL (ref 12.1–17)
IMM GRANULOCYTES # BLD AUTO: 0 K/UL (ref 0–0.04)
IMM GRANULOCYTES NFR BLD AUTO: 0 % (ref 0–0.5)
LYMPHOCYTES # BLD: 0.8 K/UL (ref 0.8–3.5)
LYMPHOCYTES NFR BLD: 13 % (ref 12–49)
MCH RBC QN AUTO: 30.4 PG (ref 26–34)
MCHC RBC AUTO-ENTMCNC: 31.9 G/DL (ref 30–36.5)
MCV RBC AUTO: 95.4 FL (ref 80–99)
MONOCYTES # BLD: 0.7 K/UL (ref 0–1)
MONOCYTES NFR BLD: 11 % (ref 5–13)
NEUTS SEG # BLD: 4.4 K/UL (ref 1.8–8)
NEUTS SEG NFR BLD: 72 % (ref 32–75)
NRBC # BLD: 0 K/UL (ref 0–0.01)
NRBC BLD-RTO: 0 PER 100 WBC
P-R INTERVAL, ECG05: 75 MS
PLATELET # BLD AUTO: 189 K/UL (ref 150–400)
PMV BLD AUTO: 11.6 FL (ref 8.9–12.9)
POTASSIUM SERPL-SCNC: 4.1 MMOL/L (ref 3.5–5.1)
Q-T INTERVAL, ECG07: 432 MS
QRS DURATION, ECG06: 138 MS
QTC CALCULATION (BEZET), ECG08: 483 MS
RBC # BLD AUTO: 4.54 M/UL (ref 4.1–5.7)
RBC MORPH BLD: ABNORMAL
SODIUM SERPL-SCNC: 142 MMOL/L (ref 136–145)
TROPONIN-HIGH SENSITIVITY: 172 NG/L (ref 0–76)
TROPONIN-HIGH SENSITIVITY: 172 NG/L (ref 0–76)
VENTRICULAR RATE, ECG03: 75 BPM
WBC # BLD AUTO: 6.2 K/UL (ref 4.1–11.1)

## 2023-01-06 PROCEDURE — G0378 HOSPITAL OBSERVATION PER HR: HCPCS

## 2023-01-06 PROCEDURE — 36415 COLL VENOUS BLD VENIPUNCTURE: CPT

## 2023-01-06 PROCEDURE — 74011250637 HC RX REV CODE- 250/637: Performed by: INTERNAL MEDICINE

## 2023-01-06 PROCEDURE — 80048 BASIC METABOLIC PNL TOTAL CA: CPT

## 2023-01-06 PROCEDURE — 74011000250 HC RX REV CODE- 250: Performed by: EMERGENCY MEDICINE

## 2023-01-06 PROCEDURE — 74011250636 HC RX REV CODE- 250/636: Performed by: NURSE PRACTITIONER

## 2023-01-06 PROCEDURE — 93306 TTE W/DOPPLER COMPLETE: CPT

## 2023-01-06 PROCEDURE — 77010033678 HC OXYGEN DAILY

## 2023-01-06 PROCEDURE — 74011250637 HC RX REV CODE- 250/637

## 2023-01-06 PROCEDURE — 84484 ASSAY OF TROPONIN QUANT: CPT

## 2023-01-06 PROCEDURE — 94760 N-INVAS EAR/PLS OXIMETRY 1: CPT

## 2023-01-06 PROCEDURE — 74011250636 HC RX REV CODE- 250/636: Performed by: INTERNAL MEDICINE

## 2023-01-06 PROCEDURE — 85025 COMPLETE CBC W/AUTO DIFF WBC: CPT

## 2023-01-06 PROCEDURE — 74011250637 HC RX REV CODE- 250/637: Performed by: NURSE PRACTITIONER

## 2023-01-06 PROCEDURE — 96372 THER/PROPH/DIAG INJ SC/IM: CPT

## 2023-01-06 PROCEDURE — 74011000250 HC RX REV CODE- 250: Performed by: INTERNAL MEDICINE

## 2023-01-06 PROCEDURE — 96376 TX/PRO/DX INJ SAME DRUG ADON: CPT

## 2023-01-06 RX ORDER — ENOXAPARIN SODIUM 100 MG/ML
80 INJECTION SUBCUTANEOUS EVERY 12 HOURS
Status: DISCONTINUED | OUTPATIENT
Start: 2023-01-06 | End: 2023-01-08 | Stop reason: HOSPADM

## 2023-01-06 RX ORDER — POLYETHYLENE GLYCOL 3350 17 G/17G
17 POWDER, FOR SOLUTION ORAL DAILY PRN
Status: DISCONTINUED | OUTPATIENT
Start: 2023-01-06 | End: 2023-01-08 | Stop reason: HOSPADM

## 2023-01-06 RX ORDER — WARFARIN SODIUM 5 MG/1
5 TABLET ORAL DAILY
Status: COMPLETED | OUTPATIENT
Start: 2023-01-06 | End: 2023-01-06

## 2023-01-06 RX ORDER — METOPROLOL SUCCINATE 25 MG/1
25 TABLET, EXTENDED RELEASE ORAL DAILY
Status: DISCONTINUED | OUTPATIENT
Start: 2023-01-06 | End: 2023-01-08 | Stop reason: HOSPADM

## 2023-01-06 RX ORDER — ENOXAPARIN SODIUM 100 MG/ML
40 INJECTION SUBCUTANEOUS DAILY
Status: DISCONTINUED | OUTPATIENT
Start: 2023-01-06 | End: 2023-01-06

## 2023-01-06 RX ORDER — GABAPENTIN 300 MG/1
300 CAPSULE ORAL 2 TIMES DAILY
Status: DISCONTINUED | OUTPATIENT
Start: 2023-01-06 | End: 2023-01-08 | Stop reason: HOSPADM

## 2023-01-06 RX ORDER — POTASSIUM CHLORIDE 750 MG/1
10 TABLET, FILM COATED, EXTENDED RELEASE ORAL DAILY
Status: DISCONTINUED | OUTPATIENT
Start: 2023-01-06 | End: 2023-01-07

## 2023-01-06 RX ORDER — ONDANSETRON 4 MG/1
4 TABLET, ORALLY DISINTEGRATING ORAL
Status: DISCONTINUED | OUTPATIENT
Start: 2023-01-06 | End: 2023-01-06

## 2023-01-06 RX ORDER — DOFETILIDE 0.25 MG/1
250 CAPSULE ORAL 2 TIMES DAILY
Status: DISCONTINUED | OUTPATIENT
Start: 2023-01-06 | End: 2023-01-06

## 2023-01-06 RX ORDER — SODIUM CHLORIDE 0.9 % (FLUSH) 0.9 %
5-40 SYRINGE (ML) INJECTION AS NEEDED
Status: DISCONTINUED | OUTPATIENT
Start: 2023-01-06 | End: 2023-01-08 | Stop reason: HOSPADM

## 2023-01-06 RX ORDER — SODIUM CHLORIDE 0.9 % (FLUSH) 0.9 %
5-40 SYRINGE (ML) INJECTION EVERY 8 HOURS
Status: DISCONTINUED | OUTPATIENT
Start: 2023-01-06 | End: 2023-01-08 | Stop reason: HOSPADM

## 2023-01-06 RX ORDER — ACETAMINOPHEN 325 MG/1
650 TABLET ORAL
Status: DISCONTINUED | OUTPATIENT
Start: 2023-01-06 | End: 2023-01-08 | Stop reason: HOSPADM

## 2023-01-06 RX ORDER — FUROSEMIDE 10 MG/ML
40 INJECTION INTRAMUSCULAR; INTRAVENOUS EVERY 12 HOURS
Status: DISCONTINUED | OUTPATIENT
Start: 2023-01-06 | End: 2023-01-08 | Stop reason: HOSPADM

## 2023-01-06 RX ORDER — ONDANSETRON 2 MG/ML
4 INJECTION INTRAMUSCULAR; INTRAVENOUS
Status: DISCONTINUED | OUTPATIENT
Start: 2023-01-06 | End: 2023-01-08 | Stop reason: HOSPADM

## 2023-01-06 RX ORDER — ACETAMINOPHEN 650 MG/1
650 SUPPOSITORY RECTAL
Status: DISCONTINUED | OUTPATIENT
Start: 2023-01-06 | End: 2023-01-08 | Stop reason: HOSPADM

## 2023-01-06 RX ADMIN — Medication 10 ML: at 05:35

## 2023-01-06 RX ADMIN — Medication 10 ML: at 18:24

## 2023-01-06 RX ADMIN — GABAPENTIN 300 MG: 300 CAPSULE ORAL at 11:34

## 2023-01-06 RX ADMIN — ENOXAPARIN SODIUM 80 MG: 100 INJECTION SUBCUTANEOUS at 22:53

## 2023-01-06 RX ADMIN — METOPROLOL SUCCINATE 25 MG: 25 TABLET, EXTENDED RELEASE ORAL at 11:34

## 2023-01-06 RX ADMIN — POTASSIUM CHLORIDE 10 MEQ: 750 TABLET, EXTENDED RELEASE ORAL at 11:34

## 2023-01-06 RX ADMIN — ENOXAPARIN SODIUM 80 MG: 100 INJECTION SUBCUTANEOUS at 11:34

## 2023-01-06 RX ADMIN — FUROSEMIDE 40 MG: 10 INJECTION, SOLUTION INTRAMUSCULAR; INTRAVENOUS at 21:55

## 2023-01-06 RX ADMIN — GABAPENTIN 300 MG: 300 CAPSULE ORAL at 21:55

## 2023-01-06 RX ADMIN — WARFARIN SODIUM 5 MG: 5 TABLET ORAL at 18:25

## 2023-01-06 RX ADMIN — Medication 10 ML: at 21:56

## 2023-01-06 RX ADMIN — Medication 10 ML: at 11:34

## 2023-01-06 RX ADMIN — FUROSEMIDE 40 MG: 10 INJECTION, SOLUTION INTRAMUSCULAR; INTRAVENOUS at 11:33

## 2023-01-06 NOTE — CONSULTS
CONSULTATION    REASON FOR CONSULT:  Elevated  Troponin    REQUESTING PROVIDER:  Dr. Radha Kong:    Chief Complaint   Patient presents with    Abnormal Lab Results         HISTORY OF PRESENT ILLNESS:  Balwinder Babb is a [de-identified]y.o. year-old male with past medical history significant for Mechanical Mitral Valve, and obvious significant cardiac medical hx that he is not able to provide clearly who presented to ED for evaluation of elevated INR. He reports recent peripheral edema and mild MOSS, but no other sx. Reports this happened approx 1 year ago and he took \"fluid pill\" for 90 days and edema resolved, so he hasn't taken anymore. H feels better this am after IV diuresis. Is still mildly hypervolemic, but very well compensated. He has no complaints this morning. Records from hospital admission course thus far reviewed. Telemetry Review: v-paced, occasional pvc      PAST MEDICAL HISTORY:    Past Medical History:   Diagnosis Date    A-fib ([facility] [address]     Neuropathy        PAST SURGICAL HISTORY:   Past Surgical History:   Procedure Laterality Date    HX HEART VALVE SURGERY      HX PACEMAKER         ALLERGIES:  No Known Allergies    FAMILY HISTORY:  History reviewed. No pertinent family history. SOCIAL HISTORY:    Tobacco: chewing tobacco since age 17. Reports \"will do it dying as well. \"  Drugs: denies  ETOH: denies    HOME MEDICATIONS:    Prior to Admission Medications   Prescriptions Last Dose Informant Patient Reported? Taking? dofetilide (TIKOSYN) 250 mcg capsule 11/1/2022  Yes Yes   Sig: Take 250 mcg by mouth two (2) times a day. furosemide (LASIX) 20 mg tablet Not Taking  Yes No   Sig: Take 20 mg by mouth daily. Patient not taking: Reported on 1/5/2023   gabapentin (NEURONTIN) 300 mg capsule 1/5/2023  Yes Yes   Sig: Take 300 mg by mouth two (2) times a day. gemfibroziL (LOPID) 600 mg tablet 1/4/2023  Yes Yes   Sig: Take 600 mg by mouth two (2) times a day.  With orange juice magnesium chloride (Slow-Mag) 71.5 mg tablet 1/5/2023  Yes Yes   Sig: Take 143 mg by mouth daily. metoprolol succinate (TOPROL-XL) 25 mg XL tablet 1/5/2023  Yes Yes   Sig: Take 25 mg by mouth daily. oxyCODONE IR (ROXICODONE) 5 mg immediate release tablet Not Taking  Yes No   Sig: Take 5 mg by mouth every six (6) hours as needed for Pain. Patient not taking: Reported on 1/5/2023   potassium chloride SR (KLOR-CON 10) 10 mEq tablet 1/5/2023  Yes Yes   Sig: Take 10 mEq by mouth daily. sacubitriL-valsartan (Entresto) 49-51 mg tab tablet 1/5/2023  Yes Yes   Sig: Take 1 Tablet by mouth daily. Facility-Administered Medications: None       REVIEW OF SYSTEMS:  Complete review of systems performed, pertinents noted above, all other systems are negative. Patient Vitals for the past 24 hrs:   Temp Pulse Resp BP SpO2   01/06/23 0807 97.5 °F (36.4 °C) 77 18 122/83 99 %   01/06/23 0740 -- -- -- -- 98 %   01/06/23 0416 97.7 °F (36.5 °C) 77 18 125/87 100 %   01/06/23 0400 -- 77 -- -- --   01/06/23 0031 98.4 °F (36.9 °C) 74 18 (!) 129/93 100 %   01/06/23 0000 -- 74 -- -- --   01/05/23 2150 -- -- -- -- 94 %   01/05/23 1952 97.3 °F (36.3 °C) 76 18 123/78 91 %   01/05/23 1905 96.9 °F (36.1 °C) 85 18 132/88 97 %   01/05/23 1741 -- 77 18 (!) 137/95 97 %   01/05/23 1321 98.9 °F (37.2 °C) 76 18 110/79 97 %       PHYSICAL EXAMINATION:    General: Well nourished male lying in bed, NAD, A&O  HEENT: Normocephalic, PERRL, no drainage  Neck: Supple, Trachea midline, No JVD  RESP: CTA bilaterally. + Symmetrical chest movement. No SOB or distress. On RA  Cardiovascular: RRR no RG. + murmur & click, + right cw device  PVS: No rubor, cyanosis, 1+ pitting BLE edema, Radial, DP, PT pulses equal bilaterally  ABD: soft, NT, Normoactive BS  Derm: Warm/Dry/Intact with no lesions, poor turgor given edema  Neuro: A&O PPTS, cranial nerves II- XII grossly intact via interaction with patient.  No focal deficits  PSYCH: No anxiety or agitation      Electrocardiogram performed earlier reviewed, it shows V-paced    Recent labs results and imaging reviewed. Recent Results (from the past 24 hour(s))   PROTHROMBIN TIME + INR    Collection Time: 01/05/23  1:35 PM   Result Value Ref Range    Prothrombin time 22.2 (H) 11.9 - 14.6 sec    INR 2.0 (H) 0.9 - 1.1     CBC WITH AUTOMATED DIFF    Collection Time: 01/05/23  1:35 PM   Result Value Ref Range    WBC 6.9 4.1 - 11.1 K/uL    RBC 4.69 4.10 - 5.70 M/uL    HGB 14.4 12.1 - 17.0 g/dL    HCT 44.9 36.6 - 50.3 %    MCV 95.7 80.0 - 99.0 FL    MCH 30.7 26.0 - 34.0 PG    MCHC 32.1 30.0 - 36.5 g/dL    RDW 15.8 (H) 11.5 - 14.5 %    PLATELET 817 558 - 288 K/uL    MPV 11.2 8.9 - 12.9 FL    NRBC 0.0 0.0  WBC    ABSOLUTE NRBC 0.00 0.00 - 0.01 K/uL    NEUTROPHILS 76 (H) 32 - 75 %    LYMPHOCYTES 10 (L) 12 - 49 %    MONOCYTES 10 5 - 13 %    EOSINOPHILS 3 0 - 7 %    BASOPHILS 1 0 - 1 %    IMMATURE GRANULOCYTES 0 0 - 0.5 %    ABS. NEUTROPHILS 5.2 1.8 - 8.0 K/UL    ABS. LYMPHOCYTES 0.7 (L) 0.8 - 3.5 K/UL    ABS. MONOCYTES 0.7 0.0 - 1.0 K/UL    ABS. EOSINOPHILS 0.2 0.0 - 0.4 K/UL    ABS. BASOPHILS 0.1 0.0 - 0.1 K/UL    ABS. IMM.  GRANS. 0.0 0.00 - 0.04 K/UL    DF AUTOMATED      RBC COMMENTS Anisocytosis  1+       METABOLIC PANEL, BASIC    Collection Time: 01/05/23  1:35 PM   Result Value Ref Range    Sodium 140 136 - 145 mmol/L    Potassium 4.3 3.5 - 5.1 mmol/L    Chloride 105 97 - 108 mmol/L    CO2 26 21 - 32 mmol/L    Anion gap 9 5 - 15 mmol/L    Glucose 109 (H) 65 - 100 mg/dL    BUN 17 6 - 20 mg/dL    Creatinine 1.60 (H) 0.70 - 1.30 mg/dL    BUN/Creatinine ratio 11 (L) 12 - 20      eGFR 43 (L) >60 ml/min/1.73m2    Calcium 8.6 8.5 - 10.1 mg/dL   NT-PRO BNP    Collection Time: 01/05/23  1:35 PM   Result Value Ref Range    NT pro-BNP 31,270 (H) <450 pg/mL   TROPONIN-HIGH SENSITIVITY    Collection Time: 01/05/23  1:35 PM   Result Value Ref Range    Troponin-High Sensitivity 150 (HH) 0 - 76 ng/L   EKG, 12 LEAD, INITIAL    Collection Time: 01/05/23  2:07 PM   Result Value Ref Range    Ventricular Rate 75 BPM    Atrial Rate 0 BPM    P-R Interval 75 ms    QRS Duration 138 ms    Q-T Interval 432 ms    QTC Calculation (Bezet) 483 ms    Calculated P Axis 0 degrees    Calculated R Axis -114 degrees    Calculated T Axis 95 degrees    Diagnosis       Ventricular-paced rhythm  No further analysis attempted due to paced rhythm  Baseline wander in lead(s) V3     TROPONIN-HIGH SENSITIVITY    Collection Time: 01/05/23  3:35 PM   Result Value Ref Range    Troponin-High Sensitivity 114 (H) 0 - 76 ng/L   TROPONIN-HIGH SENSITIVITY    Collection Time: 01/05/23  8:50 PM   Result Value Ref Range    Troponin-High Sensitivity 165 (HH) 0 - 76 ng/L   METABOLIC PANEL, BASIC    Collection Time: 01/06/23  5:16 AM   Result Value Ref Range    Sodium 142 136 - 145 mmol/L    Potassium 4.1 3.5 - 5.1 mmol/L    Chloride 105 97 - 108 mmol/L    CO2 28 21 - 32 mmol/L    Anion gap 9 5 - 15 mmol/L    Glucose 99 65 - 100 mg/dL    BUN 17 6 - 20 mg/dL    Creatinine 1.55 (H) 0.70 - 1.30 mg/dL    BUN/Creatinine ratio 11 (L) 12 - 20      eGFR 45 (L) >60 ml/min/1.73m2    Calcium 8.3 (L) 8.5 - 10.1 mg/dL   CBC WITH AUTOMATED DIFF    Collection Time: 01/06/23  5:16 AM   Result Value Ref Range    WBC 6.2 4.1 - 11.1 K/uL    RBC 4.54 4.10 - 5.70 M/uL    HGB 13.8 12.1 - 17.0 g/dL    HCT 43.3 36.6 - 50.3 %    MCV 95.4 80.0 - 99.0 FL    MCH 30.4 26.0 - 34.0 PG    MCHC 31.9 30.0 - 36.5 g/dL    RDW 15.6 (H) 11.5 - 14.5 %    PLATELET 889 620 - 945 K/uL    MPV 11.6 8.9 - 12.9 FL    NRBC 0.0 0.0  WBC    ABSOLUTE NRBC 0.00 0.00 - 0.01 K/uL    NEUTROPHILS 72 32 - 75 %    LYMPHOCYTES 13 12 - 49 %    MONOCYTES 11 5 - 13 %    EOSINOPHILS 3 0 - 7 %    BASOPHILS 1 0 - 1 %    IMMATURE GRANULOCYTES 0 0 - 0.5 %    ABS. NEUTROPHILS 4.4 1.8 - 8.0 K/UL    ABS. LYMPHOCYTES 0.8 0.8 - 3.5 K/UL    ABS. MONOCYTES 0.7 0.0 - 1.0 K/UL    ABS. EOSINOPHILS 0.2 0.0 - 0.4 K/UL    ABS.  BASOPHILS 0.1 0.0 - 0.1 K/UL    ABS. IMM. GRANS. 0.0 0.00 - 0.04 K/UL    DF AUTOMATED      RBC COMMENTS Normocytic, Normochromic     TROPONIN-HIGH SENSITIVITY    Collection Time: 01/06/23  5:16 AM   Result Value Ref Range    Troponin-High Sensitivity 172 (HH) 0 - 76 ng/L   TROPONIN-HIGH SENSITIVITY    Collection Time: 01/06/23  8:08 AM   Result Value Ref Range    Troponin-High Sensitivity 172 (HH) 0 - 76 ng/L       XR Results (maximum last 3): Results from [place] encounter on 01/05/23    XR CHEST PORT    Impression  Mild cardiomegaly  Small right pleural effusion      Results from Hospital Encounter encounter on 12/06/21    XR KNEE RT MAX 2 VWS    Impression  No fracture or hardware complication. CT Results (maximum last 3): Results from [place] encounter on 12/06/21    CT ABDOMEN W WO CONT AND PELVIS W CONT    Impression  1. No significant interval change in right iliacus hematoma. 2. Multiple bilateral enhancing renal masses suspicious for renal neoplasm. Given multiplicity, hereditary forms of papillary or clear cell renal carcinoma  can be considered. 3. Moderate bilateral pleural effusions with overlying atelectasis. Results from [place] encounter on 12/05/21    CT ABD PELV W CONT    Impression  1. Large right iliacus muscle hematoma. Correlation with hematologic laboratory  evaluation/hematocrit recommended. 2.  Cholelithiasis. 3.  Bilateral pleural effusions. 4.  Cardiomegaly. 5.  Additional incidental and chronic findings as above. MRI Results (maximum last 3): No results found for this or any previous visit. Nuclear Medicine Results (maximum last 3): No results found for this or any previous visit. US Results (maximum last 3): No results found for this or any previous visit. VAS/US Results (maximum last 3): No results found for this or any previous visit.         Current Facility-Administered Medications:     gabapentin (NEURONTIN) capsule 300 mg, 300 mg, Oral, BID, Michael Kimball MD    sodium chloride (NS) flush 5-40 mL, 5-40 mL, IntraVENous, Q8H, Marylee Estelle, MD    sodium chloride (NS) flush 5-40 mL, 5-40 mL, IntraVENous, PRN, Marylee Estelle, MD    acetaminophen (TYLENOL) tablet 650 mg, 650 mg, Oral, Q6H PRN **OR** acetaminophen (TYLENOL) suppository 650 mg, 650 mg, Rectal, Q6H PRN, Marylee Estelle, MD    polyethylene glycol (MIRALAX) packet 17 g, 17 g, Oral, DAILY PRN, Marylee Estelle, MD    [DISCONTINUED] ondansetron (ZOFRAN ODT) tablet 4 mg, 4 mg, Oral, Q8H PRN **OR** ondansetron (ZOFRAN) injection 4 mg, 4 mg, IntraVENous, Q6H PRN, Marylee Estelle, MD    furosemide (LASIX) injection 40 mg, 40 mg, IntraVENous, Q12H, Marylee Estelle, MD    metoprolol succinate (TOPROL-XL) XL tablet 25 mg, 25 mg, Oral, DAILY, Marylee Estelle, MD    potassium chloride SR (KLOR-CON 10) tablet 10 mEq, 10 mEq, Oral, DAILY, Marylee Estelle, MD    enoxaparin (LOVENOX) injection 80 mg, 80 mg, SubCUTAneous, Q12H, Radha Rodrigez, NP    warfarin (COUMADIN) tablet 5 mg, 5 mg, Oral, DAILY, Valentin Rodrigez, TAWANNA    WARFARIN INFORMATION NOTE (COUMADIN), , Other, PRN, Valentin Rodrigez, TAWANNA    sodium chloride (NS) flush 5-40 mL, 5-40 mL, IntraVENous, PRN, Miguel Munoz MD          Case discussed with collaborating physician Dr. Ibrahima Johnson and our impression and recommendations are as follows:   Mechanical Mitral Valve:  Placed approx 20 years ago per patient. Will obtain TTE this am to evaluate stability. Goal INR 2.5-3.5. Has been steady state therapy for past year with Warfarin regimen of 7.5mg on Sun/Wed and 5mg all other days, until recently when developed sinus infection and had to take antibiotic. INR then became supratherapeutic. He was given Vit K on 1/4 when INR was >9.6. INR 2.0 1/5. Recommend 1mg/kg dosing Lovenox as bridge until INR is >2.5. Restart home regimen and adjust accordingly. Do not give further Vit K UNLESS ACTIVE BLEEDING.  Do not hold doses unless INR >6. Consider overall 10% decrease in weekly dosing (I.e. 5mg daily) and with close following. Acute on Chronic HFrEF Exacerbation:  Patient is very poor historian. Obviously has CRT-D (per CXR), but cannot tell me specifics, or that he has HF other than he takes Entresto. BNP 18708 on admission yesterday. He is milldy hypervolemic. Continue IV diuresis today but can transition to PO regimen tomorrow. Recommend obtaining records from established Cardiologist Dr. Gustabo Bowman and reconciling meds/plan of care accordingly. Strict I/O. Daily weights. Fluid/Sodium restriction. Supratherapeutic INR: oal INR 2.5-3.5. Has been steady state therapy for past year with Warfarin regimen of 7.5mg on Sun/Wed and 5mg all other days, until recently when developed sinus infection and had to take antibiotic. INR then became supratherapeutic. He was given Vit K on 1/4 when INR was >9.6. INR 2.0 1/5. Recommend 1mg/kg dosing Lovenox as bridge until INR is >2.5. Restart home regimen and adjust accordingly. Do not give further Vit K UNLESS ACTIVE BLEEDING. Do not hold doses unless INR >6. Consider overall 10% decrease in weekly dosing (I.e. 5mg daily) and with close following. Type II NSTEMI: HS Trop trends [phone]. No Delta. Demand s/t hypervolemia and CKD. Obtain TTE. Patient denies h/o CAD. Sx free. Continue GDMT. Close follow-up with established Cardiologist in OP setting recommended. No inpatient eval indicated. Elevated Creat: ?h/o CKD. Note CT ABD/PELV 12/9/21 makes mention of multiple enhancing renal masses. ?? Follow-up ever. Further per HM team.     Further plans/recommendations to come after TTE completed and previous records obtained as patient is such poor historian. Thank you for involving us in the care of this patient. Please do not hesitate to call if additional questions arise.  If after hours please call [phone]

## 2023-01-06 NOTE — H&P
History & Physical    Primary Care Provider: Adalberto Elizondo NP  Source of Information: Patient self    History of Presenting Illness:   Fatou Iverson is a [de-identified] y.o. male with a history of mechanical aortic valve will presented to the ED with reports of elevated INR of 9.6   3 days ago. He was treated with 5 mg oral vitamin K and advised return to the ER. Return to the ER the following day and I will had significantly decline to 2.0. He reported dyspnea upon exertion. Chest x-ray showed mild increased vascular congestion concerning for fluid overload. He was admitted for IV diuresis. Denies chest pain cough or productive sputum. No reported fevers or chills. Routinely follows primary care physician for INR checks. Has been on Coumadin for over 10 years. Denies recent history of bleeding. Review of Systems:  A comprehensive review of systems was negative except for that written in the History of Present Illness. Past Medical History:   Diagnosis Date    A-fib ([address]     Neuropathy       Past Surgical History:   Procedure Laterality Date    HX HEART VALVE SURGERY      HX PACEMAKER       Prior to Admission medications    Medication Sig Start Date End Date Taking? Authorizing Provider   gemfibroziL (LOPID) 600 mg tablet Take 600 mg by mouth two (2) times a day. With orange juice   Yes Other, MD Shay   magnesium chloride (Slow-Mag) 71.5 mg tablet Take 143 mg by mouth daily. Yes Other, MD Shay   gabapentin (NEURONTIN) 300 mg capsule Take 300 mg by mouth two (2) times a day. Yes Other, MD Shay   potassium chloride SR (KLOR-CON 10) 10 mEq tablet Take 10 mEq by mouth daily. Yes Pricila, MD Shay   dofetilide (TIKOSYN) 250 mcg capsule Take 250 mcg by mouth two (2) times a day. Yes Other, MD Shay   metoprolol succinate (TOPROL-XL) 25 mg XL tablet Take 25 mg by mouth daily.    Yes Provider, Historical   sacubitriL-valsartan (Entresto) 49-51 mg tab tablet Take 1 Tablet by mouth daily. Yes Provider, Historical   oxyCODONE IR (ROXICODONE) 5 mg immediate release tablet Take 5 mg by mouth every six (6) hours as needed for Pain. Patient not taking: Reported on 1/5/2023    Shay Mcnulty MD   furosemide (LASIX) 20 mg tablet Take 20 mg by mouth daily. Patient not taking: Reported on 1/5/2023    Shay Mcnulty MD     No Known Allergies   History reviewed. No pertinent family history. SOCIAL HISTORY:  Patient resides:  Independently    Assisted Living    SNF    With family care x      Smoking history:   None x   Former    Chronic      Alcohol history:   None x   Social    Chronic      Ambulates:   Independently x   w/cane    w/walker    w/wc    CODE STATUS:  DNR    Full x   Other      Objective:     Physical Exam:     Visit Vitals  /83   Pulse 77   Temp 97.5 °F (36.4 °C)   Resp 18   Ht 5' 8\" (1.727 m)   Wt 76.7 kg (169 lb 3.2 oz)   SpO2 99%   BMI 25.73 kg/m²    O2 Flow Rate (L/min): 2 l/min (O2 removed at this time. Pt is on RA) O2 Device: None (Room air)    General:  Alert, cooperative, no distress, appears stated age. Head:  Normocephalic, without obvious abnormality, atraumatic. Eyes:  Conjunctivae/corneas clear. PERRL, EOMs intact. Nose: Nares normal. Septum midline. Mucosa normal. No drainage or sinus tenderness. Throat: Lips, mucosa, and tongue normal. Teeth and gums normal.   Neck: Supple, symmetrical, trachea midline, no adenopathy, thyroid: no enlargement/tenderness/nodules, no carotid bruit and no JVD. Back:   Symmetric, no curvature. ROM normal. No CVA tenderness. Lungs:   Clear to auscultation bilaterally. Chest wall:  No tenderness or deformity. Heart:  Regular rate and rhythm, S1, S2 normal, no murmur, click, rub or gallop. Abdomen:   Soft, non-tender. Bowel sounds normal. No masses,  No organomegaly. Extremities: Extremities normal, atraumatic, no cyanosis or edema. Pulses: 2+ and symmetric all extremities.    Skin: Skin color, texture, turgor normal. No rashes or lesions   Neurologic: CNII-XII intact. No motor or sensory deficits. EKG:  nonspecific ST and T waves changes. Data Review:     Recent Days:  Recent Labs     01/06/23  0516 01/05/23  1335 01/04/23  1125   WBC 6.2 6.9 7.1   HGB 13.8 14.4 14.6   HCT 43.3 44.9 45.5    216 228     Recent Labs     01/06/23  0516 01/05/23  1335 01/04/23  1301 01/04/23  1125    140  --  139   K 4.1 4.3 4.0 7.9*    105  --  105   CO2 28 26  --  25   GLU 99 109*  --  121*   BUN 17 17  --  17   CREA 1.55* 1.60*  --  1.56*   CA 8.3* 8.6  --  8.2*   ALB  --   --   --  3.1*   TBILI  --   --   --  0.5   ALT  --   --   --  9*   INR  --  2.0*  --  >9.6*     No results for input(s): PH, PCO2, PO2, HCO3, FIO2 in the last 72 hours. 24 Hour Results:  Recent Results (from the past 24 hour(s))   PROTHROMBIN TIME + INR    Collection Time: 01/05/23  1:35 PM   Result Value Ref Range    Prothrombin time 22.2 (H) 11.9 - 14.6 sec    INR 2.0 (H) 0.9 - 1.1     CBC WITH AUTOMATED DIFF    Collection Time: 01/05/23  1:35 PM   Result Value Ref Range    WBC 6.9 4.1 - 11.1 K/uL    RBC 4.69 4.10 - 5.70 M/uL    HGB 14.4 12.1 - 17.0 g/dL    HCT 44.9 36.6 - 50.3 %    MCV 95.7 80.0 - 99.0 FL    MCH 30.7 26.0 - 34.0 PG    MCHC 32.1 30.0 - 36.5 g/dL    RDW 15.8 (H) 11.5 - 14.5 %    PLATELET 544 915 - 708 K/uL    MPV 11.2 8.9 - 12.9 FL    NRBC 0.0 0.0  WBC    ABSOLUTE NRBC 0.00 0.00 - 0.01 K/uL    NEUTROPHILS 76 (H) 32 - 75 %    LYMPHOCYTES 10 (L) 12 - 49 %    MONOCYTES 10 5 - 13 %    EOSINOPHILS 3 0 - 7 %    BASOPHILS 1 0 - 1 %    IMMATURE GRANULOCYTES 0 0 - 0.5 %    ABS. NEUTROPHILS 5.2 1.8 - 8.0 K/UL    ABS. LYMPHOCYTES 0.7 (L) 0.8 - 3.5 K/UL    ABS. MONOCYTES 0.7 0.0 - 1.0 K/UL    ABS. EOSINOPHILS 0.2 0.0 - 0.4 K/UL    ABS. BASOPHILS 0.1 0.0 - 0.1 K/UL    ABS. IMM.  GRANS. 0.0 0.00 - 0.04 K/UL    DF AUTOMATED      RBC COMMENTS Anisocytosis  1+       METABOLIC PANEL, BASIC    Collection Time: 01/05/23 1:35 PM   Result Value Ref Range    Sodium 140 136 - 145 mmol/L    Potassium 4.3 3.5 - 5.1 mmol/L    Chloride 105 97 - 108 mmol/L    CO2 26 21 - 32 mmol/L    Anion gap 9 5 - 15 mmol/L    Glucose 109 (H) 65 - 100 mg/dL    BUN 17 6 - 20 mg/dL    Creatinine 1.60 (H) 0.70 - 1.30 mg/dL    BUN/Creatinine ratio 11 (L) 12 - 20      eGFR 43 (L) >60 ml/min/1.73m2    Calcium 8.6 8.5 - 10.1 mg/dL   NT-PRO BNP    Collection Time: 01/05/23  1:35 PM   Result Value Ref Range    NT pro-BNP 31,270 (H) <450 pg/mL   TROPONIN-HIGH SENSITIVITY    Collection Time: 01/05/23  1:35 PM   Result Value Ref Range    Troponin-High Sensitivity 150 (HH) 0 - 76 ng/L   EKG, 12 LEAD, INITIAL    Collection Time: 01/05/23  2:07 PM   Result Value Ref Range    Ventricular Rate 75 BPM    Atrial Rate 0 BPM    P-R Interval 75 ms    QRS Duration 138 ms    Q-T Interval 432 ms    QTC Calculation (Bezet) 483 ms    Calculated P Axis 0 degrees    Calculated R Axis -114 degrees    Calculated T Axis 95 degrees    Diagnosis       Ventricular-paced rhythm  No further analysis attempted due to paced rhythm  Baseline wander in lead(s) V3     TROPONIN-HIGH SENSITIVITY    Collection Time: 01/05/23  3:35 PM   Result Value Ref Range    Troponin-High Sensitivity 114 (H) 0 - 76 ng/L   TROPONIN-HIGH SENSITIVITY    Collection Time: 01/05/23  8:50 PM   Result Value Ref Range    Troponin-High Sensitivity 165 (HH) 0 - 76 ng/L   METABOLIC PANEL, BASIC    Collection Time: 01/06/23  5:16 AM   Result Value Ref Range    Sodium 142 136 - 145 mmol/L    Potassium 4.1 3.5 - 5.1 mmol/L    Chloride 105 97 - 108 mmol/L    CO2 28 21 - 32 mmol/L    Anion gap 9 5 - 15 mmol/L    Glucose 99 65 - 100 mg/dL    BUN 17 6 - 20 mg/dL    Creatinine 1.55 (H) 0.70 - 1.30 mg/dL    BUN/Creatinine ratio 11 (L) 12 - 20      eGFR 45 (L) >60 ml/min/1.73m2    Calcium 8.3 (L) 8.5 - 10.1 mg/dL   CBC WITH AUTOMATED DIFF    Collection Time: 01/06/23  5:16 AM   Result Value Ref Range    WBC 6.2 4.1 - 11.1 K/uL    RBC 4. 54 4.10 - 5.70 M/uL    HGB 13.8 12.1 - 17.0 g/dL    HCT 43.3 36.6 - 50.3 %    MCV 95.4 80.0 - 99.0 FL    MCH 30.4 26.0 - 34.0 PG    MCHC 31.9 30.0 - 36.5 g/dL    RDW 15.6 (H) 11.5 - 14.5 %    PLATELET 449 901 - 530 K/uL    MPV 11.6 8.9 - 12.9 FL    NRBC 0.0 0.0  WBC    ABSOLUTE NRBC 0.00 0.00 - 0.01 K/uL    NEUTROPHILS 72 32 - 75 %    LYMPHOCYTES 13 12 - 49 %    MONOCYTES 11 5 - 13 %    EOSINOPHILS 3 0 - 7 %    BASOPHILS 1 0 - 1 %    IMMATURE GRANULOCYTES 0 0 - 0.5 %    ABS. NEUTROPHILS 4.4 1.8 - 8.0 K/UL    ABS. LYMPHOCYTES 0.8 0.8 - 3.5 K/UL    ABS. MONOCYTES 0.7 0.0 - 1.0 K/UL    ABS. EOSINOPHILS 0.2 0.0 - 0.4 K/UL    ABS. BASOPHILS 0.1 0.0 - 0.1 K/UL    ABS. IMM. GRANS. 0.0 0.00 - 0.04 K/UL    DF AUTOMATED      RBC COMMENTS Normocytic, Normochromic     TROPONIN-HIGH SENSITIVITY    Collection Time: 01/06/23  5:16 AM   Result Value Ref Range    Troponin-High Sensitivity 172 (HH) 0 - 76 ng/L   TROPONIN-HIGH SENSITIVITY    Collection Time: 01/06/23  8:08 AM   Result Value Ref Range    Troponin-High Sensitivity 172 (HH) 0 - 76 ng/L         Imaging:   XR CHEST PORT   Final Result      Mild cardiomegaly   Small right pleural effusion               Assessment:     History of aortic mechanical valve with subtherapeutic INR    -Lovenox 1 mg/kg twice daily started    -We will consult pharmacy for Coumadin management    -Goal INR 2.5-3.5    Acute on chronic heart failure    - gentle diuresis with IV Lasix    -Strict input and output    -Fluid restriction to 1500 mL daily    -Encourage low-salt diet at home    Paroxysmal atrial fibrillation    -Rate controlled on Toprol        Plan:   Admit to observation telemetry  Care plan as outlined above  Anticipate discharge to home in 1 to 2 days  CODE STATUS discussed. He is full code    340pm: Met with patient and wife at bedside. Unofficial echo report 10-15%.  CODE status duiscussed and he opted for DNR/DNI    Signed By: Ana M Sanchez MD     January 6, 2023

## 2023-01-06 NOTE — ROUTINE PROCESS
Patient accepted his own PM meds, pharmacy had called and did not have all of the medications needed for administration.

## 2023-01-06 NOTE — PROGRESS NOTES
Problem: Falls - Risk of  Goal: *Absence of Falls  Description: Document [place] Flow Fall Risk and appropriate interventions in the flowsheet.   Outcome: Progressing Towards Goal  Note: Fall Risk Interventions:                                Problem: Patient Education: Go to Patient Education Activity  Goal: Patient/Family Education  Outcome: Progressing Towards Goal

## 2023-01-06 NOTE — ROUTINE PROCESS
Multiple attempts to draw q 6 hr troponin level, unable to obtain, lab called and advised, will send someone as soon as possible, only one person in lab at this time. New orders for patient home medications.

## 2023-01-06 NOTE — PROGRESS NOTES
Pharmacy Dosing Services: Warfarin    Consult for Warfarin Dosing by Pharmacy by EZ Rodrigez  Consult provided for this [de-identified] y.o.  male , for indication of Mechanical Heart Valve. Day of Therapy 1  Dose to achieve an INR goal of 2.5- 3.5    Order entered for  Warfarin  5mg ordered to be given today at 16:00. LABS Daily INR ordered. PT/INR Lab Results   Component Value Date/Time    INR 2.0 (H) 01/05/2023 01:35 PM      Platelets Lab Results   Component Value Date/Time    PLATELET 928 69/68/8745 05:16 AM      H/H     Lab Results   Component Value Date/Time    HGB 13.8 01/06/2023 05:16 AM        Warfarin Administrations (last 168 hours)       None            Pharmacy to follow daily and will provide subsequent Warfarin dosing based on clinical status.   Kiko Delgado

## 2023-01-07 PROBLEM — I50.9 CHF (CONGESTIVE HEART FAILURE) (HCC): Status: ACTIVE | Noted: 2023-01-07

## 2023-01-07 LAB
ANION GAP SERPL CALC-SCNC: 9 MMOL/L (ref 5–15)
BUN SERPL-MCNC: 17 MG/DL (ref 6–20)
BUN/CREAT SERPL: 12 (ref 12–20)
CA-I BLD-MCNC: 8.2 MG/DL (ref 8.5–10.1)
CHLORIDE SERPL-SCNC: 103 MMOL/L (ref 97–108)
CO2 SERPL-SCNC: 32 MMOL/L (ref 21–32)
CREAT SERPL-MCNC: 1.41 MG/DL (ref 0.7–1.3)
ERYTHROCYTE [DISTWIDTH] IN BLOOD BY AUTOMATED COUNT: 15.4 % (ref 11.5–14.5)
GLUCOSE SERPL-MCNC: 75 MG/DL (ref 65–100)
HCT VFR BLD AUTO: 40.5 % (ref 36.6–50.3)
HGB BLD-MCNC: 13.2 G/DL (ref 12.1–17)
INR PPP: 1.7 (ref 0.9–1.1)
MCH RBC QN AUTO: 30.4 PG (ref 26–34)
MCHC RBC AUTO-ENTMCNC: 32.6 G/DL (ref 30–36.5)
MCV RBC AUTO: 93.3 FL (ref 80–99)
NRBC # BLD: 0 K/UL (ref 0–0.01)
NRBC BLD-RTO: 0 PER 100 WBC
PLATELET # BLD AUTO: 173 K/UL (ref 150–400)
PMV BLD AUTO: 11.5 FL (ref 8.9–12.9)
POTASSIUM SERPL-SCNC: 3.1 MMOL/L (ref 3.5–5.1)
PROTHROMBIN TIME: 19.3 SEC (ref 11.9–14.6)
RBC # BLD AUTO: 4.34 M/UL (ref 4.1–5.7)
SODIUM SERPL-SCNC: 144 MMOL/L (ref 136–145)
WBC # BLD AUTO: 5.1 K/UL (ref 4.1–11.1)

## 2023-01-07 PROCEDURE — 74011250637 HC RX REV CODE- 250/637: Performed by: INTERNAL MEDICINE

## 2023-01-07 PROCEDURE — 65270000029 HC RM PRIVATE

## 2023-01-07 PROCEDURE — 74011000250 HC RX REV CODE- 250: Performed by: EMERGENCY MEDICINE

## 2023-01-07 PROCEDURE — 74011250636 HC RX REV CODE- 250/636: Performed by: INTERNAL MEDICINE

## 2023-01-07 PROCEDURE — 96376 TX/PRO/DX INJ SAME DRUG ADON: CPT

## 2023-01-07 PROCEDURE — 74011000250 HC RX REV CODE- 250: Performed by: INTERNAL MEDICINE

## 2023-01-07 PROCEDURE — G0378 HOSPITAL OBSERVATION PER HR: HCPCS

## 2023-01-07 PROCEDURE — 85027 COMPLETE CBC AUTOMATED: CPT

## 2023-01-07 PROCEDURE — 36415 COLL VENOUS BLD VENIPUNCTURE: CPT

## 2023-01-07 PROCEDURE — 85610 PROTHROMBIN TIME: CPT

## 2023-01-07 PROCEDURE — 74011250637 HC RX REV CODE- 250/637: Performed by: NURSE PRACTITIONER

## 2023-01-07 PROCEDURE — 80048 BASIC METABOLIC PNL TOTAL CA: CPT

## 2023-01-07 PROCEDURE — 74011250636 HC RX REV CODE- 250/636: Performed by: NURSE PRACTITIONER

## 2023-01-07 RX ORDER — WARFARIN SODIUM 5 MG/1
5 TABLET ORAL DAILY
Status: DISCONTINUED | OUTPATIENT
Start: 2023-01-07 | End: 2023-01-08

## 2023-01-07 RX ORDER — POTASSIUM CHLORIDE 20 MEQ/1
20 TABLET, EXTENDED RELEASE ORAL DAILY
Status: DISCONTINUED | OUTPATIENT
Start: 2023-01-07 | End: 2023-01-08 | Stop reason: HOSPADM

## 2023-01-07 RX ORDER — POTASSIUM CHLORIDE 7.45 MG/ML
20 INJECTION INTRAVENOUS ONCE
Status: COMPLETED | OUTPATIENT
Start: 2023-01-07 | End: 2023-01-07

## 2023-01-07 RX ADMIN — METOPROLOL SUCCINATE 25 MG: 25 TABLET, EXTENDED RELEASE ORAL at 08:42

## 2023-01-07 RX ADMIN — Medication 10 ML: at 21:27

## 2023-01-07 RX ADMIN — FUROSEMIDE 40 MG: 10 INJECTION, SOLUTION INTRAMUSCULAR; INTRAVENOUS at 21:27

## 2023-01-07 RX ADMIN — Medication 10 ML: at 21:34

## 2023-01-07 RX ADMIN — GABAPENTIN 300 MG: 300 CAPSULE ORAL at 21:27

## 2023-01-07 RX ADMIN — GABAPENTIN 300 MG: 300 CAPSULE ORAL at 08:42

## 2023-01-07 RX ADMIN — Medication 10 ML: at 13:22

## 2023-01-07 RX ADMIN — POTASSIUM CHLORIDE 10 MEQ: 750 TABLET, EXTENDED RELEASE ORAL at 08:42

## 2023-01-07 RX ADMIN — ENOXAPARIN SODIUM 80 MG: 100 INJECTION SUBCUTANEOUS at 22:00

## 2023-01-07 RX ADMIN — WARFARIN SODIUM 5 MG: 5 TABLET ORAL at 15:02

## 2023-01-07 RX ADMIN — POTASSIUM CHLORIDE 20 MEQ: 7.46 INJECTION, SOLUTION INTRAVENOUS at 10:47

## 2023-01-07 RX ADMIN — POTASSIUM CHLORIDE 20 MEQ: 1500 TABLET, EXTENDED RELEASE ORAL at 10:48

## 2023-01-07 RX ADMIN — FUROSEMIDE 40 MG: 10 INJECTION, SOLUTION INTRAMUSCULAR; INTRAVENOUS at 08:45

## 2023-01-07 RX ADMIN — ENOXAPARIN SODIUM 80 MG: 100 INJECTION SUBCUTANEOUS at 10:47

## 2023-01-07 NOTE — ROUTINE PROCESS
SBAR report received from [facility] on patient condition and current treatment plan.

## 2023-01-07 NOTE — PROGRESS NOTES
Problem: Falls - Risk of  Goal: *Absence of Falls  Description: Document Carly Arora Fall Risk and appropriate interventions in the flowsheet. Outcome: Progressing Towards Goal  Note: Fall Risk Interventions:            Medication Interventions: Patient to call before getting OOB, Teach patient to arise slowly    Elimination Interventions: Call light in reach, Urinal in reach    History of Falls Interventions: Door open when patient unattended         Problem: Pressure Injury - Risk of  Goal: *Prevention of pressure injury  Description: Document Krishna Scale and appropriate interventions in the flowsheet.   Outcome: Progressing Towards Goal  Note: Pressure Injury Interventions:                 Mobility Interventions: HOB 30 degrees or less    Nutrition Interventions: Document food/fluid/supplement intake, Offer support with meals,snacks and hydration                     Problem: Patient Education: Go to Patient Education Activity  Goal: Patient/Family Education  Outcome: Progressing Towards Goal

## 2023-01-07 NOTE — ROUTINE PROCESS
Patient in a daze when getting VS, has wet bed and required bed and gown change, patient reports going into a deep sleep, alert and oriented post episode just reported he was really tired when he finally went to sleep.

## 2023-01-07 NOTE — PROGRESS NOTES
Progress Note  Date:1/7/2023       Room:[facility]/  Patient Name:Rex Auguste     YOB: 1940     Age:82 y.o. Subjective    [de-identified] male with history of mechanical mitral valve, congestive heart failure with reduced ejection fraction admitted for hypercoagulable state with exacerbation of congestive heart failure. Patient seen lying in bed this morning apparently upset at himself for urinating in the bed during the night. States that the swelling in his legs has nearly resolved        Objective           Vitals Last 24 Hours:  Patient Vitals for the past 24 hrs:   Temp Pulse Resp BP SpO2   01/07/23 0800 -- 90 -- -- --   01/07/23 0751 98.2 °F (36.8 °C) 94 18 121/86 98 %   01/07/23 0712 -- -- -- -- 97 %   01/07/23 0402 97.8 °F (36.6 °C) 74 18 123/86 97 %   01/07/23 0003 97.7 °F (36.5 °C) 77 18 118/78 99 %   01/07/23 0000 -- 77 -- -- --   01/06/23 2005 97.8 °F (36.6 °C) 75 17 120/83 100 %   01/06/23 2000 -- 75 -- -- --   01/06/23 1751 98 °F (36.7 °C) 75 18 110/70 99 %   01/06/23 1200 97.5 °F (36.4 °C) 77 18 108/75 100 %        I/O (24Hr): Intake/Output Summary (Last 24 hours) at 1/7/2023 1020  Last data filed at 1/7/2023 0630  Gross per 24 hour   Intake 1210 ml   Output 1100 ml   Net 110 ml       Physical Exam:  General Appearance:  Comfortable, well-appearing. No acute distress. HEENT: NCAT, EOMI, No deformities or discharge, Neck supple with trachea midline. Respiratory: Normal respiratory rate. Breath sounds clear to auscultation. Heart: S1 normal and S2 normal.  No tachycardia  Abdomen: Soft and flat. Bowel sounds are normal. No rebound or guarding. No tenderness to palpation. Extremities: Normal range of motion. No acute deformities. Neurological: Alert and oriented to person, place and time. Skin:  Warm and dry.       Medications           Current Facility-Administered Medications   Medication Dose Route Frequency    warfarin (COUMADIN) tablet 5 mg  5 mg Oral DAILY gabapentin (NEURONTIN) capsule 300 mg  300 mg Oral BID    sodium chloride (NS) flush 5-40 mL  5-40 mL IntraVENous Q8H    sodium chloride (NS) flush 5-40 mL  5-40 mL IntraVENous PRN    acetaminophen (TYLENOL) tablet 650 mg  650 mg Oral Q6H PRN    Or    acetaminophen (TYLENOL) suppository 650 mg  650 mg Rectal Q6H PRN    polyethylene glycol (MIRALAX) packet 17 g  17 g Oral DAILY PRN    ondansetron (ZOFRAN) injection 4 mg  4 mg IntraVENous Q6H PRN    furosemide (LASIX) injection 40 mg  40 mg IntraVENous Q12H    metoprolol succinate (TOPROL-XL) XL tablet 25 mg  25 mg Oral DAILY    potassium chloride SR (KLOR-CON 10) tablet 10 mEq  10 mEq Oral DAILY    enoxaparin (LOVENOX) injection 80 mg  80 mg SubCUTAneous Q12H    WARFARIN INFORMATION NOTE (COUMADIN)   Other PRN    sodium chloride (NS) flush 5-40 mL  5-40 mL IntraVENous PRN         Allergies         Patient has no known allergies.        Labs/Imaging/Diagnostics      Labs:  Recent Results (from the past 24 hour(s))   ECHO ADULT COMPLETE    Collection Time: 01/06/23  3:22 PM   Result Value Ref Range    IVSd 1.0 0.6 - 1.0 cm    LVIDd 5.4 4.2 - 5.9 cm    LVIDs 5.2 cm    LVOT Diameter 2.4 cm    LVPWd 0.9 0.6 - 1.0 cm    Global Longitudinal Strain -3.6 %    Global Longitudinal Strain -4.1 %    Global Longitudinal Strain -4.5 %    Global Longitudinal Strain -4.1 %    EF BP 16 (A) 55 - 100 %    LV Ejection Fraction A2C 5 %    LV Ejection Fraction A4C 22 %    LV EDV A2C 118 mL    LV EDV A4C 154 mL    LV EDV  67 - 155 mL    LV ESV A2C 112 mL    LV ESV A4C 121 mL    LV ESV  (A) 22 - 58 mL    LVOT Peak Gradient 1 mmHg    LVOT Mean Gradient 0 mmHg    LVOT SV 33.9 ml    LVOT Peak Velocity 0.5 m/s    LVOT VTI 7.5 cm    RVIDd 4.4 cm    LA Diameter 5.9 cm    LA Volume A/L 106 mL    LA Volume 2C 82 (A) 18 - 58 mL    LA Volume 4C 108 (A) 18 - 58 mL    LA Volume BP 94 (A) 18 - 58 mL    AV Area by Peak Velocity 2.6 cm2    AV Area by VTI 2.2 cm2    AV Peak Gradient 3 mmHg    AV Mean Gradient 2 mmHg    AV Peak Velocity 0.8 m/s    AV Mean Velocity 0.6 m/s    AV VTI 15.2 cm    MV E Wave Deceleration Time 89.4 ms    MV E Velocity 1.26 m/s    LV E' Lateral Velocity 8 cm/s    LV E' Septal Velocity 5 cm/s    MV Area by VTI 1.5 cm2    MV Peak Gradient 8 mmHg    MV Mean Gradient 3 mmHg    MV Max Velocity 1.4 m/s    MV Mean Velocity 0.7 m/s    MV VTI 22.0 cm    TAPSE 0.5 (A) 1.7 cm    TR Peak Gradient 37 mmHg    TR Max Velocity 3.02 m/s    Aortic Root 3.4 cm    Fractional Shortening 2D 4 28 - 44 %    LV ESV Index BP 61 mL/m2    LV EDV Index BP 73 mL/m2    LV ESV Index A4C 64 mL/m2    LV EDV Index A4C 81 mL/m2    LV ESV Index A2C 59 mL/m2    LV EDV Index A2C 62 mL/m2    LVIDd Index 2.84 cm/m2    LVIDs Index 2.74 cm/m2    LV RWT Ratio 0.33     LV Mass 2D 193.3 88 - 224 g    LV Mass 2D Index 101.7 49 - 115 g/m2    E/E' Ratio (Averaged) 20.48     E/E' Lateral 15.75     E/E' Septal 25.20     LA Volume Index BP 49 (A) 16 - 34 ml/m2    LA Volume Index A/L 56 16 - 34 mL/m2    LVOT Stroke Volume Index 17.8 mL/m2    LVOT Area 4.5 cm2    LA Volume Index 2C 43 (A) 16 - 34 mL/m2    LA Volume Index 4C 57 (A) 16 - 34 mL/m2    LA Size Index 3.11 cm/m2    LA/AO Root Ratio 1.74     Ao Root Index 1.79 cm/m2    AV Velocity Ratio 0.63     LVOT:AV VTI Index 0.49     EDWARD/BSA VTI 1.2 cm2/m2    EDWARD/BSA Peak Velocity 1.4 cm2/m2    MV:LVOT VTI Index 2.93     Est. RA Pressure 15 mmHg    RVSP 51 mmHg   METABOLIC PANEL, BASIC    Collection Time: 01/07/23  6:10 AM   Result Value Ref Range    Sodium 144 136 - 145 mmol/L    Potassium 3.1 (L) 3.5 - 5.1 mmol/L    Chloride 103 97 - 108 mmol/L    CO2 32 21 - 32 mmol/L    Anion gap 9 5 - 15 mmol/L    Glucose 75 65 - 100 mg/dL    BUN 17 6 - 20 mg/dL    Creatinine 1.41 (H) 0.70 - 1.30 mg/dL    BUN/Creatinine ratio 12 12 - 20      eGFR 50 (L) >60 ml/min/1.73m2    Calcium 8.2 (L) 8.5 - 10.1 mg/dL   CBC W/O DIFF    Collection Time: 01/07/23  6:10 AM   Result Value Ref Range    WBC 5.1 4.1 - 11.1 K/uL    RBC 4.34 4.10 - 5.70 M/uL    HGB 13.2 12.1 - 17.0 g/dL    HCT 40.5 36.6 - 50.3 %    MCV 93.3 80.0 - 99.0 FL    MCH 30.4 26.0 - 34.0 PG    MCHC 32.6 30.0 - 36.5 g/dL    RDW 15.4 (H) 11.5 - 14.5 %    PLATELET 154 498 - 585 K/uL    MPV 11.5 8.9 - 12.9 FL    NRBC 0.0 0.0  WBC    ABSOLUTE NRBC 0.00 0.00 - 0.01 K/uL   PROTHROMBIN TIME + INR    Collection Time: 01/07/23  6:10 AM   Result Value Ref Range    Prothrombin time 19.3 (H) 11.9 - 14.6 sec    INR 1.7 (H) 0.9 - 1.1          Trended key labs include:  Recent Labs     01/07/23  0610 01/06/23  0516 01/05/23  1335   WBC 5.1 6.2 6.9   HGB 13.2 13.8 14.4   HCT 40.5 43.3 44.9    189 216     Recent Labs     01/07/23  0610 01/06/23  0516 01/05/23  1335 01/04/23  1301 01/04/23  1125    142 140  --  139   K 3.1* 4.1 4.3   < > 7.9*    105 105  --  105   CO2 32 28 26  --  25   GLU 75 99 109*  --  121*   BUN 17 17 17  --  17   CREA 1.41* 1.55* 1.60*  --  1.56*   CA 8.2* 8.3* 8.6  --  8.2*   ALB  --   --   --   --  3.1*   TBILI  --   --   --   --  0.5   ALT  --   --   --   --  9*   INR 1.7*  --  2.0*  --  >9.6*    < > = values in this interval not displayed. Imaging Last 24 Hours:  ECHO ADULT COMPLETE    Result Date: 1/6/2023    Left Ventricle: Severely reduced left ventricular systolic function with a visually estimated EF of 5 - 10%. Left ventricle is dilated. Normal wall thickness. Septal flattening in diastole consistent with right ventricular volume overload. Severe global hypokinesis present. Grade III diastolic dysfunction with increased LAP. There are prominent trabeculations. Aortic Valve: Mildly calcified cusp. No regurgitation. Mitral Valve: Mechanical valve. Trace regurgitation. Tricuspid Valve: Valve structure is normal. Moderate regurgitation. The estimated RVSP is 51 mmHg. Pulmonic Valve: Valve structure is normal. Mild regurgitation. Left Atrium: Left atrium is severely dilated.  Left atrial volume index is severely increased (>48 mL/m2). Interatrial Septum: No interatrial shunt visualized with color Doppler. Pericardium: No pericardial effusion. Left pleural effusion with echogenic material noted. IVC/SVC: IVC diameter is greater than 21 mm and decreases less than 50% during inspiration; therefore the estimated right atrial pressure is elevated (~15 mmHg). Assessment//Plan           Patient Active Problem List    Diagnosis Date Noted    Elevated troponin 01/05/2023    Congestive heart failure (CHF) ([facility] [address] 01/05/2023    Coagulopathy ([facility] [address] 12/06/2021    Intramuscular hematoma 12/06/2021      Hypercoagulable state -INR currently at 1.7 with Coumadin being adjusted by pharmacist with goal of INR 2.5-3.5. Continue with Lovenox while trying to get Coumadin at therapeutic level    Acute on chronic HFrEF -echocardiogram reveals severely reduced left ventricular systolic function with ejection fraction of 5 to 10%.   Continue with diuresis and treatment as per cardiology    Hypokalemia -potassium currently at 3.1 will repeat IV and orally and repeat level in the morning    Paroxysmal atrial fibrillation -rate controlled on Toprol and continue with anticoagulation    Hypertension -blood pressures well controlled on metoprolol          Electronically signed by Les Santamaria MD, Patience Rg on 1/7/2023 at 10:20 AM

## 2023-01-07 NOTE — ROUTINE PROCESS
Voids using urinal. Urine yellow. Wife been in with pt. Pt O2 nc removed by pt at times. Restarted MA site. Trace edema to lower extremities. Monitor on. Denies pain.  Visitors in for support

## 2023-01-08 VITALS
SYSTOLIC BLOOD PRESSURE: 117 MMHG | TEMPERATURE: 98.7 F | HEART RATE: 74 BPM | HEIGHT: 68 IN | DIASTOLIC BLOOD PRESSURE: 75 MMHG | WEIGHT: 157.1 LBS | BODY MASS INDEX: 23.81 KG/M2 | OXYGEN SATURATION: 96 % | RESPIRATION RATE: 18 BRPM

## 2023-01-08 LAB
ANION GAP SERPL CALC-SCNC: 10 MMOL/L (ref 5–15)
BUN SERPL-MCNC: 17 MG/DL (ref 6–20)
BUN/CREAT SERPL: 12 (ref 12–20)
CA-I BLD-MCNC: 8.4 MG/DL (ref 8.5–10.1)
CHLORIDE SERPL-SCNC: 101 MMOL/L (ref 97–108)
CO2 SERPL-SCNC: 33 MMOL/L (ref 21–32)
CREAT SERPL-MCNC: 1.38 MG/DL (ref 0.7–1.3)
ERYTHROCYTE [DISTWIDTH] IN BLOOD BY AUTOMATED COUNT: 15.2 % (ref 11.5–14.5)
GLUCOSE SERPL-MCNC: 75 MG/DL (ref 65–100)
HCT VFR BLD AUTO: 41.9 % (ref 36.6–50.3)
HGB BLD-MCNC: 13.8 G/DL (ref 12.1–17)
INR PPP: 2 (ref 0.9–1.1)
MCH RBC QN AUTO: 30.9 PG (ref 26–34)
MCHC RBC AUTO-ENTMCNC: 32.9 G/DL (ref 30–36.5)
MCV RBC AUTO: 93.7 FL (ref 80–99)
NRBC # BLD: 0 K/UL (ref 0–0.01)
NRBC BLD-RTO: 0 PER 100 WBC
PLATELET # BLD AUTO: 171 K/UL (ref 150–400)
PMV BLD AUTO: 11.3 FL (ref 8.9–12.9)
POTASSIUM SERPL-SCNC: 3 MMOL/L (ref 3.5–5.1)
PROTHROMBIN TIME: 21.7 SEC (ref 11.9–14.6)
RBC # BLD AUTO: 4.47 M/UL (ref 4.1–5.7)
SODIUM SERPL-SCNC: 144 MMOL/L (ref 136–145)
WBC # BLD AUTO: 5.4 K/UL (ref 4.1–11.1)

## 2023-01-08 PROCEDURE — 80048 BASIC METABOLIC PNL TOTAL CA: CPT

## 2023-01-08 PROCEDURE — 36415 COLL VENOUS BLD VENIPUNCTURE: CPT

## 2023-01-08 PROCEDURE — 74011250637 HC RX REV CODE- 250/637: Performed by: INTERNAL MEDICINE

## 2023-01-08 PROCEDURE — 85027 COMPLETE CBC AUTOMATED: CPT

## 2023-01-08 PROCEDURE — 85610 PROTHROMBIN TIME: CPT

## 2023-01-08 PROCEDURE — 74011000250 HC RX REV CODE- 250: Performed by: INTERNAL MEDICINE

## 2023-01-08 PROCEDURE — 74011250636 HC RX REV CODE- 250/636: Performed by: INTERNAL MEDICINE

## 2023-01-08 RX ORDER — WARFARIN SODIUM 5 MG/1
5 TABLET ORAL DAILY
Status: DISCONTINUED | OUTPATIENT
Start: 2023-01-08 | End: 2023-01-08 | Stop reason: HOSPADM

## 2023-01-08 RX ORDER — WARFARIN SODIUM 5 MG/1
5 TABLET ORAL DAILY
Qty: 30 TABLET | Refills: 0 | Status: SHIPPED | OUTPATIENT
Start: 2023-01-08 | End: 2023-02-02 | Stop reason: ALTCHOICE

## 2023-01-08 RX ADMIN — FUROSEMIDE 40 MG: 10 INJECTION, SOLUTION INTRAMUSCULAR; INTRAVENOUS at 08:29

## 2023-01-08 RX ADMIN — POTASSIUM CHLORIDE 20 MEQ: 1500 TABLET, EXTENDED RELEASE ORAL at 08:29

## 2023-01-08 RX ADMIN — GABAPENTIN 300 MG: 300 CAPSULE ORAL at 08:29

## 2023-01-08 RX ADMIN — METOPROLOL SUCCINATE 25 MG: 25 TABLET, EXTENDED RELEASE ORAL at 08:29

## 2023-01-08 RX ADMIN — Medication 10 ML: at 06:30

## 2023-01-08 NOTE — DISCHARGE SUMMARY
Physician Discharge Summary       Patient: Hermila Lee MRN: [id number]     YOB: 1940  Age: [de-identified] y.o. Sex: male    PCP: Cindy Street NP    Allergies: Patient has no known allergies. Admit date: 1/5/2023  Admitting Provider: Yaritza Montalvo MD    Discharge date: 1/8/2023  Discharging Provider: Yaritza Montalvo MD    * Admission Diagnoses: Elevated troponin [R77.8]  Congestive heart failure (CHF) ([facility] [I50.9]  CHF (congestive heart failure) ([facility] [I50.9]    * Discharge Diagnoses:    Hospital Problems as of 1/8/2023 Never Reviewed            Codes Class Noted - Resolved POA    CHF (congestive heart failure) ([facility] ICD-10-CM: I50.9  ICD-9-CM: 428.0  1/7/2023 - Present Unknown        Elevated troponin ICD-10-CM: R77.8  ICD-9-CM: 790.6  1/5/2023 - Present Unknown        Congestive heart failure (CHF) ([facility] ICD-10-CM: I50.9  ICD-9-CM: 428.0  1/5/2023 - Present Unknown           * Hospital Course: As per admitting physician 1/6/2023 Hermila Lee is a [de-identified] y.o. male with a history of mechanical aortic valve will presented to the ED with reports of elevated INR of 9.6   3 days ago. He was treated with 5 mg oral vitamin K and advised return to the ER. Return to the ER the following day and had significantly declined to 2.0. He reported dyspnea upon exertion. Chest x-ray showed mild increased vascular congestion concerning for fluid overload. He was admitted for IV diuresis. Denies chest pain cough or productive sputum. No reported fevers or chills. Routinely follows primary care physician for INR checks. Has been on Coumadin for over. Years. Denies recent history of bleeding. Patient was admitted and placed on Lasix for diabetes, Lovenox for anticoagulation while his PT/INR was being monitored with Coumadin being adjusted by pharmacy.   Patient's INR went down as low as 1.7 diuresed very well to the point where he was complaining of having to urinate too much and being unable to sleep and stating at home that he will stop the Lasix he was made aware of the need to do daily weights and monitor his symptoms taking Lasix accordingly. Patient is aware of his echocardiogram this hospitalization showing ejection fraction of 5 to 10%. States his INR is followed closely by his cardiologist and since he was very anxious to go home he stated he was going to have his PT/INR checked tomorrow at [facility] in [facility] and that Dr. Bro Carrion, his cardiologist, would adjust his Coumadin. Patient also states he has an appointment with his PCP next week. Laboratory Data  Recent Results (from the past 24 hour(s))   METABOLIC PANEL, BASIC    Collection Time: 01/08/23  5:05 AM   Result Value Ref Range    Sodium 144 136 - 145 mmol/L    Potassium 3.0 (L) 3.5 - 5.1 mmol/L    Chloride 101 97 - 108 mmol/L    CO2 33 (H) 21 - 32 mmol/L    Anion gap 10 5 - 15 mmol/L    Glucose 75 65 - 100 mg/dL    BUN 17 6 - 20 mg/dL    Creatinine 1.38 (H) 0.70 - 1.30 mg/dL    BUN/Creatinine ratio 12 12 - 20      eGFR 52 (L) >60 ml/min/1.73m2    Calcium 8.4 (L) 8.5 - 10.1 mg/dL   CBC W/O DIFF    Collection Time: 01/08/23  5:05 AM   Result Value Ref Range    WBC 5.4 4.1 - 11.1 K/uL    RBC 4.47 4.10 - 5.70 M/uL    HGB 13.8 12.1 - 17.0 g/dL    HCT 41.9 36.6 - 50.3 %    MCV 93.7 80.0 - 99.0 FL    MCH 30.9 26.0 - 34.0 PG    MCHC 32.9 30.0 - 36.5 g/dL    RDW 15.2 (H) 11.5 - 14.5 %    PLATELET 913 151 - 901 K/uL    MPV 11.3 8.9 - 12.9 FL    NRBC 0.0 0.0  WBC    ABSOLUTE NRBC 0.00 0.00 - 0.01 K/uL   PROTHROMBIN TIME + INR    Collection Time: 01/08/23  5:05 AM   Result Value Ref Range    Prothrombin time 21.7 (H) 11.9 - 14.6 sec    INR 2.0 (H) 0.9 - 1.1         Imaging  No results found. * Procedures: None  * No surgery found *      Consults:      Discharge Exam:  General Appearance:  Comfortable, well-appearing. No acute distress.     HEENT: NCAT, EOMI, No deformities or discharge, Neck supple with trachea midline. Respiratory: Normal respiratory rate. Breath sounds clear to auscultation. Heart: S1 normal and S2 normal.  No tachycardia  Abdomen: Soft and flat. Bowel sounds are normal. No rebound or guarding. No organomegaly. Extremities: Normal range of motion. No acute deformities. Pulses: Distal pulses are intact. Neurological: Alert and oriented to person, place and time. Grossly intact. Skin:  Warm and dry. * Discharge Condition: improved  * Disposition: Home    Discharge Medications:  Current Discharge Medication List        START taking these medications    Details   warfarin (COUMADIN) 5 mg tablet Take 1 Tablet by mouth daily. Indications: blood clot caused by artificial heart valve, To alternate with 7.5 mg as per cardiology recommendation  Qty: 30 Tablet, Refills: 0  Start date: 1/8/2023           CONTINUE these medications which have NOT CHANGED    Details   gemfibroziL (LOPID) 600 mg tablet Take 600 mg by mouth two (2) times a day. With orange juice      magnesium chloride (Slow-Mag) 71.5 mg tablet Take 143 mg by mouth daily. gabapentin (NEURONTIN) 300 mg capsule Take 300 mg by mouth two (2) times a day. potassium chloride SR (KLOR-CON 10) 10 mEq tablet Take 10 mEq by mouth daily. dofetilide (TIKOSYN) 250 mcg capsule Take 250 mcg by mouth two (2) times a day. metoprolol succinate (TOPROL-XL) 25 mg XL tablet Take 25 mg by mouth daily. sacubitriL-valsartan (Entresto) 49-51 mg tab tablet Take 1 Tablet by mouth daily. oxyCODONE IR (ROXICODONE) 5 mg immediate release tablet Take 5 mg by mouth every six (6) hours as needed for Pain. furosemide (LASIX) 20 mg tablet Take 20 mg by mouth daily. * Follow-up Care/Patient Instructions:   Activity: Activity as tolerated  Diet: Cardiac Diet      Follow-up Information       Follow up With Specialties Details Why Contact Info    Fay Pena NP Nurse Practitioner Follow up on 1/10/2023 @ 9:45 [address] [facility] [address]  [phone]              Signed: Jonny Crockett MD, [address] [facility]   1/8/2023  9:42 AM

## 2023-01-08 NOTE — PROGRESS NOTES
Problem: Falls - Risk of  Goal: *Absence of Falls  Description: Document Michael Valentine Fall Risk and appropriate interventions in the flowsheet. Outcome: Progressing Towards Goal  Note: Fall Risk Interventions:            Medication Interventions: Patient to call before getting OOB, Teach patient to arise slowly    Elimination Interventions: Call light in reach, Urinal in reach    History of Falls Interventions: Door open when patient unattended         Problem: Pressure Injury - Risk of  Goal: *Prevention of pressure injury  Description: Document Krishna Scale and appropriate interventions in the flowsheet.   Outcome: Progressing Towards Goal  Note: Pressure Injury Interventions:                 Mobility Interventions: HOB 30 degrees or less    Nutrition Interventions: Document food/fluid/supplement intake, Offer support with meals,snacks and hydration

## 2023-01-08 NOTE — ROUTINE PROCESS
Monitor on. No pain. O2 off. Pt ambulating in room. Less edema in feet. Ate well. Wife in. Pt up to void. Using urinal. No resp distress.

## 2023-01-08 NOTE — ROUTINE PROCESS
Bedside shift change report given to Eugenio Wood LPN  (oncoming nurse) by Ariadne Levine RN (offgoing nurse). Report included the following information Kardex.

## 2023-01-31 ENCOUNTER — HOSPITAL ENCOUNTER (EMERGENCY)
Age: 83
Discharge: HOME | DRG: 813 | End: 2023-01-31
Attending: EMERGENCY MEDICINE
Payer: MEDICARE

## 2023-01-31 VITALS
HEART RATE: 75 BPM | RESPIRATION RATE: 18 BRPM | OXYGEN SATURATION: 97 % | WEIGHT: 157 LBS | DIASTOLIC BLOOD PRESSURE: 88 MMHG | SYSTOLIC BLOOD PRESSURE: 108 MMHG | HEIGHT: 68 IN | TEMPERATURE: 97 F | BODY MASS INDEX: 23.79 KG/M2

## 2023-01-31 DIAGNOSIS — R79.1 ELEVATED INR: Primary | ICD-10-CM

## 2023-01-31 LAB
ALBUMIN SERPL-MCNC: 3.3 G/DL (ref 3.5–5)
ALBUMIN/GLOB SERPL: 1 (ref 1.1–2.2)
ALP SERPL-CCNC: 79 U/L (ref 45–117)
ALT SERPL-CCNC: 10 U/L (ref 12–78)
ANION GAP SERPL CALC-SCNC: 12 MMOL/L (ref 5–15)
AST SERPL W P-5'-P-CCNC: 20 U/L (ref 15–37)
BASOPHILS # BLD: 0.1 K/UL (ref 0–0.1)
BASOPHILS NFR BLD: 1 % (ref 0–1)
BILIRUB SERPL-MCNC: 0.6 MG/DL (ref 0.2–1)
BUN SERPL-MCNC: 33 MG/DL (ref 6–20)
BUN/CREAT SERPL: 13 (ref 12–20)
CA-I BLD-MCNC: 8.7 MG/DL (ref 8.5–10.1)
CHLORIDE SERPL-SCNC: 101 MMOL/L (ref 97–108)
CO2 SERPL-SCNC: 23 MMOL/L (ref 21–32)
CREAT SERPL-MCNC: 2.46 MG/DL (ref 0.7–1.3)
DIFFERENTIAL METHOD BLD: ABNORMAL
EOSINOPHIL # BLD: 0.2 K/UL (ref 0–0.4)
EOSINOPHIL NFR BLD: 2 % (ref 0–7)
ERYTHROCYTE [DISTWIDTH] IN BLOOD BY AUTOMATED COUNT: 15.6 % (ref 11.5–14.5)
GLOBULIN SER CALC-MCNC: 3.3 G/DL (ref 2–4)
GLUCOSE SERPL-MCNC: 100 MG/DL (ref 65–100)
HCT VFR BLD AUTO: 47.1 % (ref 36.6–50.3)
HGB BLD-MCNC: 15.1 G/DL (ref 12.1–17)
IMM GRANULOCYTES # BLD AUTO: 0 K/UL (ref 0–0.04)
IMM GRANULOCYTES NFR BLD AUTO: 0 % (ref 0–0.5)
INR PPP: >9.6 (ref 0.9–1.1)
LYMPHOCYTES # BLD: 0.7 K/UL (ref 0.8–3.5)
LYMPHOCYTES NFR BLD: 9 % (ref 12–49)
MCH RBC QN AUTO: 30.4 PG (ref 26–34)
MCHC RBC AUTO-ENTMCNC: 32.1 G/DL (ref 30–36.5)
MCV RBC AUTO: 95 FL (ref 80–99)
MONOCYTES # BLD: 0.6 K/UL (ref 0–1)
MONOCYTES NFR BLD: 8 % (ref 5–13)
NEUTS SEG # BLD: 6.2 K/UL (ref 1.8–8)
NEUTS SEG NFR BLD: 80 % (ref 32–75)
NRBC # BLD: 0 K/UL (ref 0–0.01)
NRBC BLD-RTO: 0 PER 100 WBC
PLATELET # BLD AUTO: 289 K/UL (ref 150–400)
PMV BLD AUTO: 10.9 FL (ref 8.9–12.9)
POTASSIUM SERPL-SCNC: 4.2 MMOL/L (ref 3.5–5.1)
PROT SERPL-MCNC: 6.6 G/DL (ref 6.4–8.2)
PROTHROMBIN TIME: >75 SEC (ref 11.9–14.6)
RBC # BLD AUTO: 4.96 M/UL (ref 4.1–5.7)
RBC MORPH BLD: ABNORMAL
SODIUM SERPL-SCNC: 136 MMOL/L (ref 136–145)
WBC # BLD AUTO: 7.8 K/UL (ref 4.1–11.1)

## 2023-01-31 PROCEDURE — 85610 PROTHROMBIN TIME: CPT

## 2023-01-31 PROCEDURE — 36415 COLL VENOUS BLD VENIPUNCTURE: CPT

## 2023-01-31 PROCEDURE — 99283 EMERGENCY DEPT VISIT LOW MDM: CPT

## 2023-01-31 PROCEDURE — 85025 COMPLETE CBC W/AUTO DIFF WBC: CPT

## 2023-01-31 PROCEDURE — 80053 COMPREHEN METABOLIC PANEL: CPT

## 2023-01-31 NOTE — ED PROVIDER NOTES
[facility] EMERGENCY DEPT  EMERGENCY DEPARTMENT HISTORY AND PHYSICAL EXAM      Date: 1/31/2023  Patient Name: Sarita Handley  MRN: [id number]  [place]: 1940  Date of evaluation: 1/31/2023  Provider: Ana Klein MD   Note Started: 5:26 PM 1/31/23    HISTORY OF PRESENT ILLNESS     Chief Complaint   Patient presents with    Other     Need Labs checked       History Provided By: Patient    HPI: Sarita Handley, [de-identified] y.o. male patient presents to the ED to have INR checked    PAST MEDICAL HISTORY   Past Medical History:  Past Medical History:   Diagnosis Date    A-fib ([address]     Neuropathy        Past Surgical History:  Past Surgical History:   Procedure Laterality Date    HX HEART VALVE SURGERY      HX PACEMAKER         Family History:  History reviewed. No pertinent family history. Social History:  Social History     Tobacco Use    Smoking status: Former    Smokeless tobacco: Current    Tobacco comments:     chewing tobacco   Substance Use Topics    Alcohol use: Not Currently    Drug use: Never       Allergies:  No Known Allergies    PCP: Nette Dallas NP    Current Meds:   Previous Medications    DOFETILIDE (TIKOSYN) 250 MCG CAPSULE    Take 250 mcg by mouth two (2) times a day. FUROSEMIDE (LASIX) 20 MG TABLET    Take 20 mg by mouth daily. GABAPENTIN (NEURONTIN) 300 MG CAPSULE    Take 300 mg by mouth two (2) times a day. GEMFIBROZIL (LOPID) 600 MG TABLET    Take 600 mg by mouth two (2) times a day. With orange juice    MAGNESIUM CHLORIDE (SLOW-MAG) 71.5 MG TABLET    Take 143 mg by mouth daily. METOPROLOL SUCCINATE (TOPROL-XL) 25 MG XL TABLET    Take 25 mg by mouth daily. OXYCODONE IR (ROXICODONE) 5 MG IMMEDIATE RELEASE TABLET    Take 5 mg by mouth every six (6) hours as needed for Pain. POTASSIUM CHLORIDE SR (KLOR-CON 10) 10 MEQ TABLET    Take 10 mEq by mouth daily. SACUBITRIL-VALSARTAN (ENTRESTO) 49-51 MG TAB TABLET    Take 1 Tablet by mouth daily.     WARFARIN (COUMADIN) 5 MG TABLET    Take 1 Tablet by mouth daily. Indications: blood clot caused by artificial heart valve, To alternate with 7.5 mg as per cardiology recommendation       REVIEW OF SYSTEMS   Review of Systems   All other systems reviewed and are negative. Positives and Pertinent negatives as per HPI. PHYSICAL EXAM     ED Triage Vitals [01/31/23 1406]   ED Encounter Vitals Group      /82      Pulse (Heart Rate) 83      Resp Rate 18      Temp 97 °F (36.1 °C)      Temp src       O2 Sat (%) 97 %      Weight 157 lb      Height 5' 8\"      Physical Exam  Vitals and nursing note reviewed. Constitutional:       General: He is not in acute distress. Appearance: He is not ill-appearing, toxic-appearing or diaphoretic. HENT:      Head: Normocephalic and atraumatic. Right Ear: Tympanic membrane normal.      Left Ear: Tympanic membrane normal.      Nose: Nose normal. No congestion. Mouth/Throat:      Mouth: Mucous membranes are moist.      Pharynx: Oropharynx is clear. Eyes:      Extraocular Movements: Extraocular movements intact. Conjunctiva/sclera: Conjunctivae normal.      Pupils: Pupils are equal, round, and reactive to light. Cardiovascular:      Rate and Rhythm: Normal rate and regular rhythm. Pulses: Normal pulses. Heart sounds: Normal heart sounds. Pulmonary:      Effort: Pulmonary effort is normal.      Breath sounds: Normal breath sounds. Abdominal:      General: Bowel sounds are normal.      Palpations: Abdomen is soft. Tenderness: There is no abdominal tenderness. Musculoskeletal:         General: No tenderness, deformity or signs of injury. Normal range of motion. Cervical back: Normal range of motion and neck supple. No rigidity or tenderness. Lymphadenopathy:      Cervical: No cervical adenopathy. Skin:     General: Skin is warm and dry. Capillary Refill: Capillary refill takes less than 2 seconds. Findings: Ecchymosis present.  No signs of injury, laceration, lesion, rash or wound. Neurological:      General: No focal deficit present. Mental Status: He is alert and oriented to person, place, and time. Cranial Nerves: No cranial nerve deficit. Sensory: No sensory deficit. Psychiatric:         Mood and Affect: Mood normal.         Behavior: Behavior normal.       SCREENINGS               No data recorded        LAB, EKG AND DIAGNOSTIC RESULTS   Labs:  Recent Results (from the past 12 hour(s))   CBC WITH AUTOMATED DIFF    Collection Time: 01/31/23  2:42 PM   Result Value Ref Range    WBC 7.8 4.1 - 11.1 K/uL    RBC 4.96 4.10 - 5.70 M/uL    HGB 15.1 12.1 - 17.0 g/dL    HCT 47.1 36.6 - 50.3 %    MCV 95.0 80.0 - 99.0 FL    MCH 30.4 26.0 - 34.0 PG    MCHC 32.1 30.0 - 36.5 g/dL    RDW 15.6 (H) 11.5 - 14.5 %    PLATELET 565 021 - 114 K/uL    MPV 10.9 8.9 - 12.9 FL    NRBC 0.0 0.0  WBC    ABSOLUTE NRBC 0.00 0.00 - 0.01 K/uL    NEUTROPHILS 80 (H) 32 - 75 %    LYMPHOCYTES 9 (L) 12 - 49 %    MONOCYTES 8 5 - 13 %    EOSINOPHILS 2 0 - 7 %    BASOPHILS 1 0 - 1 %    IMMATURE GRANULOCYTES 0 0 - 0.5 %    ABS. NEUTROPHILS 6.2 1.8 - 8.0 K/UL    ABS. LYMPHOCYTES 0.7 (L) 0.8 - 3.5 K/UL    ABS. MONOCYTES 0.6 0.0 - 1.0 K/UL    ABS. EOSINOPHILS 0.2 0.0 - 0.4 K/UL    ABS. BASOPHILS 0.1 0.0 - 0.1 K/UL    ABS. IMM.  GRANS. 0.0 0.00 - 0.04 K/UL    DF AUTOMATED      RBC COMMENTS Normocytic, Normochromic     PROTHROMBIN TIME + INR    Collection Time: 01/31/23  2:42 PM   Result Value Ref Range    Prothrombin time >75.0 (H) 11.9 - 14.6 sec    INR >9.6 (HH) 0.9 - 1.1   METABOLIC PANEL, COMPREHENSIVE    Collection Time: 01/31/23  2:42 PM   Result Value Ref Range    Sodium 136 136 - 145 mmol/L    Potassium 4.2 3.5 - 5.1 mmol/L    Chloride 101 97 - 108 mmol/L    CO2 23 21 - 32 mmol/L    Anion gap 12 5 - 15 mmol/L    Glucose 100 65 - 100 mg/dL    BUN 33 (H) 6 - 20 mg/dL    Creatinine 2.46 (H) 0.70 - 1.30 mg/dL    BUN/Creatinine ratio 13 12 - 20      eGFR 26 (L) >60 ml/min/1.73m2    Calcium 8.7 8.5 - 10.1 mg/dL    Bilirubin, total 0.6 0.2 - 1.0 mg/dL    AST (SGOT) 20 15 - 37 U/L    ALT (SGPT) 10 (L) 12 - 78 U/L    Alk. phosphatase 79 45 - 117 U/L    Protein, total 6.6 6.4 - 8.2 g/dL    Albumin 3.3 (L) 3.5 - 5.0 g/dL    Globulin 3.3 2.0 - 4.0 g/dL    A-G Ratio 1.0 (L) 1.1 - 2.2         Interpretation per the Radiologist below, if available at the time of this note:  No results found. PROCEDURES   Unless otherwise noted below, none. Performed by:  Armandina Buerger, MD   Procedures      CRITICAL CARE TIME       ED COURSE and DIFFERENTIAL DIAGNOSIS/MDM   Vitals:    Vitals:    01/31/23 1406   BP: 109/82   Pulse: 83   Resp: 18   Temp: 97 °F (36.1 °C)   SpO2: 97%   Weight: 71.2 kg (157 lb)   Height: 5' 8\" (1.727 m)        Patient was given the following medications:  Medications - No data to display    CONSULTS: (Who and What was discussed)  None     Chronic Conditions: Atrial fibrillation  Social Determinants affecting Dx or Tx: None  Counseling:      Records Reviewed (source and summary of external notes): None    CC/HPI Summary, DDx, ED Course, and Reassessment: Patient states he went to [facility] to have his INR checked and they stated they were not able to run it because his blood blood sample was too thin to be processed by their machine, patient denies any pain denies any bleeding, patient states the last time his INR was checked about a week ago it was 4.5 and there were no recommended changes to his present regimen of warfarin 5 mg a day which is managed by his cardiologist    ED Course as of 01/31/23 1726 Tue Jan 31, 2023   [address] Call placed to Dr. Burt Short, patient's physician at [phone] [SB]   21 [phone] Dr. Jere Uribe office stated with the patient and will wait for callback [SB]   [phone] Dr. Burt Short called back and he wants the patient to not take his Coumadin for 2 days and then on Friday or 3 days from now patient to start Coumadin regimen of 2.5 mg daily, patient to go on Thursday which is 2 days from now and have an INR check at the facility where he normally goes [SB]      ED Course User Index  [SB] Violeta Suarez MD       Disposition Considerations (Tests not done, Shared Decision Making, Pt Expectation of Test or Treatment.):  Patient informed of my discussion with Dr. Demetri Holley and Dr. Emir Silveira recommendations will be put in patient's discharge instructions    FINAL IMPRESSION     1. Elevated INR          DISPOSITION/PLAN   Discharged    Discharge Note: The patient is stable for discharge home. The signs, symptoms, diagnosis, and discharge instructions have been discussed, understanding conveyed, and agreed upon. The patient is to follow up as recommended or return to ER should their symptoms worsen. PATIENT REFERRED TO:  Follow-up Information       Follow up With Specialties Details Why Michaela Dines    Dr. Demetri Holley your cardiologist  Call in 3 days                DISCHARGE MEDICATIONS:  Current Discharge Medication List            DISCONTINUED MEDICATIONS:  Current Discharge Medication List          I am the Primary Clinician of Record: Ana Klein MD (electronically signed)    (Please note that parts of this dictation were completed with voice recognition software. Quite often unanticipated grammatical, syntax, homophones, and other interpretive errors are inadvertently transcribed by the computer software. Please disregards these errors.  Please excuse any errors that have escaped final proofreading.)

## 2023-01-31 NOTE — DISCHARGE INSTRUCTIONS
Do not take any of your Coumadin for today and for the next 2 days; on Thursday go to your [facility] lab and have your INR checked, on Friday you may resume your Coumadin at 2.5 mg a day unless you get a call from Dr. Anitra Lindsay or the [facility]  stating that your INR was still elevated

## 2023-01-31 NOTE — ED TRIAGE NOTES
Had blood drawn at [facility] today and was told they were unable to run blood due to it being too thin so came to ER to have rechecked.  Takes Coumadin

## 2023-02-02 ENCOUNTER — HOSPITAL ENCOUNTER (INPATIENT)
Age: 83
LOS: 4 days | Discharge: HOME | DRG: 813 | End: 2023-02-07
Attending: EMERGENCY MEDICINE | Admitting: INTERNAL MEDICINE
Payer: MEDICARE

## 2023-02-02 DIAGNOSIS — T88.7XXA MEDICATION SIDE EFFECTS: ICD-10-CM

## 2023-02-02 DIAGNOSIS — D68.9 COAGULOPATHY (HCC): Primary | ICD-10-CM

## 2023-02-02 LAB
ALBUMIN SERPL-MCNC: 3.6 G/DL (ref 3.5–5)
ALBUMIN/GLOB SERPL: 1.1 (ref 1.1–2.2)
ALP SERPL-CCNC: 91 U/L (ref 45–117)
ALT SERPL-CCNC: 11 U/L (ref 12–78)
ANION GAP SERPL CALC-SCNC: 7 MMOL/L (ref 5–15)
AST SERPL W P-5'-P-CCNC: 22 U/L (ref 15–37)
BASOPHILS # BLD: 0 K/UL (ref 0–0.1)
BASOPHILS NFR BLD: 1 % (ref 0–1)
BILIRUB DIRECT SERPL-MCNC: 0.3 MG/DL (ref 0–0.2)
BILIRUB SERPL-MCNC: 0.6 MG/DL (ref 0.2–1)
BUN SERPL-MCNC: 35 MG/DL (ref 6–20)
BUN/CREAT SERPL: 14 (ref 12–20)
CA-I BLD-MCNC: 8.6 MG/DL (ref 8.5–10.1)
CHLORIDE SERPL-SCNC: 102 MMOL/L (ref 97–108)
CO2 SERPL-SCNC: 31 MMOL/L (ref 21–32)
CREAT SERPL-MCNC: 2.46 MG/DL (ref 0.7–1.3)
DIFFERENTIAL METHOD BLD: ABNORMAL
EOSINOPHIL # BLD: 0.1 K/UL (ref 0–0.4)
EOSINOPHIL NFR BLD: 2 % (ref 0–7)
ERYTHROCYTE [DISTWIDTH] IN BLOOD BY AUTOMATED COUNT: 15.7 % (ref 11.5–14.5)
GLOBULIN SER CALC-MCNC: 3.3 G/DL (ref 2–4)
GLUCOSE SERPL-MCNC: 93 MG/DL (ref 65–100)
HCT VFR BLD AUTO: 48 % (ref 36.6–50.3)
HGB BLD-MCNC: 15.6 G/DL (ref 12.1–17)
IMM GRANULOCYTES # BLD AUTO: 0 K/UL (ref 0–0.04)
IMM GRANULOCYTES NFR BLD AUTO: 0 % (ref 0–0.5)
INR PPP: >9.6 (ref 0.9–1.1)
LYMPHOCYTES # BLD: 0.7 K/UL (ref 0.8–3.5)
LYMPHOCYTES NFR BLD: 9 % (ref 12–49)
MCH RBC QN AUTO: 30.4 PG (ref 26–34)
MCHC RBC AUTO-ENTMCNC: 32.5 G/DL (ref 30–36.5)
MCV RBC AUTO: 93.4 FL (ref 80–99)
MONOCYTES # BLD: 0.6 K/UL (ref 0–1)
MONOCYTES NFR BLD: 7 % (ref 5–13)
NEUTS SEG # BLD: 6.2 K/UL (ref 1.8–8)
NEUTS SEG NFR BLD: 81 % (ref 32–75)
NRBC # BLD: 0 K/UL (ref 0–0.01)
NRBC BLD-RTO: 0 PER 100 WBC
PLATELET # BLD AUTO: 261 K/UL (ref 150–400)
PMV BLD AUTO: 11.6 FL (ref 8.9–12.9)
POTASSIUM SERPL-SCNC: 4.2 MMOL/L (ref 3.5–5.1)
PROT SERPL-MCNC: 6.9 G/DL (ref 6.4–8.2)
PROTHROMBIN TIME: >75 SEC (ref 11.9–14.6)
RBC # BLD AUTO: 5.14 M/UL (ref 4.1–5.7)
SODIUM SERPL-SCNC: 140 MMOL/L (ref 136–145)
WBC # BLD AUTO: 7.6 K/UL (ref 4.1–11.1)

## 2023-02-02 PROCEDURE — 80048 BASIC METABOLIC PNL TOTAL CA: CPT

## 2023-02-02 PROCEDURE — 99285 EMERGENCY DEPT VISIT HI MDM: CPT

## 2023-02-02 PROCEDURE — 74011250636 HC RX REV CODE- 250/636: Performed by: EMERGENCY MEDICINE

## 2023-02-02 PROCEDURE — 85025 COMPLETE CBC W/AUTO DIFF WBC: CPT

## 2023-02-02 PROCEDURE — G0378 HOSPITAL OBSERVATION PER HR: HCPCS

## 2023-02-02 PROCEDURE — 36415 COLL VENOUS BLD VENIPUNCTURE: CPT

## 2023-02-02 PROCEDURE — 74011000250 HC RX REV CODE- 250: Performed by: HOSPITALIST

## 2023-02-02 PROCEDURE — 80076 HEPATIC FUNCTION PANEL: CPT

## 2023-02-02 PROCEDURE — 85610 PROTHROMBIN TIME: CPT

## 2023-02-02 PROCEDURE — 96372 THER/PROPH/DIAG INJ SC/IM: CPT

## 2023-02-02 RX ORDER — SODIUM CHLORIDE 0.9 % (FLUSH) 0.9 %
5-40 SYRINGE (ML) INJECTION AS NEEDED
Status: DISCONTINUED | OUTPATIENT
Start: 2023-02-02 | End: 2023-02-07 | Stop reason: HOSPADM

## 2023-02-02 RX ORDER — PHYTONADIONE 10 MG/ML
10 INJECTION, EMULSION INTRAMUSCULAR; INTRAVENOUS; SUBCUTANEOUS
Status: DISCONTINUED | OUTPATIENT
Start: 2023-02-02 | End: 2023-02-02 | Stop reason: CLARIF

## 2023-02-02 RX ORDER — SODIUM CHLORIDE 0.9 % (FLUSH) 0.9 %
5-40 SYRINGE (ML) INJECTION EVERY 8 HOURS
Status: DISCONTINUED | OUTPATIENT
Start: 2023-02-02 | End: 2023-02-07 | Stop reason: HOSPADM

## 2023-02-02 RX ORDER — ACETAMINOPHEN 325 MG/1
650 TABLET ORAL
Status: DISCONTINUED | OUTPATIENT
Start: 2023-02-02 | End: 2023-02-07 | Stop reason: HOSPADM

## 2023-02-02 RX ORDER — POLYETHYLENE GLYCOL 3350 17 G/17G
17 POWDER, FOR SOLUTION ORAL DAILY PRN
Status: DISCONTINUED | OUTPATIENT
Start: 2023-02-02 | End: 2023-02-07 | Stop reason: HOSPADM

## 2023-02-02 RX ORDER — ONDANSETRON 2 MG/ML
4 INJECTION INTRAMUSCULAR; INTRAVENOUS
Status: DISCONTINUED | OUTPATIENT
Start: 2023-02-02 | End: 2023-02-07 | Stop reason: HOSPADM

## 2023-02-02 RX ORDER — ACETAMINOPHEN 650 MG/1
650 SUPPOSITORY RECTAL
Status: DISCONTINUED | OUTPATIENT
Start: 2023-02-02 | End: 2023-02-07 | Stop reason: HOSPADM

## 2023-02-02 RX ORDER — ONDANSETRON 4 MG/1
4 TABLET, ORALLY DISINTEGRATING ORAL
Status: DISCONTINUED | OUTPATIENT
Start: 2023-02-02 | End: 2023-02-07 | Stop reason: HOSPADM

## 2023-02-02 RX ADMIN — Medication 10 ML: at 22:21

## 2023-02-02 RX ADMIN — Medication 10 ML: at 18:06

## 2023-02-02 RX ADMIN — PHYTONADIONE 10 MG: 10 INJECTION, EMULSION INTRAMUSCULAR; INTRAVENOUS; SUBCUTANEOUS at 16:33

## 2023-02-02 NOTE — ED NOTES
Report called to [facility] on PCU all questions answered at that time. PT in NAD denies any c/o at this time.

## 2023-02-02 NOTE — ROUTINE PROCESS
Pt admitted to room 205. MA intact. No signs of bleeding. Pt a & o x4. No pain. No resp distress. Pt on RA. Monitor on. Paced. Bruises noted on arms.

## 2023-02-02 NOTE — ED TRIAGE NOTES
Patient reports that he had his blood work drawn at [facility] this morning the nurse called and told him that his blood was too thin and that he needed a vitamin K insufion

## 2023-02-02 NOTE — DISCHARGE INSTRUCTIONS
If recurrence of symptoms come back to the ED for evaluation     Please go and get INR checked this Thursday and pcp office and monitor again on every Monday and Thursday until cardiology evaluation on 2/17   Restart warfarin but at 1/2 tablet of 7.5mg and start with first dose on 2/8 and continue similar dosing daily until adjusted by cardiologist.

## 2023-02-02 NOTE — ED PROVIDER NOTES
[facility] [address] ENCOUNTER       Pt Name: Chester Warren  MRN: [id number]  [place] 1940  Date of evaluation: 2/2/2023  Provider: Jevon Cornejo MD   PCP: Citlali Ware NP  Note Started: 3:29 PM 2/2/23     CHIEF COMPLAINT       Chief Complaint   Patient presents with    Abnormal Lab Results        HISTORY OF PRESENT ILLNESS: 1 or more elements      History From: Patient  HPI Limitations : None     Chester Warren is a [de-identified] y.o. male who presents with elevated INR. Kristine Guerrero called today after a blood draw this morning and was told it was elevated and needed Vit K treatment. Denies bleeding. Last took Coumadin 1/30. Recently started Amiodarone for afib and heart valve from [facility]. States has been getting increasingly weak since then and trouble controlling INR. Nursing Notes were all reviewed and agreed with or any disagreements were addressed in the HPI. REVIEW OF SYSTEMS      Review of Systems   Constitutional:  Positive for fatigue. Negative for activity change. HENT: Negative. Negative for congestion. Respiratory: Negative. Negative for apnea, chest tightness and shortness of breath. Cardiovascular: Negative. Gastrointestinal: Negative. Genitourinary: Negative. Musculoskeletal: Negative. Neurological: Negative. Hematological:  Bruises/bleeds easily. All other systems reviewed and are negative. Positives and Pertinent negatives as per HPI. PAST HISTORY     Past Medical History:  Past Medical History:   Diagnosis Date    A-fib ([address]     Neuropathy        Past Surgical History:  Past Surgical History:   Procedure Laterality Date    HX HEART VALVE SURGERY      HX PACEMAKER         Family History:  History reviewed. No pertinent family history.     Social History:  Social History     Tobacco Use    Smoking status: Former    Smokeless tobacco: Current    Tobacco comments:     chewing tobacco   Substance Use Topics Alcohol use: Not Currently    Drug use: Never       Allergies:  No Known Allergies    CURRENT MEDICATIONS      Current Discharge Medication List        CONTINUE these medications which have NOT CHANGED    Details   gemfibroziL (LOPID) 600 mg tablet Take 600 mg by mouth two (2) times a day. With orange juice      magnesium chloride (Slow-Mag) 71.5 mg tablet Take 143 mg by mouth daily. gabapentin (NEURONTIN) 300 mg capsule Take 300 mg by mouth two (2) times a day. furosemide (LASIX) 20 mg tablet Take 20 mg by mouth daily. potassium chloride SR (KLOR-CON 10) 10 mEq tablet Take 10 mEq by mouth daily. dofetilide (TIKOSYN) 250 mcg capsule Take 250 mcg by mouth two (2) times a day. metoprolol succinate (TOPROL-XL) 25 mg XL tablet Take 25 mg by mouth daily. sacubitriL-valsartan (Entresto) 49-51 mg tab tablet Take 1 Tablet by mouth daily. SCREENINGS               No data recorded         PHYSICAL EXAM      ED Triage Vitals [02/02/23 1440]   ED Encounter Vitals Group      BP (!) 131/104      Pulse (Heart Rate) 68      Resp Rate 20      Temp 98.2 °F (36.8 °C)      Temp src       O2 Sat (%) 92 %      Weight 161 lb      Height 5' 8\"        Physical Exam  Constitutional:       Appearance: Normal appearance. HENT:      Head: Normocephalic and atraumatic. Right Ear: External ear normal.      Left Ear: External ear normal.      Nose: Nose normal.      Mouth/Throat:      Mouth: Mucous membranes are moist.   Eyes:      Pupils: Pupils are equal, round, and reactive to light. Cardiovascular:      Rate and Rhythm: Normal rate and regular rhythm. Pulses: Normal pulses. Pulmonary:      Effort: Pulmonary effort is normal.      Breath sounds: Normal breath sounds. Abdominal:      General: Abdomen is flat. Palpations: Abdomen is soft. Musculoskeletal:         General: Normal range of motion. Cervical back: Normal range of motion.    Skin:     Capillary Refill: Capillary refill takes less than 2 seconds. Findings: Bruising (multiple bruises ot upper extremities, in varios stages of healing) present. Neurological:      General: No focal deficit present. Mental Status: He is alert. Psychiatric:         Mood and Affect: Mood normal.         Behavior: Behavior normal.           DIAGNOSTIC RESULTS   LABS:     Recent Results (from the past 12 hour(s))   CBC WITH AUTOMATED DIFF    Collection Time: 02/02/23  3:17 PM   Result Value Ref Range    WBC 7.6 4.1 - 11.1 K/uL    RBC 5.14 4.10 - 5.70 M/uL    HGB 15.6 12.1 - 17.0 g/dL    HCT 48.0 36.6 - 50.3 %    MCV 93.4 80.0 - 99.0 FL    MCH 30.4 26.0 - 34.0 PG    MCHC 32.5 30.0 - 36.5 g/dL    RDW 15.7 (H) 11.5 - 14.5 %    PLATELET 469 176 - 148 K/uL    MPV 11.6 8.9 - 12.9 FL    NRBC 0.0 0.0  WBC    ABSOLUTE NRBC 0.00 0.00 - 0.01 K/uL    NEUTROPHILS 81 (H) 32 - 75 %    LYMPHOCYTES 9 (L) 12 - 49 %    MONOCYTES 7 5 - 13 %    EOSINOPHILS 2 0 - 7 %    BASOPHILS 1 0 - 1 %    IMMATURE GRANULOCYTES 0 0 - 0.5 %    ABS. NEUTROPHILS 6.2 1.8 - 8.0 K/UL    ABS. LYMPHOCYTES 0.7 (L) 0.8 - 3.5 K/UL    ABS. MONOCYTES 0.6 0.0 - 1.0 K/UL    ABS. EOSINOPHILS 0.1 0.0 - 0.4 K/UL    ABS. BASOPHILS 0.0 0.0 - 0.1 K/UL    ABS. IMM.  GRANS. 0.0 0.00 - 0.04 K/UL    DF AUTOMATED     PROTHROMBIN TIME + INR    Collection Time: 02/02/23  3:17 PM   Result Value Ref Range    Prothrombin time >75.0 (H) 11.9 - 14.6 sec    INR >9.6 (HH) 0.9 - 1.1   METABOLIC PANEL, BASIC    Collection Time: 02/02/23  3:17 PM   Result Value Ref Range    Sodium 140 136 - 145 mmol/L    Potassium 4.2 3.5 - 5.1 mmol/L    Chloride 102 97 - 108 mmol/L    CO2 31 21 - 32 mmol/L    Anion gap 7 5 - 15 mmol/L    Glucose 93 65 - 100 mg/dL    BUN 35 (H) 6 - 20 mg/dL    Creatinine 2.46 (H) 0.70 - 1.30 mg/dL    BUN/Creatinine ratio 14 12 - 20      eGFR 26 (L) >60 ml/min/1.73m2    Calcium 8.6 8.5 - 10.1 mg/dL   HEPATIC FUNCTION PANEL    Collection Time: 02/02/23  3:17 PM   Result Value Ref Range Protein, total 6.9 6.4 - 8.2 g/dL    Albumin 3.6 3.5 - 5.0 g/dL    Globulin 3.3 2.0 - 4.0 g/dL    A-G Ratio 1.1 1.1 - 2.2      Bilirubin, total 0.6 0.2 - 1.0 mg/dL    Bilirubin, direct 0.3 (H) 0.0 - 0.2 mg/dL    Alk. phosphatase 91 45 - 117 U/L    AST (SGOT) 22 15 - 37 U/L    ALT (SGPT) 11 (L) 12 - 78 U/L            RADIOLOGY:  Non-plain film images such as CT, Ultrasound and MRI are read by the radiologist. Plain radiographic images are visualized and preliminarily interpreted by the ED Provider with the below findings:           Interpretation per the Radiologist below, if available at the time of this note:     No results found.       PROCEDURES   Unless otherwise noted below, none  Procedures     CRITICAL CARE TIME   Not met      EMERGENCY DEPARTMENT COURSE and DIFFERENTIAL DIAGNOSIS/MDM   Vitals:    Vitals:    02/02/23 1715 02/02/23 1805 02/02/23 2002 02/02/23 2044   BP: 110/77 108/79 112/77    Pulse: 71 78 75    Resp: 18 18 20    Temp:  98 °F (36.7 °C) 98.1 °F (36.7 °C)    SpO2: 94% 97% 95% 95%   Weight:       Height:            Patient was given the following medications:  Medications   sodium chloride (NS) flush 5-40 mL (10 mL IntraVENous Given 2/2/23 2221)   sodium chloride (NS) flush 5-40 mL (has no administration in time range)   acetaminophen (TYLENOL) tablet 650 mg (has no administration in time range)     Or   acetaminophen (TYLENOL) suppository 650 mg (has no administration in time range)   polyethylene glycol (MIRALAX) packet 17 g (has no administration in time range)   ondansetron (ZOFRAN ODT) tablet 4 mg (has no administration in time range)     Or   ondansetron (ZOFRAN) injection 4 mg (has no administration in time range)       CONSULTS: (Who and What was discussed)  None    Chronic Conditions:   Past Medical History:   Diagnosis Date    [facility]fib ([facility]     Neuropathy          Social Determinants affecting Dx or Tx: None    Records Reviewed (source and summary of external notes): None and Prior medical records recent visit for same, given 5mg oral Vit K     CC/HPI Summary, DDx, ED Course, and Reassessment: pt arrives with elevated INR >9 thi smorning and referred back to ED. Likely related to Amiodarone use. He is otherwise stable without active bleeding, however ehe recently started the amiodarone and will likely need to be transitioned to an alternative medicaiton with consultation with cardiology. Discused with hospital medicine, will give IM vit k for now and reassess. Disposition Considerations (Tests not done, Shared Decision Making, Pt Expectation of Test or Tx.): as above. FINAL IMPRESSION     1. Coagulopathy ([address]    2. Medication side effects          DISPOSITION/PLAN   Admitted    Admit Note: Pt is being admitted by Dr. Frankie Runner to Dr. Kirk Jacob. The results of their tests and reason(s) for their admission have been discussed with pt and/or available family. They convey agreement and understanding for the need to be admitted and for the admission diagnosis. I am the Primary Clinician of Record. Festus Maradiaga MD (electronically signed)    (Please note that parts of this dictation were completed with voice recognition software. Quite often unanticipated grammatical, syntax, homophones, and other interpretive errors are inadvertently transcribed by the computer software. Please disregards these errors.  Please excuse any errors that have escaped final proofreading.)

## 2023-02-02 NOTE — Clinical Note
Patient Class[de-identified] OBSERVATION [104]   Type of Bed: Telemetry [19]   Cardiac Monitoring Required?: Yes   Reason for Observation: medication adjustement, coaguloapthy management   Admitting Diagnosis: Coagulopathy [facility]) [[id number]]   Admitting Physician: Kassidy Randolph   Attending Physician: Rai Caputo [[id number]]

## 2023-02-03 LAB
INR PPP: 6.2 (ref 0.9–1.1)
PROTHROMBIN TIME: 52 SEC (ref 11.9–14.6)

## 2023-02-03 PROCEDURE — 97165 OT EVAL LOW COMPLEX 30 MIN: CPT

## 2023-02-03 PROCEDURE — 85610 PROTHROMBIN TIME: CPT

## 2023-02-03 PROCEDURE — 74011250637 HC RX REV CODE- 250/637: Performed by: INTERNAL MEDICINE

## 2023-02-03 PROCEDURE — 36415 COLL VENOUS BLD VENIPUNCTURE: CPT

## 2023-02-03 PROCEDURE — 97161 PT EVAL LOW COMPLEX 20 MIN: CPT

## 2023-02-03 PROCEDURE — 74011000250 HC RX REV CODE- 250: Performed by: HOSPITALIST

## 2023-02-03 PROCEDURE — 65270000029 HC RM PRIVATE

## 2023-02-03 PROCEDURE — G0378 HOSPITAL OBSERVATION PER HR: HCPCS

## 2023-02-03 RX ORDER — GEMFIBROZIL 600 MG/1
600 TABLET, FILM COATED ORAL 2 TIMES DAILY
Status: DISCONTINUED | OUTPATIENT
Start: 2023-02-03 | End: 2023-02-03

## 2023-02-03 RX ORDER — FUROSEMIDE 20 MG/1
20 TABLET ORAL DAILY
Status: DISCONTINUED | OUTPATIENT
Start: 2023-02-03 | End: 2023-02-07 | Stop reason: HOSPADM

## 2023-02-03 RX ORDER — DOFETILIDE 0.25 MG/1
250 CAPSULE ORAL 2 TIMES DAILY
Status: DISCONTINUED | OUTPATIENT
Start: 2023-02-03 | End: 2023-02-03

## 2023-02-03 RX ORDER — GABAPENTIN 300 MG/1
300 CAPSULE ORAL 2 TIMES DAILY
Status: DISCONTINUED | OUTPATIENT
Start: 2023-02-03 | End: 2023-02-07 | Stop reason: HOSPADM

## 2023-02-03 RX ORDER — POTASSIUM CHLORIDE 750 MG/1
10 TABLET, FILM COATED, EXTENDED RELEASE ORAL DAILY
Status: DISCONTINUED | OUTPATIENT
Start: 2023-02-03 | End: 2023-02-07 | Stop reason: HOSPADM

## 2023-02-03 RX ORDER — METOPROLOL SUCCINATE 25 MG/1
25 TABLET, EXTENDED RELEASE ORAL DAILY
Status: DISCONTINUED | OUTPATIENT
Start: 2023-02-03 | End: 2023-02-07 | Stop reason: HOSPADM

## 2023-02-03 RX ADMIN — GABAPENTIN 300 MG: 300 CAPSULE ORAL at 09:49

## 2023-02-03 RX ADMIN — SACUBITRIL AND VALSARTAN 2 TABLET: 24; 26 TABLET, FILM COATED ORAL at 09:51

## 2023-02-03 RX ADMIN — GABAPENTIN 300 MG: 300 CAPSULE ORAL at 21:03

## 2023-02-03 RX ADMIN — METOPROLOL SUCCINATE 25 MG: 25 TABLET, EXTENDED RELEASE ORAL at 09:51

## 2023-02-03 RX ADMIN — FUROSEMIDE 20 MG: 20 TABLET ORAL at 09:51

## 2023-02-03 RX ADMIN — Medication 10 ML: at 06:20

## 2023-02-03 RX ADMIN — POTASSIUM CHLORIDE 10 MEQ: 750 TABLET, EXTENDED RELEASE ORAL at 09:51

## 2023-02-03 RX ADMIN — Medication 10 ML: at 15:45

## 2023-02-03 RX ADMIN — Medication 10 ML: at 21:04

## 2023-02-03 NOTE — PROGRESS NOTES
Care Management Interventions  PCP Verified by CM: Yes Kerrie Wynne NP- last seen about one week ago.)  Mode of Transport at Discharge: Other (see comment) (wife)  Transition of Care Consult (CM Consult): Home Health, Discharge Planning  [facility] [address] [address]: No  Reason Outside [place]: Out of service area  Physical Therapy Consult: Yes  Occupational Therapy Consult: Yes  Support Systems: Spouse/Significant Other  Confirm Follow Up Transport: Self  The Plan for Transition of Care is Related to the Following Treatment Goals : Patient lives in his own home with his wife. Stated that he was able to do anything that he wanted to do 2 weeks ago, but has been getting weaker since then. Patient has declined any home health services. Plan to discharge home with outpatient follow up.   Discharge Location  Patient Expects to be Discharged to[de-identified] Home    Readmission Assessment  Number of days since last admission?: 8-30 days  Previous disposition: Home with Family  Who is being interviewed?: Patient  What was the patient's/caregiver's perception as to why they think they needed to return back to the hospital?: Other (Comment) (Was told to come for an elevated INR level.)  Did you visit your Primary Care Physician after you left the hospital, before you returned this time?: Yes  Did you see a specialist, such as Cardiac, Pulmonary, Orthopedic Physician, etc. after you left the hospital?: Yes  Who advised the patient to return to the hospital?: Physician's Nurse/Office Staff  Does the patient report anything that got in the way of taking their medications?: No  In our efforts to provide the best possible care to you and others like you, can you think of anything that we could have done to help you after you left the hospital the first time, so that you might not have needed to return so soon?: Other (Comment) (N/A)

## 2023-02-03 NOTE — THERAPY EVALUATION
PHYSICAL THERAPY EVALUATION/DISCHARGE  Patient: Jojo Jacome (55 y.o. male)  Date: 2/3/2023  Primary Diagnosis: Coagulopathy [facility]) [D68.9]       Precautions: Falls         ASSESSMENT  Based on the objective data described below, the patient presents with no acute deficits that require PT intervention at this time. Pt is modified independent with a single point cane at this time which is functional baseline    Other factors to consider for discharge: pt lives with spouse     Further skilled acute physical therapy is not indicated at this time. PLAN :  Recommendation for discharge: (in order for the patient to meet his/her long term goals)  No skilled physical therapy/ follow up rehabilitation needs identified at this time. This discharge recommendation:  Has been made in collaboration with the attending provider and/or case management    IF patient discharges home will need the following DME: patient owns DME required for discharge       SUBJECTIVE:   Patient stated I am feeling okay, I think this is due to my medicine.     OBJECTIVE DATA SUMMARY:   HISTORY:    Past Medical History:   Diagnosis Date    A-fib ([facility]     Neuropathy      Past Surgical History:   Procedure Laterality Date    HX HEART VALVE SURGERY      HX PACEMAKER         Prior level of function: pt has had to start using a single point cane in the last 2 weeks due to imbalance  Personal factors and/or comorbidities impacting plan of care: pt has recently changed his medications    Home Situation  Home Environment: Private residence  # Steps to Enter: 4  Rails to Enter: Yes  Hand Rails : Bilateral  Wheelchair Ramp: No  One/Two Story Residence: One story  Living Alone: No  Support Systems: Spouse/Significant Other  Patient Expects to be Discharged to[de-identified] Home  Current DME Used/Available at Home: Cane, straight    EXAMINATION/PRESENTATION/DECISION MAKING:   Critical Behavior:  Neurologic State: Alert  Orientation Level: Oriented X4 Hearing: Auditory  Auditory Impairment: None  Range Of Motion:  AROM: Within functional limits                       Strength:    Strength: Within functional limits                    Tone & Sensation:                                  Coordination:     Vision:      Functional Mobility:  Bed Mobility:  Rolling: Independent  Supine to Sit: Independent  Sit to Supine: Independent  Scooting: Independent  Transfers:  Sit to Stand: Modified independent  Stand to Sit: Modified independent           Lateral Transfers: Modified independent           Balance:   Sitting: Intact  Standing: Intact  Ambulation/Gait Training:  Distance (ft): 200 Feet (ft)  Assistive Device: Cane, straight  Ambulation - Level of Assistance: Modified independent     Gait Description (WDL): Exceptions to WDL  Gait Abnormalities: Shuffling gait        Base of Support: Widened     Speed/Radha: Slow                        Stairs:  Number of Stairs Trained: 4 (simulated in room)  Stairs - Level of Assistance: Modified independent   Rail Use: Left     Treatment Session:  No treatment rendered       Physical Therapy Evaluation Charge Determination   History Examination Presentation Decision-Making   LOW Complexity : Zero comorbidities / personal factors that will impact the outcome / POC LOW Complexity : 1-2 Standardized tests and measures addressing body structure, function, activity limitation and / or participation in recreation  LOW Complexity : Stable, uncomplicated        Based on the above components, the patient evaluation is determined to be of the following complexity level: LOW     Pain Ratin/10    Activity Tolerance:   Good      After treatment patient left in no apparent distress:   Supine in bed and Call bell within reach    COMMUNICATION/EDUCATION:   The patients plan of care was discussed with: Physical therapist, Physician, and Case management.      Fall prevention education was provided and the patient/caregiver indicated understanding.     Thank you for this referral.  Vlad Newby, PT, DPT   Time Calculation: 22 mins

## 2023-02-03 NOTE — ROUTINE PROCESS
No signs of acting bleeding, patient is stable and A&Ox4, wife at bedside, INR is improving per lab results.

## 2023-02-03 NOTE — ROUTINE PROCESS
The pt have been resting at intervals with his wife at the bedside. No particular c/o have been voiced.

## 2023-02-03 NOTE — ACP (ADVANCE CARE PLANNING)
Advance Care Planning   Healthcare Decision Maker:       Primary Decision Maker: Haja Vargas - [phone]    Click here to complete [address] including selection of the Healthcare Decision Maker Relationship (ie \"Primary\")

## 2023-02-03 NOTE — ROUTINE PROCESS
Report was received from the offgoing nurse Amparo CISNEROS. Care was resumed via the incoming nurse [facility] LEONEL MO. The report consist of using kardex information.

## 2023-02-03 NOTE — H&P
History & Physical    Primary Care Provider: Martha Armenta NP  Source of Information: Patient wife    History of Presenting Illness:   Jamia Vincent is a [de-identified] y.o. male with history of paroxysmal atrial fibrillation, aortic mechanical valve replacement admitted last night due to supratherapeutic INR of 9.0. Patient has reportedly been on Coumadin for over 10 years but recent outpatient lab draw to showed elevated INR and therefore patient advised to come to the hospital.  No evidence of GI bleed, bruising headaches or visual problems. Apparently receive vitamin K in the ER and was admitted overnight. Repeat INR pending. Denies chest pain or shortness of breath. No cough or productive sputum. Does not appear to be in fluid overload. Review of Systems:  A comprehensive review of systems was negative except for that written in the History of Present Illness. Past Medical History:   Diagnosis Date    A-fib ([facility]     Neuropathy       Past Surgical History:   Procedure Laterality Date    HX HEART VALVE SURGERY      HX PACEMAKER       Prior to Admission medications    Medication Sig Start Date End Date Taking? Authorizing Provider   gemfibroziL (LOPID) 600 mg tablet Take 600 mg by mouth two (2) times a day. With orange juice    Shay Mcnulty MD   magnesium chloride (Slow-Mag) 71.5 mg tablet Take 143 mg by mouth daily. Shay Mcnulty MD   gabapentin (NEURONTIN) 300 mg capsule Take 300 mg by mouth two (2) times a day. Shay Mcnulty MD   furosemide (LASIX) 20 mg tablet Take 20 mg by mouth daily. Shay Mcnulty MD   potassium chloride SR (KLOR-CON 10) 10 mEq tablet Take 10 mEq by mouth daily. Shay Mcnulty MD   dofetilide (TIKOSYN) 250 mcg capsule Take 250 mcg by mouth two (2) times a day. Shay Mcnulty MD   metoprolol succinate (TOPROL-XL) 25 mg XL tablet Take 25 mg by mouth daily.     Provider, Kristy   sacubitriL-valsartan (Entresto) 49-51 mg tab tablet Take 1 Tablet by mouth daily. Provider, Historical     No Known Allergies   History reviewed. No pertinent family history. SOCIAL HISTORY:  Patient resides:  Independently    Assisted Living    SNF    With family care x      Smoking history:   None x   Former    Chronic      Alcohol history:   None x   Social    Chronic      Ambulates:   Independently    w/cane x   w/walker    w/wc    CODE STATUS:  DNR    Full x   Other      Objective:     Physical Exam:     Visit Vitals  BP 94/60   Pulse 75   Temp 97.5 °F (36.4 °C)   Resp 18   Ht 5' 8\" (1.727 m)   Wt 73 kg (161 lb)   SpO2 93%   BMI 24.48 kg/m²      O2 Device: None (Room air)    General:  Alert, cooperative, no distress, appears stated age. Head:  Normocephalic, without obvious abnormality, atraumatic. Eyes:  Conjunctivae/corneas clear. PERRL, EOMs intact. Nose: Nares normal. Septum midline. Mucosa normal. No drainage or sinus tenderness. Throat: Lips, mucosa, and tongue normal. Teeth and gums normal.   Neck: Supple, symmetrical, trachea midline, no adenopathy, thyroid: no enlargement/tenderness/nodules, no carotid bruit and no JVD. Back:   Symmetric, no curvature. ROM normal. No CVA tenderness. Lungs:   Clear to auscultation bilaterally. Chest wall:  No tenderness or deformity. Heart:  IRRegular rate and rhythm, S1, S2 normal, no murmur, click, rub or gallop. Abdomen:   Soft, non-tender. Bowel sounds normal. No masses,  No organomegaly. Extremities: Extremities normal, atraumatic, no cyanosis or edema. Pulses: 2+ and symmetric all extremities. Skin: Skin color, texture, turgor normal. No rashes or lesions   Neurologic: CNII-XII intact. No motor or sensory deficits. EKG:  atrial fibrillation, rate 85.       Data Review:     Recent Days:  Recent Labs     02/02/23  1517 01/31/23  1442   WBC 7.6 7.8   HGB 15.6 15.1   HCT 48.0 47.1    289     Recent Labs     02/02/23  1517 01/31/23  1442    136   K 4.2 4.2    101   CO2 31 23   GLU 93 100   BUN 35* 33*   CREA 2.46* 2.46*   CA 8.6 8.7   ALB 3.6 3.3*   TBILI 0.6 0.6   ALT 11* 10*   INR >9.6* >9.6*     No results for input(s): PH, PCO2, PO2, HCO3, FIO2 in the last 72 hours. 24 Hour Results:  Recent Results (from the past 24 hour(s))   CBC WITH AUTOMATED DIFF    Collection Time: 02/02/23  3:17 PM   Result Value Ref Range    WBC 7.6 4.1 - 11.1 K/uL    RBC 5.14 4.10 - 5.70 M/uL    HGB 15.6 12.1 - 17.0 g/dL    HCT 48.0 36.6 - 50.3 %    MCV 93.4 80.0 - 99.0 FL    MCH 30.4 26.0 - 34.0 PG    MCHC 32.5 30.0 - 36.5 g/dL    RDW 15.7 (H) 11.5 - 14.5 %    PLATELET 238 747 - 746 K/uL    MPV 11.6 8.9 - 12.9 FL    NRBC 0.0 0.0  WBC    ABSOLUTE NRBC 0.00 0.00 - 0.01 K/uL    NEUTROPHILS 81 (H) 32 - 75 %    LYMPHOCYTES 9 (L) 12 - 49 %    MONOCYTES 7 5 - 13 %    EOSINOPHILS 2 0 - 7 %    BASOPHILS 1 0 - 1 %    IMMATURE GRANULOCYTES 0 0 - 0.5 %    ABS. NEUTROPHILS 6.2 1.8 - 8.0 K/UL    ABS. LYMPHOCYTES 0.7 (L) 0.8 - 3.5 K/UL    ABS. MONOCYTES 0.6 0.0 - 1.0 K/UL    ABS. EOSINOPHILS 0.1 0.0 - 0.4 K/UL    ABS. BASOPHILS 0.0 0.0 - 0.1 K/UL    ABS. IMM.  GRANS. 0.0 0.00 - 0.04 K/UL    DF AUTOMATED     PROTHROMBIN TIME + INR    Collection Time: 02/02/23  3:17 PM   Result Value Ref Range    Prothrombin time >75.0 (H) 11.9 - 14.6 sec    INR >9.6 (HH) 0.9 - 1.1   METABOLIC PANEL, BASIC    Collection Time: 02/02/23  3:17 PM   Result Value Ref Range    Sodium 140 136 - 145 mmol/L    Potassium 4.2 3.5 - 5.1 mmol/L    Chloride 102 97 - 108 mmol/L    CO2 31 21 - 32 mmol/L    Anion gap 7 5 - 15 mmol/L    Glucose 93 65 - 100 mg/dL    BUN 35 (H) 6 - 20 mg/dL    Creatinine 2.46 (H) 0.70 - 1.30 mg/dL    BUN/Creatinine ratio 14 12 - 20      eGFR 26 (L) >60 ml/min/1.73m2    Calcium 8.6 8.5 - 10.1 mg/dL   HEPATIC FUNCTION PANEL    Collection Time: 02/02/23  3:17 PM   Result Value Ref Range    Protein, total 6.9 6.4 - 8.2 g/dL    Albumin 3.6 3.5 - 5.0 g/dL    Globulin 3.3 2.0 - 4.0 g/dL    A-G Ratio 1.1 1.1 - 2.2 Bilirubin, total 0.6 0.2 - 1.0 mg/dL    Bilirubin, direct 0.3 (H) 0.0 - 0.2 mg/dL    Alk. phosphatase 91 45 - 117 U/L    AST (SGOT) 22 15 - 37 U/L    ALT (SGPT) 11 (L) 12 - 78 U/L         Imaging:   No orders to display         Assessment:     Coagulopathy    -INR greater than 9.0 without obvious bleeding    -We will continue to monitor patient closely. Avoid use of vitamin K    -Hold Coumadin for now due to supratherapeutic INR    Paroxysmal atrial fibrillation    -Resume oral medications    -Monitor closely for another 24 hours    -Avoid anticoagulation due to supratherapeutic INR    Generalized weakness     -With increased ambulatory dysfunction at home     -Obtain PT OT evaluation        Plan:   Admit to telemetry floor inpatient  Treatment plan as outlined above  Care plan discussed with wife at bedside  CODE STATUS discussed.   He is full code  Anticipate greater than 24 hours of in-hospital stay for close monitoring of INR      Signed By: Kia Ortega MD     February 3, 2023

## 2023-02-03 NOTE — ROUTINE PROCESS
The pt have been resting quietly in bed with no distress noted at this time. He have had visitors in. No further c/o of nausea have been voiced. The present Iv site is patent. Will continue to monitor.

## 2023-02-04 PROBLEM — N17.9 AKI (ACUTE KIDNEY INJURY): Status: ACTIVE | Noted: 2023-02-04

## 2023-02-04 PROBLEM — R79.89 ELEVATED TROPONIN: Status: RESOLVED | Noted: 2023-01-05 | Resolved: 2023-02-04

## 2023-02-04 PROBLEM — R77.8 ELEVATED TROPONIN: Status: RESOLVED | Noted: 2023-01-05 | Resolved: 2023-02-04

## 2023-02-04 PROBLEM — N18.9 CKD (CHRONIC KIDNEY DISEASE): Status: ACTIVE | Noted: 2023-02-04

## 2023-02-04 PROBLEM — N17.9 AKI (ACUTE KIDNEY INJURY) (HCC): Status: ACTIVE | Noted: 2023-02-04

## 2023-02-04 LAB
ANION GAP SERPL CALC-SCNC: 11 MMOL/L (ref 5–15)
BASOPHILS # BLD: 0 K/UL (ref 0–0.2)
BASOPHILS NFR BLD: 1 % (ref 0–2.5)
BUN SERPL-MCNC: 33 MG/DL (ref 6–20)
BUN/CREAT SERPL: 16 (ref 12–20)
CA-I BLD-MCNC: 8 MG/DL (ref 8.5–10.1)
CHLORIDE SERPL-SCNC: 102 MMOL/L (ref 97–108)
CO2 SERPL-SCNC: 27 MMOL/L (ref 21–32)
CREAT SERPL-MCNC: 2.06 MG/DL (ref 0.7–1.3)
EOSINOPHIL # BLD: 0.2 K/UL (ref 0–0.7)
EOSINOPHIL NFR BLD: 4 % (ref 0.9–2.9)
ERYTHROCYTE [DISTWIDTH] IN BLOOD BY AUTOMATED COUNT: 15.7 % (ref 11.5–14.5)
GLUCOSE SERPL-MCNC: 83 MG/DL (ref 65–100)
HCT VFR BLD AUTO: 43.8 % (ref 41–53)
HGB BLD-MCNC: 14.5 G/DL (ref 13.5–17.5)
INR PPP: 1.8 (ref 0.9–1.1)
LYMPHOCYTES # BLD: 0.5 K/UL (ref 1–4.8)
LYMPHOCYTES NFR BLD: 8 % (ref 20.5–51.1)
MCH RBC QN AUTO: 30.3 PG (ref 31–34)
MCHC RBC AUTO-ENTMCNC: 33.1 G/DL (ref 31–36)
MCV RBC AUTO: 91.6 FL (ref 80–100)
MONOCYTES # BLD: 0.5 K/UL (ref 0.2–2.4)
MONOCYTES NFR BLD: 8 % (ref 1.7–9.3)
NEUTS SEG # BLD: 4.9 K/UL (ref 1.8–7.7)
NEUTS SEG NFR BLD: 79 % (ref 42–75)
NRBC # BLD: 0 K/UL
NRBC BLD-RTO: 0 PER 100 WBC
PLATELET # BLD AUTO: 190 K/UL (ref 150–400)
PMV BLD AUTO: 9.5 FL (ref 6.5–11.5)
POTASSIUM SERPL-SCNC: 3.7 MMOL/L (ref 3.5–5.1)
PROTHROMBIN TIME: 20.5 SEC (ref 11.9–14.6)
RBC # BLD AUTO: 4.78 M/UL (ref 4.5–5.9)
SODIUM SERPL-SCNC: 140 MMOL/L (ref 136–145)
WBC # BLD AUTO: 6.1 K/UL (ref 4.4–11.3)

## 2023-02-04 PROCEDURE — 74011250637 HC RX REV CODE- 250/637: Performed by: INTERNAL MEDICINE

## 2023-02-04 PROCEDURE — 80061 LIPID PANEL: CPT

## 2023-02-04 PROCEDURE — 74011250637 HC RX REV CODE- 250/637: Performed by: HOSPITALIST

## 2023-02-04 PROCEDURE — 65270000029 HC RM PRIVATE

## 2023-02-04 PROCEDURE — 85610 PROTHROMBIN TIME: CPT

## 2023-02-04 PROCEDURE — 80048 BASIC METABOLIC PNL TOTAL CA: CPT

## 2023-02-04 PROCEDURE — 36415 COLL VENOUS BLD VENIPUNCTURE: CPT

## 2023-02-04 PROCEDURE — 74011250636 HC RX REV CODE- 250/636: Performed by: HOSPITALIST

## 2023-02-04 PROCEDURE — 74011000250 HC RX REV CODE- 250: Performed by: HOSPITALIST

## 2023-02-04 PROCEDURE — 85025 COMPLETE CBC W/AUTO DIFF WBC: CPT

## 2023-02-04 RX ORDER — AMIODARONE HYDROCHLORIDE 200 MG/1
200 TABLET ORAL DAILY
Status: DISCONTINUED | OUTPATIENT
Start: 2023-02-04 | End: 2023-02-07 | Stop reason: HOSPADM

## 2023-02-04 RX ORDER — AMIODARONE HYDROCHLORIDE 200 MG/1
200 TABLET ORAL DAILY
COMMUNITY
Start: 2023-01-11

## 2023-02-04 RX ORDER — WARFARIN 7.5 MG/1
7.5 TABLET ORAL DAILY
Status: COMPLETED | OUTPATIENT
Start: 2023-02-04 | End: 2023-02-04

## 2023-02-04 RX ORDER — ENOXAPARIN SODIUM 150 MG/ML
1 INJECTION SUBCUTANEOUS EVERY 12 HOURS
Status: DISCONTINUED | OUTPATIENT
Start: 2023-02-04 | End: 2023-02-06

## 2023-02-04 RX ORDER — WARFARIN SODIUM 5 MG/1
5 TABLET ORAL DAILY
COMMUNITY
End: 2023-02-07

## 2023-02-04 RX ADMIN — WARFARIN SODIUM 7.5 MG: 7.5 TABLET ORAL at 17:19

## 2023-02-04 RX ADMIN — GABAPENTIN 300 MG: 300 CAPSULE ORAL at 21:21

## 2023-02-04 RX ADMIN — SACUBITRIL AND VALSARTAN 2 TABLET: 24; 26 TABLET, FILM COATED ORAL at 08:41

## 2023-02-04 RX ADMIN — Medication 10 ML: at 13:50

## 2023-02-04 RX ADMIN — FUROSEMIDE 20 MG: 20 TABLET ORAL at 08:41

## 2023-02-04 RX ADMIN — Medication 10 ML: at 05:27

## 2023-02-04 RX ADMIN — ENOXAPARIN SODIUM 70 MG: 150 INJECTION SUBCUTANEOUS at 23:00

## 2023-02-04 RX ADMIN — GABAPENTIN 300 MG: 300 CAPSULE ORAL at 08:41

## 2023-02-04 RX ADMIN — ENOXAPARIN SODIUM 70 MG: 150 INJECTION SUBCUTANEOUS at 10:54

## 2023-02-04 RX ADMIN — POTASSIUM CHLORIDE 10 MEQ: 750 TABLET, EXTENDED RELEASE ORAL at 08:41

## 2023-02-04 RX ADMIN — METOPROLOL SUCCINATE 25 MG: 25 TABLET, EXTENDED RELEASE ORAL at 08:41

## 2023-02-04 RX ADMIN — AMIODARONE HYDROCHLORIDE 200 MG: 200 TABLET ORAL at 13:50

## 2023-02-04 NOTE — ROUTINE PROCESS
INR improving and within therapeutic range. Patients pharmacy called and verified Coumadin dose is 5mg PO every day, Dr. Dany Smith made aware.

## 2023-02-04 NOTE — PROGRESS NOTES
Progress Note  Date:2/4/2023       Room:205/01  Patient Name:Rex Diane     YOB: 1940     Age:82 y.o. Subjective    As per admitting provider on 2/3: Toby Blanco is a [de-identified] y.o. male with history of paroxysmal atrial fibrillation, aortic mechanical valve replacement admitted last night due to supratherapeutic INR of 9.0. Patient has reportedly been on Coumadin for over 10 years but recent outpatient lab draw to showed elevated INR and therefore patient advised to come to the hospital.  No evidence of GI bleed, bruising headaches or visual problems. Apparently receive vitamin K in the ER and was admitted overnight. Repeat INR pending. Denies chest pain or shortness of breath. No cough or productive sputum. Does not appear to be in fluid overload. 2/4: seen on follow up. No acute complaints. No obvious bleeding but INR is now subtherapeutic at 1.8 where yesterday was 6.2     Review of Systems   Constitutional:  Negative for chills, diaphoresis, fatigue and fever. HENT:  Negative for congestion, ear pain, postnasal drip, sinus pain and sore throat. Eyes:  Negative for pain, discharge and redness. Respiratory:  Negative for cough, shortness of breath and wheezing. Cardiovascular:  Negative for chest pain and palpitations. Gastrointestinal:  Negative for abdominal pain, constipation, diarrhea, nausea and vomiting. Genitourinary:  Negative for dysuria, flank pain, frequency and urgency. Musculoskeletal:  Negative for arthralgias and myalgias. Neurological:  Negative for dizziness, weakness and headaches. Psychiatric/Behavioral:  Negative for agitation and hallucinations. The patient is not nervous/anxious.       Objective           Vitals Last 24 Hours:  Patient Vitals for the past 24 hrs:   Temp Pulse Resp BP SpO2   02/04/23 0800 -- 75 -- -- --   02/04/23 0742 97.2 °F (36.2 °C) 75 16 110/76 96 %   02/04/23 0400 -- 77 -- -- --   02/04/23 0324 97.7 °F (36.5 °C) 77 18 112/75 96 %   02/03/23 2359 -- 75 -- -- --   02/03/23 2320 98 °F (36.7 °C) 76 18 106/75 97 %   02/03/23 2000 -- 75 -- -- --   02/03/23 1946 97.5 °F (36.4 °C) 74 18 115/75 97 %   02/03/23 1600 -- 77 -- -- --   02/03/23 1547 97.3 °F (36.3 °C) 77 18 102/74 97 %        I/O (24Hr): No intake or output data in the 24 hours ending 02/04/23 1201    Physical Exam:  General: Alert, cooperative, no distress, appears stated age. Head:  Normocephalic, without obvious abnormality, atraumatic. Eyes:  Conjunctivae/corneas clear. Pupils equal, round, reactive to light. Extraocular movements intact. Lungs:  Clear to auscultation bilaterally. no wheeze, rales, crackles, rhonchi   Chest wall: No tenderness or deformity. Heart: normal rate irregular rhythm, S1, S2 normal, no murmur, click, rub or gallop. Abdomen:  Soft, non-tender. Bowel sounds normal. No masses,  No organomegaly. Extremities: Extremities normal, atraumatic, no cyanosis or edema. Pulses: 2+ and symmetric all extremities. Skin: Skin color, texture, turgor normal. No rashes or lesions  Neurologic: Awake, Alert, oriented.  No obvious gross sensory or motor deficits      Medications           Current Facility-Administered Medications   Medication Dose Route Frequency    enoxaparin (LOVENOX) injection 70 mg  1 mg/kg SubCUTAneous Q12H    warfarin (COUMADIN) tablet 7.5 mg  7.5 mg Oral DAILY    furosemide (LASIX) tablet 20 mg  20 mg Oral DAILY    gabapentin (NEURONTIN) capsule 300 mg  300 mg Oral BID    metoprolol succinate (TOPROL-XL) XL tablet 25 mg  25 mg Oral DAILY    potassium chloride SR (KLOR-CON 10) tablet 10 mEq  10 mEq Oral DAILY    sacubitriL-valsartan (ENTRESTO) 24-26 mg tablet 2 Tablet  2 Tablet Oral DAILY    sodium chloride (NS) flush 5-40 mL  5-40 mL IntraVENous Q8H    sodium chloride (NS) flush 5-40 mL  5-40 mL IntraVENous PRN    acetaminophen (TYLENOL) tablet 650 mg  650 mg Oral Q6H PRN    Or    acetaminophen (TYLENOL) suppository 650 mg  650 mg Rectal Q6H PRN    polyethylene glycol (MIRALAX) packet 17 g  17 g Oral DAILY PRN    ondansetron (ZOFRAN ODT) tablet 4 mg  4 mg Oral Q8H PRN    Or    ondansetron (ZOFRAN) injection 4 mg  4 mg IntraVENous Q6H PRN         Allergies         Patient has no known allergies. Labs/Imaging/Diagnostics      Labs:  Recent Results (from the past 48 hour(s))   CBC WITH AUTOMATED DIFF    Collection Time: 02/02/23  3:17 PM   Result Value Ref Range    WBC 7.6 4.1 - 11.1 K/uL    RBC 5.14 4.10 - 5.70 M/uL    HGB 15.6 12.1 - 17.0 g/dL    HCT 48.0 36.6 - 50.3 %    MCV 93.4 80.0 - 99.0 FL    MCH 30.4 26.0 - 34.0 PG    MCHC 32.5 30.0 - 36.5 g/dL    RDW 15.7 (H) 11.5 - 14.5 %    PLATELET 918 694 - 451 K/uL    MPV 11.6 8.9 - 12.9 FL    NRBC 0.0 0.0  WBC    ABSOLUTE NRBC 0.00 0.00 - 0.01 K/uL    NEUTROPHILS 81 (H) 32 - 75 %    LYMPHOCYTES 9 (L) 12 - 49 %    MONOCYTES 7 5 - 13 %    EOSINOPHILS 2 0 - 7 %    BASOPHILS 1 0 - 1 %    IMMATURE GRANULOCYTES 0 0 - 0.5 %    ABS. NEUTROPHILS 6.2 1.8 - 8.0 K/UL    ABS. LYMPHOCYTES 0.7 (L) 0.8 - 3.5 K/UL    ABS. MONOCYTES 0.6 0.0 - 1.0 K/UL    ABS. EOSINOPHILS 0.1 0.0 - 0.4 K/UL    ABS. BASOPHILS 0.0 0.0 - 0.1 K/UL    ABS. IMM.  GRANS. 0.0 0.00 - 0.04 K/UL    DF AUTOMATED     PROTHROMBIN TIME + INR    Collection Time: 02/02/23  3:17 PM   Result Value Ref Range    Prothrombin time >75.0 (H) 11.9 - 14.6 sec    INR >9.6 (HH) 0.9 - 1.1   METABOLIC PANEL, BASIC    Collection Time: 02/02/23  3:17 PM   Result Value Ref Range    Sodium 140 136 - 145 mmol/L    Potassium 4.2 3.5 - 5.1 mmol/L    Chloride 102 97 - 108 mmol/L    CO2 31 21 - 32 mmol/L    Anion gap 7 5 - 15 mmol/L    Glucose 93 65 - 100 mg/dL    BUN 35 (H) 6 - 20 mg/dL    Creatinine 2.46 (H) 0.70 - 1.30 mg/dL    BUN/Creatinine ratio 14 12 - 20      eGFR 26 (L) >60 ml/min/1.73m2    Calcium 8.6 8.5 - 10.1 mg/dL   HEPATIC FUNCTION PANEL    Collection Time: 02/02/23  3:17 PM   Result Value Ref Range    Protein, total 6.9 6.4 - 8.2 g/dL    Albumin 3.6 3.5 - 5.0 g/dL    Globulin 3.3 2.0 - 4.0 g/dL    A-G Ratio 1.1 1.1 - 2.2      Bilirubin, total 0.6 0.2 - 1.0 mg/dL    Bilirubin, direct 0.3 (H) 0.0 - 0.2 mg/dL    Alk. phosphatase 91 45 - 117 U/L    AST (SGOT) 22 15 - 37 U/L    ALT (SGPT) 11 (L) 12 - 78 U/L   PROTHROMBIN TIME + INR    Collection Time: 02/03/23  9:23 AM   Result Value Ref Range    Prothrombin time 52.0 (H) 11.9 - 14.6 sec    INR 6.2 (HH) 0.9 - 1.1     PROTHROMBIN TIME + INR    Collection Time: 02/04/23  5:07 AM   Result Value Ref Range    Prothrombin time 20.5 (H) 11.9 - 14.6 sec    INR 1.8 (H) 0.9 - 1.1     CBC WITH AUTOMATED DIFF    Collection Time: 02/04/23  5:07 AM   Result Value Ref Range    WBC 6.1 4.4 - 11.3 K/uL    RBC 4.78 4.50 - 5.90 M/uL    HGB 14.5 13.5 - 17.5 g/dL    HCT 43.8 41 - 53 %    MCV 91.6 80 - 100 FL    MCH 30.3 (L) 31 - 34 PG    MCHC 33.1 31.0 - 36.0 g/dL    RDW 15.7 (H) 11.5 - 14.5 %    PLATELET 187 851 - 866 K/uL    MPV 9.5 6.5 - 11.5 FL    NRBC 0.0  WBC    ABSOLUTE NRBC 0.00 K/uL    NEUTROPHILS 79 (H) 42 - 75 %    LYMPHOCYTES 8 (L) 20.5 - 51.1 %    MONOCYTES 8 1.7 - 9.3 %    EOSINOPHILS 4 (H) 0.9 - 2.9 %    BASOPHILS 1 0.0 - 2.5 %    ABS. NEUTROPHILS 4.9 1.8 - 7.7 K/UL    ABS. LYMPHOCYTES 0.5 (L) 1.0 - 4.8 K/UL    ABS. MONOCYTES 0.5 0.2 - 2.4 K/UL    ABS. EOSINOPHILS 0.2 0.0 - 0.7 K/UL    ABS. BASOPHILS 0.0 0.0 - 0.2 K/UL   METABOLIC PANEL, BASIC    Collection Time: 02/04/23  5:07 AM   Result Value Ref Range    Sodium 140 136 - 145 mmol/L    Potassium 3.7 3.5 - 5.1 mmol/L    Chloride 102 97 - 108 mmol/L    CO2 27 21 - 32 mmol/L    Anion gap 11 5 - 15 mmol/L    Glucose 83 65 - 100 mg/dL    BUN 33 (H) 6 - 20 mg/dL    Creatinine 2.06 (H) 0.70 - 1.30 mg/dL    BUN/Creatinine ratio 16 12 - 20      eGFR 32 (L) >60 ml/min/1.73m2    Calcium 8.0 (L) 8.5 - 10.1 mg/dL        Imaging:  No results found.      Assessment//Plan           Problem List:  Hospital Problems  Never Reviewed            Codes Class Noted POA    JUSTIN (acute kidney injury) [facility]) ICD-10-CM: N17.9  ICD-9-CM: 584.9  2/4/2023 Unknown        CKD (chronic kidney disease) ICD-10-CM: N18.9  ICD-9-CM: 585.9  2/4/2023 Unknown        * (Principal) Coagulopathy ([facility] [address] ICD-10-CM: D68.9  ICD-9-CM: 286.9  12/6/2021 Unknown           Supratherapeutic INR  -Was last admitted in early January with similar elevated INR of 9.6 and at that time was given 5 mg of oral vitamin K but then on following day INR was down to 2  -Patient on discharge was placed on 5 mg daily dosing Coumadin where previously was on increased dosing of 7.5 mg on Sunday and Wednesday  -Patient on Coumadin secondary to mechanical mitral valve as well as A. Fib  - goal INR is 2.5 to 3.5 range   - INR monitored daily   - vitamin K 5mg sq x 1 dose given in ED as previously seen on 1/31 with similar INR > 9.6  -Also noted to have recent start of amiodarone 200 mg and initially took 4 tablets daily for about a week and then currently is on 200 mg daily  -Pharmacy also evaluate medications and noted that Lopid can also lead to interaction and cause elevated INR  -No obvious bleeding noted since admission and H&H is stable at baseline  -INR decreased drastically from greater than 9.6 down to 6.2 on 2/3 and then down to 1.8 on 2/4  -2/4: Because of INR 1.8 we will start Lovenox bridging with 1 mg/kg subcu twice daily and will give warfarin 7.5 mg oral dose x1 tonight     Chronic HFrEF  - appears euvolemic and will continue home medications of metoprolol succinate 25 mg daily and Lasix 20 mg daily and Entresto  -Last echo on 1/6/2023 shows an EF of 5 to 10% with grade 2 diastolic dysfunction  -Monitor daily weight and strict I's and O's  -Fluid restriction of 1.2 L/day    Paroxysmal A.  Fib  - follows with Dr. Lucia Bangura MD cardiologist   -Was previously on long-term Tikosyn therapy and patient was told that only controlling rhythm 86% so was discontinued about 2 months ago  -Recently started on amiodarone 200 mg with initial bolus of 4 tablets daily for 1 week  -Continue metoprolol succinate 25 mg daily  -Monitor electrolytes keep potassium above 4 and magnesium above 2  -Monitor closely on telemetry    Hypokalemia  -Below goal currently is at 3.7  -Supplement with 20 meq oral KCl daily  -Follow closely with daily labs    Generalized Weakness  - increased ambulatory dysfunction at home  -PT/OT evaluation appreciated  -Out of bed to chair with meals    Hyperlipidemia  -Was noted to be on Lopid 600 mg oral twice daily was noted by pharmacy to have interaction also with Coumadin so we will hold for now  -Obtain lipid profile    DVT prophylaxis  -Lovenox 1 mg/kg subcu twice daily until INR therapeutic above 2.5    CODE STATUS: Full code  Patient is designated decision-maker is his wife    Spent 35 minutes evaluting and coordinating patient care of which >50% was spent coordinating and counseling. Spent 35 minutes evaluting and coordinating patient care of which >50% was spent coordinating and counseling.           Electronically signed by Rosangela Oconnell MD on 2/4/2023 at 12:01 PM

## 2023-02-04 NOTE — ROUTINE PROCESS
Bedside shift change report given to DAVE Jones LPN (oncoming nurse) by Signa Libman. [place], RN (offgoing nurse). Report included the following information Kardex.

## 2023-02-04 NOTE — ROUTINE PROCESS
Lying in bed awake. No c/o pain. Concerned about INR, ready to go home. Reminded patient labs will be drawn in the morning. Wife in.

## 2023-02-05 LAB
ANION GAP SERPL CALC-SCNC: 8 MMOL/L (ref 5–15)
BASOPHILS # BLD: 0 K/UL (ref 0–0.1)
BASOPHILS NFR BLD: 1 % (ref 0–1)
BUN SERPL-MCNC: 32 MG/DL (ref 6–20)
BUN/CREAT SERPL: 15 (ref 12–20)
CA-I BLD-MCNC: 8.4 MG/DL (ref 8.5–10.1)
CHLORIDE SERPL-SCNC: 102 MMOL/L (ref 97–108)
CHOLEST SERPL-MCNC: 102 MG/DL
CO2 SERPL-SCNC: 30 MMOL/L (ref 21–32)
CREAT SERPL-MCNC: 2.17 MG/DL (ref 0.7–1.3)
DIFFERENTIAL METHOD BLD: ABNORMAL
EOSINOPHIL # BLD: 0.2 K/UL (ref 0–0.4)
EOSINOPHIL NFR BLD: 3 % (ref 0–7)
ERYTHROCYTE [DISTWIDTH] IN BLOOD BY AUTOMATED COUNT: 15.5 % (ref 11.5–14.5)
GLUCOSE SERPL-MCNC: 82 MG/DL (ref 65–100)
HCT VFR BLD AUTO: 42.6 % (ref 36.6–50.3)
HDLC SERPL-MCNC: 42 MG/DL
HDLC SERPL: 2.4 (ref 0–5)
HGB BLD-MCNC: 13.9 G/DL (ref 12.1–17)
IMM GRANULOCYTES # BLD AUTO: 0 K/UL (ref 0–0.04)
IMM GRANULOCYTES NFR BLD AUTO: 1 % (ref 0–0.5)
INR PPP: 1.7 (ref 0.9–1.1)
LDLC SERPL CALC-MCNC: 45.6 MG/DL (ref 0–100)
LIPID PROFILE,FLP: NORMAL
LYMPHOCYTES # BLD: 0.7 K/UL (ref 0.8–3.5)
LYMPHOCYTES NFR BLD: 10 % (ref 12–49)
MAGNESIUM SERPL-MCNC: 2.3 MG/DL (ref 1.6–2.4)
MCH RBC QN AUTO: 30.6 PG (ref 26–34)
MCHC RBC AUTO-ENTMCNC: 32.6 G/DL (ref 30–36.5)
MCV RBC AUTO: 93.8 FL (ref 80–99)
MONOCYTES # BLD: 0.6 K/UL (ref 0–1)
MONOCYTES NFR BLD: 10 % (ref 5–13)
NEUTS SEG # BLD: 4.9 K/UL (ref 1.8–8)
NEUTS SEG NFR BLD: 75 % (ref 32–75)
NRBC # BLD: 0 K/UL (ref 0–0.01)
NRBC BLD-RTO: 0 PER 100 WBC
PLATELET # BLD AUTO: 191 K/UL (ref 150–400)
PMV BLD AUTO: 11.6 FL (ref 8.9–12.9)
POTASSIUM SERPL-SCNC: 4.1 MMOL/L (ref 3.5–5.1)
PROTHROMBIN TIME: 19.4 SEC (ref 11.9–14.6)
RBC # BLD AUTO: 4.54 M/UL (ref 4.1–5.7)
SODIUM SERPL-SCNC: 140 MMOL/L (ref 136–145)
TRIGL SERPL-MCNC: 72 MG/DL (ref ?–150)
VLDLC SERPL CALC-MCNC: 14.4 MG/DL
WBC # BLD AUTO: 6.4 K/UL (ref 4.1–11.1)

## 2023-02-05 PROCEDURE — 65270000029 HC RM PRIVATE

## 2023-02-05 PROCEDURE — 80061 LIPID PANEL: CPT

## 2023-02-05 PROCEDURE — 80048 BASIC METABOLIC PNL TOTAL CA: CPT

## 2023-02-05 PROCEDURE — 85025 COMPLETE CBC W/AUTO DIFF WBC: CPT

## 2023-02-05 PROCEDURE — 74011250636 HC RX REV CODE- 250/636: Performed by: HOSPITALIST

## 2023-02-05 PROCEDURE — 85610 PROTHROMBIN TIME: CPT

## 2023-02-05 PROCEDURE — 74011250637 HC RX REV CODE- 250/637: Performed by: HOSPITALIST

## 2023-02-05 PROCEDURE — 74011250637 HC RX REV CODE- 250/637: Performed by: INTERNAL MEDICINE

## 2023-02-05 PROCEDURE — 83735 ASSAY OF MAGNESIUM: CPT

## 2023-02-05 PROCEDURE — 36415 COLL VENOUS BLD VENIPUNCTURE: CPT

## 2023-02-05 RX ORDER — WARFARIN SODIUM 5 MG/1
5 TABLET ORAL DAILY
Status: DISCONTINUED | OUTPATIENT
Start: 2023-02-05 | End: 2023-02-06

## 2023-02-05 RX ADMIN — POTASSIUM CHLORIDE 10 MEQ: 750 TABLET, EXTENDED RELEASE ORAL at 09:10

## 2023-02-05 RX ADMIN — GABAPENTIN 300 MG: 300 CAPSULE ORAL at 09:10

## 2023-02-05 RX ADMIN — WARFARIN SODIUM 5 MG: 5 TABLET ORAL at 17:59

## 2023-02-05 RX ADMIN — AMIODARONE HYDROCHLORIDE 200 MG: 200 TABLET ORAL at 09:10

## 2023-02-05 RX ADMIN — GABAPENTIN 300 MG: 300 CAPSULE ORAL at 21:28

## 2023-02-05 RX ADMIN — SACUBITRIL AND VALSARTAN 2 TABLET: 24; 26 TABLET, FILM COATED ORAL at 09:10

## 2023-02-05 RX ADMIN — FUROSEMIDE 20 MG: 20 TABLET ORAL at 09:10

## 2023-02-05 RX ADMIN — ENOXAPARIN SODIUM 70 MG: 150 INJECTION SUBCUTANEOUS at 23:07

## 2023-02-05 RX ADMIN — POLYETHYLENE GLYCOL 3350 17 G: 17 POWDER, FOR SOLUTION ORAL at 09:13

## 2023-02-05 RX ADMIN — METOPROLOL SUCCINATE 25 MG: 25 TABLET, EXTENDED RELEASE ORAL at 11:46

## 2023-02-05 RX ADMIN — ENOXAPARIN SODIUM 70 MG: 150 INJECTION SUBCUTANEOUS at 11:45

## 2023-02-05 NOTE — ROUTINE PROCESS
The pt have had a restful night with no particular c/o voiced. No signs of any bleeding have been noted. There have been no c/o of any nausea. His wife remains in the room. Will continue to monitor.

## 2023-02-05 NOTE — PROGRESS NOTES
Problem: Patient Education: Go to Patient Education Activity  Goal: Patient/Family Education  Outcome: Progressing Towards Goal     Problem: General Medical Care Plan  Goal: *Vital signs within specified parameters  Outcome: Progressing Towards Goal  Goal: *Labs within defined limits  Outcome: Progressing Towards Goal  Goal: *Absence of infection signs and symptoms  Outcome: Progressing Towards Goal  Goal: *Optimal pain control at patient's stated goal  Outcome: Progressing Towards Goal  Goal: *Skin integrity maintained  Outcome: Progressing Towards Goal  Goal: *Fluid volume balance  Outcome: Progressing Towards Goal  Goal: *Optimize nutritional status  Outcome: Progressing Towards Goal  Goal: *Anxiety reduced or absent  Outcome: Progressing Towards Goal  Goal: *Progressive mobility and function (eg: ADL's)  Outcome: Progressing Towards Goal     Problem: Patient Education: Go to Patient Education Activity  Goal: Patient/Family Education  Outcome: Progressing Towards Goal

## 2023-02-05 NOTE — ROUTINE PROCESS
The pt have been resting awake in bed with his wife at the bedside appearing to be in no distress. The pt was without an Iv site upon receiving care of him. He have shown no signs of any bleeding. He is noted to have some bruising to his upper ext. No respiratory difficulty have been noted. Will continue to monitor.

## 2023-02-05 NOTE — ROUTINE PROCESS
Report from [facility]. Patient in bed resting, wife at bedside, no complaints, no distress and call light at bedside.

## 2023-02-05 NOTE — PROGRESS NOTES
Progress Note  Date:2/5/2023       Room:205/01  Patient Name:Rex Mayer     YOB: 1940     Age:82 y.o. Subjective    As per admitting provider on 2/3: Hiram Gutiérrez is a [de-identified] y.o. male with history of paroxysmal atrial fibrillation, aortic mechanical valve replacement admitted last night due to supratherapeutic INR of 9.0. Patient has reportedly been on Coumadin for over 10 years but recent outpatient lab draw to showed elevated INR and therefore patient advised to come to the hospital.  No evidence of GI bleed, bruising headaches or visual problems. Apparently receive vitamin K in the ER and was admitted overnight. Repeat INR pending. Denies chest pain or shortness of breath. No cough or productive sputum. Does not appear to be in fluid overload. 2/5: seen on follow up. Wife at bedside. Nurse notes that patient has been ambulating in [place]. INR still low at 1.7    Review of Systems   Constitutional:  Negative for chills, diaphoresis, fatigue and fever. HENT:  Negative for congestion, ear pain, postnasal drip, sinus pain and sore throat. Eyes:  Negative for pain, discharge and redness. Respiratory:  Negative for cough, shortness of breath and wheezing. Cardiovascular:  Negative for chest pain and palpitations. Gastrointestinal:  Negative for abdominal pain, constipation, diarrhea, nausea and vomiting. Genitourinary:  Negative for dysuria, flank pain, frequency and urgency. Musculoskeletal:  Negative for arthralgias and myalgias. Neurological:  Negative for dizziness, weakness and headaches. Psychiatric/Behavioral:  Negative for agitation and hallucinations. The patient is not nervous/anxious.       Objective           Vitals Last 24 Hours:  Patient Vitals for the past 24 hrs:   Temp Pulse Resp BP SpO2   02/05/23 1159 -- 97 -- -- --   02/05/23 1141 97.4 °F (36.3 °C) 75 17 102/72 98 %   02/05/23 0908 -- -- -- 105/68 --   02/05/23 0746 97.6 °F (36.4 °C) 75 18 102/68 91 %   02/05/23 0400 97.7 °F (36.5 °C) 78 18 133/80 96 %   02/05/23 0031 97.3 °F (36.3 °C) 76 18 104/74 98 %   02/05/23 0000 -- 76 -- -- --   02/04/23 2305 -- -- -- -- 97 %   02/04/23 2041 97.7 °F (36.5 °C) 75 18 101/71 97 %   02/04/23 2000 -- 75 -- -- --   02/04/23 1600 -- 75 -- -- --   02/04/23 1351 98 °F (36.7 °C) 72 16 100/67 96 %          I/O (24Hr): No intake or output data in the 24 hours ending 02/05/23 1239    Physical Exam:  General: Alert, cooperative, no distress, appears stated age. Head:  Normocephalic, without obvious abnormality, atraumatic. Eyes:  Conjunctivae/corneas clear. Pupils equal, round, reactive to light. Extraocular movements intact. Lungs:  Clear to auscultation bilaterally. no wheeze, rales, crackles, rhonchi   Chest wall: No tenderness or deformity. Heart: normal rate irregular rhythm, S1, S2 normal, no murmur, click, rub or gallop. Abdomen:  Soft, non-tender. Bowel sounds normal. No masses,  No organomegaly. Extremities: Extremities normal, atraumatic, no cyanosis or  + edema   Pulses: 2+ and symmetric all extremities. Skin: Skin color, texture, turgor normal. No rashes or lesions  Neurologic: Awake, Alert, oriented.  No obvious gross sensory or motor deficits      Medications           Current Facility-Administered Medications   Medication Dose Route Frequency    warfarin (COUMADIN) tablet 5 mg  5 mg Oral DAILY    enoxaparin (LOVENOX) injection 70 mg  1 mg/kg SubCUTAneous Q12H    amiodarone (CORDARONE) tablet 200 mg  200 mg Oral DAILY    furosemide (LASIX) tablet 20 mg  20 mg Oral DAILY    gabapentin (NEURONTIN) capsule 300 mg  300 mg Oral BID    metoprolol succinate (TOPROL-XL) XL tablet 25 mg  25 mg Oral DAILY    potassium chloride SR (KLOR-CON 10) tablet 10 mEq  10 mEq Oral DAILY    sacubitriL-valsartan (ENTRESTO) 24-26 mg tablet 2 Tablet  2 Tablet Oral DAILY    sodium chloride (NS) flush 5-40 mL  5-40 mL IntraVENous Q8H    sodium chloride (NS) flush 5-40 mL  5-40 mL IntraVENous PRN    acetaminophen (TYLENOL) tablet 650 mg  650 mg Oral Q6H PRN    Or    acetaminophen (TYLENOL) suppository 650 mg  650 mg Rectal Q6H PRN    polyethylene glycol (MIRALAX) packet 17 g  17 g Oral DAILY PRN    ondansetron (ZOFRAN ODT) tablet 4 mg  4 mg Oral Q8H PRN    Or    ondansetron (ZOFRAN) injection 4 mg  4 mg IntraVENous Q6H PRN         Allergies         Patient has no known allergies. Labs/Imaging/Diagnostics      Labs:  Recent Results (from the past 48 hour(s))   PROTHROMBIN TIME + INR    Collection Time: 02/04/23  5:07 AM   Result Value Ref Range    Prothrombin time 20.5 (H) 11.9 - 14.6 sec    INR 1.8 (H) 0.9 - 1.1     CBC WITH AUTOMATED DIFF    Collection Time: 02/04/23  5:07 AM   Result Value Ref Range    WBC 6.1 4.4 - 11.3 K/uL    RBC 4.78 4.50 - 5.90 M/uL    HGB 14.5 13.5 - 17.5 g/dL    HCT 43.8 41 - 53 %    MCV 91.6 80 - 100 FL    MCH 30.3 (L) 31 - 34 PG    MCHC 33.1 31.0 - 36.0 g/dL    RDW 15.7 (H) 11.5 - 14.5 %    PLATELET 112 442 - 825 K/uL    MPV 9.5 6.5 - 11.5 FL    NRBC 0.0  WBC    ABSOLUTE NRBC 0.00 K/uL    NEUTROPHILS 79 (H) 42 - 75 %    LYMPHOCYTES 8 (L) 20.5 - 51.1 %    MONOCYTES 8 1.7 - 9.3 %    EOSINOPHILS 4 (H) 0.9 - 2.9 %    BASOPHILS 1 0.0 - 2.5 %    ABS. NEUTROPHILS 4.9 1.8 - 7.7 K/UL    ABS. LYMPHOCYTES 0.5 (L) 1.0 - 4.8 K/UL    ABS. MONOCYTES 0.5 0.2 - 2.4 K/UL    ABS. EOSINOPHILS 0.2 0.0 - 0.7 K/UL    ABS.  BASOPHILS 0.0 0.0 - 0.2 K/UL   METABOLIC PANEL, BASIC    Collection Time: 02/04/23  5:07 AM   Result Value Ref Range    Sodium 140 136 - 145 mmol/L    Potassium 3.7 3.5 - 5.1 mmol/L    Chloride 102 97 - 108 mmol/L    CO2 27 21 - 32 mmol/L    Anion gap 11 5 - 15 mmol/L    Glucose 83 65 - 100 mg/dL    BUN 33 (H) 6 - 20 mg/dL    Creatinine 2.06 (H) 0.70 - 1.30 mg/dL    BUN/Creatinine ratio 16 12 - 20      eGFR 32 (L) >60 ml/min/1.73m2    Calcium 8.0 (L) 8.5 - 10.1 mg/dL   CBC WITH AUTOMATED DIFF    Collection Time: 02/05/23  5:26 AM   Result Value Ref Range WBC 6.4 4.1 - 11.1 K/uL    RBC 4.54 4.10 - 5.70 M/uL    HGB 13.9 12.1 - 17.0 g/dL    HCT 42.6 36.6 - 50.3 %    MCV 93.8 80.0 - 99.0 FL    MCH 30.6 26.0 - 34.0 PG    MCHC 32.6 30.0 - 36.5 g/dL    RDW 15.5 (H) 11.5 - 14.5 %    PLATELET 737 913 - 172 K/uL    MPV 11.6 8.9 - 12.9 FL    NRBC 0.0 0.0  WBC    ABSOLUTE NRBC 0.00 0.00 - 0.01 K/uL    NEUTROPHILS 75 32 - 75 %    LYMPHOCYTES 10 (L) 12 - 49 %    MONOCYTES 10 5 - 13 %    EOSINOPHILS 3 0 - 7 %    BASOPHILS 1 0 - 1 %    IMMATURE GRANULOCYTES 1 (H) 0 - 0.5 %    ABS. NEUTROPHILS 4.9 1.8 - 8.0 K/UL    ABS. LYMPHOCYTES 0.7 (L) 0.8 - 3.5 K/UL    ABS. MONOCYTES 0.6 0.0 - 1.0 K/UL    ABS. EOSINOPHILS 0.2 0.0 - 0.4 K/UL    ABS. BASOPHILS 0.0 0.0 - 0.1 K/UL    ABS. IMM. GRANS. 0.0 0.00 - 0.04 K/UL    DF AUTOMATED     METABOLIC PANEL, BASIC    Collection Time: 02/05/23  5:26 AM   Result Value Ref Range    Sodium 140 136 - 145 mmol/L    Potassium 4.1 3.5 - 5.1 mmol/L    Chloride 102 97 - 108 mmol/L    CO2 30 21 - 32 mmol/L    Anion gap 8 5 - 15 mmol/L    Glucose 82 65 - 100 mg/dL    BUN 32 (H) 6 - 20 mg/dL    Creatinine 2.17 (H) 0.70 - 1.30 mg/dL    BUN/Creatinine ratio 15 12 - 20      eGFR 30 (L) >60 ml/min/1.73m2    Calcium 8.4 (L) 8.5 - 10.1 mg/dL   PROTHROMBIN TIME + INR    Collection Time: 02/05/23  5:28 AM   Result Value Ref Range    Prothrombin time 19.4 (H) 11.9 - 14.6 sec    INR 1.7 (H) 0.9 - 1.1     MAGNESIUM    Collection Time: 02/05/23  5:28 AM   Result Value Ref Range    Magnesium 2.3 1.6 - 2.4 mg/dL        Imaging:  No results found.      Assessment//Plan           Problem List:  Hospital Problems  Never Reviewed            Codes Class Noted POA    JUSTIN (acute kidney injury) ([facility] ICD-10-CM: N17.9  ICD-9-CM: 584.9  2/4/2023 Unknown        CKD (chronic kidney disease) ICD-10-CM: N18.9  ICD-9-CM: 585.9  2/4/2023 Unknown        * (Principal) Coagulopathy ([facility] ICD-10-CM: D68.9  ICD-9-CM: 286.9  12/6/2021 Unknown         Supratherapeutic INR  -Was last admitted in early January with similar elevated INR of 9.6 and at that time was given 5 mg of oral vitamin K but then on following day INR was down to 2  -Patient on discharge was placed on 5 mg daily dosing Coumadin where previously was on increased dosing of 7.5 mg on Sunday and Wednesday  -Patient on Coumadin secondary to mechanical mitral valve as well as A. Fib  - goal INR is 2.5 to 3.5 range   - INR monitored daily   - vitamin K 5mg sq x 1 dose given in ED as previously seen on 1/31 with similar INR > 9.6  -Also noted to have recent start of amiodarone 200 mg and initially took 4 tablets daily for about a week and then currently is on 200 mg daily  -Pharmacy also evaluate medications and noted that Lopid can also lead to interaction and cause elevated INR  -No obvious bleeding noted since admission and H&H is stable at baseline  -INR decreased drastically from greater than 9.6 down to 6.2 on 2/3 and then down to 1.8 on 2/4  -2/4: Because of INR 1.8 we will start Lovenox bridging with 1 mg/kg subcu twice daily and will give warfarin 7.5 mg oral dose x1 tonight  - 2/5: INR still low at 1.7, will give 5mg home dosing and will need to monitor and wait for Vitamin K effect      Chronic HFrEF  - appears euvolemic and will continue home medications of metoprolol succinate 25 mg daily and Lasix 20 mg daily and Entresto  -Last echo on 1/6/2023 shows an EF of 5 to 10% with grade 2 diastolic dysfunction but states previously about 1 year ago was at 45%. -Monitor daily weight and strict I's and O's  -Fluid restriction of 1.2 L/day    Paroxysmal A.  Fib  - follows with Dr. Khurram Mejia MD cardiologist   -Was previously on long-term Tikosyn therapy and patient was told that only controlling rhythm 86% so was discontinued about 2 months ago  -Recently started on amiodarone 200 mg with initial bolus of 4 tablets daily for 1 week  -Continue metoprolol succinate 25 mg daily  -Monitor electrolytes keep potassium above 4 and magnesium above 2  -Monitor closely on telemetry  - will discuss with patient electrophysciogist Dr. Marylin Marsh about amiodarone and plan for possible cardioversion ? Hypokalemia  -Below goal currently is at 3.7  -Supplement with 20 meq oral KCl daily  - repeat on 2/5 is stable at 4.1   -Follow closely with daily labs    Generalized Weakness  - increased ambulatory dysfunction at home  -PT/OT evaluation appreciated  -Out of bed to chair with meals  - noted to be ambulating halls with walker, continue to encourage with fall precautions     Hyperlipidemia  -Was noted to be on Lopid 600 mg oral twice daily was noted by pharmacy to have interaction also with Coumadin so we will hold for now  -Obtain lipid profile    DVT prophylaxis  -Lovenox 1 mg/kg subcu twice daily until INR therapeutic above 2.5    CODE STATUS: Full code  Patient is designated decision-maker is his wife    Spent 35 minutes evaluting and coordinating patient care of which >50% was spent coordinating and counseling. Spent 35 minutes evaluting and coordinating patient care of which >50% was spent coordinating and counseling.           Electronically signed by Allison Hightower MD on 2/5/2023 at 12:01 PM

## 2023-02-05 NOTE — ROUTINE PROCESS
Report was received from the offgoing nurse [facility] RN. Care was resumed via the incoming nurse [facility] LEONEL MO. The report consist of using kardex information.

## 2023-02-06 LAB
ANION GAP SERPL CALC-SCNC: 9 MMOL/L (ref 5–15)
BASOPHILS # BLD: 0.1 K/UL (ref 0–0.1)
BASOPHILS NFR BLD: 1 % (ref 0–1)
BUN SERPL-MCNC: 33 MG/DL (ref 6–20)
BUN/CREAT SERPL: 17 (ref 12–20)
CA-I BLD-MCNC: 8.3 MG/DL (ref 8.5–10.1)
CHLORIDE SERPL-SCNC: 104 MMOL/L (ref 97–108)
CHOLEST SERPL-MCNC: 98 MG/DL
CO2 SERPL-SCNC: 28 MMOL/L (ref 21–32)
CREAT SERPL-MCNC: 1.98 MG/DL (ref 0.7–1.3)
DIFFERENTIAL METHOD BLD: ABNORMAL
EOSINOPHIL # BLD: 0.1 K/UL (ref 0–0.4)
EOSINOPHIL NFR BLD: 2 % (ref 0–7)
ERYTHROCYTE [DISTWIDTH] IN BLOOD BY AUTOMATED COUNT: 15.6 % (ref 11.5–14.5)
GLUCOSE SERPL-MCNC: 89 MG/DL (ref 65–100)
HCT VFR BLD AUTO: 42.1 % (ref 36.6–50.3)
HDLC SERPL-MCNC: 40 MG/DL
HDLC SERPL: 2.5 (ref 0–5)
HGB BLD-MCNC: 13.9 G/DL (ref 12.1–17)
IMM GRANULOCYTES # BLD AUTO: 0.1 K/UL (ref 0–0.04)
IMM GRANULOCYTES NFR BLD AUTO: 1 % (ref 0–0.5)
INR PPP: 2 (ref 0.9–1.1)
LDLC SERPL CALC-MCNC: 46.4 MG/DL (ref 0–100)
LIPID PROFILE,FLP: NORMAL
LYMPHOCYTES # BLD: 0.6 K/UL (ref 0.8–3.5)
LYMPHOCYTES NFR BLD: 9 % (ref 12–49)
MCH RBC QN AUTO: 30.5 PG (ref 26–34)
MCHC RBC AUTO-ENTMCNC: 33 G/DL (ref 30–36.5)
MCV RBC AUTO: 92.3 FL (ref 80–99)
MONOCYTES # BLD: 0.5 K/UL (ref 0–1)
MONOCYTES NFR BLD: 8 % (ref 5–13)
NEUTS SEG # BLD: 5 K/UL (ref 1.8–8)
NEUTS SEG NFR BLD: 79 % (ref 32–75)
NRBC # BLD: 0 K/UL (ref 0–0.01)
NRBC BLD-RTO: 0 PER 100 WBC
PLATELET # BLD AUTO: 216 K/UL (ref 150–400)
PMV BLD AUTO: 11.7 FL (ref 8.9–12.9)
POTASSIUM SERPL-SCNC: 3.7 MMOL/L (ref 3.5–5.1)
PROTHROMBIN TIME: 21.5 SEC (ref 11.9–14.6)
RBC # BLD AUTO: 4.56 M/UL (ref 4.1–5.7)
RBC MORPH BLD: ABNORMAL
SODIUM SERPL-SCNC: 141 MMOL/L (ref 136–145)
TRIGL SERPL-MCNC: 58 MG/DL (ref ?–150)
VLDLC SERPL CALC-MCNC: 11.6 MG/DL
WBC # BLD AUTO: 6.4 K/UL (ref 4.1–11.1)

## 2023-02-06 PROCEDURE — 74011250636 HC RX REV CODE- 250/636: Performed by: HOSPITALIST

## 2023-02-06 PROCEDURE — 74011250637 HC RX REV CODE- 250/637: Performed by: INTERNAL MEDICINE

## 2023-02-06 PROCEDURE — 80048 BASIC METABOLIC PNL TOTAL CA: CPT

## 2023-02-06 PROCEDURE — 74011250637 HC RX REV CODE- 250/637: Performed by: HOSPITALIST

## 2023-02-06 PROCEDURE — 65270000029 HC RM PRIVATE

## 2023-02-06 PROCEDURE — 85025 COMPLETE CBC W/AUTO DIFF WBC: CPT

## 2023-02-06 PROCEDURE — 85610 PROTHROMBIN TIME: CPT

## 2023-02-06 PROCEDURE — 36415 COLL VENOUS BLD VENIPUNCTURE: CPT

## 2023-02-06 RX ORDER — ENOXAPARIN SODIUM 100 MG/ML
70 INJECTION SUBCUTANEOUS EVERY 12 HOURS
Status: DISCONTINUED | OUTPATIENT
Start: 2023-02-06 | End: 2023-02-07 | Stop reason: HOSPADM

## 2023-02-06 RX ORDER — DOCUSATE SODIUM 100 MG/1
100 CAPSULE, LIQUID FILLED ORAL 2 TIMES DAILY
Status: DISCONTINUED | OUTPATIENT
Start: 2023-02-06 | End: 2023-02-07 | Stop reason: HOSPADM

## 2023-02-06 RX ORDER — CALCIUM CARB/MAGNESIUM CARB 311-232MG
5 TABLET ORAL
Status: DISCONTINUED | OUTPATIENT
Start: 2023-02-06 | End: 2023-02-07 | Stop reason: HOSPADM

## 2023-02-06 RX ORDER — WARFARIN SODIUM 5 MG/1
5 TABLET ORAL ONCE
Status: COMPLETED | OUTPATIENT
Start: 2023-02-06 | End: 2023-02-06

## 2023-02-06 RX ADMIN — WARFARIN SODIUM 5 MG: 5 TABLET ORAL at 16:26

## 2023-02-06 RX ADMIN — METOPROLOL SUCCINATE 25 MG: 25 TABLET, EXTENDED RELEASE ORAL at 09:07

## 2023-02-06 RX ADMIN — LACTULOSE 30 ML: 20 SOLUTION ORAL at 12:26

## 2023-02-06 RX ADMIN — DOCUSATE SODIUM 100 MG: 100 CAPSULE, LIQUID FILLED ORAL at 12:26

## 2023-02-06 RX ADMIN — ENOXAPARIN SODIUM 70 MG: 100 INJECTION SUBCUTANEOUS at 12:26

## 2023-02-06 RX ADMIN — SACUBITRIL AND VALSARTAN 2 TABLET: 24; 26 TABLET, FILM COATED ORAL at 09:06

## 2023-02-06 RX ADMIN — Medication 5 MG: at 21:39

## 2023-02-06 RX ADMIN — GABAPENTIN 300 MG: 300 CAPSULE ORAL at 21:39

## 2023-02-06 RX ADMIN — AMIODARONE HYDROCHLORIDE 200 MG: 200 TABLET ORAL at 09:06

## 2023-02-06 RX ADMIN — GABAPENTIN 300 MG: 300 CAPSULE ORAL at 09:06

## 2023-02-06 RX ADMIN — POTASSIUM CHLORIDE 10 MEQ: 750 TABLET, EXTENDED RELEASE ORAL at 09:07

## 2023-02-06 RX ADMIN — DOCUSATE SODIUM 100 MG: 100 CAPSULE, LIQUID FILLED ORAL at 21:39

## 2023-02-06 RX ADMIN — FUROSEMIDE 20 MG: 20 TABLET ORAL at 09:07

## 2023-02-06 NOTE — ROUTINE PROCESS
The pt have had a restful night with no c/o being voiced. His wife remains in the room. Will continue to monitor.

## 2023-02-06 NOTE — ROUTINE PROCESS
Bedside shift change report given to paola garduno (oncoming nurse) by bayron (offgoing nurse). Report included the following information Kardex.

## 2023-02-06 NOTE — PROGRESS NOTES
Progress Note  Date:2/6/2023       Room:205/01  Patient Name:Rex Li     YOB: 1940     Age:82 y.o. Subjective    As per admitting provider on 2/3: Alexandra Duarte is a [de-identified] y.o. male with history of paroxysmal atrial fibrillation, aortic mechanical valve replacement admitted last night due to supratherapeutic INR of 9.0. Patient has reportedly been on Coumadin for over 10 years but recent outpatient lab draw to showed elevated INR and therefore patient advised to come to the hospital.  No evidence of GI bleed, bruising headaches or visual problems. Apparently receive vitamin K in the ER and was admitted overnight. Repeat INR pending. Denies chest pain or shortness of breath. No cough or productive sputum. Does not appear to be in fluid overload. 2/6: INR improve at 2. Still constipated but after lacutlose dose 20gm x 1 patient had good bowel movement     Review of Systems   Constitutional:  Negative for chills, diaphoresis, fatigue and fever. HENT:  Negative for congestion, ear pain, postnasal drip, sinus pain and sore throat. Eyes:  Negative for pain, discharge and redness. Respiratory:  Negative for cough, shortness of breath and wheezing. Cardiovascular:  Negative for chest pain and palpitations. Gastrointestinal:  Negative for abdominal pain, constipation, diarrhea, nausea and vomiting. Genitourinary:  Negative for dysuria, flank pain, frequency and urgency. Musculoskeletal:  Negative for arthralgias and myalgias. Neurological:  Negative for dizziness, weakness and headaches. Psychiatric/Behavioral:  Negative for agitation and hallucinations. The patient is not nervous/anxious.       Objective           Vitals Last 24 Hours:  Patient Vitals for the past 24 hrs:   Temp Pulse Resp BP SpO2   02/06/23 0750 97.4 °F (36.3 °C) 75 18 115/80 93 %   02/06/23 0410 97.5 °F (36.4 °C) 77 18 118/82 93 %   02/06/23 0105 -- -- -- -- 100 %   02/06/23 0027 97.5 °F (36.4 °C) 96 18 107/80 100 %   02/05/23 2000 -- 77 -- -- --   02/05/23 1950 98.6 °F (37 °C) 77 18 102/67 95 %   02/05/23 1631 98 °F (36.7 °C) 78 18 105/74 98 %   02/05/23 1600 -- 78 -- -- --   02/05/23 1159 -- 97 -- -- --   02/05/23 1141 97.4 °F (36.3 °C) 75 17 102/72 98 %          I/O (24Hr): Intake/Output Summary (Last 24 hours) at 2/6/2023 1132  Last data filed at 2/5/2023 1317  Gross per 24 hour   Intake 240 ml   Output --   Net 240 ml       Physical Exam:  General: Alert, cooperative, no distress, appears stated age. Head:  Normocephalic, without obvious abnormality, atraumatic. Eyes:  Conjunctivae/corneas clear. Pupils equal, round, reactive to light. Extraocular movements intact. Lungs:  Clear to auscultation bilaterally. no wheeze, rales, crackles, rhonchi   Chest wall: No tenderness or deformity. Heart: normal rate irregular rhythm, S1, S2 normal, no murmur, click, rub or gallop. Abdomen:  Soft, non-tender. Bowel sounds normal. No masses,  No organomegaly. Extremities: Extremities normal, atraumatic, no cyanosis or  + edema   Pulses: 2+ and symmetric all extremities. Skin: Skin color, texture, turgor normal. No rashes or lesions  Neurologic: Awake, Alert, oriented.  No obvious gross sensory or motor deficits      Medications           Current Facility-Administered Medications   Medication Dose Route Frequency    warfarin (COUMADIN) tablet 5 mg  5 mg Oral ONCE    docusate sodium (COLACE) capsule 100 mg  100 mg Oral BID    lactulose (CHRONULAC) 10 gram/15 mL solution 30 mL  20 g Oral ONCE    enoxaparin (LOVENOX) injection 70 mg  1 mg/kg SubCUTAneous Q12H    amiodarone (CORDARONE) tablet 200 mg  200 mg Oral DAILY    furosemide (LASIX) tablet 20 mg  20 mg Oral DAILY    gabapentin (NEURONTIN) capsule 300 mg  300 mg Oral BID    metoprolol succinate (TOPROL-XL) XL tablet 25 mg  25 mg Oral DAILY    potassium chloride SR (KLOR-CON 10) tablet 10 mEq  10 mEq Oral DAILY    sacubitriL-valsartan (ENTRESTO) 24-26 mg tablet 2 Tablet  2 Tablet Oral DAILY    sodium chloride (NS) flush 5-40 mL  5-40 mL IntraVENous Q8H    sodium chloride (NS) flush 5-40 mL  5-40 mL IntraVENous PRN    acetaminophen (TYLENOL) tablet 650 mg  650 mg Oral Q6H PRN    Or    acetaminophen (TYLENOL) suppository 650 mg  650 mg Rectal Q6H PRN    polyethylene glycol (MIRALAX) packet 17 g  17 g Oral DAILY PRN    ondansetron (ZOFRAN ODT) tablet 4 mg  4 mg Oral Q8H PRN    Or    ondansetron (ZOFRAN) injection 4 mg  4 mg IntraVENous Q6H PRN         Allergies         Patient has no known allergies. Labs/Imaging/Diagnostics      Labs:  Recent Results (from the past 48 hour(s))   CBC WITH AUTOMATED DIFF    Collection Time: 02/05/23  5:26 AM   Result Value Ref Range    WBC 6.4 4.1 - 11.1 K/uL    RBC 4.54 4.10 - 5.70 M/uL    HGB 13.9 12.1 - 17.0 g/dL    HCT 42.6 36.6 - 50.3 %    MCV 93.8 80.0 - 99.0 FL    MCH 30.6 26.0 - 34.0 PG    MCHC 32.6 30.0 - 36.5 g/dL    RDW 15.5 (H) 11.5 - 14.5 %    PLATELET 539 101 - 713 K/uL    MPV 11.6 8.9 - 12.9 FL    NRBC 0.0 0.0  WBC    ABSOLUTE NRBC 0.00 0.00 - 0.01 K/uL    NEUTROPHILS 75 32 - 75 %    LYMPHOCYTES 10 (L) 12 - 49 %    MONOCYTES 10 5 - 13 %    EOSINOPHILS 3 0 - 7 %    BASOPHILS 1 0 - 1 %    IMMATURE GRANULOCYTES 1 (H) 0 - 0.5 %    ABS. NEUTROPHILS 4.9 1.8 - 8.0 K/UL    ABS. LYMPHOCYTES 0.7 (L) 0.8 - 3.5 K/UL    ABS. MONOCYTES 0.6 0.0 - 1.0 K/UL    ABS. EOSINOPHILS 0.2 0.0 - 0.4 K/UL    ABS. BASOPHILS 0.0 0.0 - 0.1 K/UL    ABS. IMM.  GRANS. 0.0 0.00 - 0.04 K/UL    DF AUTOMATED     METABOLIC PANEL, BASIC    Collection Time: 02/05/23  5:26 AM   Result Value Ref Range    Sodium 140 136 - 145 mmol/L    Potassium 4.1 3.5 - 5.1 mmol/L    Chloride 102 97 - 108 mmol/L    CO2 30 21 - 32 mmol/L    Anion gap 8 5 - 15 mmol/L    Glucose 82 65 - 100 mg/dL    BUN 32 (H) 6 - 20 mg/dL    Creatinine 2.17 (H) 0.70 - 1.30 mg/dL    BUN/Creatinine ratio 15 12 - 20      eGFR 30 (L) >60 ml/min/1.73m2    Calcium 8.4 (L) 8.5 - 10.1 mg/dL PROTHROMBIN TIME + INR    Collection Time: 02/05/23  5:28 AM   Result Value Ref Range    Prothrombin time 19.4 (H) 11.9 - 14.6 sec    INR 1.7 (H) 0.9 - 1.1     MAGNESIUM    Collection Time: 02/05/23  5:28 AM   Result Value Ref Range    Magnesium 2.3 1.6 - 2.4 mg/dL   LIPID PANEL    Collection Time: 02/05/23  5:28 AM   Result Value Ref Range    LIPID PROFILE        Cholesterol, total 98 <200 mg/dL    Triglyceride 58 <150 mg/dL    HDL Cholesterol 40 mg/dL    LDL, calculated 46.4 0 - 100 mg/dL    VLDL, calculated 11.6 mg/dL    CHOL/HDL Ratio 2.5 0.0 - 5.0     CBC WITH AUTOMATED DIFF    Collection Time: 02/06/23  4:58 AM   Result Value Ref Range    WBC 6.4 4.1 - 11.1 K/uL    RBC 4.56 4.10 - 5.70 M/uL    HGB 13.9 12.1 - 17.0 g/dL    HCT 42.1 36.6 - 50.3 %    MCV 92.3 80.0 - 99.0 FL    MCH 30.5 26.0 - 34.0 PG    MCHC 33.0 30.0 - 36.5 g/dL    RDW 15.6 (H) 11.5 - 14.5 %    PLATELET 241 181 - 421 K/uL    MPV 11.7 8.9 - 12.9 FL    NRBC 0.0 0.0  WBC    ABSOLUTE NRBC 0.00 0.00 - 0.01 K/uL    NEUTROPHILS 79 (H) 32 - 75 %    LYMPHOCYTES 9 (L) 12 - 49 %    MONOCYTES 8 5 - 13 %    EOSINOPHILS 2 0 - 7 %    BASOPHILS 1 0 - 1 %    IMMATURE GRANULOCYTES 1 (H) 0 - 0.5 %    ABS. NEUTROPHILS 5.0 1.8 - 8.0 K/UL    ABS. LYMPHOCYTES 0.6 (L) 0.8 - 3.5 K/UL    ABS. MONOCYTES 0.5 0.0 - 1.0 K/UL    ABS. EOSINOPHILS 0.1 0.0 - 0.4 K/UL    ABS. BASOPHILS 0.1 0.0 - 0.1 K/UL    ABS. IMM.  GRANS. 0.1 (H) 0.00 - 0.04 K/UL    DF AUTOMATED      RBC COMMENTS Normocytic, Normochromic     METABOLIC PANEL, BASIC    Collection Time: 02/06/23  4:58 AM   Result Value Ref Range    Sodium 141 136 - 145 mmol/L    Potassium 3.7 3.5 - 5.1 mmol/L    Chloride 104 97 - 108 mmol/L    CO2 28 21 - 32 mmol/L    Anion gap 9 5 - 15 mmol/L    Glucose 89 65 - 100 mg/dL    BUN 33 (H) 6 - 20 mg/dL    Creatinine 1.98 (H) 0.70 - 1.30 mg/dL    BUN/Creatinine ratio 17 12 - 20      eGFR 34 (L) >60 ml/min/1.73m2    Calcium 8.3 (L) 8.5 - 10.1 mg/dL   PROTHROMBIN TIME + INR Collection Time: 02/06/23  4:58 AM   Result Value Ref Range    Prothrombin time 21.5 (H) 11.9 - 14.6 sec    INR 2.0 (H) 0.9 - 1.1          Imaging:  No results found. Assessment//Plan           Problem List:  Hospital Problems  Never Reviewed            Codes Class Noted POA    JUSTIN (acute kidney injury) ([facility] ICD-10-CM: N17.9  ICD-9-CM: 584.9  2/4/2023 Unknown        CKD (chronic kidney disease) ICD-10-CM: N18.9  ICD-9-CM: 585.9  2/4/2023 Unknown        * (Principal) Coagulopathy ([facility] ICD-10-CM: D68.9  ICD-9-CM: 286.9  12/6/2021 Unknown       Supratherapeutic INR  -Was last admitted in early January with similar elevated INR of 9.6 and at that time was given 5 mg of oral vitamin K but then on following day INR was down to 2  -Patient on discharge was placed on 5 mg daily dosing Coumadin where previously was on increased dosing of 7.5 mg on Sunday and Wednesday  -Patient on Coumadin secondary to mechanical mitral valve as well as A. Fib  - goal INR is 2.5 to 3.5 range   - INR monitored daily   - vitamin K 5mg sq x 1 dose given in ED as previously seen on 1/31 with similar INR > 9.6  -Also noted to have recent start of amiodarone 200 mg and initially took 4 tablets daily for about a week and then currently is on 200 mg daily  -Pharmacy also evaluate medications and noted that Lopid can also lead to interaction and cause elevated INR  -No obvious bleeding noted since admission and H&H is stable at baseline  -INR decreased drastically from greater than 9.6 down to 6.2 on 2/3 and then down to 1.8 on 2/4  -2/4: Because of INR 1.8 we will start Lovenox bridging with 1 mg/kg subcu twice daily and will give warfarin 7.5 mg oral dose x1 tonight  - 2/5: INR still low at 1.7, will give 5mg home dosing and will need to monitor and wait for Vitamin K effect   - 2/6: inr up to 2.  Continue same dosing of 5mg      Chronic HFrEF  - appears euvolemic and will continue home medications of metoprolol succinate 25 mg daily and Lasix 20 mg daily and Entresto  -Last echo on 1/6/2023 shows an EF of 5 to 10% with grade 2 diastolic dysfunction but states previously about 1 year ago was at 45%. -Monitor daily weight and strict I's and O's  -Fluid restriction of 1.2 L/day    Paroxysmal A. Fib  - follows with Dr. Arcenio Pena MD cardiologist   -Was previously on long-term Tikosyn therapy and patient was told that only controlling rhythm 86% so was discontinued about 2 months ago  -Recently started on amiodarone 200 mg with initial bolus of 4 tablets daily for 1 week  -Continue metoprolol succinate 25 mg daily  -Monitor electrolytes keep potassium above 4 and magnesium above 2  -Monitor closely on telemetry  - will discuss with patient electrophysciogist Dr. Arcenio Pena about amiodarone and plan for possible cardioversion ? Constipation   - no bowel movement for at least 5 days  - colace bid   - lacutlose 20gm oral x 1 and had results with good bowel movement     Hypokalemia  -Below goal currently is at 3.7  -Supplement with 20 meq oral KCl daily  - repeat on 2/5 is stable at 4.1   -Follow closely with daily labs    Generalized Weakness  - increased ambulatory dysfunction at home  -PT/OT evaluation appreciated  -Out of bed to chair with meals  - noted to be ambulating halls with walker, continue to encourage with fall precautions     Hyperlipidemia  -Was noted to be on Lopid 600 mg oral twice daily was noted by pharmacy to have interaction also with Coumadin so we will hold for now  -Cholesterol well controlled with total at 98 and LDL of 46.4 and triglycerides of 58    DVT prophylaxis  -Lovenox 1 mg/kg subcu twice daily until INR therapeutic above 2.5    CODE STATUS: Full code  Patient is designated decision-maker is his wife    Spent 35 minutes evaluting and coordinating patient care of which >50% was spent coordinating and counseling. Spent 35 minutes evaluting and coordinating patient care of which >50% was spent coordinating and counseling. Electronically signed by Kari Vasquez MD on 2/6/2023 at 12:01 PM

## 2023-02-06 NOTE — ROUTINE PROCESS
The pt have been awake up in the room & sitting on the side of the bed with his wife in [address]. He have voiced no particular c/o. He have shown no signs of any bleeding. He is being assisted as needed. Will continue to monitor.

## 2023-02-06 NOTE — ROUTINE PROCESS
Report was received from the offgoing nurse Umang Leonardo. Care was resumed via the incoming nurse [facility] LEONEL MO. The report consist of using kardex information.

## 2023-02-07 VITALS
HEART RATE: 76 BPM | OXYGEN SATURATION: 96 % | TEMPERATURE: 97.8 F | BODY MASS INDEX: 25.81 KG/M2 | DIASTOLIC BLOOD PRESSURE: 71 MMHG | SYSTOLIC BLOOD PRESSURE: 102 MMHG | HEIGHT: 68 IN | RESPIRATION RATE: 18 BRPM | WEIGHT: 170.31 LBS

## 2023-02-07 LAB
INR PPP: 2.9 (ref 0.9–1.1)
PROTHROMBIN TIME: 28.8 SEC (ref 11.9–14.6)

## 2023-02-07 PROCEDURE — 74011250637 HC RX REV CODE- 250/637: Performed by: HOSPITALIST

## 2023-02-07 PROCEDURE — 85610 PROTHROMBIN TIME: CPT

## 2023-02-07 PROCEDURE — 36415 COLL VENOUS BLD VENIPUNCTURE: CPT

## 2023-02-07 PROCEDURE — 74011250636 HC RX REV CODE- 250/636: Performed by: HOSPITALIST

## 2023-02-07 PROCEDURE — 74011250637 HC RX REV CODE- 250/637: Performed by: INTERNAL MEDICINE

## 2023-02-07 RX ORDER — WARFARIN 7.5 MG/1
3.75 TABLET ORAL DAILY
Qty: 15 TABLET | Refills: 0 | Status: SHIPPED | OUTPATIENT
Start: 2023-02-07

## 2023-02-07 RX ORDER — WARFARIN 7.5 MG/1
7.5 TABLET ORAL DAILY
Status: ON HOLD | COMMUNITY
End: 2023-02-07 | Stop reason: SDUPTHER

## 2023-02-07 RX ORDER — DOCUSATE SODIUM 100 MG/1
100 CAPSULE, LIQUID FILLED ORAL 2 TIMES DAILY
Qty: 60 CAPSULE | Refills: 2 | Status: SHIPPED | OUTPATIENT
Start: 2023-02-07 | End: 2023-05-08

## 2023-02-07 RX ORDER — WARFARIN SODIUM 5 MG/1
5 TABLET ORAL DAILY
Qty: 30 TABLET | Refills: 0 | Status: SHIPPED | OUTPATIENT
Start: 2023-02-07

## 2023-02-07 RX ADMIN — SACUBITRIL AND VALSARTAN 2 TABLET: 24; 26 TABLET, FILM COATED ORAL at 08:58

## 2023-02-07 RX ADMIN — FUROSEMIDE 20 MG: 20 TABLET ORAL at 08:59

## 2023-02-07 RX ADMIN — POTASSIUM CHLORIDE 10 MEQ: 750 TABLET, EXTENDED RELEASE ORAL at 08:58

## 2023-02-07 RX ADMIN — AMIODARONE HYDROCHLORIDE 200 MG: 200 TABLET ORAL at 08:58

## 2023-02-07 RX ADMIN — METOPROLOL SUCCINATE 25 MG: 25 TABLET, EXTENDED RELEASE ORAL at 12:03

## 2023-02-07 RX ADMIN — GABAPENTIN 300 MG: 300 CAPSULE ORAL at 08:59

## 2023-02-07 RX ADMIN — ENOXAPARIN SODIUM 70 MG: 100 INJECTION SUBCUTANEOUS at 00:58

## 2023-02-07 RX ADMIN — DOCUSATE SODIUM 100 MG: 100 CAPSULE, LIQUID FILLED ORAL at 08:58

## 2023-02-07 NOTE — PROGRESS NOTES
Problem: Patient Education: Go to Patient Education Activity  Goal: Patient/Family Education  2/7/2023 1149 by Carlos Shaw, RN  Outcome: Resolved/Met  2/7/2023 1104 by Carlos Shaw, RN  Outcome: Progressing Towards Goal     Problem: General Medical Care Plan  Goal: *Vital signs within specified parameters  2/7/2023 1149 by Carlos Shaw, RN  Outcome: Resolved/Met  2/7/2023 1104 by Carlos Shaw, RN  Outcome: Progressing Towards Goal  Goal: *Labs within defined limits  2/7/2023 1149 by Carlos Shaw, RN  Outcome: Resolved/Met  2/7/2023 1104 by Carlos Shaw, RN  Outcome: Progressing Towards Goal  Goal: *Absence of infection signs and symptoms  2/7/2023 1149 by Carlos Shaw, RN  Outcome: Resolved/Met  2/7/2023 1104 by Carlos Shaw, RN  Outcome: Progressing Towards Goal  Goal: *Optimal pain control at patient's stated goal  2/7/2023 1149 by Carlos Shaw, RN  Outcome: Resolved/Met  2/7/2023 1104 by Carlos Shaw RN  Outcome: Progressing Towards Goal  Goal: *Skin integrity maintained  2/7/2023 1149 by Carlos Shaw, RN  Outcome: Resolved/Met  2/7/2023 1104 by Carlos Shaw RN  Outcome: Progressing Towards Goal  Goal: *Fluid volume balance  2/7/2023 1149 by Carlos Shaw, RN  Outcome: Resolved/Met  2/7/2023 1104 by Carlos Shaw RN  Outcome: Progressing Towards Goal  Goal: *Optimize nutritional status  2/7/2023 1149 by Carlos Shaw, RN  Outcome: Resolved/Met  2/7/2023 1104 by Carlos Shaw, RN  Outcome: Progressing Towards Goal  Goal: *Anxiety reduced or absent  2/7/2023 1149 by Carlos Shaw, RN  Outcome: Resolved/Met  2/7/2023 1104 by Carlos Shaw, RN  Outcome: Progressing Towards Goal  Goal: *Progressive mobility and function (eg: ADL's)  2/7/2023 1149 by Carlos Shaw, RN  Outcome: Resolved/Met  2/7/20232023 1104 by Carlos Shaw, RN  Outcome: Progressing Towards Goal     Problem: Patient Education: Go to Patient Education Activity  Goal: Patient/Family Education  2/7/2023 1149 by Samara Soler, RN  Outcome: Resolved/Met  2/7/2023 1104 by Samara Soler, RN  Outcome: Progressing Towards Goal

## 2023-02-07 NOTE — DISCHARGE SUMMARY
Physician Discharge Summary       Patient: Denisa Garza MRN: [id number]     YOB: 1940  Age: [de-identified] y.o. Sex: male    PCP: Artur Ang NP    Allergies: Patient has no known allergies. Admit date: 2/2/2023  Admitting Provider: Marija Villarreal MD    Discharge date: 2/7/2023  Discharging Provider: Vernon Mercado MD    * Admission Diagnoses:   Coagulopathy [facility]) [D68.9]      * Discharge Diagnoses:    Hospital Problems as of 2/7/2023 Never Reviewed            Codes Class Noted - Resolved POA    JUSTIN (acute kidney injury) ([facility] [address] ICD-10-CM: N17.9  ICD-9-CM: 584.9  2/4/2023 - Present Unknown        CKD (chronic kidney disease) ICD-10-CM: N18.9  ICD-9-CM: 585.9  2/4/2023 - Present Unknown        * (Principal) Coagulopathy ([facility] [address] ICD-10-CM: D68.9  ICD-9-CM: 286.9  12/6/2021 - Present Unknown             * Hospital Course: As per admitting provider on 2/3: Denisa Garza is a [de-identified] y.o. male with history of paroxysmal atrial fibrillation, aortic mechanical valve replacement admitted last night due to supratherapeutic INR of 9.0. Patient has reportedly been on Coumadin for over 10 years but recent outpatient lab draw to showed elevated INR and therefore patient advised to come to the hospital.  No evidence of GI bleed, bruising headaches or visual problems. Apparently receive vitamin K in the ER and was admitted overnight. Repeat INR pending. Denies chest pain or shortness of breath. No cough or productive sputum. Does not appear to be in fluid overload. Supratherapeutic INR  -Was last admitted in early January with similar elevated INR of 9.6 and at that time was given 5 mg of oral vitamin K but then on following day INR was down to 2  -Patient on discharge was placed on 5 mg daily dosing Coumadin where previously was on increased dosing of 7.5 mg on Sunday and Wednesday  -Patient on Coumadin secondary to mechanical mitral valve as well as A.  Fib  - goal INR is 2.5 to 3.5 range   - INR monitored daily   - vitamin K 5mg sq x 1 dose given in ED as previously seen on 1/31 with similar INR > 9.6  -Also noted to have recent start of amiodarone 200 mg and initially took 4 tablets daily for about a week and then currently is on 200 mg daily  -Pharmacy also evaluate medications and noted that Lopid can also lead to interaction and cause elevated INR  -No obvious bleeding noted since admission and H&H is stable at baseline  -INR decreased drastically from greater than 9.6 down to 6.2 on 2/3 and then down to 1.8 on 2/4  -2/4: Because of INR 1.8 we will start Lovenox bridging with 1 mg/kg subcu twice daily and will give warfarin 7.5 mg oral dose x1 tonight  - 2/5: INR still low at 1.7, will give 5mg home dosing and will need to monitor and wait for Vitamin K effect   - 2/6: inr up to 2. Continue same dosing of 5mg   - 2/7: patient INR increased to 2.9 and discussed with patient and wife at bedside about no need for lovenox shots at home and patient states he has both 5mg and 7.5mg tablets at home and with increaese in INR to 2.9 will have patient take 1/2 tablet of 7.5mg to start on 2/8 and have INR checked this Thursday at pcp office and again on Monday and Thursday every week until stead state is reached. Chronic HFrEF  - appears euvolemic and will continue home medications of metoprolol succinate 25 mg daily and Lasix 20 mg daily and Entresto  -Last echo on 1/6/2023 shows an EF of 5 to 10% with grade 2 diastolic dysfunction but states previously about 1 year ago was at 45%. -Monitor daily weight and strict I's and O's  -Fluid restriction of 1.2 L/day  - follow up as scheduled on 2/17 with Dr. Margarito Yin to evaluate amiodarone dosing as well as warfarin dosing with INR check      Paroxysmal A.  Fib  - follows with Dr. Margarito Yin MD cardiologist   -Was previously on long-term Tikosyn therapy and patient was told that only controlling rhythm 86% so was discontinued about 2 months ago  -Recently started on amiodarone 200 mg with initial bolus of 4 tablets daily for 1 week  -Continue metoprolol succinate 25 mg daily  -Monitor electrolytes keep potassium above 4 and magnesium above 2  -Monitor closely on telemetry  - will discuss with patient electrophysciogist Dr. Sugar Srinivasan about amiodarone and plan for possible cardioversion ? Constipation   - no bowel movement for at least 5 days  - colace bid   - lacutlose 20gm oral x 1 and had results with good bowel movement   - continue colace bid      Hypokalemia  -Below goal currently is at 3.7  -Supplement with 20 meq oral KCl daily  - repeat on 2/5 is stable at 4.1        Generalized Weakness  - increased ambulatory dysfunction at home  -PT/OT evaluation appreciated  -Out of bed to chair with meals  - noted to be ambulating halls with walker, continue to encourage with fall precautions      Hyperlipidemia  -Was noted to be on Lopid 600 mg oral twice daily was noted by pharmacy to have interaction also with Coumadin so we will hold for now  -Cholesterol well controlled with total at 98 and LDL of 46.4 and triglycerides of 58  - on discharge will continue to hold lopid secondary to interaction with warfarin and increasing INR      * Procedures:   * No surgery found *        Consults:   none    Vitals Last 24 Hours:  Patient Vitals for the past 24 hrs:   Temp Pulse Resp BP SpO2   02/07/23 0739 97.1 °F (36.2 °C) 78 18 108/76 97 %   02/07/23 0351 97.3 °F (36.3 °C) 77 18 106/72 95 %   02/06/23 2335 97.5 °F (36.4 °C) 77 18 110/79 94 %   02/06/23 1934 97.5 °F (36.4 °C) 80 18 104/75 --   02/06/23 1620 97.4 °F (36.3 °C) 74 18 113/82 98 %   02/06/23 1221 97.5 °F (36.4 °C) 75 18 110/74 97 %        Discharge Exam:  General: Alert, cooperative, no distress, appears stated age. Head:   Normocephalic, without obvious abnormality, atraumatic. Eyes:   Conjunctivae/corneas clear. Pupils equal, round, reactive to light. Extraocular movements intact.   Lungs:  Clear to auscultation bilaterally. no wheeze, rales, crackles, rhonchi   Chest wall: No tenderness or deformity. Heart: normal rate irregular rhythm, S1, S2 normal, no murmur, click, rub or gallop. Abdomen:  Soft, non-tender. Bowel sounds normal. No masses,  No organomegaly. Extremities: Extremities normal, atraumatic, no cyanosis or  + edema , left upper extremity edema   Pulses: 2+ and symmetric all extremities. Skin: Skin color, texture, turgor normal. No rashes or lesions  Neurologic: Awake, Alert, oriented. No obvious gross sensory or motor deficits       Labs:  Recent Results (from the past 24 hour(s))   PROTHROMBIN TIME + INR    Collection Time: 02/07/23  5:32 AM   Result Value Ref Range    Prothrombin time 28.8 (H) 11.9 - 14.6 sec    INR 2.9 (H) 0.9 - 1.1           Imaging:  No results found. * Discharge Condition: improved  * Disposition: Home w/Family      Discharge Medications:  Current Discharge Medication List        START taking these medications    Details   docusate sodium (COLACE) 100 mg capsule Take 1 Capsule by mouth two (2) times a day for 90 days. Qty: 60 Capsule, Refills: 2  Start date: 2/7/2023, End date: 5/8/2023           CONTINUE these medications which have CHANGED    Details   !! warfarin (COUMADIN) 5 mg tablet Take 1 Tablet by mouth daily. Indications: blood clot caused by artificial heart valve, To alternate with 7.5 mg as per cardiology recommendation  Qty: 30 Tablet, Refills: 0  Start date: 2/7/2023      !! warfarin (COUMADIN) 7.5 mg tablet Take 0.5 Tablets by mouth daily. Was taking on Sunday and Wednesday per week  Qty: 15 Tablet, Refills: 0  Start date: 2/7/2023       !! - Potential duplicate medications found. Please discuss with provider. CONTINUE these medications which have NOT CHANGED    Details   amiodarone (CORDARONE) 200 mg tablet Take 200 mg by mouth daily. magnesium chloride (Slow-Mag) 71.5 mg tablet Take 143 mg by mouth daily.       gabapentin (NEURONTIN) 300 mg capsule Take 300 mg by mouth two (2) times a day. furosemide (LASIX) 20 mg tablet Take 20 mg by mouth daily. potassium chloride SR (KLOR-CON 10) 10 mEq tablet Take 10 mEq by mouth daily. metoprolol succinate (TOPROL-XL) 25 mg XL tablet Take 25 mg by mouth daily. sacubitriL-valsartan (Entresto) 49-51 mg tab tablet Take 1 Tablet by mouth daily. STOP taking these medications       gemfibroziL (LOPID) 600 mg tablet Comments:   Reason for Stopping:         dofetilide (TIKOSYN) 250 mcg capsule Comments:   Reason for Stopping:                 * Follow-up Care/Patient Instructions: Activity: Activity as tolerated  Diet: Cardiac Diet with 1.2 L fluid restriction       Follow-up Information       Follow up With Specialties Details Why Contact Info    Mariel Mccollum NP Nurse Practitioner Follow up on 2/8/2023 @ 9:00 [address]      Alexandria Webber MD Cardiovascular Disease Physician       Dr. Rajani Yanez Cardiologist  Follow up on 2/15/2023 @ 10:45 [facility]  [facility] [facility] [address] [facility], [address] [facility]  Phone # [phone]          Spent 40 minutes evaluting and coordinating patient care and discharge home of which >50% was spent coordinating and counseling.      Signed:  Carlos Gay MD  2/7/2023  11:10 AM

## 2023-02-07 NOTE — ROUTINE PROCESS
Bedside shift change report given to DAVE Mccauley LPN (oncoming nurse) by SARA Villa LPN (offgoing nurse). Report included the following information Kardex.

## 2023-02-07 NOTE — ROUTINE PROCESS
Had walked a lap around the hallway and back to room with aid of walker accompanied by wife. No c/o pain. Trace edema noted to left ankle.

## 2023-02-07 NOTE — ROUTINE PROCESS
Patient given all discharge education, both patient and wife verbalized understanding of all instructions.

## 2023-02-16 ENCOUNTER — HOSPITAL ENCOUNTER (EMERGENCY)
Age: 83
Discharge: HOME | End: 2023-02-16
Attending: EMERGENCY MEDICINE
Payer: MEDICARE

## 2023-02-16 ENCOUNTER — APPOINTMENT (OUTPATIENT)
Dept: GENERAL RADIOLOGY | Age: 83
End: 2023-02-16
Attending: EMERGENCY MEDICINE
Payer: MEDICARE

## 2023-02-16 VITALS
BODY MASS INDEX: 25.76 KG/M2 | SYSTOLIC BLOOD PRESSURE: 110 MMHG | OXYGEN SATURATION: 98 % | TEMPERATURE: 97.8 F | HEIGHT: 68 IN | WEIGHT: 170 LBS | RESPIRATION RATE: 18 BRPM | HEART RATE: 68 BPM | DIASTOLIC BLOOD PRESSURE: 83 MMHG

## 2023-02-16 DIAGNOSIS — J18.9 COMMUNITY ACQUIRED PNEUMONIA, UNSPECIFIED LATERALITY: Primary | ICD-10-CM

## 2023-02-16 PROCEDURE — 99283 EMERGENCY DEPT VISIT LOW MDM: CPT

## 2023-02-16 PROCEDURE — 71045 X-RAY EXAM CHEST 1 VIEW: CPT

## 2023-02-16 RX ORDER — DOXYCYCLINE HYCLATE 100 MG
100 TABLET ORAL 2 TIMES DAILY
Qty: 14 TABLET | Refills: 0 | Status: SHIPPED | OUTPATIENT
Start: 2023-02-16 | End: 2023-02-23

## 2023-02-16 NOTE — ED PROVIDER NOTES
[facility] EMERGENCY DEPT  EMERGENCY DEPARTMENT HISTORY AND PHYSICAL EXAM      Date: 2/16/2023  Patient Name: Lyle Peoples  MRN: [id number]  [place]: 1940  Date of evaluation: 2/16/2023  Provider: Jeanna Arechiga MD   Note Started: 4:37 PM 2/16/23    HISTORY OF PRESENT ILLNESS     Chief Complaint   Patient presents with    O2/Oxygen       History Provided By: Patient and EMS    HPI: Lyle Peoples, [de-identified] y.o. male was sent to the emergency department for evaluation from his pulmonologist office with concern for hypoxia. Patient has no complaints now and denies any complaints earlier but states his oxygen was taken and was found to be in the 80s. Nurse here notes his fingers were cold initially, after warming the patient was 98% on room air. PAST MEDICAL HISTORY   Past Medical History:  Past Medical History:   Diagnosis Date    A-fib ([address]     CHF (congestive heart failure) ([address]     last ef of 5-10%    H/O mitral valve replacement     with mechanical valve    Neuropathy        Past Surgical History:  Past Surgical History:   Procedure Laterality Date    HX HEART VALVE SURGERY      HX PACEMAKER         Family History:  No family history on file. Social History:  Social History     Tobacco Use    Smoking status: Former    Smokeless tobacco: Current    Tobacco comments:     chewing tobacco   Substance Use Topics    Alcohol use: Not Currently    Drug use: Never       Allergies:  No Known Allergies    PCP: Chester Jack NP    Current Meds:   Previous Medications    AMIODARONE (CORDARONE) 200 MG TABLET    Take 200 mg by mouth daily. DOCUSATE SODIUM (COLACE) 100 MG CAPSULE    Take 1 Capsule by mouth two (2) times a day for 90 days. FUROSEMIDE (LASIX) 20 MG TABLET    Take 20 mg by mouth daily. GABAPENTIN (NEURONTIN) 300 MG CAPSULE    Take 300 mg by mouth two (2) times a day. MAGNESIUM CHLORIDE (SLOW-MAG) 71.5 MG TABLET    Take 143 mg by mouth daily.     METOPROLOL SUCCINATE (TOPROL-XL) 25 MG XL TABLET    Take 25 mg by mouth daily. POTASSIUM CHLORIDE SR (KLOR-CON 10) 10 MEQ TABLET    Take 10 mEq by mouth daily. SACUBITRIL-VALSARTAN (ENTRESTO) 49-51 MG TAB TABLET    Take 1 Tablet by mouth daily. WARFARIN (COUMADIN) 5 MG TABLET    Take 1 Tablet by mouth daily. Indications: blood clot caused by artificial heart valve, To alternate with 7.5 mg as per cardiology recommendation    WARFARIN (COUMADIN) 7.5 MG TABLET    Take 0.5 Tablets by mouth daily. Was taking on Sunday and Wednesday per week       REVIEW OF SYSTEMS   Review of Systems  Positives and Pertinent negatives as per HPI. PHYSICAL EXAM     ED Triage Vitals [02/16/23 1623]   ED Encounter Vitals Group      /71      Pulse (Heart Rate) 81      Resp Rate 16      Temp 98.7 °F (37.1 °C)      Temp src       O2 Sat (%) 98 %      Weight 170 lb      Height 5' 8\"      Physical Exam  Physical Exam  Constitutional:       General: No acute distress. Appearance: Normal appearance. Not toxic-appearing. HENT:      Head: Normocephalic and atraumatic. Nose: Nose normal.      Mouth/Throat:      Mouth: Mucous membranes are moist.   Eyes:      Extraocular Movements: Extraocular movements intact. Pupils: Pupils are equal, round, and reactive to light. Cardiovascular:      Rate and Rhythm: Normal rate. Pulses: Normal pulses. Pulmonary:      Effort: Pulmonary effort is normal.      Breath sounds: No stridor, clear to auscultation bilaterally  Abdominal:      General: Abdomen is flat. There is no distension. Musculoskeletal:         General: Normal range of motion. Cervical back: Normal range of motion and neck supple. Skin:     General: Skin is warm and dry. Capillary Refill: Capillary refill takes less than 2 seconds. Neurological:      General: No focal deficit present. Mental Status: Alert and oriented to person, place, and time.    Psychiatric:         Mood and Affect: Mood normal.         Behavior: Behavior normal.     SCREENINGS                 Interpretation per the Radiologist below, if available at the time of this note:  XR CHEST PORT    Result Date: 2/16/2023  EXAM: XR CHEST PORT DATE: 2/16/2023 4:55 PM INDICATION: History of hypoxia COMPARISON: Chest January 5, 2023 FINDINGS: AP portable chest radiograph. Patient is status post sternotomy and valve repair. Implanted defibrillator is grossly stable in appearance. The heart size is enlarged but unchanged. Patchy airspace infiltrate is seen in the RIGHT lower lung zone. The LEFT lung is clear. There is trace blunting of the RIGHT costophrenic sulcus. No pneumothorax is seen. New RIGHT lower lung zone infiltrate and a trace right-sided effusion suspicious for infection. ED COURSE and DIFFERENTIAL DIAGNOSIS/MDM   Vitals:    Vitals:    02/16/23 1623 02/16/23 1706 02/16/23 1707   BP: 104/71  108/81   Pulse: 81  78   Resp: 16  18   Temp: 98.7 °F (37.1 °C)     SpO2: 98% 96% 94%   Weight: 77.1 kg (170 lb)     Height: 5' 8\" (1.727 m)          Patient was given the following medications:  Medications - No data to display    CONSULTS: (Who and What was discussed)  None     Chronic Conditions: Atrial fibrillation, CHF  Social Determinants affecting Dx or Tx: None  Counseling:      Records Reviewed (source and summary of external notes): Prior medical records and EMS run sheet    CC/HPI Summary, DDx, ED Course, and Reassessment: Patient without complaints, sent for concern for hypoxia, however he is found here to be not hypoxic. Likely related to issues with oxygen sensor. Discussed the same with patient and he agreed get screening chest x-ray. ED Course as of 02/16/23 1819   Thu Feb 16, 2023 1813 Discussed with patient and family present the findings concerning for pneumonia on x-ray. Patient walked to the bathroom and back without any shortness of breath or difficulty, 99% on monitor.   Patient states he feels better and wants be discharged home.  Understands need for close follow-up as well as return precautions. [CS]      ED Course User Index  [CS] Shar Vale MD       Disposition Considerations (Tests not done, Shared Decision Making, Pt Expectation of Test or Treatment.):  Patient not hypoxic in the ER on room air. Discussed findings as above and treatment options including admission but patient states he feels better at his baseline is wanting to go home. I note this to be agreeable as he has not been hypoxic during his 2 hours in the emergency department. Discussed need for close follow-up as well as return precautions of which he states understanding. FINAL IMPRESSION     1. Community acquired pneumonia, unspecified laterality          DISPOSITION/PLAN   Discharged    Discharge Note: The patient is stable for discharge home. The signs, symptoms, diagnosis, and discharge instructions have been discussed, understanding conveyed, and agreed upon. The patient is to follow up as recommended or return to ER should their symptoms worsen. PATIENT REFERRED TO:  Follow-up Information       Follow up With Specialties Details Why Contact Info    Clarence Horn NP Nurse Practitioner In 2 days  [address] [place] [address]  [phone]                DISCHARGE MEDICATIONS:  Current Discharge Medication List        START taking these medications    Details   doxycycline (VIBRA-TABS) 100 mg tablet Take 1 Tablet by mouth two (2) times a day for 7 days. Qty: 14 Tablet, Refills: 0  Start date: 2/16/2023, End date: 2/23/2023               DISCONTINUED MEDICATIONS:  Current Discharge Medication List          I am the Primary Clinician of Record: Soledad Jensen MD (electronically signed)    (Please note that parts of this dictation were completed with voice recognition software. Quite often unanticipated grammatical, syntax, homophones, and other interpretive errors are inadvertently transcribed by the computer software. Please disregards these errors.  Please excuse any errors that have escaped final proofreading.)

## 2023-02-16 NOTE — DISCHARGE INSTRUCTIONS
Thank you! Thank you for allowing me to care for you in the emergency department. It is my goal to provide you with excellent care. If you have not received excellent quality care, please ask to speak to the nurse manager. Please fill out the survey that will come to you by mail or email since we listen to your feedback! Below you will find a list of your tests from today's visit. Should you have any questions, please do not hesitate to call the emergency department. Labs  No results found for this or any previous visit (from the past 12 hour(s)). Radiologic Studies  XR CHEST PORT   Final Result      New RIGHT lower lung zone infiltrate and a trace right-sided effusion suspicious   for infection. CT Results  (Last 48 hours)      None          CXR Results  (Last 48 hours)                 02/16/23 1655  XR CHEST PORT Final result    Impression:      New RIGHT lower lung zone infiltrate and a trace right-sided effusion suspicious   for infection. Narrative:  EXAM: XR CHEST PORT       DATE: 2/16/2023 4:55 PM       INDICATION: History of hypoxia       COMPARISON: Chest January 5, 2023       FINDINGS: AP portable chest radiograph. Patient is status post sternotomy and   valve repair. Implanted defibrillator is grossly stable in appearance. The heart   size is enlarged but unchanged. Patchy airspace infiltrate is seen in the RIGHT   lower lung zone. The LEFT lung is clear. There is trace blunting of the RIGHT   costophrenic sulcus. No pneumothorax is seen. ------------------------------------------------------------------------------------------------------------  The exam and treatment you received in the Emergency Department were for an urgent problem and are not intended as complete care.  It is important that you follow-up with a doctor, nurse practitioner, or physician assistant to:  (1) confirm your diagnosis,  (2) re-evaluation of changes in your illness and treatment, and (3) for ongoing care. Please take your discharge instructions with you when you go to your follow-up appointment. If you have any problem arranging a follow-up appointment, contact the Emergency Department. If your symptoms become worse or you do not improve as expected and you are unable to reach your health care provider, please return to the Emergency Department. We are available 24 hours a day. If a prescription has been provided, please have it filled as soon as possible to prevent a delay in treatment. If you have any questions or reservations about taking the medication due to side effects or interactions with other medications, please call your primary care provider or contact the ER.

## 2023-02-27 ENCOUNTER — APPOINTMENT (OUTPATIENT)
Dept: CT IMAGING | Age: 83
End: 2023-02-27
Attending: EMERGENCY MEDICINE
Payer: MEDICARE

## 2023-02-27 ENCOUNTER — HOSPITAL ENCOUNTER (EMERGENCY)
Age: 83
Discharge: HOME | End: 2023-02-27
Attending: EMERGENCY MEDICINE
Payer: MEDICARE

## 2023-02-27 VITALS
WEIGHT: 170 LBS | SYSTOLIC BLOOD PRESSURE: 110 MMHG | RESPIRATION RATE: 20 BRPM | TEMPERATURE: 98 F | BODY MASS INDEX: 25.76 KG/M2 | DIASTOLIC BLOOD PRESSURE: 80 MMHG | HEIGHT: 68 IN | OXYGEN SATURATION: 100 % | HEART RATE: 77 BPM

## 2023-02-27 DIAGNOSIS — S09.90XA CLOSED HEAD INJURY, INITIAL ENCOUNTER: Primary | ICD-10-CM

## 2023-02-27 DIAGNOSIS — S00.81XA FACIAL ABRASION, INITIAL ENCOUNTER: ICD-10-CM

## 2023-02-27 DIAGNOSIS — S00.83XA CONTUSION OF FACE, INITIAL ENCOUNTER: ICD-10-CM

## 2023-02-27 DIAGNOSIS — T14.8XXA MULTIPLE SKIN TEARS: ICD-10-CM

## 2023-02-27 PROCEDURE — 70450 CT HEAD/BRAIN W/O DYE: CPT

## 2023-02-27 PROCEDURE — 99284 EMERGENCY DEPT VISIT MOD MDM: CPT

## 2023-02-27 NOTE — ED PROVIDER NOTES
[facility] EMERGENCY DEPT  EMERGENCY DEPARTMENT HISTORY AND PHYSICAL EXAM      Date: 2/27/2023  Patient Name: Karla Garcia  MRN: [id number]  [place]: 1940  Date of evaluation: 2/27/2023  Provider: Bhavana Riggs MD   Note Started: 5:30 PM 2/27/23    HISTORY OF PRESENT ILLNESS     Chief Complaint   Patient presents with    Fall       History Provided By: Patient    HPI: Karla Garcia, [de-identified] y.o. male with afib, on coumadin, CHF with mechanical GLF when his knee gave out resulting in left sided head trauma and some skins tears on left arm. He denies LOC, HA, arm pain, hip pain, NV. PAST MEDICAL HISTORY   Past Medical History:  Past Medical History:   Diagnosis Date    A-fib ([address]     CHF (congestive heart failure) ([address]     last ef of 5-10%    H/O mitral valve replacement     with mechanical valve    Neuropathy        Past Surgical History:  Past Surgical History:   Procedure Laterality Date    HX HEART VALVE SURGERY      HX PACEMAKER         Family History:  History reviewed. No pertinent family history. Social History:  Social History     Tobacco Use    Smoking status: Former    Smokeless tobacco: Current    Tobacco comments:     chewing tobacco   Substance Use Topics    Alcohol use: Not Currently    Drug use: Never       Allergies:  No Known Allergies    PCP: Valerio Quijano NP    Current Meds:   Previous Medications    AMIODARONE (CORDARONE) 200 MG TABLET    Take 200 mg by mouth daily. DOCUSATE SODIUM (COLACE) 100 MG CAPSULE    Take 1 Capsule by mouth two (2) times a day for 90 days. FUROSEMIDE (LASIX) 20 MG TABLET    Take 20 mg by mouth daily. GABAPENTIN (NEURONTIN) 300 MG CAPSULE    Take 300 mg by mouth two (2) times a day. MAGNESIUM CHLORIDE (SLOW-MAG) 71.5 MG TABLET    Take 143 mg by mouth daily. METOPROLOL SUCCINATE (TOPROL-XL) 25 MG XL TABLET    Take 25 mg by mouth daily. POTASSIUM CHLORIDE SR (KLOR-CON 10) 10 MEQ TABLET    Take 10 mEq by mouth daily.     SACUBITRIL-VALSARTAN (ENTRESTO) 49-51 MG TAB TABLET    Take 1 Tablet by mouth daily. WARFARIN (COUMADIN) 5 MG TABLET    Take 1 Tablet by mouth daily. Indications: blood clot caused by artificial heart valve, To alternate with 7.5 mg as per cardiology recommendation    WARFARIN (COUMADIN) 7.5 MG TABLET    Take 0.5 Tablets by mouth daily. Was taking on Sunday and Wednesday per week       REVIEW OF SYSTEMS   Review of Systems   Constitutional: Negative. Negative for chills and fever. HENT: Negative. Negative for congestion, rhinorrhea, sneezing and sore throat. Eyes: Negative. Negative for redness and visual disturbance. Respiratory: Negative. Negative for cough, shortness of breath and wheezing. Cardiovascular: Negative. Negative for chest pain and leg swelling. Gastrointestinal: Negative. Negative for abdominal pain, diarrhea, nausea and vomiting. Genitourinary: Negative. Negative for difficulty urinating, frequency and penile discharge. Musculoskeletal: Negative. Negative for arthralgias, back pain, myalgias and neck stiffness. Skin:  Positive for color change and wound. Negative for rash. Neurological: Negative. Negative for dizziness, syncope, weakness, numbness and headaches. Hematological:  Negative for adenopathy. Psychiatric/Behavioral: Negative. All other systems reviewed and are negative. Positives and Pertinent negatives as per HPI. PHYSICAL EXAM     ED Triage Vitals [02/27/23 1634]   ED Encounter Vitals Group      /80      Pulse (Heart Rate) 77      Resp Rate 20      Temp 98 °F (36.7 °C)      Temp src       O2 Sat (%) 96 %      Weight 170 lb      Height 5' 8\"      Physical Exam  Vitals and nursing note reviewed. Constitutional:       General: He is not in acute distress. Appearance: He is not toxic-appearing. HENT:      Head: Atraumatic. Eyes:      Extraocular Movements: Extraocular movements intact.       Conjunctiva/sclera: Conjunctivae normal.      Pupils: Pupils are equal, round, and reactive to light. Cardiovascular:      Rate and Rhythm: Normal rate and regular rhythm. Heart sounds: Normal heart sounds. Pulmonary:      Effort: Pulmonary effort is normal. No respiratory distress. Breath sounds: Normal breath sounds. No wheezing or rales. Chest:      Chest wall: No tenderness. Abdominal:      General: Bowel sounds are normal.      Palpations: Abdomen is soft. Tenderness: There is no abdominal tenderness. There is no guarding or rebound. Musculoskeletal:         General: No tenderness. Normal range of motion. Skin:     General: Skin is warm and dry. Findings: Bruising and lesion present. Comments: Multiple skin tears left forearm  Bruising left forearm    Abrasion left cheek   Neurological:      General: No focal deficit present. Mental Status: He is alert and oriented to person, place, and time. Mental status is at baseline. Cranial Nerves: No cranial nerve deficit. SCREENINGS               No data recorded      LAB, EKG AND DIAGNOSTIC RESULTS   Labs:  No results found for this or any previous visit (from the past 12 hour(s)). EKG: Initial EKG interpreted by me. Shows     Radiologic Studies:  Non-plain film images such as CT, Ultrasound and MRI are read by the radiologist. Plain radiographic images are visualized and preliminarily interpreted by the ED Provider with the below findings:    *    Interpretation per the Radiologist below, if available at the time of this note:  CT HEAD WO CONT    Result Date: 2/27/2023  EXAM: Head CT without Contrast: COMPARISON: 8/23/2014. TECHNIQUE: Unenhanced CT Head is performed. CT dose reduction was achieved through use of a standardized protocol tailored for this examination and automatic exposure control for dose modulation.  INDICATION:  fall head injury FINDINGS: There is no apparent acute infarct or mass, and no bleed, shift, obstructive hydrocephalus or significant extra-axial fluid collection. Cerebral white matter hypodensity is present, nonspecific, but favoring chronic microangiopathy. There is stable calcification in the right frontal lobe white matter. Bone windows are unremarkable. No acute intracranial finding or significant change. Chronic microangiopathy. PROCEDURES   Unless otherwise noted below, none. Performed by: Faviola Ryan MD   Procedures      CRITICAL CARE TIME     ED COURSE and DIFFERENTIAL DIAGNOSIS/MDM   Vitals:    Vitals:    02/27/23 1634   BP: 110/80   Pulse: 77   Resp: 20   Temp: 98 °F (36.7 °C)   SpO2: 96%   Weight: 77.1 kg (170 lb)   Height: 5' 8\" (1.727 m)        Patient was given the following medications:  Medications - No data to display    CONSULTS: (Who and What was discussed)  None     Chronic Conditions: Chronic anti-coagulation  Social Determinants affecting Dx or Tx: None     Records Reviewed (source and summary of external notes): None    CC/HPI Summary, DDx, ED Course, and Reassessment: Pt had mechanical GLF with CHI but is on coumadin so will need Head CT despite normal neurologic exam. Will clean wounds/skins tears. None are suturable. CHI, SDH, Concussion, Contusions, Abrasions, Skin tears. Disposition Considerations (Tests not done, Shared Decision Making, Pt Expectation of Test or Treatment.): Discussed return precautions given anticoagulation. Family and pt endorsed understanding. FINAL IMPRESSION     1. Closed head injury, initial encounter    2. Facial abrasion, initial encounter    3. Contusion of face, initial encounter    4. Multiple skin tears          DISPOSITION/PLAN       Discharge Note: The patient is stable for discharge home. The signs, symptoms, diagnosis, and discharge instructions have been discussed, understanding conveyed, and agreed upon. The patient is to follow up as recommended or return to ER should their symptoms worsen.       PATIENT REFERRED TO:  Follow-up Information       Follow up With Specialties Details Why Contact Rivas Edwards NP Nurse Practitioner In 2 days As needed [address] [place] [address]  [phone]      [facility] EMERGENCY DEPT Emergency Medicine  As needed, If symptoms worsen or you become confused, have worsening Headache, start vomiting or have changes in behavior please call 911 and return to ED. You are on a blood thinnner which can cause bleeding in your head.  [facility]  [phone]              DISCHARGE MEDICATIONS:  Current Discharge Medication List            DISCONTINUED MEDICATIONS:  Current Discharge Medication List          I am the Primary Clinician of Record: Slime Uriostegui MD (electronically signed)    (Please note that parts of this dictation were completed with voice recognition software. Quite often unanticipated grammatical, syntax, homophones, and other interpretive errors are inadvertently transcribed by the computer software. Please disregards these errors.  Please excuse any errors that have escaped final proofreading.)

## 2023-02-27 NOTE — ED TRIAGE NOTES
Pt reports his leg gave out on him while he was shopping at [employer]. Pt reports he did hit his head. Denies LOC denies neck pain. Skin tear to left hand. Multiple old skin tears and diffuse bruising in various stages of healing noted to left arm.

## 2023-03-03 NOTE — PROGRESS NOTES
Physician Progress Note      PATIENT:               Bin Hewitt  CSN #:                  [id number]  :                       1940  ADMIT DATE:       2023 2:27 PM  [facility] [place] DATE:        2023 1:18 PM  RESPONDING  PROVIDER #:        Watson Sanchez MD          QUERY TEXT:    Pt admitted with Coagulopathy. Pt noted to take Coumadin. If possible, please document the relationship, if any, between Coagulopathy and coumadin. The medical record reflects the following:  Risk Factors: [de-identified] y/o, mechanical mitral valve, A. fib, coumadin    Clinical Indicators:  * HPI:Patient has reportedly been on Coumadin for over 10 years but recent outpatient lab draw to showed elevated INR and therefore patient advised to come to the hospital  * INR >9.6  * Progress notes-DC Summary  -Patient on Coumadin secondary to mechanical mitral valve as well as A. 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     Query created by: Wilda Lesches on 3/1/2023 12:13 PM      Electronically signed by:  Watson Sanchez MD 3/3/2023 7:10 AM

## 2023-04-11 ENCOUNTER — APPOINTMENT (OUTPATIENT)
Dept: PHYSICAL THERAPY | Age: 83
End: 2023-04-11

## 2023-04-13 ENCOUNTER — HOSPITAL ENCOUNTER (INPATIENT)
Age: 83
LOS: 5 days | Discharge: HOME | DRG: 291 | End: 2023-04-18
Attending: EMERGENCY MEDICINE | Admitting: HOSPITALIST
Payer: MEDICARE

## 2023-04-13 ENCOUNTER — APPOINTMENT (OUTPATIENT)
Dept: CT IMAGING | Age: 83
DRG: 291 | End: 2023-04-13
Attending: EMERGENCY MEDICINE
Payer: MEDICARE

## 2023-04-13 ENCOUNTER — APPOINTMENT (OUTPATIENT)
Dept: GENERAL RADIOLOGY | Age: 83
DRG: 291 | End: 2023-04-13
Attending: EMERGENCY MEDICINE
Payer: MEDICARE

## 2023-04-13 DIAGNOSIS — J18.9 PNEUMONIA OF RIGHT LOWER LOBE DUE TO INFECTIOUS ORGANISM: Primary | ICD-10-CM

## 2023-04-13 PROBLEM — T14.8XXA MULTIPLE SKIN TEARS: Status: ACTIVE | Noted: 2023-04-13

## 2023-04-13 PROBLEM — Z95.2 H/O MECHANICAL AORTIC VALVE REPLACEMENT: Status: ACTIVE | Noted: 2023-04-13

## 2023-04-13 PROBLEM — I42.9 CARDIOMYOPATHY (HCC): Status: ACTIVE | Noted: 2023-04-13

## 2023-04-13 PROBLEM — R09.02 HYPOXIA: Status: ACTIVE | Noted: 2023-04-13

## 2023-04-13 PROBLEM — E87.20 LACTIC ACIDOSIS: Status: ACTIVE | Noted: 2023-04-13
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
## 2023-04-13 LAB
ALBUMIN SERPL-MCNC: 3 G/DL (ref 3.5–5)
ALBUMIN/GLOB SERPL: 0.9 (ref 1.1–2.2)
ALP SERPL-CCNC: 99 U/L (ref 45–117)
ALT SERPL-CCNC: 17 U/L (ref 12–78)
ANION GAP SERPL CALC-SCNC: 9 MMOL/L (ref 5–15)
APPEARANCE UR: CLEAR
AST SERPL W P-5'-P-CCNC: 20 U/L (ref 15–37)
BACTERIA URNS QL MICRO: ABNORMAL /HPF
BASOPHILS # BLD: 0 K/UL (ref 0–0.1)
BASOPHILS NFR BLD: 0 % (ref 0–1)
BILIRUB SERPL-MCNC: 1 MG/DL (ref 0.2–1)
BILIRUB UR QL: NEGATIVE
BNP SERPL-MCNC: ABNORMAL PG/ML
BUN SERPL-MCNC: 28 MG/DL (ref 6–20)
BUN/CREAT SERPL: 16 (ref 12–20)
CA-I BLD-MCNC: 8.3 MG/DL (ref 8.5–10.1)
CHLORIDE SERPL-SCNC: 105 MMOL/L (ref 97–108)
CO2 SERPL-SCNC: 24 MMOL/L (ref 21–32)
COLOR UR: ABNORMAL
CREAT SERPL-MCNC: 1.7 MG/DL (ref 0.7–1.3)
DIFFERENTIAL METHOD BLD: ABNORMAL
EOSINOPHIL # BLD: 0 K/UL (ref 0–0.4)
EOSINOPHIL NFR BLD: 0 % (ref 0–7)
ERYTHROCYTE [DISTWIDTH] IN BLOOD BY AUTOMATED COUNT: 22 % (ref 11.5–14.5)
GLOBULIN SER CALC-MCNC: 3.2 G/DL (ref 2–4)
GLUCOSE SERPL-MCNC: 101 MG/DL (ref 65–100)
GLUCOSE UR STRIP.AUTO-MCNC: NEGATIVE MG/DL
HCT VFR BLD AUTO: 45.5 % (ref 36.6–50.3)
HGB BLD-MCNC: 15.3 G/DL (ref 12.1–17)
HGB UR QL STRIP: NEGATIVE
IMM GRANULOCYTES # BLD AUTO: 0 K/UL (ref 0–0.04)
IMM GRANULOCYTES NFR BLD AUTO: 1 % (ref 0–0.5)
INR PPP: 1.6 (ref 0.9–1.1)
KETONES UR QL STRIP.AUTO: NEGATIVE MG/DL
LACTATE SERPL-SCNC: 3 MMOL/L (ref 0.4–2)
LACTATE SERPL-SCNC: 3 MMOL/L (ref 0.4–2)
LEUKOCYTE ESTERASE UR QL STRIP.AUTO: NEGATIVE
LYMPHOCYTES # BLD: 0.5 K/UL (ref 0.8–3.5)
LYMPHOCYTES NFR BLD: 7 % (ref 12–49)
MAGNESIUM SERPL-MCNC: 2.6 MG/DL (ref 1.6–2.4)
MCH RBC QN AUTO: 31.8 PG (ref 26–34)
MCHC RBC AUTO-ENTMCNC: 33.6 G/DL (ref 30–36.5)
MCV RBC AUTO: 94.6 FL (ref 80–99)
MONOCYTES # BLD: 0.6 K/UL (ref 0–1)
MONOCYTES NFR BLD: 7 % (ref 5–13)
NEUTS SEG # BLD: 6.9 K/UL (ref 1.8–8)
NEUTS SEG NFR BLD: 85 % (ref 32–75)
NITRITE UR QL STRIP.AUTO: NEGATIVE
NRBC # BLD: 0.02 K/UL (ref 0–0.01)
NRBC BLD-RTO: 0.2 PER 100 WBC
PH UR STRIP: 6 (ref 5–8)
PLATELET # BLD AUTO: 269 K/UL (ref 150–400)
PMV BLD AUTO: 11.3 FL (ref 8.9–12.9)
POTASSIUM SERPL-SCNC: 3.2 MMOL/L (ref 3.5–5.1)
PROT SERPL-MCNC: 6.2 G/DL (ref 6.4–8.2)
PROT UR STRIP-MCNC: ABNORMAL MG/DL
PROTHROMBIN TIME: 18.3 SEC (ref 11.9–14.6)
RBC # BLD AUTO: 4.81 M/UL (ref 4.1–5.7)
RBC #/AREA URNS HPF: ABNORMAL /HPF (ref 0–3)
SODIUM SERPL-SCNC: 138 MMOL/L (ref 136–145)
SP GR UR REFRACTOMETRY: 1.02 (ref 1–1.03)
TROPONIN I SERPL HS-MCNC: 51 NG/L (ref 0–76)
TROPONIN I SERPL HS-MCNC: 62 NG/L (ref 0–76)
UA: UC IF INDICATED,UAUC: ABNORMAL
UROBILINOGEN UR QL STRIP.AUTO: 2 EU/DL (ref 0.2–1)
WBC # BLD AUTO: 8.1 K/UL (ref 4.1–11.1)
WBC URNS QL MICRO: ABNORMAL /HPF (ref 0–5)

## 2023-04-13 PROCEDURE — 74011250637 HC RX REV CODE- 250/637: Performed by: HOSPITALIST

## 2023-04-13 PROCEDURE — 87040 BLOOD CULTURE FOR BACTERIA: CPT

## 2023-04-13 PROCEDURE — 74011000258 HC RX REV CODE- 258: Performed by: HOSPITALIST

## 2023-04-13 PROCEDURE — 85025 COMPLETE CBC W/AUTO DIFF WBC: CPT

## 2023-04-13 PROCEDURE — 84484 ASSAY OF TROPONIN QUANT: CPT

## 2023-04-13 PROCEDURE — 96374 THER/PROPH/DIAG INJ IV PUSH: CPT

## 2023-04-13 PROCEDURE — 93005 ELECTROCARDIOGRAM TRACING: CPT

## 2023-04-13 PROCEDURE — 83735 ASSAY OF MAGNESIUM: CPT

## 2023-04-13 PROCEDURE — 65270000029 HC RM PRIVATE

## 2023-04-13 PROCEDURE — 83880 ASSAY OF NATRIURETIC PEPTIDE: CPT

## 2023-04-13 PROCEDURE — 70450 CT HEAD/BRAIN W/O DYE: CPT

## 2023-04-13 PROCEDURE — 85610 PROTHROMBIN TIME: CPT

## 2023-04-13 PROCEDURE — 74011250636 HC RX REV CODE- 250/636: Performed by: HOSPITALIST

## 2023-04-13 PROCEDURE — 99285 EMERGENCY DEPT VISIT HI MDM: CPT

## 2023-04-13 PROCEDURE — 74011250636 HC RX REV CODE- 250/636: Performed by: EMERGENCY MEDICINE

## 2023-04-13 PROCEDURE — 74011000250 HC RX REV CODE- 250: Performed by: HOSPITALIST

## 2023-04-13 PROCEDURE — 81001 URINALYSIS AUTO W/SCOPE: CPT

## 2023-04-13 PROCEDURE — 80053 COMPREHEN METABOLIC PANEL: CPT

## 2023-04-13 PROCEDURE — 83605 ASSAY OF LACTIC ACID: CPT

## 2023-04-13 PROCEDURE — 71045 X-RAY EXAM CHEST 1 VIEW: CPT

## 2023-04-13 PROCEDURE — 74011000258 HC RX REV CODE- 258: Performed by: EMERGENCY MEDICINE

## 2023-04-13 RX ORDER — FUROSEMIDE 40 MG/1
40 TABLET ORAL DAILY
Status: CANCELLED | OUTPATIENT
Start: 2023-04-14

## 2023-04-13 RX ORDER — SODIUM CHLORIDE 0.9 % (FLUSH) 0.9 %
5-40 SYRINGE (ML) INJECTION AS NEEDED
Status: DISCONTINUED | OUTPATIENT
Start: 2023-04-13 | End: 2023-04-18 | Stop reason: HOSPADM

## 2023-04-13 RX ORDER — GABAPENTIN 300 MG/1
300 CAPSULE ORAL 2 TIMES DAILY
Status: DISCONTINUED | OUTPATIENT
Start: 2023-04-13 | End: 2023-04-15

## 2023-04-13 RX ORDER — METOPROLOL SUCCINATE 25 MG/1
25 TABLET, EXTENDED RELEASE ORAL DAILY
Status: DISCONTINUED | OUTPATIENT
Start: 2023-04-14 | End: 2023-04-18 | Stop reason: HOSPADM

## 2023-04-13 RX ORDER — FUROSEMIDE 40 MG/1
40 TABLET ORAL DAILY
COMMUNITY
Start: 2023-03-16 | End: 2023-04-25

## 2023-04-13 RX ORDER — ACETAMINOPHEN 650 MG/1
650 SUPPOSITORY RECTAL
Status: DISCONTINUED | OUTPATIENT
Start: 2023-04-13 | End: 2023-04-18 | Stop reason: HOSPADM

## 2023-04-13 RX ORDER — ACETAMINOPHEN 325 MG/1
650 TABLET ORAL
Status: DISCONTINUED | OUTPATIENT
Start: 2023-04-13 | End: 2023-04-18 | Stop reason: HOSPADM

## 2023-04-13 RX ORDER — ONDANSETRON 4 MG/1
4 TABLET, ORALLY DISINTEGRATING ORAL
Status: DISCONTINUED | OUTPATIENT
Start: 2023-04-13 | End: 2023-04-18 | Stop reason: HOSPADM

## 2023-04-13 RX ORDER — LEVOTHYROXINE SODIUM 25 UG/1
25 TABLET ORAL DAILY
COMMUNITY
Start: 2023-03-27

## 2023-04-13 RX ORDER — SODIUM CHLORIDE 0.9 % (FLUSH) 0.9 %
5-40 SYRINGE (ML) INJECTION EVERY 8 HOURS
Status: DISCONTINUED | OUTPATIENT
Start: 2023-04-13 | End: 2023-04-18 | Stop reason: HOSPADM

## 2023-04-13 RX ORDER — POLYETHYLENE GLYCOL 3350 17 G/17G
17 POWDER, FOR SOLUTION ORAL DAILY PRN
Status: DISCONTINUED | OUTPATIENT
Start: 2023-04-13 | End: 2023-04-18 | Stop reason: HOSPADM

## 2023-04-13 RX ORDER — LEVOTHYROXINE SODIUM 50 UG/1
25 TABLET ORAL DAILY
Status: DISCONTINUED | OUTPATIENT
Start: 2023-04-14 | End: 2023-04-16

## 2023-04-13 RX ORDER — GEMFIBROZIL 600 MG/1
600 TABLET, FILM COATED ORAL 2 TIMES DAILY
COMMUNITY

## 2023-04-13 RX ORDER — AMIODARONE HYDROCHLORIDE 200 MG/1
200 TABLET ORAL DAILY
Status: DISCONTINUED | OUTPATIENT
Start: 2023-04-14 | End: 2023-04-13

## 2023-04-13 RX ORDER — ENOXAPARIN SODIUM 100 MG/ML
1 INJECTION SUBCUTANEOUS EVERY 12 HOURS
Status: DISCONTINUED | OUTPATIENT
Start: 2023-04-13 | End: 2023-04-17

## 2023-04-13 RX ORDER — FUROSEMIDE 10 MG/ML
20 INJECTION INTRAMUSCULAR; INTRAVENOUS 2 TIMES DAILY WITH MEALS
Status: DISCONTINUED | OUTPATIENT
Start: 2023-04-13 | End: 2023-04-14

## 2023-04-13 RX ORDER — CETIRIZINE HYDROCHLORIDE 10 MG/1
10 TABLET ORAL EVERY EVENING
Status: DISCONTINUED | OUTPATIENT
Start: 2023-04-13 | End: 2023-04-18 | Stop reason: HOSPADM

## 2023-04-13 RX ORDER — POTASSIUM CHLORIDE 20 MEQ/1
20 TABLET, EXTENDED RELEASE ORAL 2 TIMES DAILY
Status: DISCONTINUED | OUTPATIENT
Start: 2023-04-13 | End: 2023-04-13

## 2023-04-13 RX ORDER — ONDANSETRON 2 MG/ML
4 INJECTION INTRAMUSCULAR; INTRAVENOUS
Status: DISCONTINUED | OUTPATIENT
Start: 2023-04-13 | End: 2023-04-18 | Stop reason: HOSPADM

## 2023-04-13 RX ORDER — FUROSEMIDE 10 MG/ML
20 INJECTION INTRAMUSCULAR; INTRAVENOUS 2 TIMES DAILY
Status: DISCONTINUED | OUTPATIENT
Start: 2023-04-13 | End: 2023-04-13

## 2023-04-13 RX ORDER — FUROSEMIDE 40 MG/1
40 TABLET ORAL DAILY
Status: DISCONTINUED | OUTPATIENT
Start: 2023-04-14 | End: 2023-04-14

## 2023-04-13 RX ORDER — WARFARIN 7.5 MG/1
7.5 TABLET ORAL DAILY
Status: COMPLETED | OUTPATIENT
Start: 2023-04-13 | End: 2023-04-13

## 2023-04-13 RX ORDER — DOCUSATE SODIUM 100 MG/1
100 CAPSULE, LIQUID FILLED ORAL 2 TIMES DAILY
Status: DISCONTINUED | OUTPATIENT
Start: 2023-04-13 | End: 2023-04-18 | Stop reason: HOSPADM

## 2023-04-13 RX ORDER — GUAIFENESIN 600 MG/1
600 TABLET, EXTENDED RELEASE ORAL EVERY 12 HOURS
Status: DISCONTINUED | OUTPATIENT
Start: 2023-04-13 | End: 2023-04-18 | Stop reason: HOSPADM

## 2023-04-13 RX ORDER — POTASSIUM CHLORIDE 20 MEQ/1
40 TABLET, EXTENDED RELEASE ORAL 2 TIMES DAILY
Status: DISCONTINUED | OUTPATIENT
Start: 2023-04-13 | End: 2023-04-14

## 2023-04-13 RX ADMIN — PIPERACILLIN AND TAZOBACTAM 4.5 G: 4; .5 INJECTION, POWDER, FOR SOLUTION INTRAVENOUS at 13:49

## 2023-04-13 RX ADMIN — ENOXAPARIN SODIUM 80 MG: 100 INJECTION SUBCUTANEOUS at 19:41

## 2023-04-13 RX ADMIN — CETIRIZINE HYDROCHLORIDE 10 MG: 10 TABLET, FILM COATED ORAL at 18:20

## 2023-04-13 RX ADMIN — DOCUSATE SODIUM 100 MG: 100 CAPSULE, LIQUID FILLED ORAL at 20:46

## 2023-04-13 RX ADMIN — SACUBITRIL AND VALSARTAN 2 TABLET: 24; 26 TABLET, FILM COATED ORAL at 20:45

## 2023-04-13 RX ADMIN — FUROSEMIDE 20 MG: 10 INJECTION, SOLUTION INTRAMUSCULAR; INTRAVENOUS at 19:46

## 2023-04-13 RX ADMIN — WARFARIN SODIUM 7.5 MG: 7.5 TABLET ORAL at 18:19

## 2023-04-13 RX ADMIN — GUAIFENESIN 600 MG: 600 TABLET, EXTENDED RELEASE ORAL at 20:45

## 2023-04-13 RX ADMIN — SODIUM CHLORIDE, PRESERVATIVE FREE 10 ML: 5 INJECTION INTRAVENOUS at 21:28

## 2023-04-13 RX ADMIN — GABAPENTIN 300 MG: 300 CAPSULE ORAL at 20:45

## 2023-04-13 RX ADMIN — PIPERACILLIN AND TAZOBACTAM 3.38 G: 3; .375 INJECTION, POWDER, LYOPHILIZED, FOR SOLUTION INTRAVENOUS at 21:26

## 2023-04-13 RX ADMIN — POTASSIUM CHLORIDE 40 MEQ: 1500 TABLET, EXTENDED RELEASE ORAL at 20:45

## 2023-04-13 RX ADMIN — DOXYCYCLINE 100 MG: 100 INJECTION, POWDER, LYOPHILIZED, FOR SOLUTION INTRAVENOUS at 19:40

## 2023-04-13 RX ADMIN — SODIUM CHLORIDE, PRESERVATIVE FREE 5 ML: 5 INJECTION INTRAVENOUS at 17:31

## 2023-04-13 NOTE — ACP (ADVANCE CARE PLANNING)
Advance Care Planning   Healthcare Decision Maker:       Primary Decision Maker: Kathy Steel - [phone]    Click here to complete [address] including selection of the Healthcare Decision Maker Relationship (ie \"Primary\")

## 2023-04-13 NOTE — ED TRIAGE NOTES
Patient brought in by [address] [place] [address] rescue Patient was at home in the kitchen when he experienced a fall due to loosing  on his walker and his legs giving out patient denies LOC or hitting his head patient is taking blood thinners.   Patient presents with multiplke skin tears to left arm and right side of neck

## 2023-04-13 NOTE — PROGRESS NOTES
Care Management Interventions  PCP Verified by CM: Yes Geraldine Carter NP)  Last Visit to PCP: 04/06/23  Mode of Transport at Discharge: Other (see comment) (wife)  Transition of Care Consult (CM Consult): Home Health, Discharge Planning, DME/Supply [address]: No  Reason Outside [facility] [address]: Out of service area, Patient already serviced by other home care/hospice agency  Physical Therapy Consult: Yes  Occupational Therapy Consult: Yes  Speech Therapy Consult: Yes  Support Systems: Spouse/Significant Other, Other Family Member(s)  Confirm Follow Up Transport: Family  The Plan for Transition of Care is Related to the Following Treatment Goals : Patient lives in his own home with his wife. Wife is the primary caregiver. Patient uses a walker, but reports frequent falls. PT, OT, and ST consults have been ordered by the provider. Patient is not able to make it to the bathroom in time, so a BSC will be ordered. Wife reports that a Med Alert has been ordered, but it has not arrived yet. Patient has home health nursing services established with [facility] and wishes to continue same upon discharge. Discharge plan to be determined after therapy consults.   The Patient and/or Patient Representative was Provided with a Choice of Provider and Agrees with the Discharge Plan?: Yes  Freedom of Choice List was Provided with Basic Dialogue that Supports the Patient's Individualized Plan of Care/Goals, Treatment Preferences and Shares the Quality Data Associated with the Providers?: Yes  Discharge Location  Patient Expects to be Discharged to[de-identified] Unable to determine at this time

## 2023-04-13 NOTE — ROUTINE PROCESS
External catheter applied- patient has dry abrasion on inner right fourth finger- small dry abrasions on left lower and upper arms- patient has large abrasion on upper right outer arm weeping small amount of clear drainage- small abrasions on right lower arm that is not draining at this time- patient has scattered bruising on both arms- bruising on his face and chest.

## 2023-04-14 ENCOUNTER — APPOINTMENT (OUTPATIENT)
Dept: VASCULAR SURGERY | Age: 83
DRG: 291 | End: 2023-04-14
Attending: HOSPITALIST
Payer: MEDICARE

## 2023-04-14 LAB
ANION GAP SERPL CALC-SCNC: 12 MMOL/L (ref 5–15)
ATRIAL RATE: 82 BPM
BASOPHILS # BLD: 0 K/UL (ref 0–0.1)
BASOPHILS NFR BLD: 0 % (ref 0–1)
BUN SERPL-MCNC: 28 MG/DL (ref 6–20)
BUN/CREAT SERPL: 16 (ref 12–20)
CA-I BLD-MCNC: 8 MG/DL (ref 8.5–10.1)
CALCULATED P AXIS, ECG09: 0 DEGREES
CALCULATED R AXIS, ECG10: -17 DEGREES
CALCULATED T AXIS, ECG11: 149 DEGREES
CHLORIDE SERPL-SCNC: 107 MMOL/L (ref 97–108)
CO2 SERPL-SCNC: 22 MMOL/L (ref 21–32)
CREAT SERPL-MCNC: 1.72 MG/DL (ref 0.7–1.3)
DIAGNOSIS, 93000: NORMAL
DIFFERENTIAL METHOD BLD: ABNORMAL
ECHO LV EDV A2C: 179 ML
ECHO LV EDV A4C: 169 ML
ECHO LV EDV BP: 177 ML (ref 67–155)
ECHO LV EDV INDEX A4C: 88 ML/M2
ECHO LV EDV INDEX BP: 92 ML/M2
ECHO LV EDV NDEX A2C: 93 ML/M2
ECHO LV EJECTION FRACTION A2C: 15 %
ECHO LV EJECTION FRACTION A4C: 2 %
ECHO LV EJECTION FRACTION BIPLANE: 9 % (ref 55–100)
ECHO LV ESV A2C: 153 ML
ECHO LV ESV A4C: 167 ML
ECHO LV ESV BP: 161 ML (ref 22–58)
ECHO LV ESV INDEX A2C: 80 ML/M2
ECHO LV ESV INDEX A4C: 87 ML/M2
ECHO LV ESV INDEX BP: 84 ML/M2
ECHO LV FRACTIONAL SHORTENING: 4 % (ref 28–44)
ECHO LV INTERNAL DIMENSION DIASTOLE INDEX: 2.97 CM/M2
ECHO LV INTERNAL DIMENSION DIASTOLIC: 5.7 CM (ref 4.2–5.9)
ECHO LV INTERNAL DIMENSION SYSTOLIC INDEX: 2.86 CM/M2
ECHO LV INTERNAL DIMENSION SYSTOLIC: 5.5 CM
ECHO LV IVSD: 1 CM (ref 0.6–1)
ECHO LV MASS 2D: 226.4 G (ref 88–224)
ECHO LV MASS INDEX 2D: 117.9 G/M2 (ref 49–115)
ECHO LV POSTERIOR WALL DIASTOLIC: 1 CM (ref 0.6–1)
ECHO LV RELATIVE WALL THICKNESS RATIO: 0.35
EOSINOPHIL # BLD: 0 K/UL (ref 0–0.4)
EOSINOPHIL NFR BLD: 0 % (ref 0–7)
ERYTHROCYTE [DISTWIDTH] IN BLOOD BY AUTOMATED COUNT: 22.3 % (ref 11.5–14.5)
GLUCOSE SERPL-MCNC: 88 MG/DL (ref 65–100)
HCT VFR BLD AUTO: 45.4 % (ref 36.6–50.3)
HGB BLD-MCNC: 15.2 G/DL (ref 12.1–17)
IMM GRANULOCYTES # BLD AUTO: 0 K/UL (ref 0–0.04)
IMM GRANULOCYTES NFR BLD AUTO: 0 % (ref 0–0.5)
INR PPP: 1.8 (ref 0.9–1.1)
LACTATE SERPL-SCNC: 2.2 MMOL/L (ref 0.4–2)
LYMPHOCYTES # BLD: 0.6 K/UL (ref 0.8–3.5)
LYMPHOCYTES NFR BLD: 9 % (ref 12–49)
MCH RBC QN AUTO: 31.7 PG (ref 26–34)
MCHC RBC AUTO-ENTMCNC: 33.5 G/DL (ref 30–36.5)
MCV RBC AUTO: 94.8 FL (ref 80–99)
MONOCYTES # BLD: 0.6 K/UL (ref 0–1)
MONOCYTES NFR BLD: 9 % (ref 5–13)
NEUTS SEG # BLD: 5.5 K/UL (ref 1.8–8)
NEUTS SEG NFR BLD: 82 % (ref 32–75)
NRBC # BLD: 0.02 K/UL (ref 0–0.01)
NRBC BLD-RTO: 0.3 PER 100 WBC
P-R INTERVAL, ECG05: 189 MS
PLATELET # BLD AUTO: 239 K/UL (ref 150–400)
PMV BLD AUTO: 10.7 FL (ref 8.9–12.9)
POTASSIUM SERPL-SCNC: 3.9 MMOL/L (ref 3.5–5.1)
PROTHROMBIN TIME: 20.3 SEC (ref 11.9–14.6)
Q-T INTERVAL, ECG07: 469 MS
QRS DURATION, ECG06: 194 MS
QTC CALCULATION (BEZET), ECG08: 521 MS
RBC # BLD AUTO: 4.79 M/UL (ref 4.1–5.7)
SODIUM SERPL-SCNC: 141 MMOL/L (ref 136–145)
TSH SERPL DL<=0.05 MIU/L-ACNC: 4.63 UIU/ML (ref 0.36–3.74)
VENTRICULAR RATE, ECG03: 74 BPM
WBC # BLD AUTO: 6.8 K/UL (ref 4.1–11.1)

## 2023-04-14 PROCEDURE — 74011000250 HC RX REV CODE- 250: Performed by: HOSPITALIST

## 2023-04-14 PROCEDURE — 85025 COMPLETE CBC W/AUTO DIFF WBC: CPT

## 2023-04-14 PROCEDURE — 97161 PT EVAL LOW COMPLEX 20 MIN: CPT

## 2023-04-14 PROCEDURE — 94760 N-INVAS EAR/PLS OXIMETRY 1: CPT

## 2023-04-14 PROCEDURE — 74011000258 HC RX REV CODE- 258: Performed by: HOSPITALIST

## 2023-04-14 PROCEDURE — 51798 US URINE CAPACITY MEASURE: CPT

## 2023-04-14 PROCEDURE — 74011250637 HC RX REV CODE- 250/637: Performed by: HOSPITALIST

## 2023-04-14 PROCEDURE — 97165 OT EVAL LOW COMPLEX 30 MIN: CPT

## 2023-04-14 PROCEDURE — 65270000029 HC RM PRIVATE

## 2023-04-14 PROCEDURE — 74011250636 HC RX REV CODE- 250/636: Performed by: HOSPITALIST

## 2023-04-14 PROCEDURE — 80048 BASIC METABOLIC PNL TOTAL CA: CPT

## 2023-04-14 PROCEDURE — 36415 COLL VENOUS BLD VENIPUNCTURE: CPT

## 2023-04-14 PROCEDURE — 92610 EVALUATE SWALLOWING FUNCTION: CPT

## 2023-04-14 PROCEDURE — 77010033678 HC OXYGEN DAILY

## 2023-04-14 PROCEDURE — 83605 ASSAY OF LACTIC ACID: CPT

## 2023-04-14 PROCEDURE — 85610 PROTHROMBIN TIME: CPT

## 2023-04-14 PROCEDURE — 84443 ASSAY THYROID STIM HORMONE: CPT

## 2023-04-14 PROCEDURE — 93308 TTE F-UP OR LMTD: CPT

## 2023-04-14 RX ORDER — WARFARIN 6 MG/1
6 TABLET ORAL DAILY
Status: DISCONTINUED | OUTPATIENT
Start: 2023-04-14 | End: 2023-04-15

## 2023-04-14 RX ORDER — FUROSEMIDE 10 MG/ML
40 INJECTION INTRAMUSCULAR; INTRAVENOUS 2 TIMES DAILY WITH MEALS
Status: DISCONTINUED | OUTPATIENT
Start: 2023-04-14 | End: 2023-04-18 | Stop reason: HOSPADM

## 2023-04-14 RX ORDER — POTASSIUM CHLORIDE 20 MEQ/1
20 TABLET, EXTENDED RELEASE ORAL 2 TIMES DAILY
Status: DISCONTINUED | OUTPATIENT
Start: 2023-04-14 | End: 2023-04-15

## 2023-04-14 RX ADMIN — PIPERACILLIN AND TAZOBACTAM 3.38 G: 3; .375 INJECTION, POWDER, LYOPHILIZED, FOR SOLUTION INTRAVENOUS at 13:51

## 2023-04-14 RX ADMIN — PIPERACILLIN AND TAZOBACTAM 3.38 G: 3; .375 INJECTION, POWDER, LYOPHILIZED, FOR SOLUTION INTRAVENOUS at 05:24

## 2023-04-14 RX ADMIN — SODIUM CHLORIDE, PRESERVATIVE FREE 10 ML: 5 INJECTION INTRAVENOUS at 05:27

## 2023-04-14 RX ADMIN — DOXYCYCLINE 100 MG: 100 INJECTION, POWDER, LYOPHILIZED, FOR SOLUTION INTRAVENOUS at 19:29

## 2023-04-14 RX ADMIN — SODIUM CHLORIDE, PRESERVATIVE FREE 10 ML: 5 INJECTION INTRAVENOUS at 16:48

## 2023-04-14 RX ADMIN — SODIUM CHLORIDE, PRESERVATIVE FREE 10 ML: 5 INJECTION INTRAVENOUS at 13:51

## 2023-04-14 RX ADMIN — METOPROLOL SUCCINATE 25 MG: 25 TABLET, EXTENDED RELEASE ORAL at 08:19

## 2023-04-14 RX ADMIN — CETIRIZINE HYDROCHLORIDE 10 MG: 10 TABLET, FILM COATED ORAL at 17:04

## 2023-04-14 RX ADMIN — GUAIFENESIN 600 MG: 600 TABLET, EXTENDED RELEASE ORAL at 08:19

## 2023-04-14 RX ADMIN — GABAPENTIN 300 MG: 300 CAPSULE ORAL at 21:13

## 2023-04-14 RX ADMIN — PIPERACILLIN AND TAZOBACTAM 3.38 G: 3; .375 INJECTION, POWDER, LYOPHILIZED, FOR SOLUTION INTRAVENOUS at 21:14

## 2023-04-14 RX ADMIN — DOCUSATE SODIUM 100 MG: 100 CAPSULE, LIQUID FILLED ORAL at 08:19

## 2023-04-14 RX ADMIN — FUROSEMIDE 40 MG: 10 INJECTION, SOLUTION INTRAMUSCULAR; INTRAVENOUS at 07:58

## 2023-04-14 RX ADMIN — GUAIFENESIN 600 MG: 600 TABLET, EXTENDED RELEASE ORAL at 21:13

## 2023-04-14 RX ADMIN — SACUBITRIL AND VALSARTAN 2 TABLET: 24; 26 TABLET, FILM COATED ORAL at 21:13

## 2023-04-14 RX ADMIN — ENOXAPARIN SODIUM 80 MG: 100 INJECTION SUBCUTANEOUS at 07:58

## 2023-04-14 RX ADMIN — POTASSIUM CHLORIDE 20 MEQ: 1500 TABLET, EXTENDED RELEASE ORAL at 08:19

## 2023-04-14 RX ADMIN — FUROSEMIDE 40 MG: 10 INJECTION, SOLUTION INTRAMUSCULAR; INTRAVENOUS at 16:47

## 2023-04-14 RX ADMIN — GABAPENTIN 300 MG: 300 CAPSULE ORAL at 08:19

## 2023-04-14 RX ADMIN — DOCUSATE SODIUM 100 MG: 100 CAPSULE, LIQUID FILLED ORAL at 21:13

## 2023-04-14 RX ADMIN — ENOXAPARIN SODIUM 80 MG: 100 INJECTION SUBCUTANEOUS at 19:29

## 2023-04-14 RX ADMIN — WARFARIN SODIUM 6 MG: 6 TABLET ORAL at 16:48

## 2023-04-14 RX ADMIN — SODIUM CHLORIDE, PRESERVATIVE FREE 10 ML: 5 INJECTION INTRAVENOUS at 21:13

## 2023-04-14 RX ADMIN — LEVOTHYROXINE SODIUM 25 MCG: 50 TABLET ORAL at 05:24

## 2023-04-14 RX ADMIN — SODIUM CHLORIDE, PRESERVATIVE FREE 10 ML: 5 INJECTION INTRAVENOUS at 07:59

## 2023-04-14 RX ADMIN — SACUBITRIL AND VALSARTAN 2 TABLET: 24; 26 TABLET, FILM COATED ORAL at 08:18

## 2023-04-14 RX ADMIN — DOXYCYCLINE 100 MG: 100 INJECTION, POWDER, LYOPHILIZED, FOR SOLUTION INTRAVENOUS at 07:58

## 2023-04-14 RX ADMIN — POTASSIUM CHLORIDE 20 MEQ: 1500 TABLET, EXTENDED RELEASE ORAL at 21:13

## 2023-04-14 NOTE — PROGRESS NOTES
Comprehensive Nutrition Assessment    Type and Reason for Visit: Initial, Positive nutrition screen (MST 2)    Nutrition Recommendations/Plan:   Continue current ETC Cardiac diet  1000ml FR per attending   Magic Cup 2x/d (580kcals, 18g pro)   Monitor PO intakes, document DAILY wts to assess fluid shifts      Malnutrition Assessment:  Malnutrition Status:  Insufficient data (04/14/23 0986)    Context:  Chronic illness     Findings of the 6 clinical characteristics of malnutrition:   Energy Intake:  75% or less est energy requirements for 1 month or longer (pt report)  Weight Loss:  Unable to assess     Body Fat Loss:  Unable to assess,     Muscle Mass Loss:  Unable to assess,    Fluid Accumulation:  No significant fluid accumulation (CHF related, not nutrition related),     Strength:  Not performed     Nutrition Assessment:    Admitted for Falls, fluid overload, pna. RD assessment for MST 2-  Wt loss of 2-13# recently, poor appetite. Wt loss difficult to confirm as pt with apparent 14# wt gain x2 months that is likely r/t severe LE swelling. Pt usual day recall concerning for poor PO pta, however intakes IP >75% documented in EMR-- same per pt during phone interview. RD attempted fluid restriction edu, however pt finn drowsy and expect poor compliance. Continue ONS daily. Labs: H/H BUN 28, Cr 1.72, Mg 2.6, Alb 3.0. Meds: Zyrtec, docusate 2x/d, vibramycin, lovenox, lasix, mucinex, levothyroxine, zosyn, Kcl. Nutrition Related Findings:    NFPE deferred, will attempt at F/U as appropriate. Pt denies issues with C/S, noted SLP eval this AM and rec'd ETC/Tins, noted poor oral health with broken teeth. No N/V/D/C, last BM 4/12. 3+ BL LE edema.  Wound Type:  (Scattered bruising and abrasions)    Current Nutrition Intake & Therapies:  Average Meal Intake: %  Average Supplement Intake: Unable to assess  ADULT DIET Easy to Chew; Low Fat/Low Chol/High Fiber/2 gm Na; 1000 ml  ADULT ORAL NUTRITION SUPPLEMENT Lunch, Dinner; Frozen Supplement    Anthropometric Measures:  Height: 5' 7.99\" (172.7 cm)  Ideal Body Weight (IBW): 154 lbs (70 kg)     Current Body Wt:  78.1 kg (172 lb 2.9 oz), 111.8 % IBW.  Bed scale  Current BMI (kg/m2): 26.2  Usual Body Weight: 76.2 kg (168 lb)  % Weight Change (Calculated): 2.5  Weight Adjustment:  (#/71.4kg; BMI 23.9)                 BMI Category: Normal weight (BMI 22.0-24.9) age over 72  Wt Readings from Last 10 Encounters:   04/14/23 78.1 kg (172 lb 3.2 oz)   02/27/23 77.1 kg (170 lb)   02/16/23 77.1 kg (170 lb)   02/07/23 77.3 kg (170 lb 5 oz)   01/31/23 71.2 kg (157 lb)   01/07/23 71.3 kg (157 lb 1.6 oz)   01/04/23 76.2 kg (168 lb)   12/21/22 74.8 kg (165 lb)   12/19/22 74.8 kg (165 lb)   12/08/21 73 kg (160 lb 15 oz)   Wt gain x2 months, ?fluid related    Estimated Daily Nutrient Needs:  Energy Requirements Based On: Kcal/kg  Weight Used for Energy Requirements: Other (specify) (EDW)  Energy (kcal/day): 9282-2610 kcals (25-30kcals/kg EDW)  Weight Used for Protein Requirements: Other (specify) (EDW)  Protein (g/day): 86g (1.2g/kg)  Method Used for Fluid Requirements: Other (comment)  Fluid (ml/day): 1000ml FR per MD    Nutrition Diagnosis:   Inadequate oral intake related to cardiac dysfunction as evidenced by  (diet recall)    Nutrition Interventions:   Food and/or Nutrient Delivery: Continue current diet, Continue oral nutrition supplement  Nutrition Education/Counseling: Education initiated, Education needed (fluid restriction)  Coordination of Nutrition Care: Continue to monitor while inpatient  Plan of Care discussed with: pt    Goals:     Goals: PO intake 75% or greater, Teach back diet education, within 7 days       Nutrition Monitoring and Evaluation:   Behavioral-Environmental Outcomes: None identified  Food/Nutrient Intake Outcomes: Diet advancement/tolerance, Food and nutrient intake, Supplement intake  Physical Signs/Symptoms Outcomes: Meal time behavior, Weight, Chewing or swallowing, Fluid status or edema, Biochemical data, Nutrition focused physical findings    Discharge Planning:     Too soon to determine    [employer]: Ext [phone], or via [facility]

## 2023-04-14 NOTE — PROGRESS NOTES
Problem: Pressure Injury - Risk of  Goal: *Prevention of pressure injury  Description: Document Krishna Scale and appropriate interventions in the flowsheet. Outcome: Progressing Towards Goal  Note: Pressure Injury Interventions:       Moisture Interventions: Absorbent underpads    Activity Interventions: PT/OT evaluation    Mobility Interventions: PT/OT evaluation    Nutrition Interventions: Document food/fluid/supplement intake                     Problem: Patient Education: Go to Patient Education Activity  Goal: Patient/Family Education  Outcome: Progressing Towards Goal     Problem: Falls - Risk of  Goal: *Absence of Falls  Description: Document Jaki Fall Risk and appropriate interventions in the flowsheet.   Outcome: Progressing Towards Goal  Note: Fall Risk Interventions:                                Problem: Patient Education: Go to Patient Education Activity  Goal: Patient/Family Education  Outcome: Progressing Towards Goal

## 2023-04-14 NOTE — PROGRESS NOTES
.Nephrology consulted on 74-year-old gentleman with past medical history of heart failure with markedly reduced ejection fraction, who presented with 3+ lower extremity edema and reduced urination. Chest x-ray showed findings consistent with pneumonia but no evidence of volume overload. Started on Lasix 40 mg IV twice daily. Serum creatinine elevated at baseline 1.7. Appears close to his baseline. Postvoid residual volume close to 150 mL. Also has hypokalemia. Continued on Entresto second care, metoprolol, furosemide 40 mg IV twice daily. On potassium supplements. Echocardiogram pending. UA generally unremarkable. Plan  - Continued diuresis with stepwise titration to achieve urine output of 1.5-2 L over 24 hours  - Monitoring and replenish electrolyte as needed currently stable.   #Treatment for suspected pneumonia  #Establishing nephrology care outpatient as well

## 2023-04-14 NOTE — ROUTINE PROCESS
Indwelling Trinidad catheter inserted with out difficulty. 16 fr. 10 ml. Clear yellow urine return. [place] on left thigh. Patient bathed with all skin tears dressed. Skin tears on left arm still weeping clear drainage.

## 2023-04-14 NOTE — PROGRESS NOTES
Spiritual Care Assessment/Progress Note  [facility]      NAME: Jeffery Hammer      MRN: [id number]  AGE: 82 y.o.  SEX: male  Roman Catholic Affiliation: Druze   Language: English     4/14/2023     Total Time (in minutes): 30     Spiritual Assessment begun in [facility] through conversation with:         [x]Patient        [x] Family    [x] Friend(s)    Wife, sister, and Sikh friend present    Reason for Consult: Initial/Spiritual assessment, patient floor     Spiritual beliefs: (Please include comment if needed)     [x] Identifies with a jessy tradition:    Druze     [] Supported by a jessy community:            [] Claims no spiritual orientation:           [] Seeking spiritual identity:                [] Adheres to an individual form of spirituality:           [] Not able to assess:                           Identified resources for coping:      [x] Prayer                               [] Music                  [] Guided Imagery     [x] Family/friends                 [] Pet visits     [] Devotional reading                         [] Unknown     [] Other:                                               Interventions offered during this visit: (See comments for more details)    Patient Interventions: Normalization of emotional/spiritual concerns, Life review/legacy, Affirmation of jessy     Family/Friend(s): Initial Assessment, Affirmation of emotions/emotional suffering     Plan of Care:     [x] Support spiritual and/or cultural needs    [] Support AMD and/or advance care planning process      [] Support grieving process   [] Coordinate Rites and/or Rituals    [] Coordination with community clergy   [] No spiritual needs identified at this time   [] Detailed Plan of Care below (See Comments)  [] Make referral to Music Therapy  [] Make referral to Pet Therapy     [] Make referral to Addiction services  [] Make referral to [facility]  [] Make referral to Spiritual Care Partner  [] No future visits requested        [x] Contact Spiritual Care for further referrals     Comments: Patient seen by Hedy Brewer, [address]  Intern for Initial Spiritual Care Assessment. Patient is in bed, wife, sister, and Restorationism friend are present. States he attends [employer]. Shared Life Legacy and the ramo of having family and friends over the years. Expressed being thankful things are as well as they are considering medical concerns. Wife, sister and Restorationism friend all sharing being able to be together for more than half a century as a family. Provided the ministry of presence and the comfort at the beside of the Patient and family. Listened attentively and provided words of encouragement. To Contact Spiritual Care for further referrals. ALONZO Swan.   Intern

## 2023-04-14 NOTE — PROGRESS NOTES
Problem: Falls - Risk of  Goal: *Absence of Falls  Description: Document Nicolasa Barragan Fall Risk and appropriate interventions in the flowsheet.   Outcome: Progressing Towards Goal  Note: Fall Risk Interventions:  Mobility Interventions: PT Consult for mobility concerns, PT Consult for assist device competence  Instruct patient to ask for assistance with transfers   Keep call light with in reach  Mat on floor by bed  Wear non- skid socks  Medication Interventions: Bed/chair exit alarm

## 2023-04-14 NOTE — ROUTINE PROCESS
Bedside shift change report given to abena (oncoming nurse) by Vonnie Cloud (offgoing nurse). Report included the following information Kardex.

## 2023-04-14 NOTE — PROGRESS NOTES
Diaper changed soaked with urine. No stool noted. Left arm is oozing with clear fluid. [place] is wet. Wound cleansed with wound cleanser and changed dressing. Position for comfort. With occasional cough noted.  Maintained on oxygen inhalation at 2 lpm.

## 2023-04-15 LAB
ANION GAP SERPL CALC-SCNC: 10 MMOL/L (ref 5–15)
BASOPHILS # BLD: 0 K/UL (ref 0–0.1)
BASOPHILS NFR BLD: 1 % (ref 0–1)
BUN SERPL-MCNC: 27 MG/DL (ref 6–20)
BUN/CREAT SERPL: 18 (ref 12–20)
CA-I BLD-MCNC: 8 MG/DL (ref 8.5–10.1)
CHLORIDE SERPL-SCNC: 108 MMOL/L (ref 97–108)
CO2 SERPL-SCNC: 22 MMOL/L (ref 21–32)
CREAT SERPL-MCNC: 1.49 MG/DL (ref 0.7–1.3)
DIFFERENTIAL METHOD BLD: ABNORMAL
EOSINOPHIL # BLD: 0.1 K/UL (ref 0–0.4)
EOSINOPHIL NFR BLD: 1 % (ref 0–7)
ERYTHROCYTE [DISTWIDTH] IN BLOOD BY AUTOMATED COUNT: 22.2 % (ref 11.5–14.5)
GLUCOSE SERPL-MCNC: 78 MG/DL (ref 65–100)
HCT VFR BLD AUTO: 44.1 % (ref 36.6–50.3)
HGB BLD-MCNC: 14.7 G/DL (ref 12.1–17)
IMM GRANULOCYTES # BLD AUTO: 0 K/UL (ref 0–0.04)
IMM GRANULOCYTES NFR BLD AUTO: 1 % (ref 0–0.5)
INR PPP: 3 (ref 0.9–1.1)
LYMPHOCYTES # BLD: 0.8 K/UL (ref 0.8–3.5)
LYMPHOCYTES NFR BLD: 10 % (ref 12–49)
MCH RBC QN AUTO: 31.4 PG (ref 26–34)
MCHC RBC AUTO-ENTMCNC: 33.3 G/DL (ref 30–36.5)
MCV RBC AUTO: 94.2 FL (ref 80–99)
MONOCYTES # BLD: 0.6 K/UL (ref 0–1)
MONOCYTES NFR BLD: 8 % (ref 5–13)
NEUTS SEG # BLD: 5.9 K/UL (ref 1.8–8)
NEUTS SEG NFR BLD: 79 % (ref 32–75)
NRBC # BLD: 0.02 K/UL (ref 0–0.01)
NRBC BLD-RTO: 0.3 PER 100 WBC
PLATELET # BLD AUTO: 241 K/UL (ref 150–400)
PMV BLD AUTO: 10.5 FL (ref 8.9–12.9)
POTASSIUM SERPL-SCNC: 3.7 MMOL/L (ref 3.5–5.1)
PROTHROMBIN TIME: 29.5 SEC (ref 11.9–14.6)
RBC # BLD AUTO: 4.68 M/UL (ref 4.1–5.7)
SODIUM SERPL-SCNC: 140 MMOL/L (ref 136–145)
T4 FREE SERPL-MCNC: 0.5 NG/DL (ref 0.8–1.5)
WBC # BLD AUTO: 7.4 K/UL (ref 4.1–11.1)

## 2023-04-15 PROCEDURE — 74011250636 HC RX REV CODE- 250/636: Performed by: HOSPITALIST

## 2023-04-15 PROCEDURE — 94760 N-INVAS EAR/PLS OXIMETRY 1: CPT

## 2023-04-15 PROCEDURE — 80048 BASIC METABOLIC PNL TOTAL CA: CPT

## 2023-04-15 PROCEDURE — 74011000250 HC RX REV CODE- 250: Performed by: HOSPITALIST

## 2023-04-15 PROCEDURE — 74011000258 HC RX REV CODE- 258: Performed by: HOSPITALIST

## 2023-04-15 PROCEDURE — 65270000029 HC RM PRIVATE

## 2023-04-15 PROCEDURE — 85610 PROTHROMBIN TIME: CPT

## 2023-04-15 PROCEDURE — 36415 COLL VENOUS BLD VENIPUNCTURE: CPT

## 2023-04-15 PROCEDURE — 74011250637 HC RX REV CODE- 250/637: Performed by: HOSPITALIST

## 2023-04-15 PROCEDURE — 84439 ASSAY OF FREE THYROXINE: CPT

## 2023-04-15 PROCEDURE — 85025 COMPLETE CBC W/AUTO DIFF WBC: CPT

## 2023-04-15 PROCEDURE — 77010033678 HC OXYGEN DAILY

## 2023-04-15 RX ORDER — POTASSIUM CHLORIDE 20 MEQ/1
40 TABLET, EXTENDED RELEASE ORAL 2 TIMES DAILY
Status: DISCONTINUED | OUTPATIENT
Start: 2023-04-15 | End: 2023-04-16

## 2023-04-15 RX ORDER — GABAPENTIN 100 MG/1
200 CAPSULE ORAL 2 TIMES DAILY
Status: DISCONTINUED | OUTPATIENT
Start: 2023-04-15 | End: 2023-04-18 | Stop reason: HOSPADM

## 2023-04-15 RX ADMIN — LEVOTHYROXINE SODIUM 25 MCG: 50 TABLET ORAL at 05:31

## 2023-04-15 RX ADMIN — PIPERACILLIN AND TAZOBACTAM 3.38 G: 3; .375 INJECTION, POWDER, LYOPHILIZED, FOR SOLUTION INTRAVENOUS at 14:11

## 2023-04-15 RX ADMIN — DOCUSATE SODIUM 100 MG: 100 CAPSULE, LIQUID FILLED ORAL at 21:02

## 2023-04-15 RX ADMIN — SACUBITRIL AND VALSARTAN 2 TABLET: 24; 26 TABLET, FILM COATED ORAL at 08:12

## 2023-04-15 RX ADMIN — SODIUM CHLORIDE, PRESERVATIVE FREE 10 ML: 5 INJECTION INTRAVENOUS at 21:23

## 2023-04-15 RX ADMIN — GABAPENTIN 200 MG: 100 CAPSULE ORAL at 21:01

## 2023-04-15 RX ADMIN — GABAPENTIN 300 MG: 300 CAPSULE ORAL at 08:13

## 2023-04-15 RX ADMIN — POTASSIUM CHLORIDE 40 MEQ: 1500 TABLET, EXTENDED RELEASE ORAL at 21:01

## 2023-04-15 RX ADMIN — SODIUM CHLORIDE, PRESERVATIVE FREE 10 ML: 5 INJECTION INTRAVENOUS at 05:31

## 2023-04-15 RX ADMIN — PIPERACILLIN AND TAZOBACTAM 3.38 G: 3; .375 INJECTION, POWDER, LYOPHILIZED, FOR SOLUTION INTRAVENOUS at 21:01

## 2023-04-15 RX ADMIN — DOXYCYCLINE 100 MG: 100 INJECTION, POWDER, LYOPHILIZED, FOR SOLUTION INTRAVENOUS at 07:51

## 2023-04-15 RX ADMIN — SODIUM CHLORIDE, PRESERVATIVE FREE 10 ML: 5 INJECTION INTRAVENOUS at 14:22

## 2023-04-15 RX ADMIN — METOPROLOL SUCCINATE 25 MG: 25 TABLET, EXTENDED RELEASE ORAL at 08:13

## 2023-04-15 RX ADMIN — FUROSEMIDE 40 MG: 10 INJECTION, SOLUTION INTRAMUSCULAR; INTRAVENOUS at 08:12

## 2023-04-15 RX ADMIN — DOCUSATE SODIUM 100 MG: 100 CAPSULE, LIQUID FILLED ORAL at 08:12

## 2023-04-15 RX ADMIN — PIPERACILLIN AND TAZOBACTAM 3.38 G: 3; .375 INJECTION, POWDER, LYOPHILIZED, FOR SOLUTION INTRAVENOUS at 05:31

## 2023-04-15 RX ADMIN — FUROSEMIDE 40 MG: 10 INJECTION, SOLUTION INTRAMUSCULAR; INTRAVENOUS at 16:53

## 2023-04-15 RX ADMIN — GUAIFENESIN 600 MG: 600 TABLET, EXTENDED RELEASE ORAL at 08:12

## 2023-04-15 RX ADMIN — GUAIFENESIN 600 MG: 600 TABLET, EXTENDED RELEASE ORAL at 21:02

## 2023-04-15 RX ADMIN — CETIRIZINE HYDROCHLORIDE 10 MG: 10 TABLET, FILM COATED ORAL at 17:04

## 2023-04-15 RX ADMIN — POTASSIUM CHLORIDE 20 MEQ: 1500 TABLET, EXTENDED RELEASE ORAL at 08:13

## 2023-04-15 NOTE — ROUTINE PROCESS
Has rested well, free of complaints, wife remains asleep at bedside. Dsg change to left arm performed per Brie Height LPN. Missing skin from skin tears present. Cleaned and re-dressed. Has been coughing on and off, patient keeps tobacco in mouth  during sleep hours and spits juice in bottle.

## 2023-04-15 NOTE — PROGRESS NOTES
Problem: Pressure Injury - Risk of  Goal: *Prevention of pressure injury  Description: Document Krishna Scale and appropriate interventions in the flowsheet. Outcome: Progressing Towards Goal  Note: Pressure Injury Interventions:       Moisture Interventions: Absorbent underpads, Check for incontinence Q2 hours and as needed, Internal/External urinary devices, Limit adult briefs, Minimize layers, Moisture barrier    Activity Interventions: PT/OT evaluation    Mobility Interventions: PT/OT evaluation    Nutrition Interventions: Document food/fluid/supplement intake, Offer support with meals,snacks and hydration    Friction and Shear Interventions: HOB 30 degrees or less, Minimize layers, Apply protective barrier, creams and emollients                Problem: Patient Education: Go to Patient Education Activity  Goal: Patient/Family Education  Outcome: Progressing Towards Goal     Problem: Falls - Risk of  Goal: *Absence of Falls  Description: Document Jaki Fall Risk and appropriate interventions in the flowsheet.   Outcome: Progressing Towards Goal  Note: Fall Risk Interventions:  Mobility Interventions: PT Consult for mobility concerns, PT Consult for assist device competence         Medication Interventions: Bed/chair exit alarm                   Problem: Patient Education: Go to Patient Education Activity  Goal: Patient/Family Education  Outcome: Progressing Towards Goal     Problem: Patient Education: Go to Patient Education Activity  Goal: Patient/Family Education  Outcome: Progressing Towards Goal     Problem: Patient Education: Go to Patient Education Activity  Goal: Patient/Family Education  Outcome: Progressing Towards Goal

## 2023-04-15 NOTE — ROUTINE PROCESS
SBAR walking report received from Krish Madera RN on patient condition and current treatment plan per Kardex. Multiple visitors in room at this time.

## 2023-04-15 NOTE — PROGRESS NOTES
Problem: Pressure Injury - Risk of  Goal: *Prevention of pressure injury  Description: Document Krishna Scale and appropriate interventions in the flowsheet. Outcome: Progressing Towards Goal  Note: Pressure Injury Interventions:       Moisture Interventions: Absorbent underpads, Check for incontinence Q2 hours and as needed, Internal/External urinary devices, Limit adult briefs, Minimize layers, Moisture barrier    Activity Interventions: PT/OT evaluation    Mobility Interventions: PT/OT evaluation    Nutrition Interventions: Document food/fluid/supplement intake, Offer support with meals,snacks and hydration    Friction and Shear Interventions: HOB 30 degrees or less, Minimize layers, Apply protective barrier, creams and emollients                Problem: Patient Education: Go to Patient Education Activity  Goal: Patient/Family Education  Outcome: Progressing Towards Goal     Problem: Falls - Risk of  Goal: *Absence of Falls  Description: Document Jaki Fall Risk and appropriate interventions in the flowsheet.   Outcome: Progressing Towards Goal  Note: Fall Risk Interventions:  Mobility Interventions: PT Consult for mobility concerns, PT Consult for assist device competence         Medication Interventions: Bed/chair exit alarm                   Problem: Patient Education: Go to Patient Education Activity  Goal: Patient/Family Education  Outcome: Progressing Towards Goal

## 2023-04-15 NOTE — ROUTINE PROCESS
RT been in O2 at 1L/min nc pulse ox 98% no resp distress. Oacc cough noted. MA x2 intact. Multiple bruises and skin tears noted. Dressings in place. Abrasion to rt neck noted. Monitor on. Trin[place] intact wife in a bedside for support.

## 2023-04-16 ENCOUNTER — APPOINTMENT (OUTPATIENT)
Dept: CT IMAGING | Age: 83
DRG: 291 | End: 2023-04-16
Attending: HOSPITALIST
Payer: MEDICARE

## 2023-04-16 LAB
ANION GAP SERPL CALC-SCNC: 11 MMOL/L (ref 5–15)
BASOPHILS # BLD: 0 K/UL (ref 0–0.1)
BASOPHILS NFR BLD: 0 % (ref 0–1)
BUN SERPL-MCNC: 25 MG/DL (ref 6–20)
BUN/CREAT SERPL: 17 (ref 12–20)
CA-I BLD-MCNC: 7.8 MG/DL (ref 8.5–10.1)
CHLORIDE SERPL-SCNC: 108 MMOL/L (ref 97–108)
CO2 SERPL-SCNC: 21 MMOL/L (ref 21–32)
CREAT SERPL-MCNC: 1.5 MG/DL (ref 0.7–1.3)
DIFFERENTIAL METHOD BLD: ABNORMAL
EOSINOPHIL # BLD: 0.1 K/UL (ref 0–0.4)
EOSINOPHIL NFR BLD: 1 % (ref 0–7)
ERYTHROCYTE [DISTWIDTH] IN BLOOD BY AUTOMATED COUNT: 22.5 % (ref 11.5–14.5)
GLUCOSE SERPL-MCNC: 76 MG/DL (ref 65–100)
HCT VFR BLD AUTO: 45 % (ref 36.6–50.3)
HGB BLD-MCNC: 14.9 G/DL (ref 12.1–17)
IMM GRANULOCYTES # BLD AUTO: 0 K/UL (ref 0–0.04)
IMM GRANULOCYTES NFR BLD AUTO: 0 % (ref 0–0.5)
INR PPP: 2.3 (ref 0.9–1.1)
LACTATE SERPL-SCNC: 2.1 MMOL/L (ref 0.4–2)
LYMPHOCYTES # BLD: 0.8 K/UL (ref 0.8–3.5)
LYMPHOCYTES NFR BLD: 10 % (ref 12–49)
MAGNESIUM SERPL-MCNC: 2.2 MG/DL (ref 1.6–2.4)
MCH RBC QN AUTO: 31.6 PG (ref 26–34)
MCHC RBC AUTO-ENTMCNC: 33.1 G/DL (ref 30–36.5)
MCV RBC AUTO: 95.3 FL (ref 80–99)
MONOCYTES # BLD: 0.5 K/UL (ref 0–1)
MONOCYTES NFR BLD: 7 % (ref 5–13)
NEUTS SEG # BLD: 5.9 K/UL (ref 1.8–8)
NEUTS SEG NFR BLD: 82 % (ref 32–75)
NRBC # BLD: 0 K/UL (ref 0–0.01)
NRBC BLD-RTO: 0 PER 100 WBC
PLATELET # BLD AUTO: 255 K/UL (ref 150–400)
PMV BLD AUTO: 10.6 FL (ref 8.9–12.9)
POTASSIUM SERPL-SCNC: 4.3 MMOL/L (ref 3.5–5.1)
PROTHROMBIN TIME: 24.6 SEC (ref 11.9–14.6)
RBC # BLD AUTO: 4.72 M/UL (ref 4.1–5.7)
SODIUM SERPL-SCNC: 140 MMOL/L (ref 136–145)
WBC # BLD AUTO: 7.2 K/UL (ref 4.1–11.1)

## 2023-04-16 PROCEDURE — 77010033678 HC OXYGEN DAILY

## 2023-04-16 PROCEDURE — 94760 N-INVAS EAR/PLS OXIMETRY 1: CPT

## 2023-04-16 PROCEDURE — 83735 ASSAY OF MAGNESIUM: CPT

## 2023-04-16 PROCEDURE — 65270000029 HC RM PRIVATE

## 2023-04-16 PROCEDURE — 74011250636 HC RX REV CODE- 250/636: Performed by: HOSPITALIST

## 2023-04-16 PROCEDURE — 74011000250 HC RX REV CODE- 250: Performed by: HOSPITALIST

## 2023-04-16 PROCEDURE — 71250 CT THORAX DX C-: CPT

## 2023-04-16 PROCEDURE — 80048 BASIC METABOLIC PNL TOTAL CA: CPT

## 2023-04-16 PROCEDURE — 74011000258 HC RX REV CODE- 258: Performed by: HOSPITALIST

## 2023-04-16 PROCEDURE — 83605 ASSAY OF LACTIC ACID: CPT

## 2023-04-16 PROCEDURE — 85025 COMPLETE CBC W/AUTO DIFF WBC: CPT

## 2023-04-16 PROCEDURE — 85610 PROTHROMBIN TIME: CPT

## 2023-04-16 PROCEDURE — 74011250637 HC RX REV CODE- 250/637: Performed by: HOSPITALIST

## 2023-04-16 PROCEDURE — 36415 COLL VENOUS BLD VENIPUNCTURE: CPT

## 2023-04-16 RX ORDER — POTASSIUM CHLORIDE 20 MEQ/1
20 TABLET, EXTENDED RELEASE ORAL 2 TIMES DAILY
Status: DISCONTINUED | OUTPATIENT
Start: 2023-04-16 | End: 2023-04-18 | Stop reason: HOSPADM

## 2023-04-16 RX ORDER — LEVOTHYROXINE SODIUM 50 UG/1
50 TABLET ORAL DAILY
Status: DISCONTINUED | OUTPATIENT
Start: 2023-04-17 | End: 2023-04-18 | Stop reason: HOSPADM

## 2023-04-16 RX ORDER — IBUPROFEN 200 MG
1 TABLET ORAL DAILY
Status: DISCONTINUED | OUTPATIENT
Start: 2023-04-16 | End: 2023-04-18 | Stop reason: HOSPADM

## 2023-04-16 RX ORDER — WARFARIN SODIUM 5 MG/1
5 TABLET ORAL DAILY
Status: DISCONTINUED | OUTPATIENT
Start: 2023-04-16 | End: 2023-04-18 | Stop reason: HOSPADM

## 2023-04-16 RX ORDER — AMOXICILLIN AND CLAVULANATE POTASSIUM 875; 125 MG/1; MG/1
1 TABLET, FILM COATED ORAL 2 TIMES DAILY WITH MEALS
Status: DISCONTINUED | OUTPATIENT
Start: 2023-04-16 | End: 2023-04-18 | Stop reason: HOSPADM

## 2023-04-16 RX ADMIN — POTASSIUM CHLORIDE 40 MEQ: 1500 TABLET, EXTENDED RELEASE ORAL at 08:03

## 2023-04-16 RX ADMIN — DOCUSATE SODIUM 100 MG: 100 CAPSULE, LIQUID FILLED ORAL at 21:04

## 2023-04-16 RX ADMIN — LEVOTHYROXINE SODIUM 25 MCG: 50 TABLET ORAL at 05:06

## 2023-04-16 RX ADMIN — POTASSIUM CHLORIDE 20 MEQ: 1500 TABLET, EXTENDED RELEASE ORAL at 21:03

## 2023-04-16 RX ADMIN — SACUBITRIL AND VALSARTAN 2 TABLET: 24; 26 TABLET, FILM COATED ORAL at 21:03

## 2023-04-16 RX ADMIN — GUAIFENESIN 600 MG: 600 TABLET, EXTENDED RELEASE ORAL at 08:03

## 2023-04-16 RX ADMIN — GABAPENTIN 200 MG: 100 CAPSULE ORAL at 08:03

## 2023-04-16 RX ADMIN — GABAPENTIN 200 MG: 100 CAPSULE ORAL at 21:03

## 2023-04-16 RX ADMIN — GUAIFENESIN 600 MG: 600 TABLET, EXTENDED RELEASE ORAL at 21:03

## 2023-04-16 RX ADMIN — SODIUM CHLORIDE, PRESERVATIVE FREE 10 ML: 5 INJECTION INTRAVENOUS at 05:06

## 2023-04-16 RX ADMIN — WARFARIN SODIUM 5 MG: 5 TABLET ORAL at 16:15

## 2023-04-16 RX ADMIN — METOPROLOL SUCCINATE 25 MG: 25 TABLET, EXTENDED RELEASE ORAL at 08:03

## 2023-04-16 RX ADMIN — AMOXICILLIN AND CLAVULANATE POTASSIUM 1 TABLET: 875; 125 TABLET, FILM COATED ORAL at 16:15

## 2023-04-16 RX ADMIN — SODIUM CHLORIDE, PRESERVATIVE FREE 10 ML: 5 INJECTION INTRAVENOUS at 13:58

## 2023-04-16 RX ADMIN — FUROSEMIDE 40 MG: 10 INJECTION, SOLUTION INTRAMUSCULAR; INTRAVENOUS at 08:02

## 2023-04-16 RX ADMIN — SACUBITRIL AND VALSARTAN 2 TABLET: 24; 26 TABLET, FILM COATED ORAL at 08:02

## 2023-04-16 RX ADMIN — DOCUSATE SODIUM 100 MG: 100 CAPSULE, LIQUID FILLED ORAL at 08:03

## 2023-04-16 RX ADMIN — PIPERACILLIN AND TAZOBACTAM 3.38 G: 3; .375 INJECTION, POWDER, LYOPHILIZED, FOR SOLUTION INTRAVENOUS at 05:05

## 2023-04-16 RX ADMIN — FUROSEMIDE 40 MG: 10 INJECTION, SOLUTION INTRAMUSCULAR; INTRAVENOUS at 16:24

## 2023-04-16 RX ADMIN — SODIUM CHLORIDE, PRESERVATIVE FREE 10 ML: 5 INJECTION INTRAVENOUS at 21:05

## 2023-04-16 RX ADMIN — CETIRIZINE HYDROCHLORIDE 10 MG: 10 TABLET, FILM COATED ORAL at 17:03

## 2023-04-16 NOTE — ROUTINE PROCESS
Up to [facility] assisted by 2 staff. Pt had BM. Assisted back to bed. Reiterate to pt and family for pt not to use tobacco during hospitalization and abide to policy. Pt requesting patch. Dr. Chanell marsh.

## 2023-04-16 NOTE — PROGRESS NOTES
Problem: Patient Education: Go to Patient Education Activity  Goal: Patient/Family Education  Outcome: Progressing Towards Goal     Problem: Falls - Risk of  Goal: *Absence of Falls  Description: Document Larryeverett Barragan Fall Risk and appropriate interventions in the flowsheet. Outcome: Progressing Towards Goal  Note: Fall Risk Interventions:  Mobility Interventions: Bed/chair exit alarm, PT Consult for mobility concerns, PT Consult for assist device competence         Medication Interventions: Bed/chair exit alarm                   Problem: Patient Education: Go to Patient Education Activity  Goal: Patient/Family Education  Outcome: Progressing Towards Goal     Problem: Patient Education: Go to Patient Education Activity  Goal: Patient/Family Education  Outcome: Progressing Towards Goal     Problem: Patient Education: Go to Patient Education Activity  Goal: Patient/Family Education  Outcome: Progressing Towards Goal     Problem: Pressure Injury - Risk of  Goal: *Prevention of pressure injury  Description: Document Krishna Scale and appropriate interventions in the flowsheet.   Note: Pressure Injury Interventions:       Moisture Interventions: Absorbent underpads, Check for incontinence Q2 hours and as needed, Internal/External urinary devices, Limit adult briefs, Minimize layers, Moisture barrier    Activity Interventions: PT/OT evaluation    Mobility Interventions: PT/OT evaluation    Nutrition Interventions: Document food/fluid/supplement intake, Offer support with meals,snacks and hydration    Friction and Shear Interventions: HOB 30 degrees or less, Minimize layers, Apply protective barrier, creams and emollients

## 2023-04-16 NOTE — PROGRESS NOTES
Patient has no complaints during the shift. Dressing and bed pad is soaked with clear fluid from weeping skin in left arm. IFC is draining well. No stool noted. Bed bath done,skin on the back is reddish but blanchable. Position from side to side. Tolerated well.

## 2023-04-16 NOTE — ROUTINE PROCESS
Been down for CT via bed. Up to chair with 2 staff assistance for meal. Tolerated well. Continues to have weakness noted in legs. Eating lunch. Wife in room.  Tr[place] draining yellow urine

## 2023-04-16 NOTE — ROUTINE PROCESS
Monitor on. O2 on per nc 1L/min. Turn and repositioned. Pillowed for comfort. MA intact with AB infusing. Trinidad in place. Draining yellow urine. No resp distress. Resting with HOB elevated. Oacc cough noted. Rt arm dressing in place. Continues to weep at times. Multiple bruises noted to arms and abrasion to rt side neck. Wife in at bedside.

## 2023-04-16 NOTE — PROGRESS NOTES
Was able to assist nurse with patient to go from bed to chair. Patient needed moderate assistance with getting to side of bed and also with standing. Once standing with walker in front of him he noted his legs feel weak and felt he wouldn't be able to do any steps. So we sat him back down in bed and assisted patient off of bed to standing and patient was able to maneuver his feet and make his way to safely sit in chair. He wishes to sit up in chair for lunch and will help patient back in bed when he wishes to go back into bed. Will get PT/OT to reevaluate in AM tomorrow and see if patient is a skilled rehab or swing bed candidate.      CT chest was performed and pending results at this time

## 2023-04-17 LAB
ANION GAP SERPL CALC-SCNC: 11 MMOL/L (ref 5–15)
BASOPHILS # BLD: 0 K/UL (ref 0–0.1)
BASOPHILS NFR BLD: 0 % (ref 0–1)
BUN SERPL-MCNC: 26 MG/DL (ref 6–20)
BUN/CREAT SERPL: 16 (ref 12–20)
CA-I BLD-MCNC: 8.2 MG/DL (ref 8.5–10.1)
CHLORIDE SERPL-SCNC: 107 MMOL/L (ref 97–108)
CO2 SERPL-SCNC: 23 MMOL/L (ref 21–32)
CREAT SERPL-MCNC: 1.62 MG/DL (ref 0.7–1.3)
DIFFERENTIAL METHOD BLD: ABNORMAL
EOSINOPHIL # BLD: 0.1 K/UL (ref 0–0.4)
EOSINOPHIL NFR BLD: 1 % (ref 0–7)
ERYTHROCYTE [DISTWIDTH] IN BLOOD BY AUTOMATED COUNT: 22.7 % (ref 11.5–14.5)
GLUCOSE SERPL-MCNC: 83 MG/DL (ref 65–100)
HCT VFR BLD AUTO: 46.1 % (ref 36.6–50.3)
HGB BLD-MCNC: 15.3 G/DL (ref 12.1–17)
IMM GRANULOCYTES # BLD AUTO: 0 K/UL (ref 0–0.04)
IMM GRANULOCYTES NFR BLD AUTO: 0 % (ref 0–0.5)
INR PPP: 2.5 (ref 0.9–1.1)
LYMPHOCYTES # BLD: 0.8 K/UL (ref 0.8–3.5)
LYMPHOCYTES NFR BLD: 12 % (ref 12–49)
MCH RBC QN AUTO: 31.4 PG (ref 26–34)
MCHC RBC AUTO-ENTMCNC: 33.2 G/DL (ref 30–36.5)
MCV RBC AUTO: 94.7 FL (ref 80–99)
MONOCYTES # BLD: 0.5 K/UL (ref 0–1)
MONOCYTES NFR BLD: 7 % (ref 5–13)
NEUTS SEG # BLD: 5.5 K/UL (ref 1.8–8)
NEUTS SEG NFR BLD: 80 % (ref 32–75)
NRBC # BLD: 0.02 K/UL (ref 0–0.01)
NRBC BLD-RTO: 0.3 PER 100 WBC
PLATELET # BLD AUTO: 286 K/UL (ref 150–400)
PMV BLD AUTO: 10.8 FL (ref 8.9–12.9)
POTASSIUM SERPL-SCNC: 4.5 MMOL/L (ref 3.5–5.1)
PROTHROMBIN TIME: 25.6 SEC (ref 11.9–14.6)
RBC # BLD AUTO: 4.87 M/UL (ref 4.1–5.7)
SODIUM SERPL-SCNC: 141 MMOL/L (ref 136–145)
WBC # BLD AUTO: 6.9 K/UL (ref 4.1–11.1)

## 2023-04-17 PROCEDURE — 74011250637 HC RX REV CODE- 250/637: Performed by: HOSPITALIST

## 2023-04-17 PROCEDURE — 85025 COMPLETE CBC W/AUTO DIFF WBC: CPT

## 2023-04-17 PROCEDURE — 97110 THERAPEUTIC EXERCISES: CPT

## 2023-04-17 PROCEDURE — 97535 SELF CARE MNGMENT TRAINING: CPT

## 2023-04-17 PROCEDURE — 85610 PROTHROMBIN TIME: CPT

## 2023-04-17 PROCEDURE — 80048 BASIC METABOLIC PNL TOTAL CA: CPT

## 2023-04-17 PROCEDURE — 51798 US URINE CAPACITY MEASURE: CPT

## 2023-04-17 PROCEDURE — 74011250636 HC RX REV CODE- 250/636: Performed by: HOSPITALIST

## 2023-04-17 PROCEDURE — 36415 COLL VENOUS BLD VENIPUNCTURE: CPT

## 2023-04-17 PROCEDURE — 74011000250 HC RX REV CODE- 250: Performed by: HOSPITALIST

## 2023-04-17 PROCEDURE — 65270000029 HC RM PRIVATE

## 2023-04-17 PROCEDURE — 97530 THERAPEUTIC ACTIVITIES: CPT

## 2023-04-17 RX ORDER — TAMSULOSIN HYDROCHLORIDE 0.4 MG/1
0.4 CAPSULE ORAL ONCE
Status: COMPLETED | OUTPATIENT
Start: 2023-04-17 | End: 2023-04-17

## 2023-04-17 RX ADMIN — SACUBITRIL AND VALSARTAN 2 TABLET: 24; 26 TABLET, FILM COATED ORAL at 08:18

## 2023-04-17 RX ADMIN — AMOXICILLIN AND CLAVULANATE POTASSIUM 1 TABLET: 875; 125 TABLET, FILM COATED ORAL at 16:15

## 2023-04-17 RX ADMIN — SODIUM CHLORIDE, PRESERVATIVE FREE 10 ML: 5 INJECTION INTRAVENOUS at 21:21

## 2023-04-17 RX ADMIN — GABAPENTIN 200 MG: 100 CAPSULE ORAL at 21:20

## 2023-04-17 RX ADMIN — GUAIFENESIN 600 MG: 600 TABLET, EXTENDED RELEASE ORAL at 21:20

## 2023-04-17 RX ADMIN — SODIUM CHLORIDE, PRESERVATIVE FREE 10 ML: 5 INJECTION INTRAVENOUS at 16:16

## 2023-04-17 RX ADMIN — SACUBITRIL AND VALSARTAN 2 TABLET: 24; 26 TABLET, FILM COATED ORAL at 21:20

## 2023-04-17 RX ADMIN — POTASSIUM CHLORIDE 20 MEQ: 1500 TABLET, EXTENDED RELEASE ORAL at 21:20

## 2023-04-17 RX ADMIN — GABAPENTIN 200 MG: 100 CAPSULE ORAL at 08:18

## 2023-04-17 RX ADMIN — TAMSULOSIN HYDROCHLORIDE 0.4 MG: 0.4 CAPSULE ORAL at 18:43

## 2023-04-17 RX ADMIN — SODIUM CHLORIDE, PRESERVATIVE FREE 10 ML: 5 INJECTION INTRAVENOUS at 08:18

## 2023-04-17 RX ADMIN — AMOXICILLIN AND CLAVULANATE POTASSIUM 1 TABLET: 875; 125 TABLET, FILM COATED ORAL at 08:18

## 2023-04-17 RX ADMIN — FUROSEMIDE 40 MG: 10 INJECTION, SOLUTION INTRAMUSCULAR; INTRAVENOUS at 16:16

## 2023-04-17 RX ADMIN — METOPROLOL SUCCINATE 25 MG: 25 TABLET, EXTENDED RELEASE ORAL at 08:19

## 2023-04-17 RX ADMIN — CETIRIZINE HYDROCHLORIDE 10 MG: 10 TABLET, FILM COATED ORAL at 17:59

## 2023-04-17 RX ADMIN — GUAIFENESIN 600 MG: 600 TABLET, EXTENDED RELEASE ORAL at 08:18

## 2023-04-17 RX ADMIN — DOCUSATE SODIUM 100 MG: 100 CAPSULE, LIQUID FILLED ORAL at 21:20

## 2023-04-17 RX ADMIN — POTASSIUM CHLORIDE 20 MEQ: 1500 TABLET, EXTENDED RELEASE ORAL at 08:18

## 2023-04-17 RX ADMIN — LEVOTHYROXINE SODIUM 50 MCG: 50 TABLET ORAL at 05:16

## 2023-04-17 RX ADMIN — SODIUM CHLORIDE, PRESERVATIVE FREE 10 ML: 5 INJECTION INTRAVENOUS at 13:53

## 2023-04-17 RX ADMIN — FUROSEMIDE 40 MG: 10 INJECTION, SOLUTION INTRAMUSCULAR; INTRAVENOUS at 08:17

## 2023-04-17 RX ADMIN — DOCUSATE SODIUM 100 MG: 100 CAPSULE, LIQUID FILLED ORAL at 08:42

## 2023-04-17 RX ADMIN — SODIUM CHLORIDE, PRESERVATIVE FREE 10 ML: 5 INJECTION INTRAVENOUS at 05:16

## 2023-04-17 RX ADMIN — WARFARIN SODIUM 5 MG: 5 TABLET ORAL at 16:15

## 2023-04-17 NOTE — PROGRESS NOTES
OCCUPATIONAL THERAPY TREATMENT  Patient: Harpreet Solorzano (53 y.o. male)  Date: 4/17/2023  Diagnosis: Fall [W19. XXXA]  Cardiomyopathy ([address] [I42.9]  PNA (pneumonia) [J18.9]  Hypoxia [R09.02]  CKD (chronic kidney disease) [N18.9]  Lactic acidosis [E87.20]  H/O mechanical aortic valve replacement [Z95.2]  Multiple skin tears [T14. 8XXA] PNA (pneumonia)      Precautions: Fall, Other (comment) (New O2)  Chart, occupational therapy assessment, plan of care, and goals were reviewed. ASSESSMENT  Patient continues with skilled OT services and is progressing towards goals. Patient with increased independence and safety with ADL tasks and transfers this date. Patient in bed upon arrival with wife attempting to assist patient with urinal. Patient appears agitated with wife and wife appears frustrated. Patient requests wife go to car and get his pants, states after her departure that wife is very forgetful and this has increased since he has been sick. Patient requests to get OOB with assist from OT. Patient requires Min A for sup to sit EOB, good sitting balance. Patient requires CGA for sit to stand from bed, CGA for short distance mobility with RW to chair. Patient reports no dizziness with activity. Patient assisted with donning pants upon wife return, requires Mod A to don over feet and pull over buttocks, Min A for sit to stand from chair. Patient requests to walk around room with RW once pants are donned, requires CGA for safety and verbal cues to keep feet inside RW legs. Patient reports fatigue at doorway to room and requests return to bed. Patient requires Mod A for sit to sup in bed due to LE feeling heavy. Patient has pitting edema in bilateral feet, LE, hips, and lower abdomen. Patient with occasional coughing during visit. Current Level of Function Impacting Discharge (ADLs):  Mod A for bathing, dressing, and toileting tasks for safety and thoroughness    Other factors to consider for discharge: need for 24 hour assist, wife \"very forgetful\" and patient may need additional support in the home         PLAN :  Patient continues to benefit from skilled intervention to address the above impairments. Continue treatment per established plan of care. to address goals. Recommend with staff: Jana Segal for meals, toileting     Recommend next OT session: mobility to bathroom     Recommendation for discharge: (in order for the patient to meet his/her long term goals)  Therapy up to 5 days/week in SNF setting    This discharge recommendation:  Has been made in collaboration with the attending provider and/or case management    IF patient discharges home will need the following DME: bedside commode and hospital bed       SUBJECTIVE:   Patient stated It's gotten worse since I got sick.  regarding wife forgetfulness     OBJECTIVE DATA SUMMARY:   Cognitive/Behavioral Status:  Neurologic State: Alert  Orientation Level: Oriented X4  Cognition: Appropriate decision making; Appropriate for age attention/concentration; Appropriate safety awareness        Safety/Judgement: Decreased awareness of need for assistance; Fall prevention;Home safety    Functional Mobility and Transfers for ADLs:  Bed Mobility:  Supine to Sit: Minimum assistance  Sit to Supine: Moderate assistance  Scooting: Stand-by assistance    Transfers:  Sit to Stand: Contact guard assistance     Bed to Chair: Contact guard assistance    Balance:  Sitting: Intact  Standing: Intact; With support  Standing - Static: Fair    ADL Intervention:    Cognitive Retraining  Safety/Judgement: Decreased awareness of need for assistance; Fall prevention;Home safety    Pain:  0/10    Treatment Session:   Patient with increased independence and safety with ADL tasks and transfers this date. Patient in bed upon arrival with wife attempting to assist patient with urinal. Patient appears agitated with wife and wife appears frustrated.  Patient requests wife go to car and get his pants, states after her departure that wife is very forgetful and this has increased since he has been sick. Patient requests to get OOB with assist from OT. Patient requires Min A for sup to sit EOB, good sitting balance. Patient requires CGA for sit to stand from bed, CGA for short distance mobility with RW to chair. Patient reports no dizziness with activity. Patient assisted with donning pants upon wife return, requires Mod A to don over feet and pull over buttocks, Min A for sit to stand from chair. Patient requests to walk around room with RW once pants are donned, requires CGA for safety and verbal cues to keep feet inside RW legs. Patient reports fatigue at doorway to room and requests return to bed. Patient requires Mod A for sit to sup in bed due to LE feeling heavy. Patient has pitting edema in bilateral feet, LE, hips, and lower abdomen. Patient with occasional coughing during visit. Activity Tolerance:   requires rest breaks  Please refer to the flowsheet for vital signs taken during this treatment. After treatment patient left in no apparent distress:   Supine in bed, Heels elevated for pressure relief, Call bell within reach, and Caregiver / family present    COMMUNICATION/COLLABORATION:   The patients plan of care was discussed with: Physical therapist.     RHODA Ardon  Time Calculation: 25 mins   Problem: Self Care Deficits Care Plan (Adult)  Goal: *Acute Goals and Plan of Care (Insert Text)  Description:   FUNCTIONAL STATUS PRIOR TO ADMISSION: Patient was modified independent using a rollator and rolling walker for functional mobility. HOME SUPPORT: The patient lived with wife and required supervision/set-up for bathing, dressing, and toileting tasks. Occupational Therapy Goals  Initiated 4/14/2023  1. Patient will perform bathing with minimal assistance/contact guard assist within 7 day(s). 2.  Patient will perform upper body dressing with supervision/set-up within 7 day(s).   3.  Patient will perform lower body dressing with supervision/set-up within 7 day(s). 4.  Patient will perform toilet transfers with supervision/set-up within 7 day(s). 5.  Patient will perform all aspects of toileting with modified independence within 7 day(s). 6.  Patient will participate in upper extremity therapeutic exercise/activities with independence for 10 minutes within 7 day(s). 7.  Patient will utilize energy conservation techniques during functional activities with verbal and visual cues within 7 day(s).            Outcome: Progressing Towards Goal

## 2023-04-17 NOTE — PROGRESS NOTES
PHYSICAL THERAPY TREATMENT  Patient: Carlos Kam (89 y.o. male)  Date: 4/17/2023  Diagnosis: Fall [W19. XXXA]  Cardiomyopathy ([address] [I42.9]  PNA (pneumonia) [J18.9]  Hypoxia [R09.02]  CKD (chronic kidney disease) [N18.9]  Lactic acidosis [E87.20]  H/O mechanical aortic valve replacement [Z95.2]  Multiple skin tears [T14. 8XXA] PNA (pneumonia)      Precautions: Fall, Other (comment) (New O2)  Chart, physical therapy assessment, plan of care and goals were reviewed. ASSESSMENT  Patient continues with skilled PT services and is progressing towards goals. Patient sitting in bedside chair upon arrival, with wife present. Patient requires assistance to complete sit to stand, and reports dizziness upon standing,  Patient able to complete seated ankle pumps, and with second sit to stand, reported less dizziness. With gait, patient reports dizziness again, however able to complete 10ft before returning to chair. Patient with complaints of LE feeling weak toward end of gait. Patient overall improving with function, however remains high fall risk and decreased gait distance, strength, and endurance as compared to PLOF. Current Level of Function Impacting Discharge (mobility/balance): mobility, balance, strength, endurance    Other factors to consider for discharge: lives at home with wife who states she can not pull on him to help him out of chair or out of floor when he falls. Limited safety awareness. PLAN :  Patient continues to benefit from skilled intervention to address the above impairments. Continue treatment per established plan of care. to address goals.     Recommendation for discharge: (in order for the patient to meet his/her long term goals)  Therapy up to 5 days/week in SNF setting  or possible candidate [facility]    This discharge recommendation:  Has been made in collaboration with the attending provider and/or case management    IF patient discharges home will need the following DME: to be determined (TBD)       SUBJECTIVE:   Patient stated I was a little unsteady getting in the chair.     OBJECTIVE DATA SUMMARY:   Critical Behavior:  Neurologic State: Alert  Orientation Level: Oriented to person, Oriented to place, Oriented to situation  Cognition: Appropriate decision making, Appropriate for age attention/concentration, Appropriate safety awareness  Safety/Judgement: Decreased insight into deficits, Fall prevention, Home safety  Functional Mobility Training:  Bed Mobility:                    Transfers:  Sit to Stand: Minimum assistance; Moderate assistance;Assist x1  Stand to Sit: Minimum assistance;Assist x1                             Balance:  Sitting: Intact; With support  Standing: Impaired; With support  Standing - Static: Fair;Occasional  Ambulation/Gait Training:  Distance (ft): 10 Feet (ft)  Assistive Device: Walker, rolling  Ambulation - Level of Assistance: Minimal assistance;Assist x1        Gait Abnormalities: Decreased step clearance;Shuffling gait  Right Side Weight Bearing: Full  Left Side Weight Bearing: Full  Base of Support: Widened     Speed/Radha: Slow;Shuffled  Step Length: Left shortened;Right shortened                    Stairs:              Treatment Session:   Patient able to complete sit to stand with Davis, seated and standing LE therex. Increased cues provided for safety due to pt reporting dizziness with transfers and gait. Patient appears to have limited safety awareness. Patient ambulates x 10ft with Davis for safety. Pain Ratin/10    Activity Tolerance:   Fair    After treatment patient left in no apparent distress:   Sitting in chair, Call bell within reach, and Caregiver / family present    COMMUNICATION/COLLABORATION:   The patients plan of care was discussed with: Registered nurse, Physician, and Case management.      Jd Vasquez, PT, DPT    Time Calculation: 25 mins          Problem: Mobility Impaired (Adult and Pediatric)  Goal: *Acute Goals and Plan of Care (Insert Text)  Description: FUNCTIONAL STATUS PRIOR TO ADMISSION: Patient was modified independent using a walker, rollator, and single point cane for functional mobility. HOME SUPPORT PRIOR TO ADMISSION: The patient lived with wife and required minimal assistance/contact guard assist for bathing, gait, getting in and out of bed and vehicle. Physical Therapy Goals  Initiated 4/14/2023  1. Patient will move from supine to sit and sit to supine  in bed with minimal assistance/contact guard assist within 7 day(s). 2.  Patient will transfer from bed to chair and chair to bed with minimal assistance/contact guard assist using the least restrictive device within 7 day(s). 3.  Patient will perform sit to stand with minimal assistance/contact guard assist within 7 day(s). 4.  Patient will ambulate with minimal assistance/contact guard assist for 40 feet with the least restrictive device within 7 day(s). 5.  Patient will ascend/descend 3 stairs with 2 handrail(s) with minimal assistance/contact guard assist within 7 day(s).      Outcome: Progressing Towards Goal

## 2023-04-17 NOTE — PROGRESS NOTES
Pt up to chair with one person assist. Patient weak. Call bell in reach and told to call nurse for help getting back in bed. Wife also present at bedside.      Pt also had wet brief that was also changed at this time

## 2023-04-17 NOTE — ROUTINE PROCESS
Dr. Mally Cooley notified of bladder scan 141 ml and abdomen slightly distended and firm.   Dr. Angel Carty to gove flomax 0.4 mg po one time and may straight cath if patient agreed

## 2023-04-17 NOTE — ROUTINE PROCESS
Bedside shift change report given to DAVE Lee LPN (oncoming nurse) by Joanne Ray RN (offgoing nurse). Report included the following information Kardex.

## 2023-04-17 NOTE — PROGRESS NOTES
At 2 am patient complaints of full bladder. Drained urine bag with 350 ml output. .Push IFC in and out, positioned patient on left side. At 3 am,patient still complaints of discomfort no new urine noted. IFC irrigation done. At 0520  no new output. Bladder scan done with 200ml  of urine noted. For more care.

## 2023-04-17 NOTE — PROGRESS NOTES
CARDIOLOGY PROGRESS NOTE      Patient Name: Jourdan Monahan  Age: [de-identified] y.o. Gender:male  :1940  MRN: [id number]    Patient seen and examined. This is a patient who is followed for acute on chronic heart failure with severely reduced EF 5-10%, mechanical MV. Family at the bedside. Feeling okay today. Denies chest pain. Breathing and swelling improving. No palpitations. Some mild lightheadedness when up with therapy. No other complaints reported. Telemetry reviewed, vpaced    Pertinent review of systems items noted above, all other systems are negative. Current medications reviewed. Physical Examination  No Known Allergies  Visit Vitals  /76 (BP 1 Location: Right upper arm)   Pulse 74   Temp 97.8 °F (36.6 °C)   Resp 18   Ht 5' 7.99\" (1.727 m)   Wt 74.1 kg (163 lb 4.8 oz)   SpO2 95%   BMI 24.84 kg/m²     Vital signs are stable. No apparent distress. Heart has a regular rate and rhythm. Systolic murmur, crisp click  Lungs are diminished with fine crackles bilaterally. Abdomen is soft, nontender, normal bowel sounds. Extremities have moderate pitting edema. Skin is dry and cool  Normal affect    Labs reviewed: Lab results reviewed. For significant abnormal values and values requiring intervention, see assessment and plan.   Recent Results (from the past 12 hour(s))   METABOLIC PANEL, BASIC    Collection Time: 23  5:00 AM   Result Value Ref Range    Sodium 141 136 - 145 mmol/L    Potassium 4.5 3.5 - 5.1 mmol/L    Chloride 107 97 - 108 mmol/L    CO2 23 21 - 32 mmol/L    Anion gap 11 5 - 15 mmol/L    Glucose 83 65 - 100 mg/dL    BUN 26 (H) 6 - 20 mg/dL    Creatinine 1.62 (H) 0.70 - 1.30 mg/dL    BUN/Creatinine ratio 16 12 - 20      eGFR 43 (L) >60 ml/min/1.73m2    Calcium 8.2 (L) 8.5 - 10.1 mg/dL   CBC WITH AUTOMATED DIFF    Collection Time: 23  5:00 AM   Result Value Ref Range    WBC 6.9 4.1 - 11.1 K/uL    RBC 4.87 4.10 - 5.70 M/uL    HGB 15.3 12.1 - 17.0 g/dL    HCT 46.1 36.6 - 50.3 %    MCV 94.7 80.0 - 99.0 FL    MCH 31.4 26.0 - 34.0 PG    MCHC 33.2 30.0 - 36.5 g/dL    RDW 22.7 (H) 11.5 - 14.5 %    PLATELET 153 064 - 073 K/uL    MPV 10.8 8.9 - 12.9 FL    NRBC 0.3 (H) 0.0  WBC    ABSOLUTE NRBC 0.02 (H) 0.00 - 0.01 K/uL    NEUTROPHILS 80 (H) 32 - 75 %    LYMPHOCYTES 12 12 - 49 %    MONOCYTES 7 5 - 13 %    EOSINOPHILS 1 0 - 7 %    BASOPHILS 0 0 - 1 %    IMMATURE GRANULOCYTES 0 0 - 0.5 %    ABS. NEUTROPHILS 5.5 1.8 - 8.0 K/UL    ABS. LYMPHOCYTES 0.8 0.8 - 3.5 K/UL    ABS. MONOCYTES 0.5 0.0 - 1.0 K/UL    ABS. EOSINOPHILS 0.1 0.0 - 0.4 K/UL    ABS. BASOPHILS 0.0 0.0 - 0.1 K/UL    ABS. IMM. GRANS. 0.0 0.00 - 0.04 K/UL    DF AUTOMATED     PROTHROMBIN TIME + INR    Collection Time: 04/17/23  5:00 AM   Result Value Ref Range    Prothrombin time 25.6 (H) 11.9 - 14.6 sec    INR 2.5 (H) 0.9 - 1.1          Case discussed with Dr. Bill Quach and our impression and recommendations are as follows:  Acute on Chronic HFrEF: EF 5-10%. S/p ICD. BNP 35,000, significantly hypervolemic on admission. Slight bump in creatinine today, incorrect I&O yesterday. Look to change to PO diuretic tomorrow. Continue GDMT as able. Mechanical Mitral Valve: Approx 20 years ago at [facility] per patient. On Coumadin, INR 2.5 today. Goal 2.5-3.5. INR monitored by OP cardiologist.  Atrial Fibrillation: Currently V-paced. Continue Toprol and Coumadin for stroke prevention. Pneumonia: Treatment per primary    Please do not hesitate to call me or Dr. Bill Quach if additional questions arise.     Dionna Sheridan, NP  4/17/2023

## 2023-04-17 NOTE — PROGRESS NOTES
Problem: Falls - Risk of  Goal: *Absence of Falls  Description: Document Dena Rosales Fall Risk and appropriate interventions in the flowsheet.   Outcome: Progressing Towards Goal  Note: Fall Risk Interventions:  Mobility Interventions: Bed/chair exit alarm, PT Consult for mobility concerns, PT Consult for assist device competence  Encourage patient to use gripper socks  Keep floor clear  Provide assistance with transfers   Keep  call light with in reach    Medication Interventions: Bed/chair exit alarm         History of Falls Interventions: Bed/chair exit alarm

## 2023-04-17 NOTE — ROUTINE PROCESS
Bedside shift change report given to abena (oncoming nurse) by Oscar Art (offgoing nurse). Report included the following information Kardex.

## 2023-04-17 NOTE — PROGRESS NOTES
Hospitalist Progress Note  Dr. Candi Leyden MD      Date:4/17/2023       Room:[facility]  Patient Elvia Hart     YOB: 1940     Age:82 y.o. Subjective    HPI as per admitting provider on 4/13/2023    Savage Bradshaw is a [de-identified] y.o. male followed by Sandip Bolanos NP and Cardiologist Dr. Renetta Corral MD   has a past medical history of A-fib [facility]), CHF (congestive heart failure) EF5-10% with grade 2 Diastolic dysfunction, hx of Fall, H/O metallic Aortic valve replacement on coumadin, and Neuropathy, Hypothyroidism,     Presents from home after an accidental fall while using his rolling walker in the kitchen and lost his  on his walker and the walker slipped forward and he fell to the ground he denies any head trauma or loss of consciousness but he had a significant skin lacerations to the left arm and the right side of the neck and a few further skin tears on his right hand. Patient even though came for the fall he has been having increasing lower extremity edema initially as ankle/foot edema but has been worsening and even causing increased swelling about the thigh and also with penile and scrotal swelling. He says that his current weight about 172 pounds he was previously at 158 pounds. Supposed be on 40 mg Lasix today which she says he is compliant with but does not appear to be compliant with fluid restriction as he says he is constantly thirsty. He denies any chest pain or palpitations and recently did follow-up with Dr. Michael Hay his cardiologist which he underwent 2 cardioversions but both failed to convert his A-fib previously patient was on amiodarone because his INR to become elevated and so was stopped. Patient has had increased weakness and was started with home health and wanted to transition to outpatient physical therapy. But patient has fell at least 2-3 times this week and multiple times over this past several weeks.   He does also note a congestive cough and shortness of breath but also says that he has had decreased urine output. He is on chronic Coumadin therapy last INR check last week was in similar range at 1.6. On evaluation patient chest x-ray did not show overt fluid overload but did show a consistent right lower lobe infiltrate similar to x-ray when patient was evaluated in the emergency room at [address] in February. White blood count was normal.  On arrival blood pressure 108/74 with a heart rate of 85 initially was 96 on room air but on further evaluation noted to be 92% on room air and was then placed on 2 L. Troponin obtained and was 62 but BNP was noted to be elevated greater than 35,000 lactic acid was 3 and repeat was in similar range. BUN/creatinine of 28/1.7 slightly improved from baseline where previously creatinine was at 1.98. Patient was given Zosyn x1 and was referred for admission for right lower lobe pneumonia, acute on chronic systolic and diastolic heart failure exacerbation, lactic acidosis, frequent falls, paroxysmal A-fib, history of metallic aortic valve replacement 1 continue Coumadin therapy but currently is subtherapeutic at 1.6.    4/17: Seen on follow-up was able to get out of bed to chair when needing significant amount of assistance. More awake alert states she feels better coughing is improved continues to have swelling in lower extremities but is improved    Review of Systems   Constitutional:  Negative for chills, diaphoresis, fatigue and fever. HENT:  Negative for congestion, ear pain, postnasal drip, sinus pain and sore throat. Eyes:  Negative for pain, discharge and redness. Respiratory:  Negative for shortness of breath and wheezing. Cardiovascular:  Positive for leg swelling. Negative for chest pain and palpitations. Gastrointestinal:  Negative for abdominal pain, constipation, diarrhea, nausea and vomiting. Genitourinary:  Negative for dysuria, flank pain, frequency and urgency. Musculoskeletal:  Negative for arthralgias and myalgias. Neurological:  Positive for weakness. Negative for dizziness and headaches. Psychiatric/Behavioral:  Negative for agitation and hallucinations. The patient is not nervous/anxious. Objective           Vitals Last 24 Hours:  Patient Vitals for the past 24 hrs:   Temp Pulse Resp BP SpO2   04/17/23 0845 -- -- -- -- 95 %   04/17/23 0748 -- 74 -- -- --   04/17/23 0717 97.8 °F (36.6 °C) 74 18 107/76 96 %   04/17/23 0400 98.3 °F (36.8 °C) 77 17 104/72 99 %   04/16/23 2353 98.7 °F (37.1 °C) 76 18 96/70 99 %   04/16/23 2352 -- 76 -- -- --   04/16/23 1954 -- 77 -- -- --   04/16/23 1936 -- -- -- -- 98 %   04/16/23 1928 98.1 °F (36.7 °C) 79 18 98/69 98 %   04/16/23 1559 97.4 °F (36.3 °C) 75 18 101/75 97 %   04/16/23 1554 -- 75 -- -- --          I/O (24Hr): Intake/Output Summary (Last 24 hours) at 4/17/2023 1205  Last data filed at 4/17/2023 0308  Gross per 24 hour   Intake 540 ml   Output 300 ml   Net 240 ml         Physical Exam:  General: Alert, cooperative, no distress. Head:  Normocephalic, without obvious abnormality, atraumatic. Eyes:  Conjunctivae/corneas clear. Pupils equal, round, reactive to light. Extraocular movements intact. Lungs:  b/L air entry diminished at the bases with basilar crackles noted   Chest wall: No tenderness or deformity. Heart:  Regular rate and rhythm, S1, S2 normal, + murmur, no click, rub, or gallop. Abdomen:      Distended mildly, Soft, non-tender. Bowel sounds normal. No masses. No organomegaly. Back:  No spine tenderness to palpation  Extremities:   3+ b/l pitting edema up to thighs with penile and scrotal Swelling   Pulses: Symmetric all extremities. Skin:   Skin color, texture, turgor normal.   Lymph nodes: Cervical nodes normal.  Neurologic: CNII-XII intact. Generalized weakness , sensation, and reflexes throughout.          Medications           Current Facility-Administered Medications   Medication Dose Route Frequency    potassium chloride (K-DUR, KLOR-CON M20) SR tablet 20 mEq  20 mEq Oral BID    warfarin (COUMADIN) tablet 5 mg  5 mg Oral DAILY    levothyroxine (SYNTHROID) tablet 50 mcg  50 mcg Oral DAILY    nicotine (NICODERM CQ) 21 mg/24 hr patch 1 Patch  1 Patch TransDERmal DAILY    amoxicillin-clavulanate (AUGMENTIN) 875-125 mg per tablet 1 Tablet  1 Tablet Oral BID WITH MEALS    gabapentin (NEURONTIN) capsule 200 mg  200 mg Oral BID    furosemide (LASIX) injection 40 mg  40 mg IntraVENous BID WITH MEALS    sodium chloride (NS) flush 5-40 mL  5-40 mL IntraVENous Q8H    sodium chloride (NS) flush 5-40 mL  5-40 mL IntraVENous PRN    acetaminophen (TYLENOL) tablet 650 mg  650 mg Oral Q6H PRN    Or    acetaminophen (TYLENOL) suppository 650 mg  650 mg Rectal Q6H PRN    polyethylene glycol (MIRALAX) packet 17 g  17 g Oral DAILY PRN    ondansetron (ZOFRAN ODT) tablet 4 mg  4 mg Oral Q8H PRN    Or    ondansetron (ZOFRAN) injection 4 mg  4 mg IntraVENous Q6H PRN    metoprolol succinate (TOPROL-XL) XL tablet 25 mg  25 mg Oral DAILY    sacubitriL-valsartan (ENTRESTO) 24-26 mg tablet 2 Tablet  2 Tablet Oral BID    docusate sodium (COLACE) capsule 100 mg  100 mg Oral BID    guaiFENesin ER (MUCINEX) tablet 600 mg  600 mg Oral Q12H    cetirizine (ZYRTEC) tablet 10 mg  10 mg Oral QPM    WARFARIN INFORMATION NOTE (COUMADIN)   Other PRN         Allergies         Patient has no known allergies.        Labs/Imaging/Diagnostics      Labs:  Recent Results (from the past 48 hour(s))   METABOLIC PANEL, BASIC    Collection Time: 04/16/23  5:51 AM   Result Value Ref Range    Sodium 140 136 - 145 mmol/L    Potassium 4.3 3.5 - 5.1 mmol/L    Chloride 108 97 - 108 mmol/L    CO2 21 21 - 32 mmol/L    Anion gap 11 5 - 15 mmol/L    Glucose 76 65 - 100 mg/dL    BUN 25 (H) 6 - 20 mg/dL    Creatinine 1.50 (H) 0.70 - 1.30 mg/dL    BUN/Creatinine ratio 17 12 - 20      eGFR 47 (L) >60 ml/min/1.73m2    Calcium 7.8 (L) 8.5 - 10.1 mg/dL   CBC WITH AUTOMATED DIFF    Collection Time: 04/16/23  5:51 AM   Result Value Ref Range    WBC 7.2 4.1 - 11.1 K/uL    RBC 4.72 4.10 - 5.70 M/uL    HGB 14.9 12.1 - 17.0 g/dL    HCT 45.0 36.6 - 50.3 %    MCV 95.3 80.0 - 99.0 FL    MCH 31.6 26.0 - 34.0 PG    MCHC 33.1 30.0 - 36.5 g/dL    RDW 22.5 (H) 11.5 - 14.5 %    PLATELET 614 385 - 071 K/uL    MPV 10.6 8.9 - 12.9 FL    NRBC 0.0 0.0  WBC    ABSOLUTE NRBC 0.00 0.00 - 0.01 K/uL    NEUTROPHILS 82 (H) 32 - 75 %    LYMPHOCYTES 10 (L) 12 - 49 %    MONOCYTES 7 5 - 13 %    EOSINOPHILS 1 0 - 7 %    BASOPHILS 0 0 - 1 %    IMMATURE GRANULOCYTES 0 0 - 0.5 %    ABS. NEUTROPHILS 5.9 1.8 - 8.0 K/UL    ABS. LYMPHOCYTES 0.8 0.8 - 3.5 K/UL    ABS. MONOCYTES 0.5 0.0 - 1.0 K/UL    ABS. EOSINOPHILS 0.1 0.0 - 0.4 K/UL    ABS. BASOPHILS 0.0 0.0 - 0.1 K/UL    ABS. IMM.  GRANS. 0.0 0.00 - 0.04 K/UL    DF AUTOMATED     PROTHROMBIN TIME + INR    Collection Time: 04/16/23  5:51 AM   Result Value Ref Range    Prothrombin time 24.6 (H) 11.9 - 14.6 sec    INR 2.3 (H) 0.9 - 1.1     LACTIC ACID    Collection Time: 04/16/23  5:51 AM   Result Value Ref Range    Lactic acid 2.1 (HH) 0.4 - 2.0 mmol/L   MAGNESIUM    Collection Time: 04/16/23  5:51 AM   Result Value Ref Range    Magnesium 2.2 1.6 - 2.4 mg/dL   METABOLIC PANEL, BASIC    Collection Time: 04/17/23  5:00 AM   Result Value Ref Range    Sodium 141 136 - 145 mmol/L    Potassium 4.5 3.5 - 5.1 mmol/L    Chloride 107 97 - 108 mmol/L    CO2 23 21 - 32 mmol/L    Anion gap 11 5 - 15 mmol/L    Glucose 83 65 - 100 mg/dL    BUN 26 (H) 6 - 20 mg/dL    Creatinine 1.62 (H) 0.70 - 1.30 mg/dL    BUN/Creatinine ratio 16 12 - 20      eGFR 43 (L) >60 ml/min/1.73m2    Calcium 8.2 (L) 8.5 - 10.1 mg/dL   CBC WITH AUTOMATED DIFF    Collection Time: 04/17/23  5:00 AM   Result Value Ref Range    WBC 6.9 4.1 - 11.1 K/uL    RBC 4.87 4.10 - 5.70 M/uL    HGB 15.3 12.1 - 17.0 g/dL    HCT 46.1 36.6 - 50.3 %    MCV 94.7 80.0 - 99.0 FL    MCH 31.4 26.0 - 34.0 PG    MCHC 33.2 30.0 - 36.5 g/dL    RDW 22.7 (H) 11.5 - 14.5 %    PLATELET 013 927 - 651 K/uL    MPV 10.8 8.9 - 12.9 FL    NRBC 0.3 (H) 0.0  WBC    ABSOLUTE NRBC 0.02 (H) 0.00 - 0.01 K/uL    NEUTROPHILS 80 (H) 32 - 75 %    LYMPHOCYTES 12 12 - 49 %    MONOCYTES 7 5 - 13 %    EOSINOPHILS 1 0 - 7 %    BASOPHILS 0 0 - 1 %    IMMATURE GRANULOCYTES 0 0 - 0.5 %    ABS. NEUTROPHILS 5.5 1.8 - 8.0 K/UL    ABS. LYMPHOCYTES 0.8 0.8 - 3.5 K/UL    ABS. MONOCYTES 0.5 0.0 - 1.0 K/UL    ABS. EOSINOPHILS 0.1 0.0 - 0.4 K/UL    ABS. BASOPHILS 0.0 0.0 - 0.1 K/UL    ABS. IMM. GRANS. 0.0 0.00 - 0.04 K/UL    DF AUTOMATED     PROTHROMBIN TIME + INR    Collection Time: 04/17/23  5:00 AM   Result Value Ref Range    Prothrombin time 25.6 (H) 11.9 - 14.6 sec    INR 2.5 (H) 0.9 - 1.1          Imaging:  CT HEAD WO CONT    Result Date: 4/13/2023  No acute abnormality. CT CHEST WO CONT    Result Date: 4/16/2023  Bibasilar subsegmental atelectasis with bilateral pleural drains Cardiomyopathy with coronary artery calcification Third spacing Cholelithiasis    XR CHEST PORT    Result Date: 4/13/2023  Right lower lobe pneumonia is redemonstrated. Assessment//Plan           Problem List:  Hospital Problems  Never Reviewed            Codes Class Noted POA    Lactic acidosis ICD-10-CM: E87.20  ICD-9-CM: 276.2  4/13/2023 Unknown        Cardiomyopathy ([facility] [address] ICD-10-CM: I42.9  ICD-9-CM: 425.4  4/13/2023 Unknown        Fall ICD-10-CM: Xavier Vargas. Nelli Ludwigins  ICD-9-CM: E888.9  4/13/2023 Unknown        Hypoxia ICD-10-CM: R09.02  ICD-9-CM: 799.02  4/13/2023 Unknown        * (Principal) PNA (pneumonia) ICD-10-CM: J18.9  ICD-9-CM: 478  4/13/2023 Unknown        H/O mechanical aortic valve replacement ICD-10-CM: Z95.2  ICD-9-CM: V43.3  4/13/2023 Unknown        Multiple skin tears ICD-10-CM: T14. 8XXA  ICD-9-CM: 879.8  4/13/2023 Unknown        CKD (chronic kidney disease) ICD-10-CM: N18.9  ICD-9-CM: 585.9  2/4/2023 Unknown       Acute on Chronic Systolic and Diastolic CHF exacerbation   - BNP > 35,000 but cxr does not show any acute fluid overload but has 3+ edema lower extremities up to thigh  - monitor daily weights and input and output  - cardiology consult appreciated  - limited 2D echo in AM evaluate EF and IVC performed and follow results  -Continue education on fluid restriction  -Troponin x2 are negative  -Monitor on telemetry  -Patient consultation appreciated  -Restart Entresto tier 2 therapy twice daily and metoprolol succinate daily  -4/17: We will continue IV Lasix twice daily for additional 24 hours and transition to oral in a.m.      Right lower lobe pneumonia  -Afebrile and normal white blood count but chest x-ray shows continued right lower lobe opacity  -Zosyn x1 given the emergency room will continue every 8 hours  -Start doxycycline 100 mg IV twice daily  -Speech therapy evaluated patient and no obvious swallowing difficulties but patient does have poor oral care and high risk for aspiration and recommended outpatient modified barium swallow study once patient is medically stable  -Mucinex twice daily  -CT chest done on 4/16 shows by basilar subsegmental atelectasis with bilateral pleural drains cardiomyopathy with coronary artery calcification and for this reason discontinue IV antibiotics and changed to Augmentin 875/125 mg oral twice daily for additional 5 days     Frequent falls  -Status post fall prior to arrival with multiple skin tears and bruising noted  -Fall precautions  -Has been working with home health PT services and currently only has skilled nursing  -Therapy did evaluate the patient and patient needed max assist was able to stand but did not ambulate and overall is very weak and will have them evaluate once again on Monday   -PT/OT evaluation and recommendation appreciated at this time recommending skilled rehab   -Reevaluation on 4/17 was able to ambulate with walker but only a few steps and continues to be weak and recommended either skilled rehab or swing bed Chronic mitral valve metallic valve  -On chronic Coumadin therapy and currently INR is at 1.6 and notes that it was similar last week when PCP checked  -Pharmacy to help dose with Coumadin  -Patient's wife as well as patient himself did not know their dosing of Coumadin so nurse called pharmacy and the last orders were for patient to take half tablet of 7.5 mg of Coumadin and 1 mg tablet  -4/14: Patient was given Coumadin of 7.5 mg dose on 4/13 and INR currently is at 1.8 discussed with pharmacy possibly due to 6 mg dosing daily and continue to monitor  -4/15: INR jumped to 3 and therapeutic so held Lovenox bridging but with sudden increase possible falsely elevated secondary to antibiotics so we will hold Coumadin dosing today and reevaluate in a.m.  -4/17: INR at 2.5 and will continue 5mg oral daily     Hypothyroidism  - TSH elevated at 4.63 and free T4 is low at 0.5 so increased home levothyroxine 25mcg dose to 50mcg dose start on 4/17     Paroxysmal A-fib  -Restart home metoprolol succinate dosing and monitor on telemetry  -Maintain electrolytes potassium greater than 4 and magnesium greater than 2  -Previously was on amiodarone but states that it was stopped secondary to Coumadin interaction and supratherapeutic INR  -Currently with patient's INR subtherapeutic on admission start patient on 1 mg/kg SQ twice daily of Lovenox for bridging until becomes therapeutic  -4/15: Defibrillator/pacemaker device interrogated by [employer] report was placed in the chart and shows no dysfunction     CKD  -Previous creatinine was at 1.90 currently is low at 1.7 most likely secondary to fluid overload  -Continue current Entresto and IV diuresis with Lasix  -Monitor input output and daily weights  -Urinalysis negative for acute infection  -Patient states that on home Lasix dosing still having decreased urine output even though he has increased frequency  -Nephrology consultation appreciated  -4/15:  With current IV diuresis BUN/creatinine at 27/1.49  - 4/16: continues to be stable at 25/1.5   -4/17: Small jump in creatinine up to 1.62 so we will continue IV Lasix twice daily and possible transition to oral Lasix 40 mg twice daily in a.m. tomorrow    Urinary retention  -Bladder scan this morning showed about 195 cc of retention with patient noting trouble voiding at home with frequent small urine output discussed with patient on 4/14 for Trinidad catheter which will assist with urinary retention as well as monitoring input output and can be reevaluated once scrotal/penile swelling has improved  -4/17: Trinidad catheter removed patient continues to void and only minimal residual noted continue to monitor closely     Hypokalemia  -Low at 3.2 and is on chronic 10 mill equivalent oral daily supplementation  -We will start with 40 mEq twice daily first dose now of potassium supplementation and changed on 4/14 to 20meq KCL BID as K+ on 4/14 was 3.9 repeat on 4/15 slightly decreased at 3.7 so we will increase supplementation back to 40 mEq twice daily and repeat on 4/16 at 4.3 so change supplementation to 20meq kcl bid and repeat potassium on 4/17 stable at 4.5  - maintain K+ > 4    Chronic tobacco use  -Patient continues to chew tobacco and was using since admission but requested that he abide by policy and no tobacco products in the hospital  -Nicotine patch 21 mg placed on 4/16 and will change daily     DVT prophylaxis  -Lovenox 1 mg/kg SQ twice daily while INR is subtherapeutic and coumadin dosing as per pharmacy      CODE STATUS: Full code  Patient designates his wife as a medical decision-maker if for some reason he is unable to make decisions for himself       Spent 50 minutes evaluting and coordinating patient care    Disposition : Continue current IV diuresis monitor renal function possible skilled rehab on discharge        Electronically signed by Lissette Eddy MD on 4/17/2023 at 10:59 AM

## 2023-04-18 ENCOUNTER — HOSPITAL ENCOUNTER (OUTPATIENT)
Age: 83
Discharge: STILL ADMITTED OR ADMINISTRATIVE | End: 2023-04-18
Attending: INTERNAL MEDICINE | Admitting: INTERNAL MEDICINE

## 2023-04-18 ENCOUNTER — HOSPITAL ENCOUNTER (INPATIENT)
Age: 83
LOS: 7 days | Discharge: NURSING, REHAB OR LONG-TERM CARE | DRG: 291 | End: 2023-04-25
Attending: INTERNAL MEDICINE | Admitting: INTERNAL MEDICINE
Payer: MEDICARE

## 2023-04-18 ENCOUNTER — APPOINTMENT (OUTPATIENT)
Dept: VASCULAR SURGERY | Age: 83
DRG: 291 | End: 2023-04-18
Attending: NURSE PRACTITIONER
Payer: MEDICARE

## 2023-04-18 ENCOUNTER — APPOINTMENT (OUTPATIENT)
Dept: GENERAL RADIOLOGY | Age: 83
DRG: 291 | End: 2023-04-18
Attending: HOSPITALIST
Payer: MEDICARE

## 2023-04-18 VITALS
HEIGHT: 68 IN | SYSTOLIC BLOOD PRESSURE: 97 MMHG | TEMPERATURE: 97.3 F | WEIGHT: 163.13 LBS | BODY MASS INDEX: 24.72 KG/M2 | OXYGEN SATURATION: 100 % | HEART RATE: 75 BPM | RESPIRATION RATE: 18 BRPM | DIASTOLIC BLOOD PRESSURE: 71 MMHG

## 2023-04-18 DIAGNOSIS — G62.9 NEUROPATHY: Primary | ICD-10-CM

## 2023-04-18 PROBLEM — R33.9 URINARY RETENTION: Status: ACTIVE | Noted: 2023-04-18

## 2023-04-18 PROBLEM — I50.9 CHF EXACERBATION (HCC): Status: ACTIVE | Noted: 2023-04-18

## 2023-04-18 LAB
ANION GAP SERPL CALC-SCNC: 10 MMOL/L (ref 5–15)
APPEARANCE UR: CLEAR
ARTERIAL PATENCY WRIST A: YES
BACTERIA URNS QL MICRO: NEGATIVE /HPF
BASE DEFICIT BLDA-SCNC: 1.5 MMOL/L
BASOPHILS # BLD: 0 K/UL (ref 0–0.1)
BASOPHILS NFR BLD: 1 % (ref 0–1)
BDY SITE: ABNORMAL
BILIRUB UR QL: NEGATIVE
BUN SERPL-MCNC: 32 MG/DL (ref 6–20)
BUN/CREAT SERPL: 20 (ref 12–20)
CA-I BLD-MCNC: 8.1 MG/DL (ref 8.5–10.1)
CHLORIDE SERPL-SCNC: 106 MMOL/L (ref 97–108)
CO2 SERPL-SCNC: 22 MMOL/L (ref 21–32)
COHGB MFR BLD: 0.7 % (ref 1–2)
COLOR UR: NORMAL
CREAT SERPL-MCNC: 1.59 MG/DL (ref 0.7–1.3)
DIFFERENTIAL METHOD BLD: ABNORMAL
EOSINOPHIL # BLD: 0.1 K/UL (ref 0–0.4)
EOSINOPHIL NFR BLD: 1 % (ref 0–7)
ERYTHROCYTE [DISTWIDTH] IN BLOOD BY AUTOMATED COUNT: 22.1 % (ref 11.5–14.5)
FIO2 ON VENT: 24 %
GAS FLOW.O2 O2 DELIVERY SYS: 1 L/MIN
GLUCOSE SERPL-MCNC: 133 MG/DL (ref 65–100)
GLUCOSE UR STRIP.AUTO-MCNC: NEGATIVE MG/DL
HCO3 BLDA-SCNC: 22 MMOL/L (ref 22–26)
HCT VFR BLD AUTO: 44 % (ref 36.6–50.3)
HGB BLD-MCNC: 14.8 G/DL (ref 12.1–17)
HGB UR QL STRIP: NEGATIVE
IMM GRANULOCYTES # BLD AUTO: 0 K/UL (ref 0–0.04)
IMM GRANULOCYTES NFR BLD AUTO: 0 % (ref 0–0.5)
INR PPP: 3.1 (ref 0.9–1.1)
KETONES UR QL STRIP.AUTO: NEGATIVE MG/DL
LEUKOCYTE ESTERASE UR QL STRIP.AUTO: NEGATIVE
LYMPHOCYTES # BLD: 0.6 K/UL (ref 0.8–3.5)
LYMPHOCYTES NFR BLD: 8 % (ref 12–49)
MCH RBC QN AUTO: 31.6 PG (ref 26–34)
MCHC RBC AUTO-ENTMCNC: 33.6 G/DL (ref 30–36.5)
MCV RBC AUTO: 93.8 FL (ref 80–99)
METHGB MFR BLD: 0.3 % (ref 0–1.4)
MONOCYTES # BLD: 0.6 K/UL (ref 0–1)
MONOCYTES NFR BLD: 7 % (ref 5–13)
NEUTS SEG # BLD: 6.3 K/UL (ref 1.8–8)
NEUTS SEG NFR BLD: 83 % (ref 32–75)
NITRITE UR QL STRIP.AUTO: NEGATIVE
NRBC # BLD: 0 K/UL (ref 0–0.01)
NRBC BLD-RTO: 0 PER 100 WBC
OXYHGB MFR BLD: 97 % (ref 95–99)
PCO2 BLDA: 35 MMHG (ref 35–45)
PERFORMED BY, TECHID: ABNORMAL
PH BLDA: 7.42 (ref 7.35–7.45)
PH UR STRIP: 6 (ref 5–8)
PLATELET # BLD AUTO: 275 K/UL (ref 150–400)
PMV BLD AUTO: 11 FL (ref 8.9–12.9)
PO2 BLDA: 116 MMHG (ref 80–100)
POTASSIUM SERPL-SCNC: 4.5 MMOL/L (ref 3.5–5.1)
PROT UR STRIP-MCNC: NEGATIVE MG/DL
PROTHROMBIN TIME: 30.6 SEC (ref 11.9–14.6)
RBC # BLD AUTO: 4.69 M/UL (ref 4.1–5.7)
RBC #/AREA URNS HPF: NORMAL /HPF (ref 0–3)
RIGHT ARM BP: 84 MMHG
SAO2% DEVICE SAO2% SENSOR NAME: ABNORMAL
SODIUM SERPL-SCNC: 138 MMOL/L (ref 136–145)
SP GR UR REFRACTOMETRY: 1.01 (ref 1–1.03)
SPECIMEN SITE: ABNORMAL
UA: UC IF INDICATED,UAUC: NORMAL
UROBILINOGEN UR QL STRIP.AUTO: 0.2 EU/DL (ref 0.2–1)
WBC # BLD AUTO: 7.6 K/UL (ref 4.1–11.1)
WBC URNS QL MICRO: NORMAL /HPF (ref 0–5)

## 2023-04-18 PROCEDURE — 87086 URINE CULTURE/COLONY COUNT: CPT

## 2023-04-18 PROCEDURE — 93922 UPR/L XTREMITY ART 2 LEVELS: CPT

## 2023-04-18 PROCEDURE — 71045 X-RAY EXAM CHEST 1 VIEW: CPT

## 2023-04-18 PROCEDURE — 97535 SELF CARE MNGMENT TRAINING: CPT

## 2023-04-18 PROCEDURE — 80048 BASIC METABOLIC PNL TOTAL CA: CPT

## 2023-04-18 PROCEDURE — 74011250637 HC RX REV CODE- 250/637: Performed by: HOSPITALIST

## 2023-04-18 PROCEDURE — 36600 WITHDRAWAL OF ARTERIAL BLOOD: CPT

## 2023-04-18 PROCEDURE — 74011250636 HC RX REV CODE- 250/636: Performed by: HOSPITALIST

## 2023-04-18 PROCEDURE — 51798 US URINE CAPACITY MEASURE: CPT

## 2023-04-18 PROCEDURE — 85025 COMPLETE CBC W/AUTO DIFF WBC: CPT

## 2023-04-18 PROCEDURE — 82803 BLOOD GASES ANY COMBINATION: CPT

## 2023-04-18 PROCEDURE — 97530 THERAPEUTIC ACTIVITIES: CPT

## 2023-04-18 PROCEDURE — 74011250636 HC RX REV CODE- 250/636: Performed by: INTERNAL MEDICINE

## 2023-04-18 PROCEDURE — 65270000029 HC RM PRIVATE

## 2023-04-18 PROCEDURE — 85610 PROTHROMBIN TIME: CPT

## 2023-04-18 PROCEDURE — 81001 URINALYSIS AUTO W/SCOPE: CPT

## 2023-04-18 PROCEDURE — P9047 ALBUMIN (HUMAN), 25%, 50ML: HCPCS | Performed by: INTERNAL MEDICINE

## 2023-04-18 PROCEDURE — 74011000250 HC RX REV CODE- 250: Performed by: HOSPITALIST

## 2023-04-18 PROCEDURE — 74011250637 HC RX REV CODE- 250/637: Performed by: INTERNAL MEDICINE

## 2023-04-18 PROCEDURE — 74011000250 HC RX REV CODE- 250: Performed by: INTERNAL MEDICINE

## 2023-04-18 RX ORDER — GABAPENTIN 100 MG/1
200 CAPSULE ORAL 2 TIMES DAILY
Status: DISCONTINUED | OUTPATIENT
Start: 2023-04-18 | End: 2023-04-25 | Stop reason: HOSPADM

## 2023-04-18 RX ORDER — AMOXICILLIN AND CLAVULANATE POTASSIUM 875; 125 MG/1; MG/1
1 TABLET, FILM COATED ORAL 2 TIMES DAILY WITH MEALS
Status: COMPLETED | OUTPATIENT
Start: 2023-04-19 | End: 2023-04-21

## 2023-04-18 RX ORDER — ACETAMINOPHEN 325 MG/1
650 TABLET ORAL
Status: DISCONTINUED | OUTPATIENT
Start: 2023-04-18 | End: 2023-04-25 | Stop reason: HOSPADM

## 2023-04-18 RX ORDER — ACETAMINOPHEN 650 MG/1
650 SUPPOSITORY RECTAL
Status: DISCONTINUED | OUTPATIENT
Start: 2023-04-18 | End: 2023-04-25 | Stop reason: HOSPADM

## 2023-04-18 RX ORDER — SODIUM CHLORIDE 0.9 % (FLUSH) 0.9 %
5-40 SYRINGE (ML) INJECTION AS NEEDED
Status: DISCONTINUED | OUTPATIENT
Start: 2023-04-18 | End: 2023-04-25 | Stop reason: HOSPADM

## 2023-04-18 RX ORDER — GUAIFENESIN 600 MG/1
600 TABLET, EXTENDED RELEASE ORAL EVERY 12 HOURS
Status: DISCONTINUED | OUTPATIENT
Start: 2023-04-18 | End: 2023-04-19

## 2023-04-18 RX ORDER — IBUPROFEN 200 MG
1 TABLET ORAL DAILY
Status: DISCONTINUED | OUTPATIENT
Start: 2023-04-19 | End: 2023-04-25 | Stop reason: HOSPADM

## 2023-04-18 RX ORDER — POTASSIUM CHLORIDE 20 MEQ/1
20 TABLET, EXTENDED RELEASE ORAL DAILY
Status: DISCONTINUED | OUTPATIENT
Start: 2023-04-19 | End: 2023-04-25 | Stop reason: HOSPADM

## 2023-04-18 RX ORDER — ONDANSETRON 4 MG/1
4 TABLET, ORALLY DISINTEGRATING ORAL
Status: DISCONTINUED | OUTPATIENT
Start: 2023-04-18 | End: 2023-04-25 | Stop reason: HOSPADM

## 2023-04-18 RX ORDER — ALBUMIN HUMAN 250 G/1000ML
12.5 SOLUTION INTRAVENOUS ONCE
Status: COMPLETED | OUTPATIENT
Start: 2023-04-18 | End: 2023-04-18

## 2023-04-18 RX ORDER — ONDANSETRON 2 MG/ML
4 INJECTION INTRAMUSCULAR; INTRAVENOUS
Status: DISCONTINUED | OUTPATIENT
Start: 2023-04-18 | End: 2023-04-25 | Stop reason: HOSPADM

## 2023-04-18 RX ORDER — SODIUM CHLORIDE 0.9 % (FLUSH) 0.9 %
5-40 SYRINGE (ML) INJECTION EVERY 8 HOURS
Status: DISCONTINUED | OUTPATIENT
Start: 2023-04-18 | End: 2023-04-25 | Stop reason: HOSPADM

## 2023-04-18 RX ORDER — LEVOTHYROXINE SODIUM 25 UG/1
50 TABLET ORAL DAILY
Status: DISCONTINUED | OUTPATIENT
Start: 2023-04-19 | End: 2023-04-19

## 2023-04-18 RX ORDER — TAMSULOSIN HYDROCHLORIDE 0.4 MG/1
0.4 CAPSULE ORAL DAILY
Status: DISCONTINUED | OUTPATIENT
Start: 2023-04-18 | End: 2023-04-18 | Stop reason: HOSPADM

## 2023-04-18 RX ORDER — POLYETHYLENE GLYCOL 3350 17 G/17G
17 POWDER, FOR SOLUTION ORAL DAILY PRN
Status: DISCONTINUED | OUTPATIENT
Start: 2023-04-18 | End: 2023-04-25 | Stop reason: HOSPADM

## 2023-04-18 RX ORDER — CETIRIZINE HYDROCHLORIDE 10 MG/1
10 TABLET ORAL EVERY EVENING
Status: DISCONTINUED | OUTPATIENT
Start: 2023-04-19 | End: 2023-04-25 | Stop reason: HOSPADM

## 2023-04-18 RX ORDER — TAMSULOSIN HYDROCHLORIDE 0.4 MG/1
0.4 CAPSULE ORAL DAILY
Status: DISCONTINUED | OUTPATIENT
Start: 2023-04-19 | End: 2023-04-25 | Stop reason: HOSPADM

## 2023-04-18 RX ADMIN — SODIUM CHLORIDE, PRESERVATIVE FREE 10 ML: 5 INJECTION INTRAVENOUS at 05:24

## 2023-04-18 RX ADMIN — METOPROLOL SUCCINATE 25 MG: 25 TABLET, EXTENDED RELEASE ORAL at 08:28

## 2023-04-18 RX ADMIN — GABAPENTIN 200 MG: 100 CAPSULE ORAL at 22:51

## 2023-04-18 RX ADMIN — DOCUSATE SODIUM 100 MG: 100 CAPSULE, LIQUID FILLED ORAL at 08:28

## 2023-04-18 RX ADMIN — SODIUM CHLORIDE, PRESERVATIVE FREE 10 ML: 5 INJECTION INTRAVENOUS at 08:29

## 2023-04-18 RX ADMIN — LEVOTHYROXINE SODIUM 50 MCG: 50 TABLET ORAL at 05:23

## 2023-04-18 RX ADMIN — TAMSULOSIN HYDROCHLORIDE 0.4 MG: 0.4 CAPSULE ORAL at 08:27

## 2023-04-18 RX ADMIN — SACUBITRIL AND VALSARTAN 2 TABLET: 24; 26 TABLET, FILM COATED ORAL at 08:28

## 2023-04-18 RX ADMIN — POTASSIUM CHLORIDE 20 MEQ: 1500 TABLET, EXTENDED RELEASE ORAL at 08:28

## 2023-04-18 RX ADMIN — GABAPENTIN 200 MG: 100 CAPSULE ORAL at 08:28

## 2023-04-18 RX ADMIN — AMOXICILLIN AND CLAVULANATE POTASSIUM 1 TABLET: 875; 125 TABLET, FILM COATED ORAL at 17:01

## 2023-04-18 RX ADMIN — GUAIFENESIN 600 MG: 600 TABLET, EXTENDED RELEASE ORAL at 22:50

## 2023-04-18 RX ADMIN — CETIRIZINE HYDROCHLORIDE 10 MG: 10 TABLET, FILM COATED ORAL at 17:00

## 2023-04-18 RX ADMIN — SODIUM CHLORIDE 250 ML: 9 INJECTION, SOLUTION INTRAVENOUS at 14:58

## 2023-04-18 RX ADMIN — WARFARIN SODIUM 5 MG: 5 TABLET ORAL at 17:01

## 2023-04-18 RX ADMIN — SODIUM CHLORIDE, PRESERVATIVE FREE 10 ML: 5 INJECTION INTRAVENOUS at 13:02

## 2023-04-18 RX ADMIN — GUAIFENESIN 600 MG: 600 TABLET, EXTENDED RELEASE ORAL at 08:27

## 2023-04-18 RX ADMIN — ALBUMIN (HUMAN) 12.5 G: 0.25 INJECTION, SOLUTION INTRAVENOUS at 23:15

## 2023-04-18 RX ADMIN — AMOXICILLIN AND CLAVULANATE POTASSIUM 1 TABLET: 875; 125 TABLET, FILM COATED ORAL at 08:28

## 2023-04-18 RX ADMIN — SODIUM CHLORIDE, PRESERVATIVE FREE 10 ML: 5 INJECTION INTRAVENOUS at 22:51

## 2023-04-18 RX ADMIN — FUROSEMIDE 40 MG: 10 INJECTION, SOLUTION INTRAMUSCULAR; INTRAVENOUS at 08:28

## 2023-04-18 RX ADMIN — SODIUM CHLORIDE 250 ML: 9 INJECTION, SOLUTION INTRAVENOUS at 16:23

## 2023-04-18 NOTE — PROGRESS NOTES
PHYSICAL THERAPY TREATMENT  Patient: Pavan Rodrigez (74 y.o. male)  Date: 4/18/2023  Diagnosis: Fall [W19. XXXA]  Cardiomyopathy ([address] [I42.9]  PNA (pneumonia) [J18.9]  Hypoxia [R09.02]  CKD (chronic kidney disease) [N18.9]  Lactic acidosis [E87.20]  H/O mechanical aortic valve replacement [Z95.2]  Multiple skin tears [T14. 8XXA] PNA (pneumonia)      Precautions: Fall, Other (comment) (New O2)  Chart, physical therapy assessment, plan of care and goals were reviewed. ASSESSMENT  Patient continues with skilled PT services and is progressing towards goals. Pt able to perform supine > sit EOB with min A of 1. Cueing for hand placement. Able to stand x2 from bed to RW with min A of 1. Current Level of Function Impacting Discharge (mobility/balance): Decreased strength. Decreased mobility. Decreased endurance. Other factors to consider for discharge: TBD         PLAN :  Patient continues to benefit from skilled intervention to address the above impairments. Continue treatment per established plan of care. to address goals. Recommendation for discharge: (in order for the patient to meet his/her long term goals)  To be determined: This discharge recommendation:  Has been made in collaboration with the attending provider and/or case management    IF patient discharges home will need the following DME: to be determined (TBD)       SUBJECTIVE:   Patient stated Edi Purchase I'm tired.     OBJECTIVE DATA SUMMARY:   Critical Behavior:  Neurologic State: Alert  Orientation Level: Oriented to person, Oriented to place, Oriented to situation  Cognition: Appropriate safety awareness, Appropriate for age attention/concentration, Appropriate decision making  Safety/Judgement: Decreased awareness of need for assistance, Fall prevention, Home safety  Functional Mobility Training:  Bed Mobility:     Supine to Sit: Minimum assistance     Scooting: Minimum assistance     Transfers:  Sit to Stand: Minimum assistance;Contact guard assistance  Stand to Sit: Contact guard assistance    Balance:  Sitting: Intact  Standing: Intact; With support  Standing - Static: Fair    Treatment Session:   Pt supine in bed upon entry to room. Pt's wife and family friend present. Pt able to be awaken by saying his name and agreeable to therapy. Pt able to perform supine > sit EOB with min A of 1 with cueing for hand placement. EOB static sitting no LOB. Pt able to stand x2 from bed to RW with min A of 1. While pt was sitting on EOB, Radiology came into room and needed a chest x-ray. Pt was left in care with radiology sitting on EOB. Pain Ratin    Activity Tolerance:   Fair    After treatment patient left in no apparent distress:   Caregiver / family present and pt sitting EOB in care of radiology    COMMUNICATION/COLLABORATION:   The patients plan of care was discussed with: Physical therapist.     Tom Shahid PTA, LPTA   Time Calculation: 10 mins         Problem: Mobility Impaired (Adult and Pediatric)  Goal: *Acute Goals and Plan of Care (Insert Text)  Description: FUNCTIONAL STATUS PRIOR TO ADMISSION: Patient was modified independent using a walker, rollator, and single point cane for functional mobility. HOME SUPPORT PRIOR TO ADMISSION: The patient lived with wife and required minimal assistance/contact guard assist for bathing, gait, getting in and out of bed and vehicle. Physical Therapy Goals  Initiated 2023  1. Patient will move from supine to sit and sit to supine  in bed with minimal assistance/contact guard assist within 7 day(s). 2.  Patient will transfer from bed to chair and chair to bed with minimal assistance/contact guard assist using the least restrictive device within 7 day(s). 3.  Patient will perform sit to stand with minimal assistance/contact guard assist within 7 day(s). 4.  Patient will ambulate with minimal assistance/contact guard assist for 40 feet with the least restrictive device within 7 day(s).    5. Patient will ascend/descend 3 stairs with 2 handrail(s) with minimal assistance/contact guard assist within 7 day(s).      Outcome: Progressing Towards Goal

## 2023-04-18 NOTE — PROGRESS NOTES
OCCUPATIONAL THERAPY TREATMENT  Patient: Donovan Guzman (61 y.o. male)  Date: 4/18/2023  Diagnosis: Fall [W19. XXXA]  Cardiomyopathy ([address] [I42.9]  PNA (pneumonia) [J18.9]  Hypoxia [R09.02]  CKD (chronic kidney disease) [N18.9]  Lactic acidosis [E87.20]  H/O mechanical aortic valve replacement [Z95.2]  Multiple skin tears [T14. 8XXA] PNA (pneumonia)      Precautions: Fall  Chart, occupational therapy assessment, plan of care, and goals were reviewed. ASSESSMENT  Patient continues with skilled OT services and is making slow progress towards goals. Patient lethargic this date, with NC O2 in place, but not on. Patient O2 sats at 96% with monitor in place on patient's ear. Patient agrees to get OOB for lunch. Patient with saturated brief and sheets. Patient requires Mod A for sup to sit, good sitting balance EOB. Patient requires Min A for sit to stand this date, Min A for short distance mobility to chair. Patient requires Mod A for sit to stand from low chair, Max A to don brief in standing with patient able to maintain standing for brief time before requiring seated rest. Patient with slow responses to questions, reports feeling OK. Patient O2 sats vary throughout visit from 99% to 84%. Patient with no signs of SOB, lethargic throughout. Nurse arrives at end of visit to take vitals, wife also in room and patient left with call bell within reach. Current Level of Function Impacting Discharge (ADLs): Mod A for bathing, dressing, and toileting tasks     Other factors to consider for discharge: need for 24 hour assist          PLAN :  Patient continues to benefit from skilled intervention to address the above impairments. Continue treatment per established plan of care. to address goals.     Recommend with staff: Mally De Leon for meals and toileting     Recommend next OT session: bathing seated EOB    Recommendation for discharge: (in order for the patient to meet his/her long term goals)  Therapy up to 5 days/week in SNF setting    This discharge recommendation:  Has been made in collaboration with the attending provider and/or case management    IF patient discharges home will need the following DME: bedside commode and hospital bed       SUBJECTIVE:   Patient stated Yusuf Hodge, that's ok.     OBJECTIVE DATA SUMMARY:   Cognitive/Behavioral Status:  Neurologic State: Lethargic  Orientation Level: Oriented to person  Cognition: Appropriate safety awareness; Appropriate for age attention/concentration; Appropriate decision making        Safety/Judgement: Decreased awareness of need for assistance;Decreased awareness of need for safety    Functional Mobility and Transfers for ADLs:  Bed Mobility:  Supine to Sit: Moderate assistance  Scooting: Minimum assistance    Transfers:  Sit to Stand: Minimum assistance     Bed to Chair: Minimum assistance    Balance:  Sitting: Intact; High guard  Standing: Intact; With support  Standing - Static: Fair    ADL Intervention:    Cognitive Retraining  Safety/Judgement: Decreased awareness of need for assistance;Decreased awareness of need for safety    Pain:  0/10    Treatment Session:   Patient lethargic this date, with NC O2 in place, but not on. Patient O2 sats at 96% with monitor in place on patient's ear. Patient agrees to get OOB for lunch. Patient with saturated brief and sheets. Patient requires Mod A for sup to sit, good sitting balance EOB. Patient requires Min A for sit to stand this date, Min A for short distance mobility to chair. Patient requires Mod A for sit to stand from low chair, Max A to don brief in standing with patient able to maintain standing for brief time before requiring seated rest. Patient with slow responses to questions, reports feeling OK. Patient O2 sats vary throughout visit from 99% to 84%. Patient with no signs of SOB, lethargic throughout. Nurse arrives at end of visit to take vitals, wife also in room and patient left with call bell within reach.      Activity Tolerance: Fair and requires rest breaks  Please refer to the flowsheet for vital signs taken during this treatment. After treatment patient left in no apparent distress:   Sitting in chair, Call bell within reach, and Caregiver / family present    COMMUNICATION/COLLABORATION:   The patients plan of care was discussed with: Physical therapist.     EDSON Helms/L  Time Calculation: 18 mins   Problem: Self Care Deficits Care Plan (Adult)  Goal: *Acute Goals and Plan of Care (Insert Text)  Description:   FUNCTIONAL STATUS PRIOR TO ADMISSION: Patient was modified independent using a rollator and rolling walker for functional mobility. HOME SUPPORT: The patient lived with wife and required supervision/set-up for bathing, dressing, and toileting tasks. Occupational Therapy Goals  Initiated 4/14/2023  1. Patient will perform bathing with minimal assistance/contact guard assist within 7 day(s). 2.  Patient will perform upper body dressing with supervision/set-up within 7 day(s). 3.  Patient will perform lower body dressing with supervision/set-up within 7 day(s). 4.  Patient will perform toilet transfers with supervision/set-up within 7 day(s). 5.  Patient will perform all aspects of toileting with modified independence within 7 day(s). 6.  Patient will participate in upper extremity therapeutic exercise/activities with independence for 10 minutes within 7 day(s). 7.  Patient will utilize energy conservation techniques during functional activities with verbal and visual cues within 7 day(s).            Outcome: Progressing Towards Goal

## 2023-04-18 NOTE — ROUTINE PROCESS
Has been inc of urine during shift. Sitting on side of bed without assistance, instructed several times not to get up alone. Wife in.

## 2023-04-18 NOTE — PROGRESS NOTES
CARDIOLOGY PROGRESS NOTE      Patient Name: Diogenes Bates  Age: [de-identified] y.o. Gender:male  :1940  MRN: [id number]    Patient seen and examined. This is a patient who is followed for acute on chronic heart failure with severely reduced EF 5-10%, mechanical MV. Family at the bedside. Confused today. Awakens to voice. Denies chest pain. Wife reports feet are cold, says they feel numb. No other complaints reported. Telemetry reviewed, vpaced    Pertinent review of systems items noted above, all other systems are negative. Current medications reviewed. Physical Examination  No Known Allergies  Visit Vitals  BP 97/73 (BP 1 Location: Right upper arm)   Pulse 74   Temp 97.5 °F (36.4 °C)   Resp 18   Ht 5' 7.99\" (1.727 m)   Wt 74 kg (163 lb 2 oz)   SpO2 98%   BMI 24.81 kg/m²     Vital signs are stable. Drowsy  Heart has a regular rate and rhythm. Systolic murmur, crisp click  Lungs are coarse with crackles  Abdomen is soft, nontender, normal bowel sounds. Extremities have moderate pitting edema. Skin is dry and cool  Confused    Labs reviewed: Lab results reviewed. For significant abnormal values and values requiring intervention, see assessment and plan.   Recent Results (from the past 12 hour(s))   URINALYSIS W/ REFLEX CULTURE    Collection Time: 23  8:50 AM    Specimen: Urine   Result Value Ref Range    Color Yellow/Straw      Appearance Clear Clear      Specific gravity 1.015 1.003 - 1.030      pH (UA) 6.0 5.0 - 8.0      Protein Negative Negative mg/dL    Glucose Negative Negative mg/dL    Ketone Negative Negative mg/dL    Bilirubin Negative Negative      Blood Negative Negative      Urobilinogen 0.2 0.2 - 1.0 EU/dL    Nitrites Negative Negative      Leukocyte Esterase Negative Negative      WBC 0-4 0 - 5 /hpf    RBC 0-5 0 - 3 /hpf    Bacteria Negative Negative /hpf    UA:UC IF INDICATED Culture not indicated by UA result Culture not indicated by UA result          Case discussed with Dr. Jaspal Briggs and our impression and recommendations are as follows:  Acute on Chronic HFrEF: EF 5-10%. S/p ICD. BNP 35,000, significantly hypervolemic on admission. Awaiting labs today. Confused overnight, back on oxygen. Urine sent out, cxr ordered. May need to consider inotropes. Hold entresto to allow BP room for diuresis. Mechanical Mitral Valve: Approx 20 years ago at [facility] per patient. On Coumadin, INR pending today. Goal 2.5-3.5. INR monitored by OP cardiologist.  Atrial Fibrillation: Currently V-paced. Continue Toprol as well as Coumadin for stroke prevention. Pneumonia: Treatment per primary  Numb feet, check MICHAEL    Please do not hesitate to call me or Dr. Jaspal Briggs if additional questions arise.     Nazanin Sender, NP  4/18/2023

## 2023-04-18 NOTE — ROUTINE PROCESS
Both lab techs attempted to draw blood and was unsuccessful. Unit manager is attempting to draw blood.

## 2023-04-18 NOTE — ROUTINE PROCESS
TRANSFER - OUT REPORT:    Verbal report given to Sheila Abbott RN(name) on Harpreet Solorzano  being transferred to Telemetry(unit) for change in patient condition(LOW BP)       Report consisted of patients Situation, Background, Assessment and   Recommendations(SBAR). Information from the following report(s) SBAR was reviewed with the receiving nurse. Lines:   Peripheral IV 04/17/23 Posterior;Right Forearm (Active)   Site Assessment Clean, dry, & intact 04/18/23 0723   Phlebitis Assessment 0 04/18/23 0723   Infiltration Assessment 0 04/18/23 0723   Dressing Status Clean, dry, & intact 04/18/23 0723   Dressing Type Transparent 04/18/23 0723   Hub Color/Line Status Green 04/18/23 0723   Alcohol Cap Used Yes 04/18/23 0723        Opportunity for questions and clarification was provided.       Patient transported with:   Monitor o2 at 2l /min per nc

## 2023-04-18 NOTE — ROUTINE PROCESS
Bedside shift change report given to abena (oncoming nurse) by Lenora Palmer (offgoing nurse). Report included the following information Kardex.

## 2023-04-18 NOTE — PROGRESS NOTES
Physical Therapy attempted treatment. Patient and wife present, patient sound asleep. DPT attempted awakening patient with verbal/tactile cues and sternal rub. Patient could barely keep an eye opened, and wife states he had a rough morning/last night and didn't sleep. Will re-attempt treatment at a later time.

## 2023-04-18 NOTE — DISCHARGE SUMMARY
Physician Discharge Summary       Patient: Jourdan Monahan MRN: [id number]     YOB: 1940  Age: [de-identified] y.o. Sex: male    PCP: Ron Bennett NP    Allergies: Patient has no known allergies. Admit date: 4/13/2023  Admitting Provider: Abhijit Mallory MD    Discharge date: 4/18/2023  Discharging Provider: Abhijit Mallory MD    * Admission Diagnoses:   Fall [W19. XXXA]  Cardiomyopathy ([address] [I42.9]  PNA (pneumonia) [J18.9]  Hypoxia [R09.02]  CKD (chronic kidney disease) [N18.9]  Lactic acidosis [E87.20]  H/O mechanical aortic valve replacement [Z95.2]  Multiple skin tears [T14. 8XXA]      * Discharge Diagnoses:    Hospital Problems as of 4/18/2023 Never Reviewed            Codes Class Noted - Resolved POA    Urinary retention ICD-10-CM: R33.9  ICD-9-CM: 788.20  4/18/2023 - Present Unknown        Lactic acidosis ICD-10-CM: E87.20  ICD-9-CM: 276.2  4/13/2023 - Present Unknown        Cardiomyopathy ([facility] [address] ICD-10-CM: I42.9  ICD-9-CM: 425.4  4/13/2023 - Present Unknown        Fall ICD-10-CM: W19. Mary Karthik  ICD-9-CM: E888.9  4/13/2023 - Present Unknown        Hypoxia ICD-10-CM: R09.02  ICD-9-CM: 799.02  4/13/2023 - Present Unknown        * (Principal) PNA (pneumonia) ICD-10-CM: J18.9  ICD-9-CM: 486  4/13/2023 - Present Unknown        H/O mechanical aortic valve replacement ICD-10-CM: Z95.2  ICD-9-CM: V43.3  4/13/2023 - Present Unknown        Multiple skin tears ICD-10-CM: T14. 8XXA  ICD-9-CM: 879.8  4/13/2023 - Present Unknown        CKD (chronic kidney disease) ICD-10-CM: N18.9  ICD-9-CM: 585.9  2/4/2023 - Present Unknown             * Hospital Course:   HPI as per admitting provider on 4/13/2023    Jourdan Monahan is a [de-identified] y.o. male followed by Ron Bennett NP and Cardiologist Dr. Lyle Alvarez MD   [facility] CHF (congestive heart failure) EF5-10% with grade 2 Diastolic dysfunction, hx of Fall, H/O metallic Aortic valve replacement on coumadin, and Neuropathy, Hypothyroidism,     Presents from home after an accidental fall while using his rolling walker in the kitchen and lost his  on his walker and the walker slipped forward and he fell to the ground he denies any head trauma or loss of consciousness but he had a significant skin lacerations to the left arm and the right side of the neck and a few further skin tears on his right hand. Patient even though came for the fall he has been having increasing lower extremity edema initially as ankle/foot edema but has been worsening and even causing increased swelling about the thigh and also with penile and scrotal swelling. He says that his current weight about 172 pounds he was previously at 158 pounds. Supposed be on 40 mg Lasix today which she says he is compliant with but does not appear to be compliant with fluid restriction as he says he is constantly thirsty. He denies any chest pain or palpitations and recently did follow-up with Dr. Melia Rivero his cardiologist which he underwent 2 cardioversions but both failed to convert his A-fib previously patient was on amiodarone because his INR to become elevated and so was stopped. Patient has had increased weakness and was started with home health and wanted to transition to outpatient physical therapy. But patient has fell at least 2-3 times this week and multiple times over this past several weeks. He does also note a congestive cough and shortness of breath but also says that he has had decreased urine output. He is on chronic Coumadin therapy last INR check last week was in similar range at 1.6. On evaluation patient chest x-ray did not show overt fluid overload but did show a consistent right lower lobe infiltrate similar to x-ray when patient was evaluated in the emergency room at [address] in February.   White blood count was normal.  On arrival blood pressure 108/74 with a heart rate of 85 initially was 96 on room air but on further evaluation noted to be 92% on room air and was then placed on 2 L. Troponin obtained and was 62 but BNP was noted to be elevated greater than 35,000 lactic acid was 3 and repeat was in similar range. BUN/creatinine of 28/1.7 slightly improved from baseline where previously creatinine was at 1.98. Patient was given Zosyn x1 and was referred for admission for right lower lobe pneumonia, acute on chronic systolic and diastolic heart failure exacerbation, lactic acidosis, frequent falls, paroxysmal A-fib, history of metallic aortic valve replacement 1 continue Coumadin therapy but currently is subtherapeutic at 1.6. Acute on Chronic Systolic and Diastolic CHF exacerbation   - BNP > 35,000 but cxr does not show any acute fluid overload but has 3+ edema lower extremities up to thigh  - monitor daily weights and input and output  - cardiology consult appreciated  - limited 2D echo in AM evaluate EF and IVC performed and follow results  -Continue education on fluid restriction  -Troponin x2 are negative  -Monitor on telemetry  -Patient consultation appreciated  -Restart Entresto tier 2 therapy twice daily and metoprolol succinate daily  -Reevaluation patient overnight had increased confusion and recurrence of hypoxia and requiring O2 once again. Over last few days patient on Sunday was doing quite well increased energy out of bed to chair today once again he has increased weakness and noted poor circulation in lower extremities continues to have lower extremity edema of 2-3+ and chest x-ray to evaluate hypoxia noted moderate right and small to moderate left pleural effusion and with blood pressure being low this morning discussed with cardiology and she recommends transfer for inotropic support will assist for further removal of fluid.   [facility] Barak Olvera and need for transfer     Right lower lobe pneumonia  -Afebrile and normal white blood count but chest x-ray shows continued right lower lobe opacity  -Zosyn x1 given the emergency room will continue every 8 hours  -Start doxycycline 100 mg IV twice daily  -Speech therapy evaluated patient and no obvious swallowing difficulties but patient does have poor oral care and high risk for aspiration and recommended outpatient modified barium swallow study once patient is medically stable  -Mucinex twice daily  -CT chest done on 4/16 shows by basilar subsegmental atelectasis with bilateral pleural drains cardiomyopathy with coronary artery calcification and for this reason discontinue IV antibiotics and changed to Augmentin 875/125 mg oral twice daily for additional 5 days  -This x-ray portable done on 4/18 shows moderate effusions on the right small to moderate on the left but also noting bibasilar opacities.   We will continue on oral antibiotic currently patient is afebrile he is now requiring O2 and most likely is secondary to the continued pleural effusions     Frequent falls  -Status post fall prior to arrival with multiple skin tears and bruising noted  -Fall precautions  -Has been working with home health PT services and currently only has skilled nursing  -Therapy did evaluate the patient and patient needed max assist was able to stand but did not ambulate and overall is very weak and will have them evaluate once again on Monday   -PT/OT evaluation and recommendation appreciated at this time recommending skilled rehab   -On 4/18 patient had more trouble working with therapy had increased weakness and most likely secondary to his cardiomyopathy and fluid overload that has reoccurred for this reason we will need transfer but once medically stable for discharge patient would benefit skilled rehab and would be a good candidate for our swing bed here at [facility]     Chronic mitral valve metallic valve  -On chronic Coumadin therapy and currently INR is at 1.6 and notes that it was similar last week when PCP checked  -Pharmacy to help dose with Coumadin  -Patient's wife as well as patient himself did not know their dosing of Coumadin so nurse called pharmacy and the last orders were for patient to take half tablet of 7.5 mg of Coumadin and 1 mg tablet  -4/14: Patient was given Coumadin of 7.5 mg dose on 4/13 and INR currently is at 1.8 discussed with pharmacy possibly due to 6 mg dosing daily and continue to monitor  -4/15: INR jumped to 3 and therapeutic so held Lovenox bridging but with sudden increase possible falsely elevated secondary to antibiotics so we will hold Coumadin dosing today and reevaluate in a.m.  -4/17: INR at 2.5 and will continue 5mg oral daily   4/18: INR is pending at this time      Hypothyroidism  - TSH elevated at 4.63 and free T4 is low at 0.5 so increased home levothyroxine 25mcg dose to 50mcg dose start on 4/17     Paroxysmal A-fib  -Restart home metoprolol succinate dosing and monitor on telemetry  -Maintain electrolytes potassium greater than 4 and magnesium greater than 2  -Previously was on amiodarone but states that it was stopped secondary to Coumadin interaction and supratherapeutic INR  -Currently with patient's INR subtherapeutic on admission start patient on 1 mg/kg SQ twice daily of Lovenox for bridging until becomes therapeutic  -4/15: Defibrillator/pacemaker device interrogated by [employer] report was placed in the chart and shows no dysfunction     CKD  -Previous creatinine was at 1.90 currently is low at 1.7 most likely secondary to fluid overload  -Continue current Entresto and IV diuresis with Lasix  -Monitor input output and daily weights  -Urinalysis negative for acute infection  -Patient states that on home Lasix dosing still having decreased urine output even though he has increased frequency  -Nephrology consultation appreciated  -4/15:  With current IV diuresis BUN/creatinine at 27/1.49  - 4/16: continues to be stable at 25/1.5   -4/17: Small jump in creatinine up to 1.62 so we will continue IV Lasix twice daily and possible transition to oral Lasix 40 mg twice daily in a.m. tomorrow  -4/18 labs are pending for this a.m. but diuresis on hold secondary to hypotension     Urinary retention  -Bladder scan this morning showed about 195 cc of retention with patient noting trouble voiding at home with frequent small urine output discussed with patient on 4/14 for Trinidad catheter which will assist with urinary retention as well as monitoring input output and can be reevaluated once scrotal/penile swelling has improved  -4/18: Patient at times has been having urinary retention about less than 200 cc patient has not wish for Trinidad catheter to be placed back again started on Flomax and will need continued close monitoring for retention     Hypokalemia  -Low at 3.2 and is on chronic 10 mill equivalent oral daily supplementation  -We will start with 40 mEq twice daily first dose now of potassium supplementation and changed on 4/14 to 20meq KCL BID as K+ on 4/14 was 3.9 repeat on 4/15 slightly decreased at 3.7 so we will increase supplementation back to 40 mEq twice daily and repeat on 4/16 at 4.3 so change supplementation to 20meq kcl bid and repeat potassium on 4/17 stable at 4.5  - maintain K+ > 4     Chronic tobacco use  -Patient continues to chew tobacco and was using since admission but requested that he abide by policy and no tobacco products in the hospital  -Nicotine patch 21 mg placed on 4/16 and will change daily     DVT prophylaxis  -Lovenox 1 mg/kg SQ twice daily while INR is subtherapeutic and coumadin dosing as per pharmacy      CODE STATUS: Full code  Patient designates his wife as a medical decision-maker if for some reason he is unable to make decisions for himself    Was able to talk with hospitalist Dr. Lucy Nelson MD can accept patient on transfer once remote telemetry bed becomes available.   Did discuss about possibility of inotropic therapy and he did note that he can do not titrating inotropic drips on the remote telemetry floor. We will also continue to monitor patient blood pressure as well as awaiting labs that are still pending from this morning     * Procedures:   * No surgery found *        Consults:    CARDIOLOGY PROGRESS NOTE        Patient Name: Gareth Bowser  Age: [de-identified] y.o. Gender:male  :1940  MRN: [id number]     Patient seen and examined. This is a patient who is followed for acute on chronic heart failure with severely reduced EF 5-10%, mechanical MV. Family at the bedside. Confused today. Awakens to voice. Denies chest pain. Wife reports feet are cold, says they feel numb. No other complaints reported. Telemetry reviewed, vpaced     Pertinent review of systems items noted above, all other systems are negative. Current medications reviewed. Physical Examination  No Known Allergies  Visit Vitals  BP 97/73 (BP 1 Location: Right upper arm)   Pulse 74   Temp 97.5 °F (36.4 °C)   Resp 18   Ht 5' 7.99\" (1.727 m)   Wt 74 kg (163 lb 2 oz)   SpO2 98%   BMI 24.81 kg/m²      Vital signs are stable. Drowsy  Heart has a regular rate and rhythm. Systolic murmur, crisp click  Lungs are coarse with crackles  Abdomen is soft, nontender, normal bowel sounds. Extremities have moderate pitting edema. Skin is dry and cool  Confused     Labs reviewed: Lab results reviewed. For significant abnormal values and values requiring intervention, see assessment and plan.   Recent Results         Recent Results (from the past 12 hour(s))   URINALYSIS W/ REFLEX CULTURE     Collection Time: 23  8:50 AM     Specimen: Urine   Result Value Ref Range     Color Yellow/Straw       Appearance Clear Clear       Specific gravity 1.015 1.003 - 1.030       pH (UA) 6.0 5.0 - 8.0       Protein Negative Negative mg/dL     Glucose Negative Negative mg/dL     Ketone Negative Negative mg/dL     Bilirubin Negative Negative       Blood Negative Negative       Urobilinogen 0.2 0.2 - 1.0 EU/dL     Nitrites Negative Negative       Leukocyte Esterase Negative Negative       WBC 0-4 0 - 5 /hpf     RBC 0-5 0 - 3 /hpf     Bacteria Negative Negative /hpf     UA:UC IF INDICATED Culture not indicated by UA result Culture not indicated by UA result              Case discussed with Dr. Roldan Kang and our impression and recommendations are as follows:  Acute on Chronic HFrEF: EF 5-10%. S/p ICD. BNP 35,000, significantly hypervolemic on admission. Awaiting labs today. Confused overnight, back on oxygen. Urine sent out, cxr ordered. May need to consider inotropes. Hold entresto to allow BP room for diuresis. Mechanical Mitral Valve: Approx 20 years ago at [facility] per patient. On Coumadin, INR pending today. Goal 2.5-3.5. INR monitored by OP cardiologist.  Atrial Fibrillation: Currently V-paced. Continue Toprol as well as Coumadin for stroke prevention. Pneumonia: Treatment per primary  Numb feet, check MICHAEL     Please do not hesitate to call me or Dr. Roldan Kang if additional questions arise. Munira Pineda NP  4/18/2023       OCCUPATIONAL THERAPY TREATMENT  Patient: Juan Manuel Koehler (48 y.o. male)  Date: 4/18/2023  Diagnosis: Fall [W19. XXXA]  Cardiomyopathy ([address] [I42.9]  PNA (pneumonia) [J18.9]  Hypoxia [R09.02]  CKD (chronic kidney disease) [N18.9]  Lactic acidosis [E87.20]  H/O mechanical aortic valve replacement [Z95.2]  Multiple skin tears [T14. 8XXA] PNA (pneumonia)      Precautions: Fall  Chart, occupational therapy assessment, plan of care, and goals were reviewed. ASSESSMENT  Patient continues with skilled OT services and is making slow progress towards goals. Patient lethargic this date, with NC O2 in place, but not on. Patient O2 sats at 96% with monitor in place on patient's ear. Patient agrees to get OOB for lunch. Patient with saturated brief and sheets. Patient requires Mod A for sup to sit, good sitting balance EOB. Patient requires Min A for sit to stand this date, Min A for short distance mobility to chair.  Patient requires Mod A for sit to stand from low chair, Max A to don brief in standing with patient able to maintain standing for brief time before requiring seated rest. Patient with slow responses to questions, reports feeling OK. Patient O2 sats vary throughout visit from 99% to 84%. Patient with no signs of SOB, lethargic throughout. Nurse arrives at end of visit to take vitals, wife also in room and patient left with call bell within reach. Current Level of Function Impacting Discharge (ADLs): Mod A for bathing, dressing, and toileting tasks      Other factors to consider for discharge: need for 24 hour assist           PLAN :  Patient continues to benefit from skilled intervention to address the above impairments. Continue treatment per established plan of care. to address goals. Recommend with staff: Tino Cuenca for meals and toileting      Recommend next OT session: bathing seated EOB     Recommendation for discharge: (in order for the patient to meet his/her long term goals)  Therapy up to 5 days/week in SNF setting     This discharge recommendation:  Has been made in collaboration with the attending provider and/or case management     IF patient discharges home will need the following DME: bedside commode and hospital bed    PHYSICAL THERAPY TREATMENT  Patient: Brandi Felix (89 y.o. male)  Date: 4/18/2023  Diagnosis: Fall [W19. XXXA]  Cardiomyopathy ([facility] [address] [I42.9]  PNA (pneumonia) [J18.9]  Hypoxia [R09.02]  CKD (chronic kidney disease) [N18.9]  Lactic acidosis [E87.20]  H/O mechanical aortic valve replacement [Z95.2]  Multiple skin tears [T14. 8XXA] PNA (pneumonia)      Precautions: Fall, Other (comment) (New O2)  Chart, physical therapy assessment, plan of care and goals were reviewed. ASSESSMENT  Patient continues with skilled PT services and is progressing towards goals. Pt able to perform supine > sit EOB with min A of 1. Cueing for hand placement. Able to stand x2 from bed to RW with min A of 1.       Current Level of Function Impacting Discharge (mobility/balance): Decreased strength. Decreased mobility. Decreased endurance. Other factors to consider for discharge: TBD          PLAN :  Patient continues to benefit from skilled intervention to address the above impairments. Continue treatment per established plan of care. to address goals. Recommendation for discharge: (in order for the patient to meet his/her long term goals)  To be determined: This discharge recommendation:  Has been made in collaboration with the attending provider and/or case management     IF patient discharges home will need the following DME: to be determined (TBD)       Vitals Last 24 Hours:  Patient Vitals for the past 24 hrs:   Temp Pulse Resp BP SpO2   04/18/23 1159 98.3 °F (36.8 °C) 74 18 (!) 88/61 93 %   04/18/23 0950 -- -- -- -- 98 %   04/18/23 0940 -- -- -- -- 99 %   04/18/23 0800 -- 74 -- -- --   04/18/23 0723 97.5 °F (36.4 °C) 74 18 97/73 (!) 87 %   04/18/23 0715 -- -- -- -- (!) 82 %   04/18/23 0333 98 °F (36.7 °C) 73 18 95/72 94 %   04/17/23 2354 97.5 °F (36.4 °C) 74 20 99/71 91 %   04/17/23 2000 -- 78 -- -- --   04/17/23 1927 97.1 °F (36.2 °C) 78 20 104/74 --   04/17/23 1548 97.4 °F (36.3 °C) 81 20 100/71 --        Discharge Exam:  General: Alert, cooperative, no distress. Head:  Normocephalic, without obvious abnormality, atraumatic. Eyes:  Conjunctivae/corneas clear. Pupils equal, round, reactive to light. Extraocular movements intact. Lungs:  b/L air entry diminished bases   Chest wall: No tenderness or deformity. Heart:  Regular rate and rhythm, S1, S2 normal, + murmur, no click, rub, or gallop. Abdomen:      Distended mildly, Soft, non-tender. Bowel sounds normal. No masses. No organomegaly. Back:  No spine tenderness to palpation  Extremities:   3+ b/l pitting edema up to thighs with penile and scrotal Swelling which has improved   Pulses: Symmetric all extremities.   Skin:   Skin color, texture, turgor normal.   Lymph nodes: Cervical nodes normal.  Neurologic: CNII-XII intact. Generalized weakness , sensation, and reflexes throughout. Labs:  Recent Results (from the past 24 hour(s))   URINALYSIS W/ REFLEX CULTURE    Collection Time: 04/18/23  8:50 AM    Specimen: Urine   Result Value Ref Range    Color Yellow/Straw      Appearance Clear Clear      Specific gravity 1.015 1.003 - 1.030      pH (UA) 6.0 5.0 - 8.0      Protein Negative Negative mg/dL    Glucose Negative Negative mg/dL    Ketone Negative Negative mg/dL    Bilirubin Negative Negative      Blood Negative Negative      Urobilinogen 0.2 0.2 - 1.0 EU/dL    Nitrites Negative Negative      Leukocyte Esterase Negative Negative      WBC 0-4 0 - 5 /hpf    RBC 0-5 0 - 3 /hpf    Bacteria Negative Negative /hpf    UA:UC IF INDICATED Culture not indicated by UA result Culture not indicated by UA result     ANKLE BRACHIAL INDEX    Collection Time: 04/18/23 11:43 AM   Result Value Ref Range    Right arm BP 84 mmHg         Imaging:  CT HEAD WO CONT    Result Date: 4/13/2023  No acute abnormality. CT CHEST WO CONT    Result Date: 4/16/2023  Bibasilar subsegmental atelectasis with bilateral pleural drains Cardiomyopathy with coronary artery calcification Third spacing Cholelithiasis    XR CHEST PORT    Result Date: 4/18/2023  Stable moderate right and small-to-moderate left pleural effusions with bibasilar opacities. XR CHEST PORT    Result Date: 4/13/2023  Right lower lobe pneumonia is redemonstrated. * Discharge Condition: poor  * Disposition:  [address] [place] [address] under hospitalist service and to [facility] Meredith Victoria [facility]      Discharge Medications:  Current Discharge Medication List            * Follow-up Care/Patient Instructions:   Activity: Bedrest  Diet: Cardiac easy to chew with 1 L fluid restriction    Spent 75 minutes evaluting and coordinating patient care and transfer to [facility] for higher level of cardiac care     Signed:  Erna Schulte MD  4/18/2023  1:39 PM

## 2023-04-18 NOTE — PROGRESS NOTES
Patient seen by Rosangela Savage, [address].  Intern for Follow up to [facility]. Patient in bed with wife and sisters present. Shared feelings and emotions of being in hospital but still thankful things are as well as they are. Shared able to cope because of family and friends. Medical staff in to provide care. Provided presence of ministry at bedside. Listened attentively providing words of comfort and encouragement. Contact [facility] for further referrals. ALONZO Alvarenga.   Intern

## 2023-04-18 NOTE — ROUTINE PROCESS
Bed alarm going off,  sitting on side of bed states I didn't think it was anybody here, I had to pee,  inc of urine on pants, removed, pull up applied. Wife in and states I thought he was getting a little confused, cause he asked if I had locked the back door.

## 2023-04-18 NOTE — PROGRESS NOTES
Problem: Pressure Injury - Risk of  Goal: *Prevention of pressure injury  Description: Document Krishna Scale and appropriate interventions in the flowsheet.   Outcome: Progressing Towards Goal  Note: Pressure Injury Interventions:       Moisture Interventions: Absorbent underpads    Activity Interventions: Increase time out of bed    Mobility Interventions: PT/OT evaluation    Nutrition Interventions: Document food/fluid/supplement intake    Friction and Shear Interventions: HOB 30 degrees or less, Minimize layers, Apply protective barrier, creams and emollients

## 2023-04-19 LAB
ANION GAP SERPL CALC-SCNC: 7 MMOL/L (ref 5–15)
BACTERIA SPEC CULT: NORMAL
BUN SERPL-MCNC: 31 MG/DL (ref 6–20)
BUN/CREAT SERPL: 23 (ref 12–20)
CA-I BLD-MCNC: 8.1 MG/DL (ref 8.5–10.1)
CHLORIDE SERPL-SCNC: 111 MMOL/L (ref 97–108)
CO2 SERPL-SCNC: 22 MMOL/L (ref 21–32)
CREAT SERPL-MCNC: 1.37 MG/DL (ref 0.7–1.3)
ERYTHROCYTE [DISTWIDTH] IN BLOOD BY AUTOMATED COUNT: 22.2 % (ref 11.5–14.5)
GLUCOSE SERPL-MCNC: 88 MG/DL (ref 65–100)
HCT VFR BLD AUTO: 43.1 % (ref 36.6–50.3)
HGB BLD-MCNC: 14.3 G/DL (ref 12.1–17)
INR PPP: 2.9 (ref 0.9–1.1)
MCH RBC QN AUTO: 31.6 PG (ref 26–34)
MCHC RBC AUTO-ENTMCNC: 33.2 G/DL (ref 30–36.5)
MCV RBC AUTO: 95.1 FL (ref 80–99)
NRBC # BLD: 0 K/UL (ref 0–0.01)
NRBC BLD-RTO: 0 PER 100 WBC
PLATELET # BLD AUTO: 247 K/UL (ref 150–400)
PMV BLD AUTO: 11.3 FL (ref 8.9–12.9)
POTASSIUM SERPL-SCNC: 4 MMOL/L (ref 3.5–5.1)
PROTHROMBIN TIME: 30.4 SEC (ref 11.9–14.6)
RBC # BLD AUTO: 4.53 M/UL (ref 4.1–5.7)
SODIUM SERPL-SCNC: 140 MMOL/L (ref 136–145)
SPECIAL REQUESTS,SREQ: NORMAL
WBC # BLD AUTO: 7 K/UL (ref 4.1–11.1)

## 2023-04-19 PROCEDURE — 85610 PROTHROMBIN TIME: CPT

## 2023-04-19 PROCEDURE — 36415 COLL VENOUS BLD VENIPUNCTURE: CPT

## 2023-04-19 PROCEDURE — 97530 THERAPEUTIC ACTIVITIES: CPT

## 2023-04-19 PROCEDURE — 74011250637 HC RX REV CODE- 250/637: Performed by: INTERNAL MEDICINE

## 2023-04-19 PROCEDURE — 65270000029 HC RM PRIVATE

## 2023-04-19 PROCEDURE — 74011000250 HC RX REV CODE- 250: Performed by: INTERNAL MEDICINE

## 2023-04-19 PROCEDURE — 85027 COMPLETE CBC AUTOMATED: CPT

## 2023-04-19 PROCEDURE — 80048 BASIC METABOLIC PNL TOTAL CA: CPT

## 2023-04-19 PROCEDURE — 97165 OT EVAL LOW COMPLEX 30 MIN: CPT

## 2023-04-19 PROCEDURE — 74011250637 HC RX REV CODE- 250/637: Performed by: HOSPITALIST

## 2023-04-19 PROCEDURE — 97161 PT EVAL LOW COMPLEX 20 MIN: CPT

## 2023-04-19 RX ORDER — METOPROLOL SUCCINATE 25 MG/1
12.5 TABLET, EXTENDED RELEASE ORAL DAILY
Status: DISCONTINUED | OUTPATIENT
Start: 2023-04-20 | End: 2023-04-25 | Stop reason: HOSPADM

## 2023-04-19 RX ORDER — ASPIRIN 81 MG/1
81 TABLET ORAL DAILY
COMMUNITY

## 2023-04-19 RX ORDER — FUROSEMIDE 40 MG/1
40 TABLET ORAL DAILY
Status: DISCONTINUED | OUTPATIENT
Start: 2023-04-20 | End: 2023-04-20

## 2023-04-19 RX ORDER — WARFARIN SODIUM 5 MG/1
5 TABLET ORAL ONCE
Status: COMPLETED | OUTPATIENT
Start: 2023-04-19 | End: 2023-04-19

## 2023-04-19 RX ORDER — LEVOTHYROXINE SODIUM 25 UG/1
25 TABLET ORAL
Status: DISCONTINUED | OUTPATIENT
Start: 2023-04-20 | End: 2023-04-25 | Stop reason: HOSPADM

## 2023-04-19 RX ORDER — ASPIRIN 81 MG/1
81 TABLET ORAL DAILY
Status: DISCONTINUED | OUTPATIENT
Start: 2023-04-20 | End: 2023-04-25 | Stop reason: HOSPADM

## 2023-04-19 RX ORDER — DOCUSATE SODIUM 100 MG/1
100 CAPSULE, LIQUID FILLED ORAL 2 TIMES DAILY
Status: DISCONTINUED | OUTPATIENT
Start: 2023-04-19 | End: 2023-04-25 | Stop reason: HOSPADM

## 2023-04-19 RX ADMIN — LEVOTHYROXINE SODIUM 50 MCG: 0.03 TABLET ORAL at 06:04

## 2023-04-19 RX ADMIN — CETIRIZINE HYDROCHLORIDE 10 MG: 10 TABLET, FILM COATED ORAL at 17:40

## 2023-04-19 RX ADMIN — TAMSULOSIN HYDROCHLORIDE 0.4 MG: 0.4 CAPSULE ORAL at 09:18

## 2023-04-19 RX ADMIN — AMOXICILLIN AND CLAVULANATE POTASSIUM 1 TABLET: 875; 125 TABLET, FILM COATED ORAL at 17:40

## 2023-04-19 RX ADMIN — WARFARIN SODIUM 5 MG: 5 TABLET ORAL at 17:40

## 2023-04-19 RX ADMIN — SODIUM CHLORIDE, PRESERVATIVE FREE 10 ML: 5 INJECTION INTRAVENOUS at 06:06

## 2023-04-19 RX ADMIN — DOCUSATE SODIUM 100 MG: 100 CAPSULE, LIQUID FILLED ORAL at 20:39

## 2023-04-19 RX ADMIN — SODIUM CHLORIDE, PRESERVATIVE FREE 10 ML: 5 INJECTION INTRAVENOUS at 21:27

## 2023-04-19 RX ADMIN — GABAPENTIN 200 MG: 100 CAPSULE ORAL at 20:39

## 2023-04-19 RX ADMIN — GUAIFENESIN 600 MG: 600 TABLET, EXTENDED RELEASE ORAL at 09:20

## 2023-04-19 RX ADMIN — GABAPENTIN 200 MG: 100 CAPSULE ORAL at 09:18

## 2023-04-19 RX ADMIN — POTASSIUM CHLORIDE 20 MEQ: 1500 TABLET, EXTENDED RELEASE ORAL at 09:18

## 2023-04-19 RX ADMIN — AMOXICILLIN AND CLAVULANATE POTASSIUM 1 TABLET: 875; 125 TABLET, FILM COATED ORAL at 09:20

## 2023-04-19 NOTE — H&P
History and Physical    Patient: Lupe Carreon MRN: [id number]  SSN: [id number]    YOB: 1940  Age: 82 y.o. Sex: male      Subjective: Lupe Carreon is a 82 y.o. male with PMH of HFrEF s/p ICD, MVR so mechanical valve, atrial fibrillation, hypertension and other medical problems as below. He was admitted to [facility] after a mechanical fall and found to have CHF exacerbation and pneumonia. Patient reports having increased weakness and fell multiple times for the past few weeks. He also reports having progressively worsening LE edema and scrotal swelling. Complaint with diuretics but not with fluid restriction. On arrival blood pressure 108/74 with a heart rate of 85 initially was 96 on room air but on further evaluation noted to be 92% on room air and was then placed on 2 L. Pro-BNP elevated 31690. Sub-therapeutic INR 1.6    In [facility], he was treated with IV lasix for CHF and IV zosyn and doxycycline for CAP. He is transferred here for further cardiology evaluation and management, possible needing inotropic support. On my evaluation, patient reports still having cough and some shortness of breath. Leg swelling improved but not completely resolved. Chart review: cardiology Gary Plants NP) 4/18: May need to consider inotropes. Hold entresto to allow BP room for diuresis. Mechanical Mitral Valve: Approx 20 years ago at [facility] per patient. Goal 2.5-3.5. Past Medical History:   Diagnosis Date    A-fib [facility])     CHF (congestive heart failure) (HCC)     last ef of 5-10%    Fall     H/O mitral valve replacement     with mechanical valve    Neuropathy      No family history on file. Social History     Tobacco Use    Smoking status: Former    Smokeless tobacco: Current    Tobacco comments:     chewing tobacco   Substance Use Topics    Alcohol use: Not Currently        Objective:     Physical Exam:   General: alert, cooperative, no distress  Eye: conjunctivae/corneas clear. PERRL, EOM's intact.    Throat and Neck: normal and no erythema or exudates noted. No mass   Lung: course breath sounds on right lung on auscultation. Heart: regular rate and rhythm,   Abdomen: soft, non-tender. Bowel sounds normal. No masses,  Extremities: 2+ LE edema. able to move all extremities normal, atraumatic  Skin: Normal.  Neurologic: AOx3. Motor function and sensation grossly intact. Psychiatric: non focal    Most recent lab work and imaging results reviewed in EMR. Assessment and plan:   # CHF exacerbation, HFrEF  - IV Lasix on hold given soft BP   - Recent ECHO showed ef of 9%  - Monitor I/O  - Continue to hold entresto and metoprolol   - Trial of albumin and monitor response. Defer inotropic to cardiology   - Consult cardiology   - Low sodium diet and fluid restriction. # Community acquired pneumonia   - Continue PO augmentin x 5 days.   - Mucinex for symptoms control. # Acute respiratory failure with hypoxia  - Secondary to CHF exacerbation and pneumonia   - Continue oxygen supplementation, wean as tolerated  - Management as above. # MRV mechanical valve  - INR goal 2.5-3.5  - Consult pharmacy for warfarin dosing. # Paroxymal atrial fibrillation  - NSR on my evaluation  - Hold metoprolol due to soft BP; continue AC with warfarin    # CKD stage IIIb (eGRR 30-45)  - Creatinine stable with IV lasix  - Continue to monitor     # Hypothyroidism  - Continue home medications. PO levothyroxine 50mcg. # Urinary retention  - PO flomax. # Frequent fall  - PT/OT     # Tabacco use  - Nicotine patch  - cessation counseling. # Social Determents of health: None    # Discussed with patient/POA/family, leaning towards DNR/DNI, however still wants time to think about it. FULL CODE for now, re-address in AM.     # Medication reconciliation: Medication list reviewed on Epic and/or outside documentation. Not reviewed with patient.  However, medication reconciliation incomplete, appreciate assistance from pharmacy or nursing staff.     Signed By: Ciaran Barrett MD     April 18, 2023

## 2023-04-19 NOTE — PROGRESS NOTES
Reason for Admission:  pneumonia                     RUR Score:          22%           Plan for utilizing home health:      politely declined    PCP: First and Last name:  Mercedes Leone NP     Name of Practice:    Are you a current patient: Yes/No: Yes   Approximate date of last visit: 04/13/23   Can you participate in a virtual visit with your PCP:                     Current Advanced Directive/Advance Care Plan: Full Code  CM confirmed code status with patient's wife. Healthcare Decision Maker:   Click here to complete [id number] including selection of the Healthcare Decision Maker Relationship (ie \"Primary\")             Primary Decision Maker: Celina Garcia - Spouse - [phone]                  Transition of Care Plan:                      CM met with patient's wife at bedside to complete DCP assessment. Patient lives with his wife in a single story home accessible by four steps. He ambulates with a rolling walker and requires some assistance with ADLs. He drives himself to appointments. Home health services were politely declined at this time. Current discharge disposition is home self care; family will come to  patient at time of discharge. CM will continue to follow.

## 2023-04-19 NOTE — PROGRESS NOTES
Problem: Falls - Risk of  Goal: *Absence of Falls  Description: Document Jesse Reid Fall Risk and appropriate interventions in the flowsheet. Outcome: Progressing Towards Goal  Note: Fall Risk Interventions:                                Problem: Patient Education: Go to Patient Education Activity  Goal: Patient/Family Education  Outcome: Progressing Towards Goal     Problem: Pressure Injury - Risk of  Goal: *Prevention of pressure injury  Description: Document Krishna Scale and appropriate interventions in the flowsheet.   Outcome: Progressing Towards Goal  Note: Pressure Injury Interventions:       Moisture Interventions: Absorbent underpads    Activity Interventions: Increase time out of bed, Pressure redistribution bed/mattress(bed type), PT/OT evaluation    Mobility Interventions: HOB 30 degrees or less, Float heels, Pressure redistribution bed/mattress (bed type), PT/OT evaluation    Nutrition Interventions: Document food/fluid/supplement intake    Friction and Shear Interventions: HOB 30 degrees or less                Problem: Patient Education: Go to Patient Education Activity  Goal: Patient/Family Education  Outcome: Progressing Towards Goal

## 2023-04-19 NOTE — PROGRESS NOTES
2025  Bedside report given to Gus Bernabe RN by Saida Ornelas RN. Report included SBAR, ED Report, Labs, and Cardiac Rhythm nsr. Patient in bed resting well. Vitals are stable.

## 2023-04-19 NOTE — PROGRESS NOTES
Warfarin Dosing Consult  Harry South is a 82 y.o. male with Mechanical Mitral Valve. Pharmacy was consulted by Dr. Claudetta Short to dose and monitor warfarin. INR Goal: 2.5-3.5    PTA Dose: 5 mg    Drugs that may increase INR:None  Drugs that may decrease INR: None  Other current anticoagulants/ drugs that may increase bleeding risk: None  Risk factors: Age > 65  Daily INR ordered: Yes    Recent Labs     04/19/23  0612 04/18/23  1500 04/17/23  0500   INR 2.9* 3.1* 2.5*   HGB 14.3 14.8 15.3    275 286       Date INR Previous Dose   4/19 2.9  5 mg     Assessment/ Plan:  Afib, CHF exacerbation.   Will order warfarin 5 mg PO x 1 dose  Pharmacy will continue to monitor daily and adjust therapy as indicated

## 2023-04-19 NOTE — ROUTINE PROCESS
Bedside shift change report given to [id number] Nirmala Trejo (oncoming nurse) by Radha Deleon RN (offgoing nurse). Report included the following information SBAR, Intake/Output, MAR, and Recent Results.

## 2023-04-20 LAB
ALBUMIN SERPL-MCNC: 2.3 G/DL (ref 3.5–5)
ANION GAP SERPL CALC-SCNC: 5 MMOL/L (ref 5–15)
BUN SERPL-MCNC: 27 MG/DL (ref 6–20)
BUN/CREAT SERPL: 22 (ref 12–20)
CA-I BLD-MCNC: 7.9 MG/DL (ref 8.5–10.1)
CHLORIDE SERPL-SCNC: 110 MMOL/L (ref 97–108)
CO2 SERPL-SCNC: 24 MMOL/L (ref 21–32)
CREAT SERPL-MCNC: 1.25 MG/DL (ref 0.7–1.3)
GLUCOSE SERPL-MCNC: 95 MG/DL (ref 65–100)
INR PPP: 2.8 (ref 0.9–1.1)
PHOSPHATE SERPL-MCNC: 2.8 MG/DL (ref 2.6–4.7)
POTASSIUM SERPL-SCNC: 4.2 MMOL/L (ref 3.5–5.1)
PROTHROMBIN TIME: 29.3 SEC (ref 11.9–14.6)
SODIUM SERPL-SCNC: 139 MMOL/L (ref 136–145)

## 2023-04-20 PROCEDURE — 85610 PROTHROMBIN TIME: CPT

## 2023-04-20 PROCEDURE — 74011250637 HC RX REV CODE- 250/637: Performed by: HOSPITALIST

## 2023-04-20 PROCEDURE — 65270000029 HC RM PRIVATE

## 2023-04-20 PROCEDURE — 77010033678 HC OXYGEN DAILY

## 2023-04-20 PROCEDURE — 74011250637 HC RX REV CODE- 250/637: Performed by: INTERNAL MEDICINE

## 2023-04-20 PROCEDURE — 80069 RENAL FUNCTION PANEL: CPT

## 2023-04-20 PROCEDURE — 74011000250 HC RX REV CODE- 250: Performed by: INTERNAL MEDICINE

## 2023-04-20 PROCEDURE — 36415 COLL VENOUS BLD VENIPUNCTURE: CPT

## 2023-04-20 RX ORDER — WARFARIN SODIUM 5 MG/1
5 TABLET ORAL ONCE
Status: COMPLETED | OUTPATIENT
Start: 2023-04-20 | End: 2023-04-20

## 2023-04-20 RX ORDER — FUROSEMIDE 40 MG/1
40 TABLET ORAL 2 TIMES DAILY
Status: DISCONTINUED | OUTPATIENT
Start: 2023-04-20 | End: 2023-04-25 | Stop reason: HOSPADM

## 2023-04-20 RX ADMIN — GABAPENTIN 200 MG: 100 CAPSULE ORAL at 21:40

## 2023-04-20 RX ADMIN — DOCUSATE SODIUM 100 MG: 100 CAPSULE, LIQUID FILLED ORAL at 08:22

## 2023-04-20 RX ADMIN — FUROSEMIDE 40 MG: 40 TABLET ORAL at 08:22

## 2023-04-20 RX ADMIN — GABAPENTIN 200 MG: 100 CAPSULE ORAL at 08:21

## 2023-04-20 RX ADMIN — CETIRIZINE HYDROCHLORIDE 10 MG: 10 TABLET, FILM COATED ORAL at 17:17

## 2023-04-20 RX ADMIN — DOCUSATE SODIUM 100 MG: 100 CAPSULE, LIQUID FILLED ORAL at 21:40

## 2023-04-20 RX ADMIN — WARFARIN SODIUM 5 MG: 5 TABLET ORAL at 17:17

## 2023-04-20 RX ADMIN — SODIUM CHLORIDE, PRESERVATIVE FREE 10 ML: 5 INJECTION INTRAVENOUS at 06:07

## 2023-04-20 RX ADMIN — SODIUM CHLORIDE, PRESERVATIVE FREE 10 ML: 5 INJECTION INTRAVENOUS at 13:12

## 2023-04-20 RX ADMIN — POTASSIUM CHLORIDE 20 MEQ: 1500 TABLET, EXTENDED RELEASE ORAL at 08:22

## 2023-04-20 RX ADMIN — AMOXICILLIN AND CLAVULANATE POTASSIUM 1 TABLET: 875; 125 TABLET, FILM COATED ORAL at 17:24

## 2023-04-20 RX ADMIN — ASPIRIN 81 MG: 81 TABLET, COATED ORAL at 08:22

## 2023-04-20 RX ADMIN — TAMSULOSIN HYDROCHLORIDE 0.4 MG: 0.4 CAPSULE ORAL at 08:22

## 2023-04-20 RX ADMIN — LEVOTHYROXINE SODIUM 25 MCG: 0.03 TABLET ORAL at 06:07

## 2023-04-20 RX ADMIN — SODIUM CHLORIDE, PRESERVATIVE FREE 10 ML: 5 INJECTION INTRAVENOUS at 21:40

## 2023-04-20 RX ADMIN — AMOXICILLIN AND CLAVULANATE POTASSIUM 1 TABLET: 875; 125 TABLET, FILM COATED ORAL at 08:22

## 2023-04-20 NOTE — PROGRESS NOTES
Bedside shift report provided to Sandee Carmen RN ( on coming nurse) by Nancy Foley RN ( off going nurse). Report was given using SBAR, recent results and cardiac rhythm. Patient stable with no acute distress or complaints.

## 2023-04-20 NOTE — DISCHARGE INSTRUCTIONS
Per heart failure protocol:  Follow-up with cardiology within 3-5 days of discharge.  ____________________________________________________________________________

## 2023-04-20 NOTE — PROGRESS NOTES
2 person skin assessment performed with Jacqueline Garcia RN. Left arm skin tears, blanchable redness to bilateral heels with small cut to left heel, redness to sacrum.

## 2023-04-20 NOTE — PROGRESS NOTES
CM met with patient and family at bedside to discuss SNF recommendations. Patient and spouse were agreeable and choice was obtained for SNF in [place], [address] discussed the SNFs in [place] and the Swing beds at [facility]. Choice obtained for Swing Bed in [place].  Cm sent referral.

## 2023-04-20 NOTE — PROGRESS NOTES
Problem: Pressure Injury - Risk of  Goal: *Prevention of pressure injury  Description: Document Krishna Scale and appropriate interventions in the flowsheet. Outcome: Progressing Towards Goal  Note: Pressure Injury Interventions:       Moisture Interventions: Absorbent underpads    Activity Interventions: Increase time out of bed, Pressure redistribution bed/mattress(bed type), PT/OT evaluation    Mobility Interventions: HOB 30 degrees or less, Float heels, Pressure redistribution bed/mattress (bed type), PT/OT evaluation    Nutrition Interventions: Document food/fluid/supplement intake    Friction and Shear Interventions: HOB 30 degrees or less                Problem: Patient Education: Go to Patient Education Activity  Goal: Patient/Family Education  Outcome: Progressing Towards Goal     Problem: Falls - Risk of  Goal: *Absence of Falls  Description: Document Jaki Fall Risk and appropriate interventions in the flowsheet. Outcome: Progressing Towards Goal  Note: Fall Risk Interventions:   Call light within reach  Bed exit alarm on  Bed in low position with wheels in lock position.   Gripper socks  Routine rounding  Side rails times 2 up

## 2023-04-20 NOTE — PROGRESS NOTES
Warfarin Dosing Consult  Fredderick Libman is a 82 y.o. male with Mechanical Mitral Valve. Pharmacy was consulted by Dr. Summer Mathis to dose and monitor warfarin. INR Goal: 2.5-3.5    PTA Dose: 5 mg    Drugs that may increase INR:None  Drugs that may decrease INR: None  Other current anticoagulants/ drugs that may increase bleeding risk: None  Risk factors: Age > 65  Daily INR ordered: Yes    Recent Labs     04/20/23  1130 04/19/23  0612 04/18/23  1500   INR 2.8* 2.9* 3.1*   HGB  --  14.3 14.8   PLT  --  247 275       Date INR Previous Dose   4/19 2.9  5 mg   4/20 2.8 5 mg (4/19/23 at 18:00)     Assessment/ Plan:  Afib, CHF exacerbation.   Will order warfarin 5 mg PO x 1 dose   Pharmacy will continue to monitor daily and adjust therapy as indicated

## 2023-04-21 ENCOUNTER — APPOINTMENT (OUTPATIENT)
Dept: GENERAL RADIOLOGY | Age: 83
DRG: 291 | End: 2023-04-21
Attending: HOSPITALIST
Payer: MEDICARE

## 2023-04-21 LAB
ALBUMIN SERPL-MCNC: 2.4 G/DL (ref 3.5–5)
ANION GAP SERPL CALC-SCNC: 5 MMOL/L (ref 5–15)
BUN SERPL-MCNC: 24 MG/DL (ref 6–20)
BUN/CREAT SERPL: 21 (ref 12–20)
CA-I BLD-MCNC: 7.9 MG/DL (ref 8.5–10.1)
CHLORIDE SERPL-SCNC: 107 MMOL/L (ref 97–108)
CO2 SERPL-SCNC: 25 MMOL/L (ref 21–32)
CREAT SERPL-MCNC: 1.17 MG/DL (ref 0.7–1.3)
GLUCOSE SERPL-MCNC: 76 MG/DL (ref 65–100)
INR PPP: 2.7 (ref 0.9–1.1)
PHOSPHATE SERPL-MCNC: 2.6 MG/DL (ref 2.6–4.7)
POTASSIUM SERPL-SCNC: 4 MMOL/L (ref 3.5–5.1)
PROTHROMBIN TIME: 28.3 SEC (ref 11.9–14.6)
SODIUM SERPL-SCNC: 137 MMOL/L (ref 136–145)

## 2023-04-21 PROCEDURE — 80069 RENAL FUNCTION PANEL: CPT

## 2023-04-21 PROCEDURE — 97530 THERAPEUTIC ACTIVITIES: CPT

## 2023-04-21 PROCEDURE — 74011250637 HC RX REV CODE- 250/637: Performed by: INTERNAL MEDICINE

## 2023-04-21 PROCEDURE — 71046 X-RAY EXAM CHEST 2 VIEWS: CPT

## 2023-04-21 PROCEDURE — 74011000250 HC RX REV CODE- 250: Performed by: INTERNAL MEDICINE

## 2023-04-21 PROCEDURE — 65270000029 HC RM PRIVATE

## 2023-04-21 PROCEDURE — 36415 COLL VENOUS BLD VENIPUNCTURE: CPT

## 2023-04-21 PROCEDURE — 97535 SELF CARE MNGMENT TRAINING: CPT

## 2023-04-21 PROCEDURE — 74011250637 HC RX REV CODE- 250/637: Performed by: HOSPITALIST

## 2023-04-21 PROCEDURE — 74011250637 HC RX REV CODE- 250/637: Performed by: NURSE PRACTITIONER

## 2023-04-21 PROCEDURE — 85610 PROTHROMBIN TIME: CPT

## 2023-04-21 RX ORDER — WARFARIN SODIUM 5 MG/1
5 TABLET ORAL ONCE
Status: COMPLETED | OUTPATIENT
Start: 2023-04-21 | End: 2023-04-21

## 2023-04-21 RX ADMIN — CETIRIZINE HYDROCHLORIDE 10 MG: 10 TABLET, FILM COATED ORAL at 18:16

## 2023-04-21 RX ADMIN — FUROSEMIDE 40 MG: 40 TABLET ORAL at 09:17

## 2023-04-21 RX ADMIN — SODIUM CHLORIDE, PRESERVATIVE FREE 10 ML: 5 INJECTION INTRAVENOUS at 16:33

## 2023-04-21 RX ADMIN — TAMSULOSIN HYDROCHLORIDE 0.4 MG: 0.4 CAPSULE ORAL at 09:17

## 2023-04-21 RX ADMIN — WARFARIN SODIUM 5 MG: 5 TABLET ORAL at 18:16

## 2023-04-21 RX ADMIN — POTASSIUM CHLORIDE 20 MEQ: 1500 TABLET, EXTENDED RELEASE ORAL at 09:17

## 2023-04-21 RX ADMIN — GABAPENTIN 200 MG: 100 CAPSULE ORAL at 21:33

## 2023-04-21 RX ADMIN — AMOXICILLIN AND CLAVULANATE POTASSIUM 1 TABLET: 875; 125 TABLET, FILM COATED ORAL at 09:17

## 2023-04-21 RX ADMIN — FUROSEMIDE 40 MG: 40 TABLET ORAL at 21:33

## 2023-04-21 RX ADMIN — DOCUSATE SODIUM 100 MG: 100 CAPSULE, LIQUID FILLED ORAL at 09:16

## 2023-04-21 RX ADMIN — DOCUSATE SODIUM 100 MG: 100 CAPSULE, LIQUID FILLED ORAL at 21:33

## 2023-04-21 RX ADMIN — SODIUM CHLORIDE, PRESERVATIVE FREE 10 ML: 5 INJECTION INTRAVENOUS at 21:31

## 2023-04-21 RX ADMIN — ASPIRIN 81 MG: 81 TABLET, COATED ORAL at 09:17

## 2023-04-21 RX ADMIN — LEVOTHYROXINE SODIUM 25 MCG: 0.03 TABLET ORAL at 05:37

## 2023-04-21 RX ADMIN — SODIUM CHLORIDE, PRESERVATIVE FREE 10 ML: 5 INJECTION INTRAVENOUS at 06:38

## 2023-04-21 RX ADMIN — GABAPENTIN 200 MG: 100 CAPSULE ORAL at 09:15

## 2023-04-21 NOTE — PROGRESS NOTES
Problem: Pressure Injury - Risk of  Goal: *Prevention of pressure injury  Description: Document Krishna Scale and appropriate interventions in the flowsheet. Outcome: Progressing Towards Goal  Note: Pressure Injury Interventions:       Moisture Interventions: Internal/External urinary devices, Absorbent underpads    Activity Interventions: PT/OT evaluation    Mobility Interventions: PT/OT evaluation    Nutrition Interventions: Offer support with meals,snacks and hydration, Document food/fluid/supplement intake    Friction and Shear Interventions: Minimize layers, Feet elevated on foot rest                Problem: Patient Education: Go to Patient Education Activity  Goal: Patient/Family Education  Outcome: Progressing Towards Goal     Problem: Falls - Risk of  Goal: *Absence of Falls  Description: Document Jaki Fall Risk and appropriate interventions in the flowsheet. Outcome: Progressing Towards Goal  Note: Fall Risk Interventions:  Call light within reach  Bed in low position with wheels locked  Bed alarm on  Gripper socks  Routine rounding  Side rails time 2 up.

## 2023-04-21 NOTE — PROGRESS NOTES
Patient BP is scheduled for lasix. His BP has run in the 90 and 80 for the day and currently 82/5 . Provider on call Dr Cassandra Hines notified. Orders received to hold the lasix for this dose and monitor the patient for changes in vital signs and report back to physician if patient becomes symptomatic or BP decreased further down.

## 2023-04-21 NOTE — PROGRESS NOTES
Warfarin Dosing Consult  Kelechi Alva is a 82 y.o. male with Mechanical Mitral Valve. Pharmacy was consulted by Dr. Sissy Mcclellan to dose and monitor warfarin. INR Goal: 2.5-3.5    PTA Dose: 5mg daily (appears to be alternating 1mg, 5mg and 7.5mg based on INR)    Drugs that may increase INR:None  Drugs that may decrease INR: None  Other current anticoagulants/ drugs that may increase bleeding risk: Aspirin  Risk factors: Age > 65  Daily INR ordered: Yes    Recent Labs     04/19/23  0612 04/18/23  1500 04/17/23  0500 04/16/23  0551 04/15/23  0648 04/14/23  0532 04/13/23  1254   HGB 14.3 14.8 15.3 14.9 14.7 15.2 15.3    275 286 255 241 239 269     Recent Labs     04/13/23  1254   ALT 17   AST 20     Recent Labs     04/21/23  0620 04/20/23  1130 04/19/23  0612 04/18/23  1500 04/17/23  0500 04/16/23  0551 04/15/23  0648   INR 2.7* 2.8* 2.9* 3.1* 2.5* 2.3* 3.0*          Date INR Previous Dose   4/19 2.9  5 mg   4/20 2.8 5 mg (4/19/23 at 18:00)   4/21 2.7 5mg       Assessment/ Plan:  INR therapeutic at 2.7  Will order warfarin 5mg PO x 1 dose today. Pharmacy will continue to monitor daily and adjust therapy as indicated.

## 2023-04-22 LAB
ALBUMIN SERPL-MCNC: 2.4 G/DL (ref 3.5–5)
ANION GAP SERPL CALC-SCNC: 5 MMOL/L (ref 5–15)
BNP SERPL-MCNC: ABNORMAL PG/ML
BUN SERPL-MCNC: 22 MG/DL (ref 6–20)
BUN/CREAT SERPL: 21 (ref 12–20)
CA-I BLD-MCNC: 7.5 MG/DL (ref 8.5–10.1)
CHLORIDE SERPL-SCNC: 105 MMOL/L (ref 97–108)
CO2 SERPL-SCNC: 26 MMOL/L (ref 21–32)
CREAT SERPL-MCNC: 1.04 MG/DL (ref 0.7–1.3)
GLUCOSE SERPL-MCNC: 80 MG/DL (ref 65–100)
INR PPP: 2.5 (ref 0.9–1.1)
PHOSPHATE SERPL-MCNC: 2.6 MG/DL (ref 2.6–4.7)
POTASSIUM SERPL-SCNC: 4 MMOL/L (ref 3.5–5.1)
PROTHROMBIN TIME: 27 SEC (ref 11.9–14.6)
SODIUM SERPL-SCNC: 136 MMOL/L (ref 136–145)

## 2023-04-22 PROCEDURE — 80069 RENAL FUNCTION PANEL: CPT

## 2023-04-22 PROCEDURE — 83880 ASSAY OF NATRIURETIC PEPTIDE: CPT

## 2023-04-22 PROCEDURE — 97530 THERAPEUTIC ACTIVITIES: CPT

## 2023-04-22 PROCEDURE — 74011250637 HC RX REV CODE- 250/637: Performed by: INTERNAL MEDICINE

## 2023-04-22 PROCEDURE — 65270000029 HC RM PRIVATE

## 2023-04-22 PROCEDURE — 36415 COLL VENOUS BLD VENIPUNCTURE: CPT

## 2023-04-22 PROCEDURE — 74011250637 HC RX REV CODE- 250/637: Performed by: HOSPITALIST

## 2023-04-22 PROCEDURE — 74011250637 HC RX REV CODE- 250/637: Performed by: NURSE PRACTITIONER

## 2023-04-22 PROCEDURE — 74011250637 HC RX REV CODE- 250/637: Performed by: SPECIALIST

## 2023-04-22 PROCEDURE — 74011000250 HC RX REV CODE- 250: Performed by: INTERNAL MEDICINE

## 2023-04-22 PROCEDURE — 85610 PROTHROMBIN TIME: CPT

## 2023-04-22 RX ORDER — SPIRONOLACTONE 25 MG/1
25 TABLET ORAL DAILY
Status: DISCONTINUED | OUTPATIENT
Start: 2023-04-22 | End: 2023-04-25 | Stop reason: HOSPADM

## 2023-04-22 RX ADMIN — CETIRIZINE HYDROCHLORIDE 10 MG: 10 TABLET, FILM COATED ORAL at 17:58

## 2023-04-22 RX ADMIN — SODIUM CHLORIDE, PRESERVATIVE FREE 10 ML: 5 INJECTION INTRAVENOUS at 21:28

## 2023-04-22 RX ADMIN — WARFARIN SODIUM 7.5 MG: 2.5 TABLET ORAL at 17:58

## 2023-04-22 RX ADMIN — POTASSIUM CHLORIDE 20 MEQ: 1500 TABLET, EXTENDED RELEASE ORAL at 09:00

## 2023-04-22 RX ADMIN — SPIRONOLACTONE 25 MG: 25 TABLET ORAL at 16:53

## 2023-04-22 RX ADMIN — LEVOTHYROXINE SODIUM 25 MCG: 0.03 TABLET ORAL at 06:14

## 2023-04-22 RX ADMIN — FUROSEMIDE 40 MG: 40 TABLET ORAL at 09:16

## 2023-04-22 RX ADMIN — DOCUSATE SODIUM 100 MG: 100 CAPSULE, LIQUID FILLED ORAL at 09:17

## 2023-04-22 RX ADMIN — GABAPENTIN 200 MG: 100 CAPSULE ORAL at 09:17

## 2023-04-22 RX ADMIN — TAMSULOSIN HYDROCHLORIDE 0.4 MG: 0.4 CAPSULE ORAL at 09:17

## 2023-04-22 RX ADMIN — GABAPENTIN 200 MG: 100 CAPSULE ORAL at 21:28

## 2023-04-22 RX ADMIN — FUROSEMIDE 40 MG: 40 TABLET ORAL at 21:28

## 2023-04-22 RX ADMIN — ASPIRIN 81 MG: 81 TABLET, COATED ORAL at 09:17

## 2023-04-22 RX ADMIN — SODIUM CHLORIDE, PRESERVATIVE FREE 10 ML: 5 INJECTION INTRAVENOUS at 14:36

## 2023-04-22 RX ADMIN — DOCUSATE SODIUM 100 MG: 100 CAPSULE, LIQUID FILLED ORAL at 21:27

## 2023-04-22 RX ADMIN — SODIUM CHLORIDE, PRESERVATIVE FREE 10 ML: 5 INJECTION INTRAVENOUS at 06:14

## 2023-04-22 NOTE — PROGRESS NOTES
Warfarin Dosing Consult  Jeffery Hammer is a 82 y.o. male with mechanical mitral valve. Pharmacy was consulted by Dr. Mally Huynh to dose and monitor warfarin. INR Goal: 2.5-3.5    PTA Dose: Spoke with patient, he appears to have very labile INR and his dose changes every 3 days or so based on his INR. He was recently on a mix of 1 and 2 mg doses but added that just prior to admission he was on warfarin 5 mg PO daily. Drugs that may increase INR:None  Drugs that may decrease INR: None  Other current anticoagulants/ drugs that may increase bleeding risk: Aspirin  Risk factors: Age > 65  Daily INR ordered: Yes    Recent Labs     04/19/23  0612 04/18/23  1500 04/17/23  0500 04/16/23  0551 04/15/23  0648 04/14/23  0532 04/13/23  1254   HGB 14.3 14.8 15.3 14.9 14.7 15.2 15.3    275 286 255 241 239 269     Recent Labs     04/13/23  1254   ALT 17   AST 20     Recent Labs     04/22/23  0550 04/21/23  0620 04/20/23  1130 04/19/23  0612 04/18/23  1500 04/17/23  0500 04/16/23  0551   INR 2.5* 2.7* 2.8* 2.9* 3.1* 2.5* 2.3*          Date INR Previous Dose   4/19 2.9  5 mg   4/20 2.8 5 mg   4/21 2.7 5mg   4/22 2.5 5 mg       Assessment/ Plan:  INR therapeutic but steadily down-trending. Warfarin 7.5 mg x 1 was ordered. Next INR with AM labs  Pharmacy will continue to monitor daily and adjust therapy as indicated.        Efren Matt, PharmD, BCPS, [address] [place] [address]  Clinical Pharmacist   [phone]

## 2023-04-22 NOTE — PROGRESS NOTES
Problem: Falls - Risk of  Goal: *Absence of Falls  Description: Document Giselle Mensah Fall Risk and appropriate interventions in the flowsheet. Outcome: Progressing Towards Goal  Note: Fall Risk Interventions:                                Problem: Pressure Injury - Risk of  Goal: *Prevention of pressure injury  Description: Document Krishna Scale and appropriate interventions in the flowsheet.   Outcome: Progressing Towards Goal  Note: Pressure Injury Interventions:       Moisture Interventions: Absorbent underpads, Internal/External urinary devices    Activity Interventions: PT/OT evaluation, Increase time out of bed    Mobility Interventions: PT/OT evaluation    Nutrition Interventions: Offer support with meals,snacks and hydration, Document food/fluid/supplement intake    Friction and Shear Interventions: Minimize layers

## 2023-04-23 LAB
INR PPP: 3 (ref 0.9–1.1)
PROTHROMBIN TIME: 30.6 SEC (ref 11.9–14.6)

## 2023-04-23 PROCEDURE — 65270000029 HC RM PRIVATE

## 2023-04-23 PROCEDURE — 77010033678 HC OXYGEN DAILY

## 2023-04-23 PROCEDURE — 74011000250 HC RX REV CODE- 250: Performed by: INTERNAL MEDICINE

## 2023-04-23 PROCEDURE — 94761 N-INVAS EAR/PLS OXIMETRY MLT: CPT

## 2023-04-23 PROCEDURE — 85610 PROTHROMBIN TIME: CPT

## 2023-04-23 PROCEDURE — 74011250637 HC RX REV CODE- 250/637: Performed by: HOSPITALIST

## 2023-04-23 PROCEDURE — 74011250637 HC RX REV CODE- 250/637: Performed by: SPECIALIST

## 2023-04-23 PROCEDURE — 74011250637 HC RX REV CODE- 250/637: Performed by: INTERNAL MEDICINE

## 2023-04-23 PROCEDURE — 36415 COLL VENOUS BLD VENIPUNCTURE: CPT

## 2023-04-23 RX ORDER — WARFARIN SODIUM 5 MG/1
5 TABLET ORAL EVERY EVENING
Status: DISCONTINUED | OUTPATIENT
Start: 2023-04-23 | End: 2023-04-25

## 2023-04-23 RX ADMIN — GABAPENTIN 200 MG: 100 CAPSULE ORAL at 20:36

## 2023-04-23 RX ADMIN — GABAPENTIN 200 MG: 100 CAPSULE ORAL at 11:13

## 2023-04-23 RX ADMIN — ASPIRIN 81 MG: 81 TABLET, COATED ORAL at 11:13

## 2023-04-23 RX ADMIN — SODIUM CHLORIDE, PRESERVATIVE FREE 10 ML: 5 INJECTION INTRAVENOUS at 14:09

## 2023-04-23 RX ADMIN — LEVOTHYROXINE SODIUM 25 MCG: 0.03 TABLET ORAL at 05:46

## 2023-04-23 RX ADMIN — SODIUM CHLORIDE, PRESERVATIVE FREE 10 ML: 5 INJECTION INTRAVENOUS at 05:46

## 2023-04-23 RX ADMIN — SPIRONOLACTONE 25 MG: 25 TABLET ORAL at 11:23

## 2023-04-23 RX ADMIN — POTASSIUM CHLORIDE 20 MEQ: 1500 TABLET, EXTENDED RELEASE ORAL at 11:13

## 2023-04-23 RX ADMIN — WARFARIN SODIUM 5 MG: 5 TABLET ORAL at 17:21

## 2023-04-23 RX ADMIN — TAMSULOSIN HYDROCHLORIDE 0.4 MG: 0.4 CAPSULE ORAL at 11:12

## 2023-04-23 RX ADMIN — CETIRIZINE HYDROCHLORIDE 10 MG: 10 TABLET, FILM COATED ORAL at 17:21

## 2023-04-23 RX ADMIN — DOCUSATE SODIUM 100 MG: 100 CAPSULE, LIQUID FILLED ORAL at 20:36

## 2023-04-23 RX ADMIN — DOCUSATE SODIUM 100 MG: 100 CAPSULE, LIQUID FILLED ORAL at 11:14

## 2023-04-23 NOTE — PROGRESS NOTES
Warfarin Dosing Consult  Shell Bird is a 82 y.o. male with mechanical mitral valve. Pharmacy was consulted by Dr. Juliet Anderson to dose and monitor warfarin. INR Goal: 2.5-3.5    PTA Dose: Spoke with patient, he appears to have very labile INR and his dose changes every 3 days or so based on his INR. He was recently on a mix of 1 and 2 mg doses but added that just prior to admission he was on warfarin 5 mg PO daily. Drugs that may increase INR:None  Drugs that may decrease INR: None  Other current anticoagulants/ drugs that may increase bleeding risk: Aspirin  Risk factors: Age > 65  Daily INR ordered: Yes    Recent Labs     04/19/23  0612 04/18/23  1500 04/17/23  0500 04/16/23  0551 04/15/23  0648 04/14/23  0532 04/13/23  1254   HGB 14.3 14.8 15.3 14.9 14.7 15.2 15.3    275 286 255 241 239 269     Recent Labs     04/13/23  1254   ALT 17   AST 20     Recent Labs     04/23/23  0743 04/22/23  0550 04/21/23  0620 04/20/23  1130 04/19/23  0612 04/18/23  1500 04/17/23  0500   INR 3.0* 2.5* 2.7* 2.8* 2.9* 3.1* 2.5*          Date INR Previous Dose   4/19 2.9  5 mg   4/20 2.8 5 mg   4/21 2.7 5mg   4/22 2.5 5 mg   4/23 3 7.5 mg       Assessment/ Plan:  INR therapeutic. Warfarin 5 mg PO qPM ordered. Next INR with AM labs  Pharmacy will continue to monitor daily and adjust therapy as indicated.        Gerardo Chavez, PharmD, BCPS, [address] [place] [address]  Clinical Pharmacist   [phone]

## 2023-04-23 NOTE — PROGRESS NOTES
Problem: Falls - Risk of  Goal: *Absence of Falls  Description: Document Stormy Bee Fall Risk and appropriate interventions in the flowsheet. Outcome: Progressing Towards Goal  Note: Fall Risk Interventions:                                Problem: Pressure Injury - Risk of  Goal: *Prevention of pressure injury  Description: Document Krishna Scale and appropriate interventions in the flowsheet.   Outcome: Progressing Towards Goal  Note: Pressure Injury Interventions:       Moisture Interventions: Absorbent underpads    Activity Interventions: PT/OT evaluation, Increase time out of bed    Mobility Interventions: PT/OT evaluation, Assess need for specialty bed    Nutrition Interventions: Offer support with meals,snacks and hydration, Document food/fluid/supplement intake    Friction and Shear Interventions: Minimize layers, Apply protective barrier, creams and emollients

## 2023-04-24 LAB
INR PPP: 2.1 (ref 0.9–1.1)
PROTHROMBIN TIME: 23.8 SEC (ref 11.9–14.6)

## 2023-04-24 PROCEDURE — 77010033678 HC OXYGEN DAILY

## 2023-04-24 PROCEDURE — 74011250637 HC RX REV CODE- 250/637: Performed by: INTERNAL MEDICINE

## 2023-04-24 PROCEDURE — 94761 N-INVAS EAR/PLS OXIMETRY MLT: CPT

## 2023-04-24 PROCEDURE — 65270000029 HC RM PRIVATE

## 2023-04-24 PROCEDURE — 85610 PROTHROMBIN TIME: CPT

## 2023-04-24 PROCEDURE — 74011250637 HC RX REV CODE- 250/637: Performed by: HOSPITALIST

## 2023-04-24 PROCEDURE — 97530 THERAPEUTIC ACTIVITIES: CPT

## 2023-04-24 PROCEDURE — 74011250637 HC RX REV CODE- 250/637: Performed by: NURSE PRACTITIONER

## 2023-04-24 PROCEDURE — 74011000250 HC RX REV CODE- 250: Performed by: INTERNAL MEDICINE

## 2023-04-24 PROCEDURE — 97110 THERAPEUTIC EXERCISES: CPT

## 2023-04-24 PROCEDURE — 97116 GAIT TRAINING THERAPY: CPT

## 2023-04-24 PROCEDURE — 36415 COLL VENOUS BLD VENIPUNCTURE: CPT

## 2023-04-24 RX ADMIN — SODIUM CHLORIDE, PRESERVATIVE FREE 10 ML: 5 INJECTION INTRAVENOUS at 00:41

## 2023-04-24 RX ADMIN — DOCUSATE SODIUM 100 MG: 100 CAPSULE, LIQUID FILLED ORAL at 22:01

## 2023-04-24 RX ADMIN — DOCUSATE SODIUM 100 MG: 100 CAPSULE, LIQUID FILLED ORAL at 09:17

## 2023-04-24 RX ADMIN — GABAPENTIN 200 MG: 100 CAPSULE ORAL at 09:17

## 2023-04-24 RX ADMIN — ASPIRIN 81 MG: 81 TABLET, COATED ORAL at 09:18

## 2023-04-24 RX ADMIN — FUROSEMIDE 40 MG: 40 TABLET ORAL at 22:01

## 2023-04-24 RX ADMIN — GABAPENTIN 200 MG: 100 CAPSULE ORAL at 22:01

## 2023-04-24 RX ADMIN — SODIUM CHLORIDE, PRESERVATIVE FREE 10 ML: 5 INJECTION INTRAVENOUS at 18:05

## 2023-04-24 RX ADMIN — LEVOTHYROXINE SODIUM 25 MCG: 0.03 TABLET ORAL at 05:21

## 2023-04-24 RX ADMIN — SODIUM CHLORIDE, PRESERVATIVE FREE 10 ML: 5 INJECTION INTRAVENOUS at 22:06

## 2023-04-24 RX ADMIN — SODIUM CHLORIDE, PRESERVATIVE FREE 10 ML: 5 INJECTION INTRAVENOUS at 05:21

## 2023-04-24 RX ADMIN — TAMSULOSIN HYDROCHLORIDE 0.4 MG: 0.4 CAPSULE ORAL at 09:17

## 2023-04-24 RX ADMIN — POTASSIUM CHLORIDE 20 MEQ: 1500 TABLET, EXTENDED RELEASE ORAL at 09:17

## 2023-04-24 RX ADMIN — CETIRIZINE HYDROCHLORIDE 10 MG: 10 TABLET, FILM COATED ORAL at 18:05

## 2023-04-24 RX ADMIN — WARFARIN SODIUM 5 MG: 5 TABLET ORAL at 18:05

## 2023-04-24 NOTE — PROGRESS NOTES
Warfarin Dosing Consult  Mary Mello is a [de-identified] y.o. male with mechanical mitral valve. Pharmacy was consulted by Dr. Belinda Agudelo to dose and monitor warfarin. INR Goal: 2.5-3.5    PTA Dose: Spoke with patient, he appears to have very labile INR and his dose changes every 3 days or so based on his INR. He was recently on a mix of 1 and 2 mg doses but added that just prior to admission he was on warfarin 5 mg PO daily. Drugs that may increase INR:None  Drugs that may decrease INR: None  Other current anticoagulants/ drugs that may increase bleeding risk: Aspirin  Risk factors: Age > 65  Daily INR ordered: Yes    Recent Labs     04/19/23  0612 04/18/23  1500 04/17/23  0500 04/16/23  0551 04/15/23  0648 04/14/23  0532 04/13/23  1254   HGB 14.3 14.8 15.3 14.9 14.7 15.2 15.3    275 286 255 241 239 269     Recent Labs     04/13/23  1254   ALT 17   AST 20     Recent Labs     04/24/23  0944 04/23/23  0743 04/22/23  0550 04/21/23  0620 04/20/23  1130 04/19/23  0612 04/18/23  1500   INR 2.1* 3.0* 2.5* 2.7* 2.8* 2.9* 3.1*          Date INR Previous Dose   4/19 2.9  5 mg   4/20 2.8 5 mg   4/21 2.7 5mg   4/22 2.5 5 mg   4/23 3 7.5 mg   4/24 2.1 5mg       Assessment/ Plan:  INR therapeutic. Warfarin 5 mg PO qPM ordered. Next INR with AM labs  Pharmacy will continue to monitor daily and adjust therapy as indicated.

## 2023-04-24 NOTE — PROGRESS NOTES
Problem: Mobility Impaired (Adult and Pediatric)  Goal: *Acute Goals and Plan of Care (Insert Text)  Description: FUNCTIONAL STATUS PRIOR TO ADMISSION: Patient required minimal assistance for basic and instrumental ADLs. HOME SUPPORT PRIOR TO ADMISSION: The patient lived with wife and required supervision/set-up for ADLs. Patient stated goal: get better  Patient will move from supine to sit and sit to supine , scoot up and down, and roll side to side in bed with minimal assistance/contact guard assist within 7 day(s). Patient will transfer from bed to chair and chair to bed with minimal assistance/contact guard assist using the least restrictive device within 7 day(s). Patient will improve static standing balance to minimal assistance within 1 week(s). Patient will ambulate 50 feet with minimal assistance with least restrictive device within 1 weeks. Outcome: Progressing Towards Goal   PHYSICAL THERAPY TREATMENT  Patient: Beverley Vasquez (72 y.o. male)  Date: 4/24/2023  Diagnosis: [facility] exacerbation ([facility] [I50.9] <principal problem not specified>      Precautions: In place during session: Nasal Cannula 2L and EKG/telemetry     Chart, physical therapy assessment, plan of care and goals were reviewed. ASSESSMENT  Patient continues with skilled PT services and is progressing towards goals. Pt received semi supine  upon PT/OT arrival, agreeable to session. (See below for objective details and assist levels). Overall pt tolerated session fair  today with ambulation. O2 sats dropped with activity and ambulation this session. O2 sats ranged from 85 to 89% with activity on 2 L O2. Pt. Requires assist for standing and ambulation. Unsteady gt. Noted but no LOB noted. Pt. Fatigues easily and requires frequent rest breaks often. Will continue to benefit from skilled PT services, and will continue to progress as tolerated.      Current Level of Function Impacting Discharge (mobility/balance): Min A x 1 for Mobility     Other factors to consider for discharge: decreased mobility/safety/assistance needed         PLAN :  Patient continues to benefit from skilled intervention to address the above impairments. Continue treatment per established plan of care to address goals. Recommend with staff: Out of bed to chair for meals, Encourage HEP in prep for ADLs/mobility, Use of bed/chair alarm for safety, and LE elevation for management of edema    Recommendation for discharge: (in order for the patient to meet his/her long term goals)  Joshua Herrera    This discharge recommendation:  Has been made in collaboration with the attending provider and/or case management    IF patient discharges home will need the following DME: to be determined (TBD)       SUBJECTIVE:   Patient stated I will see what I can do. \"    OBJECTIVE DATA SUMMARY:   Critical Behavior:  Neurologic State: Alert  Orientation Level: Oriented X4  Cognition: Follows commands  Safety/Judgement: Awareness of environment, Insight into deficits  Functional Mobility Training:  Bed Mobility:         Pt. In chair upon arrival.           Transfers:  Sit to Stand: Minimum assistance; Moderate assistance;Assist x2  Stand to Sit: Minimum assistance;Assist x2; Additional time        Bed to Chair: Minimum assistance;Contact guard assistance;Assist x2                    Balance:  Sitting: Intact; With support  Sitting - Static: Good (unsupported)  Sitting - Dynamic: Fair (occasional)  Standing: Impaired; With support  Standing - Static: Fair;Constant support  Standing - Dynamic : Fair;Constant support  Ambulation/Gait Training:  Distance (ft): 11 Feet (ft) (7' with RW then needed a seated rest break 4' more)  Assistive Device: Walker, rolling;Gait belt  Ambulation - Level of Assistance: Minimal assistance;Assist x1 (plus 1 for chair to follow/safety)        Gait Abnormalities: Shuffling gait        Base of Support: Widened     Speed/Radha: Slow;Shuffled  Step Length: Right shortened;Left shortened                             Therapeutic Exercises:       EXERCISE   Sets   Reps   Active Active Assist   Passive Self ROM   Comments   Ankle Pumps  20 [x] [] [] []    Quad Sets/Glut Sets   [] [] [] []    Hamstring Sets   [] [] [] []    Short Arc Quads   [] [] [] []    Heel Slides   [] [] [] []    Straight Leg Raises   [] [] [] []    Hip abd/add 2 6 [x] [] [] []    Long Arc Quads 2 6 [x] [] [] []    Marching 2 6 [x] [] [] []       [] [] [] []       Pain Ratin/10      Activity Tolerance:   Fair, desaturates with exertion and requires oxygen, requires frequent rest breaks, and observed SOB with activity    After treatment patient left in no apparent distress:   Bed locked and returned to lowest position, Sitting in chair, Call bell within reach, Caregiver / family present, and RN notified of session. COMMUNICATION/COLLABORATION:   The patients plan of care was discussed with: Occupational therapy assistant and Registered nurse. [place] with OT for increased safety and mobility for pt and clinician.      Meg Maria, PT   Time Calculation: 30 mins

## 2023-04-24 NOTE — PROGRESS NOTES
Problem: Falls - Risk of  Goal: *Absence of Falls  Description: Document Jovita Fajardo Fall Risk and appropriate interventions in the flowsheet. Outcome: Progressing Towards Goal  Note: Fall Risk Interventions:                                Problem: Pressure Injury - Risk of  Goal: *Prevention of pressure injury  Description: Document Krishna Scale and appropriate interventions in the flowsheet.   Outcome: Progressing Towards Goal  Note: Pressure Injury Interventions:       Moisture Interventions: Absorbent underpads    Activity Interventions: Increase time out of bed, PT/OT evaluation    Mobility Interventions: PT/OT evaluation    Nutrition Interventions: Document food/fluid/supplement intake, Offer support with meals,snacks and hydration    Friction and Shear Interventions: Apply protective barrier, creams and emollients, Feet elevated on foot rest, Minimize layers

## 2023-04-24 NOTE — PROGRESS NOTES
CARDIOLOGY PROGRESS NOTE      Patient Name: Oswaldo Armstrong  Age: [de-identified] y.o. Gender:male  :1940  MRN: [id number]    Patient seen and examined. This is a patient who is followed for acute on chronic heart failure with severely reduced EF 5-10%, mechanical MV. Spouse at the bedside. Resting in bed, no acute distress, O2 NC. Denies chest pain. Reports SOB has improved. Onset of HF sx 2 months ago, temporally related to stopping Tikoysn at Dr Dariel Jones recommendation (previously followed by [facility]). Prior to then he was very active, mowing grass, etc    Telemetry reviewed, V-paced    Pertinent review of systems items noted above, all other systems are negative. Current medications reviewed. Physical Examination  No Known Allergies  Visit Vitals  BP 98/69 (BP Patient Position: At rest;Sitting;Reclining)   Pulse 73   Temp 97.5 °F (36.4 °C)   Resp (!) 5   Wt 69.6 kg (153 lb 7 oz)   SpO2 93%   BMI 23.34 kg/m²     Vital signs are stable. Heart has a regular rate and rhythm. crisp click  Lungs are diminished  Abdomen is soft, nontender, normal bowel sounds. Extremities have 1+ LE edema. Skin is dry and warm    Labs reviewed: Lab results reviewed. For significant abnormal values and values requiring intervention, see assessment and plan. Recent Results (from the past 12 hour(s))   PROTHROMBIN TIME + INR    Collection Time: 23  9:44 AM   Result Value Ref Range    Prothrombin time 23.8 (H) 11.9 - 14.6 sec    INR 2.1 (H) 0.9 - 1.1        Impression and plan as discussed with Dr. Thompson Davidson  Acute on Chronic HFrEF: EF 5-10%. S/p ICD. BNP 35,000 to 15K after diuresis. Doing much better volume wise but weak. Not on GDMT due to low BPs. continue spironolactone 25. Will need to follow up with [facility] in 2 weeks. 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 On Coumadin  Atrial Fibrillation: Currently V-paced. Continue Coumadin for stroke prevention.    Pneumonia: Treatment per primary  Anticipate discharge to SNF in [address] early next week    Please do not hesitate to call me if additional questions arise.     Nevin Bird NP  4/24/2023

## 2023-04-24 NOTE — PROGRESS NOTES
CM contacted Dean Pr[place] [facility] [phone], regarding possible swing bed and dc today. She stated that she needed to speak with the doctor and therapy team and get back to CM regarding possible acceptance. CM expressed patient's readiness for DC today. CM awaiting return call. 3:15 PM - CM notified that [facility] [place] for a swing bed. CM met with patient and his spouse in room to discuss DCP and other SNF. Patient stated it was very important that he stay in the [facility]. CM discussed [facility] Varela vs [employer], choice given for [facility], referral sent via careport, awaiting possible acceptance. CM continues to follow and monitor for needs.

## 2023-04-24 NOTE — PROGRESS NOTES
OCCUPATIONAL THERAPY TREATMENT  Patient: Altagracia Houser (19 y.o. male)  Date: 4/24/2023  Diagnosis: CHF exacerbation ([facility] [I50.9] <principal problem not specified>      Precautions: In place during session: Nasal Cannula 2L and External Catheter  Chart, occupational therapy assessment, plan of care, and goals were reviewed. ASSESSMENT  Pt continues with skilled OT/PT services and is progressing towards goals. Pt received sitting in recliner upon arrival, AXO x4 and agreeable to PTA/GOLDBERG tx at this time. Pt co-tx'ed to limited activity tolerance. Wife present during session. Overall, pt continues to present with deficits in generalized strength/AROM, coordination, bed mobility, static/dynamic sitting and standing balance and functional activity tolerance during performance of ADLs/mobility (see below for objective details and assist levels). Pt tolerated session fair, required freq rest breaks. Pt initially found w/ O2 off. O2 at 2L placed on pt and pt completed pursed lip breathing. O2 stats 85-88 after mobility and 94-97 after rest break. Pt completed UE therex sitting in recliner, see below for details, completed to maintain/ increase strength and endurance. Pt noted w/ increased ambulation this date in preparation for household mobility. Education provided on energy conservation techniques, w/ pt and wife verbalizing good understanding. Will continue to progress. Recommend d/c to [facility] once medically appropriate. Other factors to consider for discharge: PLOF, time since onset, severity of deficits and decline from functional baseline. PLAN :  Patient continues to benefit from skilled intervention to address the above impairments. Continue treatment per established plan of care. to address goals. Recommend with staff: Out of bed to chair for meals and Encourage HEP in prep for ADLs/mobility    Recommend next session:  Toileting and Seated grooming    Recommendation for discharge: (in order for the patient to meet his/her long term goals)  Joshua Herrera    This discharge recommendation:  Has been made in collaboration with the attending provider and/or case management    IF patient discharges home will need the following DME: TBD       SUBJECTIVE:   Patient stated I haven't been walking much.     OBJECTIVE DATA SUMMARY:   Cognitive/Behavioral Status:  Neurologic State: Alert  Orientation Level: Oriented X4  Cognition: Follows commands     Functional Mobility and Transfers for ADLs:  Transfers:  Sit to Stand: Minimum assistance; Moderate assistance;Assist x2     Bed to Chair: Minimum assistance;Contact guard assistance;Assist x2    Balance:  Sitting: Intact; With support  Sitting - Static: Good (unsupported)  Sitting - Dynamic: Fair (occasional)  Standing: Impaired; With support  Standing - Static: Fair;Constant support  Standing - Dynamic : Fair;Constant support       Therapeutic Exercises:   Exercise Sets Reps AROM AAROM PROM Self PROM Comments   Shoulder flex/ext 1 10 [x] [] [] []    Elbow flex/ext 1 10 [x] [] [] []    Wrist flex/ext 1 10 [x] [] [] []    Hand flex/ ext 1 10 [x] [] [] []        Pain:  0/10    Activity Tolerance:   Fair and requires frequent rest breaks    After treatment patient left in no apparent distress:   Sitting in chair, Call bell within reach, and Caregiver / family present, bed locked and in lowest position    COMMUNICATION/COLLABORATION:   The patients plan of care was discussed with: Physical therapy assistant and Registered nurse. PT/OT sessions occurred together for increased safety of pt and clinician. The supervising occupational therapist and treating occupational therapist assistant have met to review this patients progress and plan of care.     Chace Payment, NANCY  Time Calculation: 30 mins     Problem: Self Care Deficits Care Plan (Adult)  Goal: *Acute Goals and Plan of Care (Insert Text)  Description:   FUNCTIONAL STATUS PRIOR TO ADMISSION: Patient was modified independent using a rollator for functional mobility. Patient required assistance for basic and instrumental ADLs. HOME SUPPORT: The patient lived with his wife and required assistance for ADLs/IADLs. Occupational Therapy Goals  Initiated 4/19/2023  Patient Goal: Pt did not state. 1.  Patient will perform lower body dressing with modified independence within 7 day(s). 2.  Patient will perform grooming with modified independence within 7 day(s). 3.  Patient will perform bathing with modified independence within 7 day(s). 4.  Patient will perform toilet transfers with modified independence within 7 day(s). 5.  Patient will perform all aspects of toileting with modified independence within 7 day(s). 6.  Patient will participate in upper extremity therapeutic exercise/activities with independence within 7 day(s). 7.  Patient will utilize energy conservation techniques during functional activities with verbal cues within 7 day(s).   Outcome: Progressing Towards Goal

## 2023-04-24 NOTE — PROGRESS NOTES
Hospitalist Progress Note            Daily Progress Note: 4/24/2023 1:58 PM    History of present illness :     As per the referring facility transferring physician  43-year-old male with history of heart failure with reduced ejection fraction of 5- 10% presents to the emergency room at the referring facility complaining of a fall with no loss of consciousness or head injury. He is found with rib fracture, found with consistent right lower lobe infiltrate, found with hypoxia admitted for further management. He does not look like an active pneumonia issues, found with heart failure with volume overload. As he is not improving with his activity or breathing. He is getting out of breath even getting up to the chair at bedside. Discussed with [facility] cardiology the commended to transfer to [facility] so that he can be started on inotropic support      Hospital course:   He was admitted with not much improvement at the referring facility so transferred here. He was admitted last night by my colleague, denies any new complaints. Awaiting further cardiology input. He denies any chest pain or palpitations. No fever, chills. Appetite not great, but he ate strawberries that his family brought fresh from the form. Has multiple family members in the room. Due to hypotension metoprolol and Entresto kept on hold. Warfarin is in therapeutic range. Patient denies any complaints, he is trying to eat his breakfast when I seen him. Subjective:     Reviewed cardiology notes, appreciate help. As per spouse he is stable at base line with no issues. Oral intake is better even though not optimal.  No significant events noted overnight.       Current Facility-Administered Medications   Medication Dose Route Frequency    warfarin (COUMADIN) tablet 5 mg  5 mg Oral QPM    spironolactone (ALDACTONE) tablet 25 mg  25 mg Oral DAILY    furosemide (LASIX) tablet 40 mg  40 mg Oral BID    WARFARIN INFORMATION NOTE (COUMADIN) 1 Each  1 Each Other Rx Dosing/Monitoring    [Held by provider] metoprolol succinate (TOPROL-XL) XL tablet 12.5 mg  12.5 mg Oral DAILY    [Held by provider] sacubitriL-valsartan (ENTRESTO) 24-26 mg tablet 1 Tablet  1 Tablet Oral BID    docusate sodium (COLACE) capsule 100 mg  100 mg Oral BID    aspirin delayed-release tablet 81 mg  81 mg Oral DAILY    levothyroxine (SYNTHROID) tablet 25 mcg  25 mcg Oral 6am    cetirizine (ZYRTEC) tablet 10 mg  10 mg Oral QPM    gabapentin (NEURONTIN) capsule 200 mg  200 mg Oral BID    nicotine (NICODERM CQ) 21 mg/24 hr patch 1 Patch  1 Patch TransDERmal DAILY    potassium chloride (K-DUR, KLOR-CON M20) SR tablet 20 mEq  20 mEq Oral DAILY    tamsulosin (FLOMAX) capsule 0.4 mg  0.4 mg Oral DAILY    sodium chloride (NS) flush 5-40 mL  5-40 mL IntraVENous Q8H    sodium chloride (NS) flush 5-40 mL  5-40 mL IntraVENous PRN    acetaminophen (TYLENOL) tablet 650 mg  650 mg Oral Q6H PRN    Or    acetaminophen (TYLENOL) suppository 650 mg  650 mg Rectal Q6H PRN    polyethylene glycol (MIRALAX) packet 17 g  17 g Oral DAILY PRN    ondansetron (ZOFRAN ODT) tablet 4 mg  4 mg Oral Q8H PRN    Or    ondansetron (ZOFRAN) injection 4 mg  4 mg IntraVENous Q6H PRN        Objective:     Visit Vitals  /69 (BP Patient Position: At rest;Lying)   Pulse 74   Temp 97.4 °F (36.3 °C)   Resp 18   Wt 69.6 kg (153 lb 7 oz)   SpO2 96%   BMI 23.34 kg/m²    O2 Flow Rate (L/min): 2 l/min O2 Device: Nasal cannula    Temp (24hrs), Av.5 °F (36.4 °C), Min:97.4 °F (36.3 °C), Max:97.7 °F (36.5 °C)      701 -  190  In: 240 [P.O.:240]  Out: -   1901 -  0700  In: -   Out: 2350 [Urine:2350]    PHYSICAL EXAM:    Physical Exam  Constitutional:       Comments: Cachectic, frail looking. Cardiovascular:      Rate and Rhythm: Regular rhythm. Pulmonary:      Breath sounds: Normal breath sounds. Abdominal:      Palpations: Abdomen is soft.    Musculoskeletal: Cervical back: Neck supple. Right lower leg: No edema. Left lower leg: No edema. Neurological:      Mental Status: He is alert. Data Review    Recent Results (from the past 24 hour(s))   PROTHROMBIN TIME + INR    Collection Time: 04/24/23  9:44 AM   Result Value Ref Range    Prothrombin time 23.8 (H) 11.9 - 14.6 sec    INR 2.1 (H) 0.9 - 1.1         XR CHEST AP LAT   Final Result   Slightly improved aeration of the right lung with residual airspace   disease. Assessment/Plan:     Heart failure severely reduced ejection fraction : No evidence of fluid overload. Getting out of breath easily, on furosemide. Awaiting further cardiology recommendations. Hypotension: He is on very small dose of metoprolol XL, Entresto, but due to Hypotension on hold. We will continue to monitor closely. Prosthetic Mitral valve on anticoagulation with warfarin, INR is therapeutic. Pharmacy is monitoring dosing. Hypokalemia: Supplementation daily as he is on diuretics. Hypothyroidism: On levothyroxine which we will continue, recently had TSH done that was in normal range with low free T4, may be due to chronic illness with euthyroid sick syndrome. Will repeat labs again    Planning for discharge once bed available, he seems stable at this time with poor prognosis. DVT Prophylaxis: On therapeutic range  Code Status: Full Code  POA/NOK: No advance medical directives but spouse with the decisions. Disposition and discharge barriers: Awaiting further swing bed availability  working on it. Care Plan discussed with: Spouse at bedside, case management. Signed by :   Aleyda Ridley MD    This is dictation was done by dragon, computer voice recognition software. Quite often unanticipated grammatical, syntax, homophones and other interpretive errors or inadvertently transcribed by the computer software. Please excuse errors that have escaped final proofreading.   Thank you.

## 2023-04-25 VITALS
RESPIRATION RATE: 20 BRPM | WEIGHT: 155.87 LBS | BODY MASS INDEX: 23.7 KG/M2 | DIASTOLIC BLOOD PRESSURE: 63 MMHG | OXYGEN SATURATION: 99 % | TEMPERATURE: 97.7 F | SYSTOLIC BLOOD PRESSURE: 92 MMHG | HEART RATE: 75 BPM

## 2023-04-25 LAB
ANION GAP SERPL CALC-SCNC: 3 MMOL/L (ref 5–15)
BUN SERPL-MCNC: 22 MG/DL (ref 6–20)
BUN/CREAT SERPL: 20 (ref 12–20)
CA-I BLD-MCNC: 8.2 MG/DL (ref 8.5–10.1)
CHLORIDE SERPL-SCNC: 102 MMOL/L (ref 97–108)
CO2 SERPL-SCNC: 27 MMOL/L (ref 21–32)
CREAT SERPL-MCNC: 1.1 MG/DL (ref 0.7–1.3)
GLUCOSE SERPL-MCNC: 83 MG/DL (ref 65–100)
INR PPP: 2 (ref 0.9–1.1)
POTASSIUM SERPL-SCNC: 4.1 MMOL/L (ref 3.5–5.1)
PROTHROMBIN TIME: 22.9 SEC (ref 11.9–14.6)
SODIUM SERPL-SCNC: 132 MMOL/L (ref 136–145)

## 2023-04-25 PROCEDURE — 74011250637 HC RX REV CODE- 250/637: Performed by: SPECIALIST

## 2023-04-25 PROCEDURE — 85610 PROTHROMBIN TIME: CPT

## 2023-04-25 PROCEDURE — 77010033678 HC OXYGEN DAILY

## 2023-04-25 PROCEDURE — 80048 BASIC METABOLIC PNL TOTAL CA: CPT

## 2023-04-25 PROCEDURE — 74011250637 HC RX REV CODE- 250/637: Performed by: NURSE PRACTITIONER

## 2023-04-25 PROCEDURE — 65270000029 HC RM PRIVATE

## 2023-04-25 PROCEDURE — 74011250637 HC RX REV CODE- 250/637: Performed by: INTERNAL MEDICINE

## 2023-04-25 PROCEDURE — 94761 N-INVAS EAR/PLS OXIMETRY MLT: CPT

## 2023-04-25 PROCEDURE — 74011250637 HC RX REV CODE- 250/637: Performed by: HOSPITALIST

## 2023-04-25 PROCEDURE — 74011000250 HC RX REV CODE- 250: Performed by: INTERNAL MEDICINE

## 2023-04-25 PROCEDURE — 36415 COLL VENOUS BLD VENIPUNCTURE: CPT

## 2023-04-25 RX ORDER — TAMSULOSIN HYDROCHLORIDE 0.4 MG/1
0.4 CAPSULE ORAL DAILY
Qty: 30 CAPSULE | Refills: 0 | Status: SHIPPED | OUTPATIENT
Start: 2023-04-26 | End: 2023-05-26

## 2023-04-25 RX ORDER — GABAPENTIN 300 MG/1
300 CAPSULE ORAL 2 TIMES DAILY
Qty: 60 CAPSULE | Refills: 0 | Status: SHIPPED | OUTPATIENT
Start: 2023-04-25 | End: 2023-05-25

## 2023-04-25 RX ORDER — SPIRONOLACTONE 25 MG/1
25 TABLET ORAL DAILY
Qty: 30 TABLET | Refills: 0 | Status: SHIPPED | OUTPATIENT
Start: 2023-04-26 | End: 2023-05-26

## 2023-04-25 RX ORDER — FUROSEMIDE 40 MG/1
40 TABLET ORAL 2 TIMES DAILY
Qty: 60 TABLET | Refills: 0 | Status: SHIPPED | OUTPATIENT
Start: 2023-04-25 | End: 2023-05-25

## 2023-04-25 RX ADMIN — SODIUM CHLORIDE, PRESERVATIVE FREE 10 ML: 5 INJECTION INTRAVENOUS at 06:35

## 2023-04-25 RX ADMIN — DOCUSATE SODIUM 100 MG: 100 CAPSULE, LIQUID FILLED ORAL at 09:36

## 2023-04-25 RX ADMIN — ASPIRIN 81 MG: 81 TABLET, COATED ORAL at 09:36

## 2023-04-25 RX ADMIN — SPIRONOLACTONE 25 MG: 25 TABLET ORAL at 09:36

## 2023-04-25 RX ADMIN — GABAPENTIN 200 MG: 100 CAPSULE ORAL at 09:36

## 2023-04-25 RX ADMIN — TAMSULOSIN HYDROCHLORIDE 0.4 MG: 0.4 CAPSULE ORAL at 09:36

## 2023-04-25 RX ADMIN — FUROSEMIDE 40 MG: 40 TABLET ORAL at 09:36

## 2023-04-25 RX ADMIN — POTASSIUM CHLORIDE 20 MEQ: 1500 TABLET, EXTENDED RELEASE ORAL at 09:36

## 2023-04-25 RX ADMIN — LEVOTHYROXINE SODIUM 25 MCG: 0.03 TABLET ORAL at 06:35

## 2023-04-25 RX ADMIN — WARFARIN SODIUM 7.5 MG: 2.5 TABLET ORAL at 12:00

## 2023-04-25 NOTE — PROGRESS NOTES
CARDIOLOGY PROGRESS NOTE      Patient Name: Pineda Camacho  Age: [de-identified] y.o. Gender:male  :1940  MRN: [id number]    Patient seen and examined. This is a patient who is followed for acute on chronic heart failure with severely reduced EF 5-10%, mechanical MV. Onset of HF sx 2 months ago, temporally related to stopping Tikoysn at Dr Jenelle Santiago recommendation (followed by [facility]). Prior to then he was very active, mowing grass, etc    Seen and examined. Wife at bedside. Feeling fine. Planning to go to rehab. No chest pain. Breathing stable. Telemetry reviewed, V-paced    Pertinent review of systems items noted above, all other systems are negative. Current medications reviewed. Physical Examination  No Known Allergies  Visit Vitals  BP 92/63 (BP Patient Position: At rest;Semi fowlers)   Pulse 75   Temp 97.7 °F (36.5 °C)   Resp 20   Wt 70.7 kg (155 lb 13.8 oz)   SpO2 99%   BMI 23.70 kg/m²     Vital signs are stable. Heart has a regular rate and rhythm. crisp click  Lungs are diminished  Abdomen is soft, nontender, normal bowel sounds. Extremities have 1+ LE edema. Skin is dry and warm    Labs reviewed: Lab results reviewed. For significant abnormal values and values requiring intervention, see assessment and plan.   Recent Results (from the past 12 hour(s))   PROTHROMBIN TIME + INR    Collection Time: 23  9:19 AM   Result Value Ref Range    Prothrombin time 22.9 (H) 11.9 - 14.6 sec    INR 2.0 (H) 0.9 - 1.1     METABOLIC PANEL, BASIC    Collection Time: 23  9:19 AM   Result Value Ref Range    Sodium 132 (L) 136 - 145 mmol/L    Potassium 4.1 3.5 - 5.1 mmol/L    Chloride 102 97 - 108 mmol/L    CO2 27 21 - 32 mmol/L    Anion gap 3 (L) 5 - 15 mmol/L    Glucose 83 65 - 100 mg/dL    BUN 22 (H) 6 - 20 mg/dL    Creatinine 1.10 0.70 - 1.30 mg/dL    BUN/Creatinine ratio 20 12 - 20      eGFR >60 >60 ml/min/1.73m2    Calcium 8.2 (L) 8.5 - 10.1 mg/dL      Impression and plan as discussed with  Veronica  Acute on Chronic HFrEF: EF 5-10%. S/p ICD. BNP 35,000 to 15K after diuresis. Doing much better volume wise but weak. Not on GDMT due to low BPs. continue spironolactone 25. Will need to follow up with [facility] in 2 weeks. 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 On Coumadin, INR subtherapeutic would bridge with lovenox. Atrial Fibrillation: Currently V-paced. Continue Coumadin for stroke prevention. Pneumonia: Treatment per primary  Anticipate discharge to SNF in [address]    Follow up with [facility] in 1-2 weeks. Please do not hesitate to call me if additional questions arise.     uGerrero Peoples NP  4/25/2023

## 2023-04-25 NOTE — DISCHARGE SUMMARY
Admit date: 4/18/2023   Admitting Provider: Nahed Mina MD    Discharge date: 4/25/2023  Discharging Provider: Serge Bynum MD      * Admission Diagnoses: CHF exacerbation [facility]) [I50.9]    * Discharge Diagnoses:    Hospital Problems as of 4/25/2023 Date Reviewed: 4/25/2023            Codes Class Noted - Resolved POA    * (Principal) [facility] exacerb[facility] ([facility] ICD-10-CM: I50.9  ICD-9-CM: 428.0  4/18/2023 - Present Yes        CHF (congestive heart failure) ([facility]) ICD-10-CM: I50.9  ICD-9-CM: 428.0  1/7/2023 - Present Yes        Congestive heart failure (CHF) ([facility] ICD-10-CM: I50.9  ICD-9-CM: 428.0  1/5/2023 - Present Yes           * Hospital Course: As per the referring facility transferring physician  60-year-old male with history of heart failure with reduced ejection fraction of 5- 10% presents to the emergency room at the referring facility complaining of a fall with no loss of consciousness or head injury. He is found with rib fracture, found with consistent right lower lobe infiltrate, found with hypoxia admitted for further management. He does not look like an active pneumonia issues, found with heart failure with volume overload. As he is not improving with his activity or breathing. He is getting out of breath even getting up to the chair at bedside. Discussed with [facility] cardiology the commended to transfer to [facility] so that he can be started on inotropic support        Hospital course:   He was admitted with not much improvement at the referring facility so transferred here. He was admitted last night by my colleague, denies any new complaints. Awaiting further cardiology input. He denies any chest pain or palpitations. No fever, chills. Appetite not great, but he ate strawberries that his family brought fresh from the form. Has multiple family members in the room. Due to hypotension metoprolol and Entresto kept on hold. Warfarin is in therapeutic range. Patient denies any complaints, he is trying to eat his breakfast when I seen him. Echocardiogram    Left Ventricle: Severely reduced left ventricular systolic function. EF by 2D Simpsons Biplane is 9%. Left ventricle is mildly dilated. Severe global hypokinesis present. Pericardium: Pleural effusion with echogenic material noted. IVC/SVC: IVC diameter is greater than 21 mm and decreases less than 50% during inspiration; therefore the estimated right atrial pressure is elevated (~15 mmHg). * Procedures:   * No surgery found *      Consults: Cardiology     Impression and plan as discussed with Dr. Ed Roberts  Acute on Chronic HFrEF: EF 5-10%. S/p ICD. BNP 35,000 to 15K after diuresis. Doing much better volume wise but weak. Not on GDMT due to low BPs. continue spironolactone 25. Will need to follow up with [facility] in 2 weeks. 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 On Coumadin  Atrial Fibrillation: Currently V-paced. Continue Coumadin for stroke prevention. Pneumonia: Treatment per primary  Anticipate discharge to SNF in [address] early next week     Please do not hesitate to call me if additional questions arise. David Hathaway NP  4/24/2023         Cosigned by: Parul Aguirre MD at 04/24/23 1642       Significant Diagnostic Studies:   Recent Results (from the past 24 hour(s))   PROTHROMBIN TIME + INR    Collection Time: 04/25/23  9:19 AM   Result Value Ref Range    Prothrombin time 22.9 (H) 11.9 - 14.6 sec    INR 2.0 (H) 0.9 - 1.1       CT Results  (Last 48 hours)      None          Echocardiogram    Left Ventricle: Severely reduced left ventricular systolic function. EF by 2D Simpsons Biplane is 9%. Left ventricle is mildly dilated. Severe global hypokinesis present. Pericardium: Pleural effusion with echogenic material noted.     IVC/SVC: IVC diameter is greater than 21 mm and decreases less than 50% during inspiration; therefore the estimated right atrial pressure is elevated (~15 mmHg). Discharge Exam:  Patient Vitals for the past 24 hrs:   Temp Pulse Resp BP SpO2   04/25/23 0744 97.7 °F (36.5 °C) 75 20 98/67 99 %   04/25/23 0400 -- 76 -- -- --   04/25/23 0301 97.4 °F (36.3 °C) 71 20 105/68 96 %   04/25/23 0000 -- 70 -- -- --   04/24/23 2325 97.5 °F (36.4 °C) 76 20 101/67 99 %   04/24/23 2000 -- 89 -- -- --   04/24/23 1916 97.7 °F (36.5 °C) 73 20 100/66 100 %   04/24/23 1519 97.4 °F (36.3 °C) 68 20 99/69 92 %   04/24/23 1214 97.5 °F (36.4 °C) 73 20 98/69 93 %   Vital signs are stable. Heart has a regular rate and rhythm. crisp click  Lungs are diminished  Abdomen is soft, nontender, normal bowel sounds. Extremities have 1+ LE edema. Skin is dry and warm      * Discharge Condition: stable  * Disposition: [facility])    Discharge Medications:  Current Discharge Medication List        START taking these medications    Details   spironolactone (ALDACTONE) 25 mg tablet Take 1 Tablet by mouth daily for 30 days. Qty: 30 Tablet, Refills: 0  Start date: 4/26/2023, End date: 5/26/2023      tamsulosin (FLOMAX) 0.4 mg capsule Take 1 Capsule by mouth daily for 30 days. Qty: 30 Capsule, Refills: 0  Start date: 4/26/2023, End date: 5/26/2023           CONTINUE these medications which have CHANGED    Details   furosemide (LASIX) 40 mg tablet Take 1 Tablet by mouth two (2) times a day for 30 days. Qty: 60 Tablet, Refills: 0  Start date: 4/25/2023, End date: 5/25/2023           CONTINUE these medications which have NOT CHANGED    Details   levothyroxine (SYNTHROID) 25 mcg tablet Take 1 Tablet by mouth daily. gemfibroziL (LOPID) 600 mg tablet Take 1 Tablet by mouth two (2) times a day. warfarin (COUMADIN) 5 mg tablet Take 1 Tablet by mouth daily.  Indications: blood clot caused by artificial heart valve, To alternate with 7.5 mg as per cardiology recommendation  Qty: 30 Tablet, Refills: 0      docusate sodium (COLACE) 100 mg capsule Take 1 Capsule by mouth two (2) times a day for 90 days. Qty: 60 Capsule, Refills: 2      magnesium chloride (Slow-Mag) 71.5 mg tablet Take 1 Tablet by mouth daily. gabapentin (NEURONTIN) 300 mg capsule Take 1 Capsule by mouth two (2) times a day. potassium chloride SR (KLOR-CON 10) 10 mEq tablet Take 1 Tablet by mouth daily. aspirin delayed-release 81 mg tablet Take 1 Tablet by mouth daily. STOP taking these medications       metoprolol succinate (TOPROL-XL) 25 mg XL tablet Comments:   Reason for Stopping:         sacubitril-valsartan (ENTRESTO) 24 mg/26 mg tablet Comments:   Reason for Stopping:               * Follow-up Care/Patient Instructions: Activity: Activity as tolerated and no driving for today  Diet: Cardiac Diet and Resume previous diet  Wound Care: As directed    Follow-up Information       Follow up With Specialties Details Why Contact Info    Jennifre Bradley NP Nurse Practitioner   [address] [place] [address]  Judit Galicia MD Cardiovascular Disease Physician Follow up in 1 week(s)      [facility] and [address] [place] [address] Follow up today  [id number] [place] [address].   [address] [facility] [address] [phone]          Discharge time 38 mins    Signed:  Mulugeta Anderson MD  4/25/2023  10:42 AM

## 2023-04-25 NOTE — PROGRESS NOTES
CM noted discharge order. Patient is discharging to [address] [place] [address] located at [address], [facility], room 3. Report may be called to [phone], Rome Martinez. Patient is clear to discharge to [facility] SNF by CM. Nurse is aware. Discharge plan of care/case management plan validated with provider discharge order. Medicare pt has received, reviewed, and signed 2nd IM letter informing them of their right to appeal the discharge. Signed copied has been placed on pt bedside chart.

## 2023-04-25 NOTE — PROGRESS NOTES
Warfarin Dosing Consult  Luzma Cintron is a [de-identified] y.o. male with mechanical mitral valve. Pharmacy was consulted by Dr. Annette Driscoll to dose and monitor warfarin. INR Goal: 2.5-3.5    PTA Dose: Spoke with patient, he appears to have very labile INR and his dose changes every 3 days or so based on his INR. He was recently on a mix of 1 and 2 mg doses but added that just prior to admission he was on warfarin 5 mg PO daily. Drugs that may increase INR:None  Drugs that may decrease INR: None  Other current anticoagulants/ drugs that may increase bleeding risk: Aspirin  Risk factors: Age > 65  Daily INR ordered: Yes    Recent Labs     04/19/23  0612 04/18/23  1500 04/17/23  0500 04/16/23  0551 04/15/23  0648 04/14/23  0532 04/13/23  1254   HGB 14.3 14.8 15.3 14.9 14.7 15.2 15.3    275 286 255 241 239 269     Recent Labs     04/13/23  1254   ALT 17   AST 20     Recent Labs     04/25/23  0919 04/24/23  0944 04/23/23  0743 04/22/23  0550 04/21/23  0620 04/20/23  1130 04/19/23  0612   INR 2.0* 2.1* 3.0* 2.5* 2.7* 2.8* 2.9*          Date INR Previous Dose   4/19 2.9  5 mg   4/20 2.8 5 mg   4/21 2.7 5mg   4/22 2.5 5 mg   4/23 3 7.5 mg   4/24 2.1 5mg   4/25 2.0 5mg       Assessment/ Plan:  INR subtherapeutic. Warfarin 7.5 mg PO ordered. Next INR with AM labs  Pharmacy will continue to monitor daily and adjust therapy as indicated.

## 2023-04-25 NOTE — PROGRESS NOTES
TRANSFER - OUT REPORT:    Verbal report given to Bee Hilario on Hussain Beam  being transferred to [facility], Room 188B for continuum of care. Report consisted of patients Situation, Background, Assessment and   Recommendations(SBAR). Opportunity for questions and clarification was provided. Patient transported with \"Hospital to Home. \"

## 2023-04-25 NOTE — PROGRESS NOTES
Problem: Falls - Risk of  Goal: *Absence of Falls  Description: Document Arthuro Maillard Fall Risk and appropriate interventions in the flowsheet. Outcome: Progressing Towards Goal  Note: Fall Risk Interventions:                                Problem: Patient Education: Go to Patient Education Activity  Goal: Patient/Family Education  Outcome: Progressing Towards Goal     Problem: Pressure Injury - Risk of  Goal: *Prevention of pressure injury  Description: Document Krishna Scale and appropriate interventions in the flowsheet.   Outcome: Progressing Towards Goal  Note: Pressure Injury Interventions:       Moisture Interventions: Absorbent underpads, Apply protective barrier, creams and emollients, Assess need for specialty bed, Internal/External urinary devices, Minimize layers    Activity Interventions: PT/OT evaluation    Mobility Interventions: PT/OT evaluation    Nutrition Interventions: Document food/fluid/supplement intake, Offer support with meals,snacks and hydration    Friction and Shear Interventions: Apply protective barrier, creams and emollients, Foam dressings/transparent film/skin sealants, HOB 30 degrees or less, Minimize layers

## 2023-04-25 NOTE — PROGRESS NOTES
Problem: Falls - Risk of  Goal: *Absence of Falls  Description: Document David Corey Fall Risk and appropriate interventions in the flowsheet. Outcome: Progressing Towards Goal  Note: Fall Risk Interventions:                                Problem: Patient Education: Go to Patient Education Activity  Goal: Patient/Family Education  Outcome: Progressing Towards Goal     Problem: Pressure Injury - Risk of  Goal: *Prevention of pressure injury  Description: Document Krishna Scale and appropriate interventions in the flowsheet.   Outcome: Progressing Towards Goal  Note: Pressure Injury Interventions:       Moisture Interventions: Absorbent underpads    Activity Interventions: PT/OT evaluation    Mobility Interventions: PT/OT evaluation    Nutrition Interventions: Document food/fluid/supplement intake    Friction and Shear Interventions: Apply protective barrier, creams and emollients, Feet elevated on foot rest, Minimize layers                Problem: Patient Education: Go to Patient Education Activity  Goal: Patient/Family Education  Outcome: Progressing Towards Goal     Problem: Patient Education: Go to Patient Education Activity  Goal: Patient/Family Education  Outcome: Progressing Towards Goal     Problem: Patient Education: Go to Patient Education Activity  Goal: Patient/Family Education  Outcome: Progressing Towards Goal

## 2023-04-25 NOTE — PROGRESS NOTES
Discharge plan of care/case management plan validated with provider discharge order. I have reviewed discharge instructions with the patient and wife. The patient and wife verbalized understanding. IV and telemetry to be removed upon depature. \"Hospital to Home\" will transport to [facility] \"sometime after 4 p.m.\"    1315 IV catheter discontinued intact. Site without signs and symptoms of complications. Dressing and pressure applied. Telemetry removed.

## 2023-04-26 ENCOUNTER — TELEPHONE (OUTPATIENT)
Dept: CASE MANAGEMENT | Age: 83
End: 2023-04-26

## 2023-04-26 PROCEDURE — 65270000029 HC RM PRIVATE

## 2023-04-26 NOTE — TELEPHONE ENCOUNTER
Discharge Follow-up call    Patient Discharged on: 04/25/2023    Patient Discharge to: SNF         RN-NN notes patient discharge to SNF. Discharge note/follow-up call not completed due to SNF.

## 2023-04-27 PROCEDURE — 65270000029 HC RM PRIVATE

## 2023-04-28 PROCEDURE — 65270000029 HC RM PRIVATE

## 2023-04-29 PROCEDURE — 65270000029 HC RM PRIVATE

## 2023-04-30 PROCEDURE — 65270000029 HC RM PRIVATE

## 2023-05-01 PROCEDURE — 65270000029 HC RM PRIVATE

## 2023-05-02 PROCEDURE — 65270000029 HC RM PRIVATE

## 2023-05-03 PROCEDURE — 65270000029 HC RM PRIVATE

## 2023-05-04 PROCEDURE — 65270000029 HC RM PRIVATE

## 2023-05-05 PROCEDURE — 65270000029 HC RM PRIVATE

## 2023-05-06 PROCEDURE — 65270000029 HC RM PRIVATE

## 2023-05-07 PROCEDURE — 65270000029 HC RM PRIVATE

## 2023-05-08 PROCEDURE — 65270000029 HC RM PRIVATE

## 2023-05-09 PROCEDURE — 65270000029 HC RM PRIVATE

## 2023-05-10 PROCEDURE — 65270000029 HC RM PRIVATE

## 2023-05-11 PROCEDURE — 65270000029 HC RM PRIVATE

## 2023-05-12 PROCEDURE — 65270000029 HC RM PRIVATE
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
## 2023-05-13 PROCEDURE — 65270000029 HC RM PRIVATE

## 2023-05-14 PROCEDURE — 65270000029 HC RM PRIVATE

## 2023-05-15 PROCEDURE — 65270000029 HC RM PRIVATE

## 2023-05-16 PROCEDURE — 65270000029 HC RM PRIVATE

## 2023-05-17 PROCEDURE — 65270000029 HC RM PRIVATE

## 2023-05-17 RX ORDER — LEVOTHYROXINE SODIUM 0.03 MG/1
25 TABLET ORAL DAILY
COMMUNITY
Start: 2023-03-27

## 2023-05-17 RX ORDER — SPIRONOLACTONE 25 MG/1
25 TABLET ORAL DAILY
Qty: 30 TABLET | Refills: 0 | COMMUNITY
Start: 2023-04-26 | End: 2023-05-26

## 2023-05-17 RX ORDER — GABAPENTIN 300 MG/1
300 CAPSULE ORAL 2 TIMES DAILY
Qty: 60 CAPSULE | Refills: 0 | COMMUNITY
Start: 2023-04-25 | End: 2023-05-25

## 2023-05-17 RX ORDER — FUROSEMIDE 40 MG/1
40 TABLET ORAL 2 TIMES DAILY
Qty: 60 TABLET | Refills: 0 | COMMUNITY
Start: 2023-04-25 | End: 2023-05-25

## 2023-05-17 RX ORDER — WARFARIN SODIUM 5 MG/1
5 TABLET ORAL DAILY
COMMUNITY
Start: 2023-02-07

## 2023-05-17 RX ORDER — GEMFIBROZIL 600 MG/1
600 TABLET, FILM COATED ORAL 2 TIMES DAILY
COMMUNITY

## 2023-05-17 RX ORDER — ASPIRIN 81 MG/1
81 TABLET ORAL DAILY
COMMUNITY

## 2023-05-17 RX ORDER — POTASSIUM CHLORIDE 750 MG/1
10 TABLET, FILM COATED, EXTENDED RELEASE ORAL DAILY
COMMUNITY

## 2023-05-17 RX ORDER — TAMSULOSIN HYDROCHLORIDE 0.4 MG/1
0.4 CAPSULE ORAL DAILY
Qty: 30 CAPSULE | Refills: 0 | COMMUNITY
Start: 2023-04-26 | End: 2023-05-26

## 2023-05-18 PROCEDURE — 65270000029 HC RM PRIVATE

## 2023-05-19 PROCEDURE — 65270000029 HC RM PRIVATE

## 2023-05-20 PROCEDURE — 65270000029 HC RM PRIVATE

## 2023-05-21 PROCEDURE — 65270000029 HC RM PRIVATE

## 2023-05-22 PROCEDURE — 65270000029 HC RM PRIVATE

## 2023-05-23 PROCEDURE — 65270000029 HC RM PRIVATE

## 2023-05-24 ENCOUNTER — HOSPITAL ENCOUNTER (OUTPATIENT)
Facility: HOSPITAL | Age: 83
Setting detail: RECURRING SERIES
Discharge: HOME | End: 2023-05-27
Payer: MEDICARE

## 2023-05-24 PROCEDURE — 65270000029 HC RM PRIVATE

## 2023-05-24 PROCEDURE — 97161 PT EVAL LOW COMPLEX 20 MIN: CPT

## 2023-05-24 NOTE — THERAPY EVALUATION
where he received PT services. He reports that he fell once in the last week and up to 4 times in the last month. He states that his most recent fall was from getting dizzy when bending over to pick something up. He does reports some L chest pain that he is not sure is from falling or something else. He navigates stairs with step to pattern with ALEX UE support. Onset Date: 2 months ago  Start of Care: 5/24/2023  PLOF: Independent with all ADL's   Mechanism of Injury:  change of medication causing weakness  Previous Treatment/Compliance: Physical Therapy at [facility]  Radiographs: None taken  What increases symptoms: moving quick with position changes makes him lightheaded  What decreases symptoms: holding onto stable surface  Work Hx: Patient is retired. Living Situation: Patient lives with wife., One level home. , and 3 stairs to enter with alex railings. Pt Goals: \" I want to get my legs and arms back strong. \"  Motivation: Good  Substance use:  Tobacco (chewing)  Cognition: Alert and Oriented x 4  Fall Assessment: TUG 20 seconds  PMHx:  Past Medical History:   Diagnosis Date    A[facility]fib ([facility]     CHF (congestive heart failure) ([facility])     last ef of 5-10%    Fall     H/O mitral valve replacement     with mechanical valve    Neuropathy      Surgical Hx:  Past Surgical History:   Procedure Laterality Date    CARDIAC VALVE SURGERY      PACEMAKER       Current Medications:  Current Outpatient Medications   Medication Instructions    aspirin 81 mg, Oral, DAILY    furosemide (LASIX) 40 mg, Oral, 2 TIMES DAILY    gabapentin (NEURONTIN) 300 mg, Oral, 2 TIMES DAILY    gemfibrozil (LOPID) 600 mg, Oral, 2 TIMES DAILY    levothyroxine (SYNTHROID) 25 mcg, Oral, DAILY    magnesium cl-calcium carbonate (SLOW-MAG) 71.5-119 MG TBEC tablet 71.5 mg, Oral, DAILY    potassium chloride (KLOR-CON) 10 MEQ extended release tablet 10 mEq, Oral, DAILY    spironolactone (ALDACTONE) 25 mg, Oral, DAILY    tamsulosin (FLOMAX) 0.4 mg, Oral, DAILY

## 2023-05-25 PROCEDURE — 65270000029 HC RM PRIVATE

## 2023-05-26 PROCEDURE — 65270000029 HC RM PRIVATE

## 2023-05-27 PROCEDURE — 65270000029 HC RM PRIVATE

## 2023-05-28 PROCEDURE — 65270000029 HC RM PRIVATE

## 2023-05-29 PROCEDURE — 65270000029 HC RM PRIVATE

## 2023-05-30 PROCEDURE — 65270000029 HC RM PRIVATE

## 2023-05-31 PROCEDURE — 65270000029 HC RM PRIVATE

## 2023-06-01 ENCOUNTER — HOSPITAL ENCOUNTER (OUTPATIENT)
Facility: HOSPITAL | Age: 83
Setting detail: RECURRING SERIES
Discharge: HOME | End: 2023-06-04
Payer: MEDICARE

## 2023-06-01 PROCEDURE — 65270000029 HC RM PRIVATE

## 2023-06-01 PROCEDURE — 97112 NEUROMUSCULAR REEDUCATION: CPT

## 2023-06-01 PROCEDURE — 97110 THERAPEUTIC EXERCISES: CPT

## 2023-06-02 PROCEDURE — 65270000029 HC RM PRIVATE

## 2023-06-03 PROCEDURE — 65270000029 HC RM PRIVATE

## 2023-06-04 PROCEDURE — 65270000029 HC RM PRIVATE

## 2023-06-05 PROCEDURE — 65270000029 HC RM PRIVATE

## 2023-06-06 ENCOUNTER — HOSPITAL ENCOUNTER (OUTPATIENT)
Facility: HOSPITAL | Age: 83
Setting detail: RECURRING SERIES
Discharge: HOME | End: 2023-06-09
Payer: MEDICARE

## 2023-06-06 PROCEDURE — 65270000029 HC RM PRIVATE

## 2023-06-06 PROCEDURE — 97110 THERAPEUTIC EXERCISES: CPT

## 2023-06-06 NOTE — PROGRESS NOTES
end of session (0-10 scale): 0/10    Assessment   Patient tolerated treatment well today. We were able to tolerate more during the session today and did not need too many rest breaks. He did find hip flexion exercises the most challenging. We will continue to progress as he tolerates to build up strength and endurance. Patient will continue to benefit from skilled PT services to modify and progress therapeutic interventions, analyze and address functional mobility deficits, analyze and address ROM deficits, analyze and address strength deficits, analyze and address imbalance/dizziness, and instruct in home and community integration to address functional deficits and attain remaining goals. Progress toward goals / Updated goals:    Short Term Goals, to be met within 5 treatments:  Patient will demonstrate independence and compliance with HEP in order to increase mobility and assist with carryover from PT services. Status at last Eval/Progress Note: not started  Current Status: see above, Initial Evaluation completed today  Goal Met? No     2. Patient will be able to stand from a chair with single UE use with LE strength 4/5. Status at last Eval/Progress Note: 3+/5 hip strength  Current Status: see above, Initial Evaluation completed today  Goal Met? No     3. Patient will be able to ambulate for 15 minutes without need for a rest break to go to the store with use of rollator. Status at last Eval/Progress Note: needs rest breaks often and use of rollator  Current Status: see above, Initial Evaluation completed today  Goal Met? No     Long Term Goals, to be met within 10 treatments: 1. Patient will be able to stand from a chair without use of UE for support. Status at last Eval/Progress Note: needs chastity UE  Current Status: see above, Initial Evaluation completed today  Goal Met? No     2.   Patient will improve LE strength to 4+/5 to be able to navigate stairs with single HR and reciprocal pattern to enter

## 2023-06-07 PROCEDURE — 65270000029 HC RM PRIVATE

## 2023-06-08 PROCEDURE — 65270000029 HC RM PRIVATE

## 2023-06-09 PROCEDURE — 65270000029 HC RM PRIVATE

## 2023-06-10 PROCEDURE — 65270000029 HC RM PRIVATE

## 2023-06-11 PROCEDURE — 65270000029 HC RM PRIVATE

## 2023-06-12 PROCEDURE — 65270000029 HC RM PRIVATE

## 2023-06-13 PROCEDURE — 65270000029 HC RM PRIVATE

## 2023-06-14 PROCEDURE — 65270000029 HC RM PRIVATE

## 2023-06-15 ENCOUNTER — HOSPITAL ENCOUNTER (OUTPATIENT)
Facility: HOSPITAL | Age: 83
Setting detail: RECURRING SERIES
Discharge: HOME | End: 2023-06-18
Payer: MEDICARE

## 2023-06-15 PROCEDURE — 65270000029 HC RM PRIVATE

## 2023-06-15 PROCEDURE — 97110 THERAPEUTIC EXERCISES: CPT

## 2023-06-16 PROCEDURE — 65270000029 HC RM PRIVATE

## 2023-06-17 PROCEDURE — 65270000029 HC RM PRIVATE

## 2023-06-18 PROCEDURE — 65270000029 HC RM PRIVATE

## 2023-06-19 ENCOUNTER — HOSPITAL ENCOUNTER (OUTPATIENT)
Facility: HOSPITAL | Age: 83
Setting detail: RECURRING SERIES
Discharge: HOME | End: 2023-06-22
Payer: MEDICARE

## 2023-06-19 PROCEDURE — 97110 THERAPEUTIC EXERCISES: CPT

## 2023-06-19 PROCEDURE — 65270000029 HC RM PRIVATE

## 2023-06-19 NOTE — PROGRESS NOTES
enter home. Status at last Eval/Progress Note: step to pattern with 3+/5 strength  Current Status: step to pattern with 3+/5 strength  Goal Met:  No     3. Patient will be able to stand to perform tasks in home for > 10 minutes without needing to sit.   Status at last Eval/Progress Note: needs frequent rest breaks  Current Status: frequent rest breaks    []  See Progress Note/Recertification  []  See Discharge Summary    PLAN  [x]  Continue plan of care  [x]  Upgrade activities as tolerated  []  Discharge due to :  []  Other:      Charan Harris, PT, DPT       6/19/2023       11:09 AM

## 2023-06-20 PROCEDURE — 65270000029 HC RM PRIVATE

## 2023-06-21 ENCOUNTER — HOSPITAL ENCOUNTER (OUTPATIENT)
Facility: HOSPITAL | Age: 83
Setting detail: RECURRING SERIES
Discharge: HOME | End: 2023-06-24
Payer: MEDICARE

## 2023-06-21 PROCEDURE — 97110 THERAPEUTIC EXERCISES: CPT

## 2023-06-21 PROCEDURE — 97165 OT EVAL LOW COMPLEX 30 MIN: CPT

## 2023-06-21 PROCEDURE — 65270000029 HC RM PRIVATE

## 2023-06-21 NOTE — PROGRESS NOTES
[address]  [place], [place] [place]  Ph: [phone]     Fax: [phone]    CONTINUED PLAN OF CARE/RECERTIFICATION FOR PHYSICAL THERAPY          Patient Name:              Devang Blackwell :  1940   Treatment/Medical Diagnosis:  Other abnormalities of gait and mobility [R26.89]  Other lack of coordination [R27.8]   Onset Date:  2 months ago    Referral Source: James Lane MD Start of Care [facility]):  2023   Prior Hospitalization:  See Medical History Provider #:  NPI      Prior Level of Function (PLOF):  independent   Comorbidities:  See Medical History   Medications:  Verified on Patient Summary List   Visits from [facility]:  8 Missed Visits:  0       Summary of Care / Assessment / Recommendations:   Patient has attended 8 PT visits making some progress towards improving balance and endurance. He reports that he is feeling 30% better since starting therapy. He has had more fluid build up since the start of therapy which is causing him to be more fatigued. He always feels better by the end of the session. He has been seen by MD about fluid build up. His TUG score has improved by 2 seconds, but strength measurements have stayed about the same. He will continue to benefit from PT services to improve balance, increase endurance, increase LE strength, and improve his overall QOL. He will also be starting OT today to work on UE strength. Progress Toward Goals:     Short Term Goals, to be met within 5 treatments:  Patient will demonstrate independence and compliance with HEP in order to increase mobility and assist with carryover from PT services. Status at last Eval/Progress Note: not started  Current Status: Independent and compliant  Goal Met: Yes     2. Patient will be able to stand from a chair with single UE use with LE strength 4/5.   Status at last Eval/Progress Note: 3+/5 hip strength  Current Status: 1 UE support;   Goal Met:

## 2023-06-21 NOTE — PROGRESS NOTES
R  Hip Flexion                                  3/5                     3/5  Hip Abduction                              4/5                     4/5  Hip Adduction                              4+/5                   4+/5  Knee Extension                           4/5                     4/5  Knee Flexion                               4-/5                    4-/5  Ankle PF                                      4/5                     4/5  Ankle DF                                      4/5                     4/5    TUG Test: 18 seconds (rollator)    Pain Level at end of session (0-10 scale): 0/10    Assessment   Patient tolerated treatment fair. Pt states he was using the rollator prior to being admitted to rehab a few months ago. Pt states he feels like he is 30% better since starting therapy here. Pt concerned about the weakness still in his legs and arms - pt starting OT today. Progress note completed by physical therapist. Patient will continue to benefit from skilled PT services to analyze and address functional mobility deficits, analyze and address ROM deficits, and analyze and address strength deficits to address functional deficits and attain remaining goals. Progress toward goals / Updated goals:    Short Term Goals, to be met within 5 treatments:  Patient will demonstrate independence and compliance with HEP in order to increase mobility and assist with carryover from PT services. Status at last Eval/Progress Note: not started  Current Status: Independent and compliant  Goal Met: Yes     2. Patient will be able to stand from a chair with single UE use with LE strength 4/5. Status at last Eval/Progress Note: 3+/5 hip strength  Current Status: 1 UE support;   Goal Met:  Progressing (6/9/23)      3. Patient will be able to ambulate for 15 minutes without need for a rest break to go to the store with use of rollator.    Status at last Eval/Progress Note: needs rest breaks often and use of

## 2023-06-21 NOTE — THERAPY EVALUATION
[address]  [place], [place]  Ph: [phone]     Fax: [phone]            OCCUPATIONAL THERAPY - MEDICARE EVALUATION/PLAN OF CARE NOTE (updated 3/23)    Date of Service: 2023        Patient Name:  Juliocesar Aquino :  1940   Medical   Diagnosis:  Other abnormalities of gait and mobility [R26.89]  Other lack of coordination [R27.8] Treatment Diagnosis:  M79.641  Pain in right hand and M79.642  Pain in left hand    Referral Source: Barber Purdy MD Provider #:  [id number]   Insurance: Payor: MEDICARE / Plan: MEDICARE PART A AND B / Product Type: *No Product type* /    [x] Patient's date of birth verified      Visit #   Current  / Total 1 12   Time   In / Out 1100 1140   Total Treatment Time 40 minutes   Total Timed Codes 40 minutes   1:1 Treatment Time 40 minutes      [facility] Totals Reminder:  bill using total billable   min of TIMED therapeutic procedures and modalities. 8-22 min = 1 unit; 23-37 min = 2 units; 38-52 min = 3 units;  53-67 min = 4 units; 68-82 min = 5 units           SUBJECTIVE  Pain Level (0-10 scale): 0/10  Changes in pain since onset: Not applicable    Any medication changes, allergies to medications, adverse drug reactions, diagnosis change, or new procedure performed?: [x] No    [] Yes (see summary sheet for update)  Medications: Verified on Patient Summary List    Subjective functional status/changes:     Patient is [de-identified] y.o.  male who presents for occupational therapy evaluation with decreased bilateral UE strength. Patient reports that he initially became weak after starting a new medicine. Patient reports he was in A-fib and  medication change as well as cardioversion did not take him out of A-fib. Patient has O2 available at home to use overnight on 2LPM, but does not feel like he needs the oxygen. He reports that he was in the hospital a few weeks.  He was at a SNF for 3 weeks where he received PT

## 2023-06-22 PROCEDURE — 65270000029 HC RM PRIVATE

## 2023-06-23 PROCEDURE — 65270000029 HC RM PRIVATE

## 2023-06-24 PROCEDURE — 65270000029 HC RM PRIVATE

## 2023-06-25 PROCEDURE — 65270000029 HC RM PRIVATE

## 2023-06-26 ENCOUNTER — HOSPITAL ENCOUNTER (OUTPATIENT)
Facility: HOSPITAL | Age: 83
Setting detail: RECURRING SERIES
Discharge: HOME | End: 2023-06-29
Payer: MEDICARE

## 2023-06-26 PROCEDURE — 97110 THERAPEUTIC EXERCISES: CPT

## 2023-06-26 PROCEDURE — 65270000029 HC RM PRIVATE

## 2023-06-27 PROCEDURE — 65270000029 HC RM PRIVATE

## 2023-06-28 ENCOUNTER — HOSPITAL ENCOUNTER (OUTPATIENT)
Facility: HOSPITAL | Age: 83
Setting detail: RECURRING SERIES
Discharge: HOME | End: 2023-07-01
Payer: MEDICARE

## 2023-06-28 PROCEDURE — 97110 THERAPEUTIC EXERCISES: CPT

## 2023-06-28 PROCEDURE — 65270000029 HC RM PRIVATE

## 2023-06-28 PROCEDURE — 97530 THERAPEUTIC ACTIVITIES: CPT

## 2023-06-29 PROCEDURE — 65270000029 HC RM PRIVATE

## 2023-06-30 PROCEDURE — 65270000029 HC RM PRIVATE

## 2023-07-01 PROCEDURE — 65270000029 HC RM PRIVATE

## 2023-07-02 PROCEDURE — 65270000029 HC RM PRIVATE

## 2023-07-03 PROCEDURE — 65270000029 HC RM PRIVATE

## 2023-07-04 PROCEDURE — 65270000029 HC RM PRIVATE

## 2023-07-05 PROCEDURE — 65270000029 HC RM PRIVATE

## 2023-07-06 ENCOUNTER — APPOINTMENT (OUTPATIENT)
Facility: HOSPITAL | Age: 83
End: 2023-07-06
Payer: MEDICARE

## 2023-07-06 ENCOUNTER — HOSPITAL ENCOUNTER (EMERGENCY)
Facility: HOSPITAL | Age: 83
Discharge: HOME | End: 2023-07-06
Attending: EMERGENCY MEDICINE
Payer: MEDICARE

## 2023-07-06 VITALS
OXYGEN SATURATION: 99 % | DIASTOLIC BLOOD PRESSURE: 84 MMHG | RESPIRATION RATE: 16 BRPM | SYSTOLIC BLOOD PRESSURE: 122 MMHG | TEMPERATURE: 97.3 F | HEART RATE: 80 BPM | HEIGHT: 68 IN | BODY MASS INDEX: 25.55 KG/M2 | WEIGHT: 168.6 LBS

## 2023-07-06 DIAGNOSIS — I50.9 CONGESTIVE HEART FAILURE, UNSPECIFIED HF CHRONICITY, UNSPECIFIED HEART FAILURE TYPE (HCC): Primary | ICD-10-CM

## 2023-07-06 DIAGNOSIS — R10.13 ABDOMINAL PAIN, EPIGASTRIC: ICD-10-CM

## 2023-07-06 DIAGNOSIS — K21.9 GASTROESOPHAGEAL REFLUX DISEASE WITHOUT ESOPHAGITIS: ICD-10-CM

## 2023-07-06 LAB
ALBUMIN SERPL-MCNC: 3.7 G/DL (ref 3.5–5)
ALBUMIN/GLOB SERPL: 1.2 (ref 1.1–2.2)
ALP SERPL-CCNC: 146 U/L (ref 45–117)
ALT SERPL-CCNC: 15 U/L (ref 12–78)
ANION GAP SERPL CALC-SCNC: 10 MMOL/L (ref 5–15)
AST SERPL W P-5'-P-CCNC: 20 U/L (ref 15–37)
BASOPHILS # BLD: 0.1 K/UL (ref 0–0.2)
BASOPHILS NFR BLD: 0 % (ref 0–2.5)
BILIRUB SERPL-MCNC: 0.7 MG/DL (ref 0.2–1)
BNP SERPL-MCNC: ABNORMAL PG/ML
BUN SERPL-MCNC: 37 MG/DL (ref 6–20)
BUN/CREAT SERPL: 25 (ref 12–20)
CA-I BLD-MCNC: 8.5 MG/DL (ref 8.5–10.1)
CHLORIDE SERPL-SCNC: 102 MMOL/L (ref 97–108)
CO2 SERPL-SCNC: 31 MMOL/L (ref 21–32)
CREAT SERPL-MCNC: 1.46 MG/DL (ref 0.7–1.3)
EOSINOPHIL # BLD: 0 K/UL (ref 0–0.7)
EOSINOPHIL NFR BLD: 0 % (ref 0.9–2.9)
ERYTHROCYTE [DISTWIDTH] IN BLOOD BY AUTOMATED COUNT: 15 % (ref 11.5–14.5)
GLOBULIN SER CALC-MCNC: 3.2 G/DL (ref 2–4)
GLUCOSE SERPL-MCNC: 126 MG/DL (ref 65–100)
HCT VFR BLD AUTO: 34.1 % (ref 41–53)
HGB BLD-MCNC: 11.2 G/DL (ref 13.5–17.5)
INR PPP: 4.2 (ref 0.9–1.1)
LYMPHOCYTES # BLD: 0.4 K/UL (ref 1–4.8)
LYMPHOCYTES NFR BLD: 4 % (ref 20.5–51.1)
MAGNESIUM SERPL-MCNC: 2.4 MG/DL (ref 1.6–2.4)
MCH RBC QN AUTO: 33 PG (ref 31–34)
MCHC RBC AUTO-ENTMCNC: 32.9 G/DL (ref 31–36)
MCV RBC AUTO: 100.4 FL (ref 80–100)
MONOCYTES # BLD: 0.8 K/UL (ref 0.2–2.4)
MONOCYTES NFR BLD: 7 % (ref 1.7–9.3)
NEUTS SEG # BLD: 10.5 K/UL (ref 1.8–7.7)
NEUTS SEG NFR BLD: 89 % (ref 42–75)
NRBC # BLD: 0.01 K/UL
NRBC BLD-RTO: 0 PER 100 WBC
PLATELET # BLD AUTO: 272 K/UL (ref 150–400)
PMV BLD AUTO: 8.9 FL (ref 6.5–11.5)
POTASSIUM SERPL-SCNC: 3.4 MMOL/L (ref 3.5–5.1)
PROT SERPL-MCNC: 6.9 G/DL (ref 6.4–8.2)
PROTHROMBIN TIME: 39.2 SEC (ref 11.9–14.6)
RBC # BLD AUTO: 3.4 M/UL (ref 4.5–5.9)
SODIUM SERPL-SCNC: 143 MMOL/L (ref 136–145)
TROPONIN I SERPL HS-MCNC: 19 NG/L (ref 0–76)
TROPONIN I SERPL HS-MCNC: 19 NG/L (ref 0–76)
WBC # BLD AUTO: 11.8 K/UL (ref 4.4–11.3)

## 2023-07-06 PROCEDURE — 83880 ASSAY OF NATRIURETIC PEPTIDE: CPT

## 2023-07-06 PROCEDURE — 84484 ASSAY OF TROPONIN QUANT: CPT

## 2023-07-06 PROCEDURE — 36415 COLL VENOUS BLD VENIPUNCTURE: CPT

## 2023-07-06 PROCEDURE — 96374 THER/PROPH/DIAG INJ IV PUSH: CPT

## 2023-07-06 PROCEDURE — 85025 COMPLETE CBC W/AUTO DIFF WBC: CPT

## 2023-07-06 PROCEDURE — 71045 X-RAY EXAM CHEST 1 VIEW: CPT

## 2023-07-06 PROCEDURE — 6370000000 HC RX 637 (ALT 250 FOR IP): Performed by: EMERGENCY MEDICINE

## 2023-07-06 PROCEDURE — 6360000002 HC RX W HCPCS: Performed by: EMERGENCY MEDICINE

## 2023-07-06 PROCEDURE — 80053 COMPREHEN METABOLIC PANEL: CPT

## 2023-07-06 PROCEDURE — 65270000029 HC RM PRIVATE

## 2023-07-06 PROCEDURE — 85610 PROTHROMBIN TIME: CPT

## 2023-07-06 PROCEDURE — 99285 EMERGENCY DEPT VISIT HI MDM: CPT

## 2023-07-06 PROCEDURE — 83735 ASSAY OF MAGNESIUM: CPT

## 2023-07-06 PROCEDURE — 93005 ELECTROCARDIOGRAM TRACING: CPT | Performed by: EMERGENCY MEDICINE

## 2023-07-06 RX ORDER — FAMOTIDINE 20 MG/1
20 TABLET, FILM COATED ORAL DAILY
Qty: 60 TABLET | Refills: 0 | Status: SHIPPED | OUTPATIENT
Start: 2023-07-06

## 2023-07-06 RX ORDER — MAGNESIUM HYDROXIDE/ALUMINUM HYDROXICE/SIMETHICONE 120; 1200; 1200 MG/30ML; MG/30ML; MG/30ML
30 SUSPENSION ORAL
Status: COMPLETED | OUTPATIENT
Start: 2023-07-06 | End: 2023-07-06

## 2023-07-06 RX ORDER — LIDOCAINE HYDROCHLORIDE 20 MG/ML
15 SOLUTION OROPHARYNGEAL
Status: COMPLETED | OUTPATIENT
Start: 2023-07-06 | End: 2023-07-06

## 2023-07-06 RX ORDER — FUROSEMIDE 10 MG/ML
40 INJECTION INTRAMUSCULAR; INTRAVENOUS
Status: COMPLETED | OUTPATIENT
Start: 2023-07-06 | End: 2023-07-06

## 2023-07-06 RX ADMIN — ALUMINUM HYDROXIDE, MAGNESIUM HYDROXIDE, AND SIMETHICONE 30 ML: 200; 200; 20 SUSPENSION ORAL at 11:21

## 2023-07-06 RX ADMIN — LIDOCAINE HYDROCHLORIDE 15 ML: 20 SOLUTION ORAL at 11:21

## 2023-07-06 RX ADMIN — FUROSEMIDE 40 MG: 40 INJECTION, SOLUTION INTRAMUSCULAR; INTRAVENOUS at 12:28

## 2023-07-06 ASSESSMENT — PAIN - FUNCTIONAL ASSESSMENT: PAIN_FUNCTIONAL_ASSESSMENT: 0-10

## 2023-07-06 ASSESSMENT — PAIN SCALES - GENERAL: PAINLEVEL_OUTOF10: 8

## 2023-07-06 ASSESSMENT — PAIN DESCRIPTION - LOCATION: LOCATION: OTHER (COMMENT)

## 2023-07-06 ASSESSMENT — PAIN DESCRIPTION - DESCRIPTORS: DESCRIPTORS: SHARP

## 2023-07-06 NOTE — ED TRIAGE NOTES
Pt reports epigastric pain that began last night. Pt is reports n/v and unable to take meds this morning because of saw. Pain is rated 8/10.

## 2023-07-06 NOTE — ED NOTES
Pt and spouse given discharge and follow up instructions. No further questions at this time. Pt advised to follow with PCP. Education on prescription given.       Liu Hernández RN  07/06/23 3926

## 2023-07-06 NOTE — ED PROVIDER NOTES
[facility] EMERGENCY DEPT  EMERGENCY DEPARTMENT HISTORY AND PHYSICAL EXAM      Date: 7/6/2023  Patient Name: Eren Miller  MRN: [id number]  [address] [facility] 1940  Date of evaluation: 7/6/2023  Provider: Jerzy Gore MD   Note Started: 10:34 AM EDT 7/6/23    HISTORY OF PRESENT ILLNESS     Chief Complaint   Patient presents with    Abdominal Pain    Emesis       History Provided By: Patient    HPI: Eren Miller is a 82 y.o. male presenting with epigastrci and substernal chest pain that began yesterday evening after eating some biscuits. States vomited and felt somewhat better but symptoms have returned. Pain is an aching, burning pain with a gnawing sensation in the upper abdomen. States it feels like acid reflux. No hx of MI but has pm/defibrillator for afib/chf. PAST MEDICAL HISTORY   Past Medical History:  Past Medical History:   Diagnosis Date    A-fib ([address])     CHF (congestive heart failure) ([address])     last ef of 5-10%    Fall     H/O mitral valve replacement     with mechanical valve    Neuropathy        Past Surgical History:  Past Surgical History:   Procedure Laterality Date    CARDIAC VALVE SURGERY      PACEMAKER         Family History:  History reviewed. No pertinent family history. Social History:  Social History     Tobacco Use    Smoking status: Former    Smokeless tobacco: Current   Substance Use Topics    Alcohol use: Not Currently    Drug use: Never       Allergies:  No Known Allergies    PCP: DEE DEE Aquino NP    Current Meds:   No current facility-administered medications for this encounter. Current Outpatient Medications   Medication Sig Dispense Refill    famotidine (PEPCID) 20 MG tablet Take 1 tablet by mouth daily 60 tablet 0    aspirin 81 MG EC tablet Take 1 tablet by mouth daily      furosemide (LASIX) 40 MG tablet Take 1 tablet by mouth 2 times daily 60 tablet 0    gabapentin (NEURONTIN) 300 MG capsule Take 1 capsule by mouth 2 times daily.  Max Daily Amount: 600

## 2023-07-07 ENCOUNTER — HOSPITAL ENCOUNTER (OUTPATIENT)
Facility: HOSPITAL | Age: 83
Setting detail: RECURRING SERIES
Discharge: HOME | End: 2023-07-10
Payer: MEDICARE

## 2023-07-07 LAB
EKG ATRIAL RATE: 0 BPM
EKG DIAGNOSIS: NORMAL
EKG P-R INTERVAL: 249 MS
EKG Q-T INTERVAL: 427 MS
EKG QRS DURATION: 136 MS
EKG QTC CALCULATION (BAZETT): 514 MS
EKG R AXIS: 212 DEGREES
EKG T AXIS: 103 DEGREES
EKG VENTRICULAR RATE: 87 BPM

## 2023-07-07 PROCEDURE — 65270000029 HC RM PRIVATE

## 2023-07-07 PROCEDURE — 97110 THERAPEUTIC EXERCISES: CPT

## 2023-07-08 PROCEDURE — 65270000029 HC RM PRIVATE

## 2023-07-09 PROCEDURE — 65270000029 HC RM PRIVATE

## 2023-07-10 ENCOUNTER — HOSPITAL ENCOUNTER (OUTPATIENT)
Facility: HOSPITAL | Age: 83
Setting detail: RECURRING SERIES
Discharge: HOME | End: 2023-07-13
Payer: MEDICARE

## 2023-07-10 PROCEDURE — 97110 THERAPEUTIC EXERCISES: CPT

## 2023-07-10 PROCEDURE — 65270000029 HC RM PRIVATE

## 2023-07-10 PROCEDURE — 97530 THERAPEUTIC ACTIVITIES: CPT

## 2023-07-10 NOTE — PROGRESS NOTES
OCCUPATIONAL THERAPY - MEDICARE DAILY TREATMENT NOTE (updated 3/23)      Date: 7/10/2023          Patient Name:  Ioana Adame :  1940   Medical   Diagnosis:  Other abnormalities of gait and mobility [R26.89]  Other lack of coordination [R27.8] Treatment Diagnosis:  M25.641  Stiffness of right hand and M25.642  Stiffness of left hand    Referral Source: Blanca Lopez MD Insurance:   Payor: MEDICARE / Plan: MEDICARE PART A AND B / Product Type: *No Product type* /     [x] Patient's date of birth verified                     Visit #   Current  / Total 5 12   Time   In / Out 1301 1356   Total Treatment Time 55 minutes   Total Timed Codes 55 minutes   1:1 Treatment Time 55 minutes      [facility] Totals Reminder:  bill using total billable   min of TIMED therapeutic procedures and modalities. 8-22 min = 1 unit; 23-37 min = 2 units; 38-52 min = 3 units; 53-67 min = 4 units; 68-82 min = 5 units     SUBJECTIVE    Pain Level (0-10 scale): 0/10    Any medication changes, allergies to medications, adverse drug reactions, diagnosis change, or new procedure performed?: [x] No    [] Yes (see summary sheet for update)  Medications: Verified on Patient Summary List    Subjective functional status/changes:     \"I'm not at my best today. \"    OBJECTIVE    Therapeutic Procedures: Tx Min Billable or 1:1 Min (if diff from Tx Min) Procedure, Rationale, Specifics   40 40 [id number] Therapeutic Exercise (timed):  increase ROM, strength, coordination, balance, and proprioception to improve patient's ability to progress to PLOF and address remaining functional goals. (see flow sheet as applicable)   16 16 11455 Therapeutic Activity (timed):  use of dynamic activities replicating functional movements to increase ROM, strength, coordination, balance, and proprioception in order to improve patient's ability to progress to PLOF and address remaining functional goals.   (see flow sheet as applicable)             56 56    Total Total

## 2023-07-10 NOTE — PROGRESS NOTES
PHYSICAL THERAPY - MEDICARE DAILY TREATMENT NOTE (updated 3/23)    Date of Service: 7/10/2023        Patient Name:  Chelita Galeana :  1940   Medical   Diagnosis:  Other abnormalities of gait and mobility [R26.89]  Other lack of coordination [R27.8] Treatment Diagnosis:  R27.8     Other lack of coordination and R26.89   Abnormalities of gait and mobility    Referral Source: Marquis Sher MD Insurance:   Payor: MEDICARE / Plan: MEDICARE PART A AND B / Product Type: *No Product type* /    [x] Patient's date of birth verified                      Visit #   Current  / Total 12 18   Time   In / Out 2:00 2:53   Total Treatment Time (in minutes) 53    Total Timed Codes (in minutes) 53    1:1 Treatment Time (in minutes) 48       [facility] Totals Reminder:  bill using total billable   min of TIMED therapeutic procedures and modalities. 8-22 min = 1 unit; 23-37 min = 2 units; 38-52 min = 3 units; 53-67 min = 4 units; 68-82 min = 5 units        SUBJECTIVE  Pain Level (0-10 scale): 0/10    Any medication changes, allergies to medications, adverse drug reactions, diagnosis change, or new procedure performed?: [x] No    [] Yes (see summary sheet for update)  Medications: [x] Verified on Patient Summary List    Subjective functional status/changes:     \"I am just tired more today. \"    OBJECTIVE  Therapeutic Procedures: Tx Min Billable or 1:1 Min (if diff from Tx Min) Procedure, Rationale, Specifics   53 53 [id number] Therapeutic Exercise (timed):  increase ROM, strength, coordination, balance, and proprioception to improve patient's ability to progress to PLOF and address remaining functional goals. (see flow sheet as applicable)   53 53    Total Total       [x] Patient Education billed concurrently with other procedures   [x] Review HEP    [] Progressed/Changed HEP, detail:    [] Other:       Pain Level at end of session (0-10 scale): 0/10    Assessment   Patient tolerated treatment fair today.  He required more

## 2023-07-11 PROCEDURE — 65270000029 HC RM PRIVATE

## 2023-07-12 ENCOUNTER — HOSPITAL ENCOUNTER (OUTPATIENT)
Facility: HOSPITAL | Age: 83
Setting detail: RECURRING SERIES
Discharge: HOME | End: 2023-07-15
Payer: MEDICARE

## 2023-07-12 PROCEDURE — 97530 THERAPEUTIC ACTIVITIES: CPT

## 2023-07-12 PROCEDURE — 65270000029 HC RM PRIVATE

## 2023-07-12 PROCEDURE — 97110 THERAPEUTIC EXERCISES: CPT

## 2023-07-12 NOTE — PROGRESS NOTES
PHYSICAL THERAPY - MEDICARE DAILY TREATMENT NOTE (updated 3/23)    Date of Service: 2023        Patient Name:  Mynor Alonso :  1940   Medical   Diagnosis:  Other abnormalities of gait and mobility [R26.89]  Other lack of coordination [R27.8] Treatment Diagnosis:  R27.8     Other lack of coordination and R26.89   Abnormalities of gait and mobility    Referral Source: Nadine eRn MD Insurance:   Payor: MEDICARE / Plan: MEDICARE PART A AND B / Product Type: *No Product type* /    [x] Patient's date of birth verified                      Visit #   Current  / Total 13 18   Time   In / Out 2:05 2:57   Total Treatment Time (in minutes) 52    Total Timed Codes (in minutes) 52    1:1 Treatment Time (in minutes) 46       [facility] Totals Reminder:  bill using total billable   min of TIMED therapeutic procedures and modalities. 8-22 min = 1 unit; 23-37 min = 2 units; 38-52 min = 3 units; 53-67 min = 4 units; 68-82 min = 5 units        SUBJECTIVE  Pain Level (0-10 scale): 0/10    Any medication changes, allergies to medications, adverse drug reactions, diagnosis change, or new procedure performed?: [x] No    [] Yes (see summary sheet for update)  Medications: [x] Verified on Patient Summary List    Subjective functional status/changes:     \"I had some hip pain yesterday, but I took some Tylenol earlier and it helped. \"    OBJECTIVE  Therapeutic Procedures: Tx Min Billable or 1:1 Min (if diff from Tx Min) Procedure, Rationale, Specifics   52  [id number] Therapeutic Exercise (timed):  increase ROM, strength, coordination, balance, and proprioception to improve patient's ability to progress to PLOF and address remaining functional goals.  (see flow sheet as applicable)   52     Total Total       [x] Patient Education billed concurrently with other procedures   [x] Review HEP    [] Progressed/Changed HEP, detail:    [] Other:       Other Objective/Functional Measures  /65 start  /64 end    Pain

## 2023-07-12 NOTE — PROGRESS NOTES
treatments: 1. Patient will increase R  strength by 10# to increase R hand function. Status at last Eval/Progress Note: 40#  Current Status: to be assessed on 10th visit   Goal Met? No     2. Patient will increase L forearm supination by 10 degrees to be able to hold tools and work on his tractor. Status at last Eval/Progress Note: 40 degrees   Current Status: to be assessed on 10th visit   Goal Met? No     3. Patient will be able to carry lift 20# using bilateral hands from table and carry 10 ft to be able to carry cat in home. Status at last Eval/Progress Note: able to lift 1# overhead 10X, able to lift 1# overhead 8X   Current Status: to be assessed on 10th visit   Goal Met? No     4. Patient will be able to complete 10 chair-pushups to increase ease of getting off the ground. Status at last Eval/Progress Note: unable to complete   Current Status: to be assessed on 10th visit   Goal Met?   No    []  See Progress Note/Recertification  []  See Discharge Summary    PLAN  [x]  Continue plan of care  []  Upgrade activities as tolerated  []  Discharge due to :  []  Other:      Catalina Crigler, MSOTR/L       7/12/2023       3:20 PM

## 2023-07-13 PROCEDURE — 65270000029 HC RM PRIVATE

## 2023-07-14 PROCEDURE — 65270000029 HC RM PRIVATE

## 2023-07-15 PROCEDURE — 65270000029 HC RM PRIVATE

## 2023-07-16 PROCEDURE — 65270000029 HC RM PRIVATE

## 2023-07-17 ENCOUNTER — HOSPITAL ENCOUNTER (OUTPATIENT)
Facility: HOSPITAL | Age: 83
Setting detail: RECURRING SERIES
Discharge: HOME | End: 2023-07-20
Payer: MEDICARE

## 2023-07-17 PROCEDURE — 97110 THERAPEUTIC EXERCISES: CPT

## 2023-07-17 PROCEDURE — 65270000029 HC RM PRIVATE

## 2023-07-17 NOTE — PROGRESS NOTES
PHYSICAL THERAPY - MEDICARE DAILY TREATMENT NOTE (updated 3/23)    Date of Service: 2023        Patient Name:  Lissette Lewis :  1940   Medical   Diagnosis:  Other abnormalities of gait and mobility [R26.89]  Other lack of coordination [R27.8] Treatment Diagnosis:  R27.8     Other lack of coordination and R26.89   Abnormalities of gait and mobility    Referral Source: Patsy Corbin MD Insurance:   Payor: MEDICARE / Plan: MEDICARE PART A AND B / Product Type: *No Product type* /    [x] Patient's date of birth verified                      Visit #   Current  / Total 14 18   Time   In / Out 3:00 4:02   Total Treatment Time (in minutes) 62    Total Timed Codes (in minutes) 62    1:1 Treatment Time (in minutes) 58       [facility] Totals Reminder:  bill using total billable   min of TIMED therapeutic procedures and modalities. 8-22 min = 1 unit; 23-37 min = 2 units; 38-52 min = 3 units; 53-67 min = 4 units; 68-82 min = 5 units        SUBJECTIVE  Pain Level (0-10 scale): 0/10    Any medication changes, allergies to medications, adverse drug reactions, diagnosis change, or new procedure performed?: [x] No    [] Yes (see summary sheet for update)  Medications: [x] Verified on Patient Summary List    Subjective functional status/changes:     Pt and wife report that pt fell yesterday morning while trying to walk to bathroom. Pt states he was still half asleep and got a little dizzy and fell into the bathroom doorway. Pt was not using an AD. States he did not get injured, except some scratches on his forehead and L arm. Denies any pain or dizziness today. Pt states he was, however, able to get up on his own much easier than the last time he fell. OBJECTIVE  Therapeutic Procedures:   Tx Min Billable or 1:1 Min (if diff from Tx Min) Procedure, Rationale, Specifics   62  27171 Therapeutic Exercise (timed):  increase ROM, strength, coordination, balance, and proprioception to improve patient's ability to

## 2023-07-18 PROCEDURE — 65270000029 HC RM PRIVATE

## 2023-07-19 ENCOUNTER — HOSPITAL ENCOUNTER (OUTPATIENT)
Facility: HOSPITAL | Age: 83
Setting detail: RECURRING SERIES
Discharge: HOME | End: 2023-07-22
Payer: MEDICARE

## 2023-07-19 PROCEDURE — 97110 THERAPEUTIC EXERCISES: CPT

## 2023-07-19 PROCEDURE — 65270000029 HC RM PRIVATE

## 2023-07-19 NOTE — PROGRESS NOTES
[address]  [address]  Ph: [phone]     Fax: [phone]    CONTINUED PLAN OF CARE/RECERTIFICATION FOR OCCUPATIONAL THERAPY          Patient Name:              Rosemary Reeves :  1940   Treatment/Medical Diagnosis:  Other abnormalities of gait and mobility [R26.89]  Other lack of coordination [R27.8]   Onset Date:  3 months ago    Referral Source: Olena Washington MD Start of Care [facility]):  2023   Prior Hospitalization:  See Medical History Provider #:  NPI      Prior Level of Function (PLOF): Independent with all ADL's and Primary use of rollator for mobility    Comorbidities:  See Medical History   Medications:  Verified on Patient Summary List   Visits from [facility]:  7 Missed Visits:  1     SUMMARY OF TREATMENT / ASSESSMENT / RECOMMENDATIONS:  Patient seen this date for occupational therapy reassessment visit. Patient progressing with OT goals, increasing strength and ROM in bilateral upper extremities. Patient reports he has been working more in his garden recently. Patient reports he continues to work at home on exercises and activities. Patient presents with increased right  strength by 2# and left  strength by 1#. He also presents with increased right 3 pt pinch strength by 2#, right 2 pt pinch strength by 1#, and right lateral pinch strength by 1#. Patient presents with increased left forearm supination by 10 degrees and left wrist extension by 8 degrees. Patient would benefit from continued occupational therapy services to increase strength and ROM and increase independence and safety with ADL and IADL tasks. CURRENT STATUS OF GOALS  Short Term Goals, to be met within 6 treatments:  Patient will demonstrate independence and compliance with HEP in order to increase mobility and assist with carryover from OT services.   Status at last Eval/Progress Note: provided  Current Status: Patient unable to
2pt pinch              Lateral pinch  Right 40 10 9 12   Right 7/19 42 12 10 13   Left 45 10 11 14   Left 7/19 46 12 9 14        Range of Motion: BUE       R UE   AROM R UE  AROM  7/19 *Goal L UE AROM L UE  AROM  7/19 *Goal   Forearm supination  60 62 70 40 50 60   Forearm pronation              Wrist flexion  74 68 74 70 74 70   Wrist extension  46 50 55 44 52 50   Radial Dev    12 16 18 18 22 18   Ulnar Deviation   38 40 38 34 38 34   Any spaces left blank were not tested. Pain Level at end of session (0-10 scale): 0/10    Assessment   Patient seen this date for occupational therapy visit and reassessment. Patient presents with no pain, motivated to improve strength and ROM. Patient reports he has been working more in his garden recently. Patient reports he continues to work at home on exercises and activities. Patient tolerated treatment this date without complaints of pain. Patient completed chair-pushups independently and demonstrated ability to carry a 10# weight using bilateral hands from table and carry it 5ft independently on this date. Patient increasing strength and ROM. Patient reports he wants to continue to work on increasing his strength in his bilateral upper extremities. Patient will continue to benefit from skilled OT services to modify and progress therapeutic interventions, analyze and address functional mobility deficits, analyze and address ROM deficits, analyze and address strength deficits, analyze and address soft tissue restrictions, and analyze and cue for proper movement patterns to address functional deficits and attain remaining goals. Progress toward goals / Updated goals:   Short Term Goals, to be met within 6 treatments:  Patient will demonstrate independence and compliance with HEP in order to increase mobility and assist with carryover from OT services.   Status at last Eval/Progress Note: provided  Current Status: Patient unable to demonstrate independence with HEP; he

## 2023-07-19 NOTE — PROGRESS NOTES
[address]  [address] [facility], [address] [place] [address]  Ph: [phone]     Fax: [phone]    CONTINUED PLAN OF CARE/RECERTIFICATION FOR PHYSICAL THERAPY          Patient Name:              Mele Pickett :  1940   Treatment/Medical Diagnosis:  Other abnormalities of gait and mobility [R26.89]  Other lack of coordination [R27.8]   Onset Date:  2 months ago    Referral Source: Lay Oneil MD Start of Care [facility]):  2023   Prior Hospitalization:  See Medical History Provider #:  NPI      Prior Level of Function (PLOF): Independent    Comorbidities:  See Medical History   Medications:  Verified on Patient Summary List   Visits from [facility]:  15 Missed Visits:  1       Summary of Care / Assessment / Recommendations:   Patient reported to OPPT for progress note. Updated measurements and reassessed goals. He has met two short term and one long term goal. He is also progressing to three additional goals and able to introduce one additional goal. Patient has shown increases in functional LE strength and endurance. He notes that since beginning therapy, he is roughly 50-75% of where he would like to be and would like to continue therapy. Plan to continue with LE strengthening, balance, and endurance. Patient will continue to benefit from skilled PT services to modify and progress therapeutic interventions, analyze and address functional mobility deficits, analyze and address ROM deficits, and analyze and address strength deficits to address functional deficits and attain remaining goals. Progress toward goals / Updated goals:    Short Term Goals, to be met within 5 treatments:  Patient will demonstrate independence and compliance with HEP in order to increase mobility and assist with carryover from PT services. Status at last Eval/Progress Note: not started  Current Status: Independent and compliant  Goal Met: Yes     2.   Patient will be able to stand from a
3+/5                      3+/5  Hip Abduction                              4+/5                     4/+5  Hip Adduction                              4+/5                     4+/5  Knee Extension                           5/5                       5/5  Knee Flexion                               5/5                       5/5  Ankle PF                                      5/5                       5/5  Ankle DF                                      4+/5                    4+/5     TUG Test: 18 seconds (rollator)       Pain Level at end of session (0-10 scale): 0/10    Assessment   Patient reported to OPPT for progress note. Updated measurements and reassessed goals. He has met two short term and one long term goal. He is also progressing to three additional goals and able to introduce one additional goal. Patient has shown increases in functional LE strength and endurance. He notes that since beginning therapy, he is roughly 50-75% of where he would like to be and would like to continue therapy. At session, able to increase resistance with hip abduction and add ankle weights to all standing exercises performed. Plan to continue with LE strengthening, balance, and endurance. Patient will continue to benefit from skilled PT services to modify and progress therapeutic interventions, analyze and address functional mobility deficits, analyze and address ROM deficits, and analyze and address strength deficits to address functional deficits and attain remaining goals. Progress toward goals / Updated goals:    Short Term Goals, to be met within 5 treatments:  Patient will demonstrate independence and compliance with HEP in order to increase mobility and assist with carryover from PT services. Status at last Eval/Progress Note: not started  Current Status: Independent and compliant  Goal Met: Yes     2. Patient will be able to stand from a chair with single UE use with LE strength 4/5.   Status at last

## 2023-07-20 PROCEDURE — 65270000029 HC RM PRIVATE

## 2023-07-21 PROCEDURE — 65270000029 HC RM PRIVATE

## 2023-07-22 PROCEDURE — 65270000029 HC RM PRIVATE

## 2023-07-23 PROCEDURE — 65270000029 HC RM PRIVATE

## 2023-07-24 PROCEDURE — 65270000029 HC RM PRIVATE

## 2023-07-25 PROCEDURE — 65270000029 HC RM PRIVATE

## 2023-07-26 ENCOUNTER — HOSPITAL ENCOUNTER (OUTPATIENT)
Facility: HOSPITAL | Age: 83
Setting detail: RECURRING SERIES
Discharge: HOME | End: 2023-07-29
Payer: MEDICARE

## 2023-07-26 PROCEDURE — 65270000029 HC RM PRIVATE

## 2023-07-26 PROCEDURE — 97530 THERAPEUTIC ACTIVITIES: CPT

## 2023-07-26 PROCEDURE — 97110 THERAPEUTIC EXERCISES: CPT

## 2023-07-26 NOTE — PROGRESS NOTES
PHYSICAL THERAPY - MEDICARE DAILY TREATMENT NOTE (updated 3/23)    Date of Service: 2023        Patient Name:  Tammy Shi :  1940   Medical   Diagnosis:  Other abnormalities of gait and mobility [R26.89]  Other lack of coordination [R27.8] Treatment Diagnosis:  M62.81  GENERAL MUSCLE WEAKNESS, R26.81   Unsteadiness on feet, R27.8     Other lack of coordination, and R26.89   Abnormalities of gait and mobility    Referral Source: Tobi Ahumada, MD Insurance:   Payor: MEDICARE / Plan: MEDICARE PART A AND B / Product Type: *No Product type* /    [x] Patient's date of birth verified                      Visit #   Current  / Total 16 19   Time   In / Out 302 pm 350 pm   Total Treatment Time (in minutes) 48    Total Timed Codes (in minutes) 48    1:1 Treatment Time (in minutes) 50       [facility] Totals Reminder:  bill using total billable   min of TIMED therapeutic procedures and modalities. 8-22 min = 1 unit; 23-37 min = 2 units; 38-52 min = 3 units; 53-67 min = 4 units; 68-82 min = 5 units        SUBJECTIVE  Pain Level (0-10 scale): 0/10    Any medication changes, allergies to medications, adverse drug reactions, diagnosis change, or new procedure performed?: [x] No    [] Yes (see summary sheet for update)  Medications: [x] Verified on Patient Summary List    Subjective functional status/changes: \"Pt reports legs are tired but no pain. Nadia Griggs \"    OBJECTIVE  Therapeutic Procedures: Tx Min Billable or 1:1 Min (if diff from Tx Min) Procedure, Rationale, Specifics   48 69 [id number] Therapeutic Exercise (timed):  increase ROM, strength, coordination, balance, and proprioception to improve patient's ability to progress to PLOF and address remaining functional goals.  (see flow sheet as applicable)   48 48    Total Total     Modalities Rationale:     n/a    [x] Patient Education billed concurrently with other procedures   [x] Review HEP    [] Progressed/Changed HEP, detail:    [] Other:       Other

## 2023-07-26 NOTE — PROGRESS NOTES
OCCUPATIONAL THERAPY - MEDICARE DAILY TREATMENT NOTE (updated 3/23)      Date: 2023          Patient Name:  Alcides Ribeiro :  1940   Medical   Diagnosis:  Other abnormalities of gait and mobility [R26.89]  Other lack of coordination [R27.8] Treatment Diagnosis:  M25.641  Stiffness of right hand and M25.642  Stiffness of left hand    Referral Source: Patsy Callejas MD Insurance:   Payor: MEDICARE / Plan: MEDICARE PART A AND B / Product Type: *No Product type* /     [x] Patient's date of birth verified                     Visit #   Current  / Total 8 12   Time   In / Out 1400 1458   Total Treatment Time 58 minutes   Total Timed Codes 58 minutes   1:1 Treatment Time 58 minutes      [facility] Totals Reminder:  bill using total billable   min of TIMED therapeutic procedures and modalities. 8-22 min = 1 unit; 23-37 min = 2 units; 38-52 min = 3 units; 53-67 min = 4 units; 68-82 min = 5 units     SUBJECTIVE    Pain Level (0-10 scale): 0/10    Any medication changes, allergies to medications, adverse drug reactions, diagnosis change, or new procedure performed?: [x] No    [] Yes (see summary sheet for update)  Medications: Verified on Patient Summary List    Subjective functional status/changes:     \"I have been busy this morning. \"    OBJECTIVE    Therapeutic Procedures: Tx Min Billable or 1:1 Min (if diff from Tx Min) Procedure, Rationale, Specifics   40 40 58063 Therapeutic Exercise (timed):  increase ROM, strength, coordination, balance, and proprioception to improve patient's ability to progress to PLOF and address remaining functional goals. (see flow sheet as applicable)   18 18 58693 Therapeutic Activity (timed):  use of dynamic activities replicating functional movements to increase ROM, strength, coordination, balance, and proprioception in order to improve patient's ability to progress to PLOF and address remaining functional goals.   (see flow sheet as applicable)             58 58    Total

## 2023-07-27 PROCEDURE — 65270000029 HC RM PRIVATE

## 2023-07-28 PROCEDURE — 65270000029 HC RM PRIVATE

## 2023-07-29 PROCEDURE — 65270000029 HC RM PRIVATE

## 2023-07-30 PROCEDURE — 65270000029 HC RM PRIVATE

## 2023-07-31 PROCEDURE — 65270000029 HC RM PRIVATE

## 2023-08-01 PROCEDURE — 65270000029 HC RM PRIVATE

## 2023-08-02 ENCOUNTER — HOSPITAL ENCOUNTER (OUTPATIENT)
Facility: HOSPITAL | Age: 83
Setting detail: RECURRING SERIES
Discharge: HOME | End: 2023-08-05
Payer: MEDICARE

## 2023-08-02 PROCEDURE — 97110 THERAPEUTIC EXERCISES: CPT

## 2023-08-02 PROCEDURE — 65270000029 HC RM PRIVATE

## 2023-08-02 NOTE — PROGRESS NOTES
OCCUPATIONAL THERAPY - MEDICARE DAILY TREATMENT NOTE (updated 3/23)      Date: 2023          Patient Name:  Karen Hayes :  1940   Medical   Diagnosis:  Other abnormalities of gait and mobility [R26.89]  Other lack of coordination [R27.8] Treatment Diagnosis:  M25.511  RIGHT SHOULDER PAIN, M25.512  LEFT SHOULDER PAIN, M79.621  Pain in right upper arm, and M79.622  Pain in left upper arm    Referral Source: Lizy Ledesma MD Insurance:   Payor: MEDICARE / Plan: MEDICARE PART A AND B / Product Type: *No Product type* /     [x] Patient's date of birth verified                     Visit #   Current  / Total 9 12   Time   In / Out 1400 1501   Total Treatment Time 61 minutes   Total Timed Codes 61 minutes   1:1 Treatment Time 61 minutes      [facility] Totals Reminder:  bill using total billable   min of TIMED therapeutic procedures and modalities. 8-22 min = 1 unit; 23-37 min = 2 units; 38-52 min = 3 units; 53-67 min = 4 units; 68-82 min = 5 units     SUBJECTIVE    Pain Level (0-10 scale): 0/10    Any medication changes, allergies to medications, adverse drug reactions, diagnosis change, or new procedure performed?: [x] No    [] Yes (see summary sheet for update)  Medications: Verified on Patient Summary List    Subjective functional status/changes:     \"I fall asleep every time I sit down. \"    OBJECTIVE    Therapeutic Procedures: Tx Min Billable or 1:1 Min (if diff from Tx Min) Procedure, Rationale, Specifics   61 61 [id number] Therapeutic Exercise (timed):  increase ROM, strength, coordination, balance, and proprioception to improve patient's ability to progress to PLOF and address remaining functional goals. (see flow sheet as applicable)     77103 Therapeutic Activity (timed):  use of dynamic activities replicating functional movements to increase ROM, strength, coordination, balance, and proprioception in order to improve patient's ability to progress to PLOF and address remaining functional goals.

## 2023-08-02 NOTE — PROGRESS NOTES
support. Status at last Eval/Progress Note: needs chastity UE  Current Status: able to rise with one UE  Goal Met: Progressing     2. Patient will improve LE strength to 4+/5 to be able to navigate stairs with single HR and reciprocal pattern to enter home. Status at last Eval/Progress Note: step to pattern with 3+/5 strength  Current Status: able to perform steps with reciprocal pattern and 1 UE support, but minimum of 3+/5 LE strength  Goal Met:  Progressing     3. Patient will be able to stand to perform tasks in home for > 10 minutes without needing to sit. Status at last Eval/Progress Note: needs frequent rest breaks  Current Status: 10 minutes  Goal Met: Yes     4. Patient will be able to stand and ambulate on uneven ground for at least 15 minutes to be able to tend to garden with ease.   Status at last Eval/Progress Note: able to stand on even ground for 10 minutes with ease  Current Status: New goal (7/19/2023)  Goal Met: no        []  See Progress Note/Recertification  []  See Discharge Summary    PLAN  [x]  Continue plan of care  [x]  Upgrade activities as tolerated  []  Discharge due to :  []  Other:      Randy Clonts, PTA, LPTA       8/2/2023       4:07 PM

## 2023-08-03 ENCOUNTER — APPOINTMENT (OUTPATIENT)
Facility: HOSPITAL | Age: 83
End: 2023-08-03
Payer: MEDICARE

## 2023-08-03 PROCEDURE — 65270000029 HC RM PRIVATE

## 2023-08-04 PROCEDURE — 65270000029 HC RM PRIVATE

## 2023-08-05 PROCEDURE — 65270000029 HC RM PRIVATE

## 2023-08-06 PROCEDURE — 65270000029 HC RM PRIVATE

## 2023-08-07 PROCEDURE — 65270000029 HC RM PRIVATE

## 2023-08-08 PROCEDURE — 65270000029 HC RM PRIVATE

## 2023-08-09 ENCOUNTER — HOSPITAL ENCOUNTER (OUTPATIENT)
Facility: HOSPITAL | Age: 83
Setting detail: RECURRING SERIES
Discharge: HOME | End: 2023-08-12
Payer: MEDICARE

## 2023-08-09 PROCEDURE — 97110 THERAPEUTIC EXERCISES: CPT

## 2023-08-09 PROCEDURE — 65270000029 HC RM PRIVATE

## 2023-08-09 PROCEDURE — 97530 THERAPEUTIC ACTIVITIES: CPT

## 2023-08-09 NOTE — PROGRESS NOTES
PHYSICAL THERAPY - MEDICARE DAILY TREATMENT NOTE (updated 3/23)    Date of Service: 2023        Patient Name:  Jag Chung :  1940   Medical   Diagnosis:  Other abnormalities of gait and mobility [R26.89]  Other lack of coordination [R27.8] Treatment Diagnosis:  M62.81  GENERAL MUSCLE WEAKNESS and R26.81   Unsteadiness on feet    Referral Source: María Pham MD Insurance:   Payor: MEDICARE / Plan: MEDICARE PART A AND B / Product Type: *No Product type* /    [x] Patient's date of birth verified                      Visit #   Current  / Total 18 19   Time   In / Out 258 pm 348 pm   Total Treatment Time (in minutes) 50    Total Timed Codes (in minutes) 50    1:1 Treatment Time (in minutes) 48       [facility] Totals Reminder:  bill using total billable   min of TIMED therapeutic procedures and modalities. 8-22 min = 1 unit; 23-37 min = 2 units; 38-52 min = 3 units; 53-67 min = 4 units; 68-82 min = 5 units        SUBJECTIVE  Pain Level (0-10 scale): 0/10    Any medication changes, allergies to medications, adverse drug reactions, diagnosis change, or new procedure performed?: [x] No    [] Yes (see summary sheet for update)  Medications: [x] Verified on Patient Summary List    Subjective functional status/changes: \"Pt reports being tired however no pain. He was seen by the  About his blood pressure with no changes and blood work done. \"    OBJECTIVE  Therapeutic Procedures: Tx Min Billable or 1:1 Min (if diff from Tx Min) Procedure, Rationale, Specifics   50 50 [id number] Therapeutic Exercise (timed):  increase ROM, strength, coordination, balance, and proprioception to improve patient's ability to progress to PLOF and address remaining functional goals.  (see flow sheet as applicable)   50 50    Total Total     Modalities Rationale n/a    [x] Patient Education billed concurrently with other procedures   [x] Review HEP    [] Progressed/Changed HEP, detail:    [] Other:           Pain Level at

## 2023-08-10 PROCEDURE — 65270000029 HC RM PRIVATE

## 2023-08-11 PROCEDURE — 65270000029 HC RM PRIVATE

## 2023-08-12 PROCEDURE — 65270000029 HC RM PRIVATE

## 2023-08-13 PROCEDURE — 65270000029 HC RM PRIVATE

## 2023-08-14 PROCEDURE — 65270000029 HC RM PRIVATE

## 2023-08-15 PROCEDURE — 65270000029 HC RM PRIVATE

## 2023-08-16 ENCOUNTER — APPOINTMENT (OUTPATIENT)
Facility: HOSPITAL | Age: 83
End: 2023-08-16
Payer: MEDICARE

## 2023-08-16 PROCEDURE — 65270000029 HC RM PRIVATE

## 2023-08-17 PROCEDURE — 65270000029 HC RM PRIVATE

## 2023-08-18 ENCOUNTER — HOSPITAL ENCOUNTER (OUTPATIENT)
Facility: HOSPITAL | Age: 83
Setting detail: RECURRING SERIES
Discharge: HOME | End: 2023-08-21
Payer: MEDICARE

## 2023-08-18 PROCEDURE — 97110 THERAPEUTIC EXERCISES: CPT

## 2023-08-18 NOTE — THERAPY DISCHARGE
patient  Goal Met? YES     2. Patient will increase R  strength by 5# to increase R hand function. Status at last Eval/Progress Note: 42#  Current Status: 40#  Goal Met? NO     3. Patient will increase L forearm supination by 5 degrees to be able to hold tools and work on his tractor. Status at last Eval/Progress Note: 50 degrees at [facility]  Current Status: 70 degrees  Goal Met? Yes     4. Patient will be able to carry lift 10# using bilateral hands from table and carry 5 ft in prep for carrying cat. Status at last Eval/Progress Note: able to lift 1# overhead 10X, able to lift 1# overhead 8X   Current Status: able to carry 10# using bilateral hands from table and carry 5 ft to counter. Goal Met? Yes     Long Term Goals, to be met within 12 treatments: 1. Patient will increase R  strength by 10# to increase R hand function. Status at last Eval/Progress Note: 42#  Current Status: 40#  Goal Met? NO     2. Patient will increase L forearm supination by 10 degrees to be able to hold tools and work on his tractor. Status at last Eval/Progress Note: 50 degrees at [facility]  Current Status: 70 degrees  Goal Met? YES     3. Patient will be able to carry lift 20# using bilateral hands from table and carry 10 ft to be able to carry cat in home. Status at last Eval/Progress Note: able to lift 1# overhead 10X, able to lift 1# overhead 8X   Current Status: able to lift 20# and carry 10 ft with turn   Goal Met? YES     4. Patient will be able to complete 10 chair-pushups to increase ease of getting off the ground. Status at last Eval/Progress Note: unable to complete   Current Status: Patient completed 10 chair-pushups independently on 7/19/23. Goal Met? YES      RECOMMENDATIONS  Discontinue therapy. Progressing towards or have reached established goals.     PERLA Stoll     8/18/2023       11:06

## 2023-08-18 NOTE — DISCHARGE SUMMARY
[address]  [address] [facility], [address] [place] [address]  Ph: [phone]     Fax: [phone]    PHYSICAL THERAPY DISCHARGE SUMMARY  Patient Name: Jag Chung : 1940   Treatment/Medical Diagnosis: Other abnormalities of gait and mobility [R26.89]  Other lack of coordination [R27.8]   Referral Source: María Pham MD     Date of Initial Visit: 23 Attended Visits: 19 Missed Visits: 1     SUMMARY OF TREATMENT/CURRENT STATUS      Patient is an 81 yo male who was referred for PT services due to decreased balance and LE weakness during his initial visits. He has an extensive cardiac history of afib and has a pacemaker/defibrillator. Patient tolerated treatments today reviewing HEP and assessing mobility and strengthening of LE. Patient has shown improvement in his endurance level and strength of LE when is fluid level became more stable. Patient is returning to his daily activities and yard work and ambulates with rolling walker outdoors and take rest breaks as needed. His swelling and fluid level is now regulated and continues to be monitored by MD on a regular basis along with coumadin medication. Patient has completed 19 visits of Physical Therapy. Short terms goals were met and progressing toward long term goals. Patient agreed to  continue to exercises at the [facility] for walking program and exercise bike. Patient will has benefited from skilled PT services to analyze and address strength deficits, analyze and address soft tissue restrictions, and analyze and cue for proper movement patterns to address functional deficits and improved his overall functional capacity. Short Term Goals, to be met within 5 treatments:  Patient will demonstrate independence and compliance with HEP in order to increase mobility and assist with carryover from PT services.   Status at last Eval/Progress Note: not started  Current Status: Independent and compliant  Goal Met:

## 2023-08-18 NOTE — PROGRESS NOTES
care  [x]  Upgrade activities as tolerated  []  Discharge due to :  []  Other:      Sam Monroy, PT,        8/18/2023       11:18 AM

## 2025-04-06 ENCOUNTER — APPOINTMENT (OUTPATIENT)
Facility: HOSPITAL | Age: 85
End: 2025-04-06
Payer: MEDICARE

## 2025-04-06 ENCOUNTER — HOSPITAL ENCOUNTER (EMERGENCY)
Facility: HOSPITAL | Age: 85
Discharge: HOME | End: 2025-04-06
Payer: MEDICARE

## 2025-04-06 VITALS
RESPIRATION RATE: 13 BRPM | OXYGEN SATURATION: 97 % | TEMPERATURE: 97.4 F | SYSTOLIC BLOOD PRESSURE: 101 MMHG | DIASTOLIC BLOOD PRESSURE: 64 MMHG | WEIGHT: 163.9 LBS | BODY MASS INDEX: 24.84 KG/M2 | HEIGHT: 68 IN | HEART RATE: 82 BPM

## 2025-04-06 DIAGNOSIS — S32.010A CLOSED COMPRESSION FRACTURE OF L1 VERTEBRA, INITIAL ENCOUNTER (HCC): Primary | ICD-10-CM

## 2025-04-06 PROCEDURE — 6370000000 HC RX 637 (ALT 250 FOR IP)

## 2025-04-06 PROCEDURE — 99284 EMERGENCY DEPT VISIT MOD MDM: CPT

## 2025-04-06 PROCEDURE — 72131 CT LUMBAR SPINE W/O DYE: CPT

## 2025-04-06 RX ORDER — LIDOCAINE 4 G/G
1 PATCH TOPICAL
Status: DISCONTINUED | OUTPATIENT
Start: 2025-04-06 | End: 2025-04-06 | Stop reason: HOSPADM

## 2025-04-06 RX ORDER — ONDANSETRON 4 MG/1
4 TABLET, ORALLY DISINTEGRATING ORAL EVERY 8 HOURS PRN
Status: DISCONTINUED | OUTPATIENT
Start: 2025-04-06 | End: 2025-04-06 | Stop reason: HOSPADM

## 2025-04-06 RX ORDER — OXYCODONE HYDROCHLORIDE 5 MG/1
5 TABLET ORAL EVERY 6 HOURS PRN
Qty: 12 TABLET | Refills: 0 | Status: SHIPPED | OUTPATIENT
Start: 2025-04-06 | End: 2025-04-09

## 2025-04-06 RX ORDER — ONDANSETRON 4 MG/1
TABLET, ORALLY DISINTEGRATING ORAL
Status: COMPLETED
Start: 2025-04-06 | End: 2025-04-06

## 2025-04-06 RX ORDER — ACETAMINOPHEN 500 MG
1000 TABLET ORAL
Status: COMPLETED | OUTPATIENT
Start: 2025-04-06 | End: 2025-04-06

## 2025-04-06 RX ORDER — OXYCODONE HYDROCHLORIDE 5 MG/1
5 TABLET ORAL
Refills: 0 | Status: COMPLETED | OUTPATIENT
Start: 2025-04-06 | End: 2025-04-06

## 2025-04-06 RX ADMIN — ONDANSETRON 4 MG: 4 TABLET, ORALLY DISINTEGRATING ORAL at 08:45

## 2025-04-06 RX ADMIN — ACETAMINOPHEN 1000 MG: 500 TABLET ORAL at 07:32

## 2025-04-06 RX ADMIN — OXYCODONE 5 MG: 5 TABLET ORAL at 08:27

## 2025-04-06 ASSESSMENT — PAIN SCALES - GENERAL: PAINLEVEL_OUTOF10: 7

## 2025-04-06 ASSESSMENT — PAIN - FUNCTIONAL ASSESSMENT: PAIN_FUNCTIONAL_ASSESSMENT: 0-10

## 2025-04-06 ASSESSMENT — PAIN DESCRIPTION - PAIN TYPE: TYPE: ACUTE PAIN

## 2025-04-06 ASSESSMENT — PAIN DESCRIPTION - FREQUENCY: FREQUENCY: CONTINUOUS

## 2025-04-06 ASSESSMENT — PAIN DESCRIPTION - LOCATION: LOCATION: BACK

## 2025-04-06 ASSESSMENT — LIFESTYLE VARIABLES
HOW OFTEN DO YOU HAVE A DRINK CONTAINING ALCOHOL: NEVER
HOW MANY STANDARD DRINKS CONTAINING ALCOHOL DO YOU HAVE ON A TYPICAL DAY: PATIENT DOES NOT DRINK

## 2025-04-06 NOTE — ED TRIAGE NOTES
Brought in from home via [facility] for ground level fall at home. Patient reports slipped in urine and fell backwards into the wall and slid down. C/O back pain. Dressings observed to left elbow, right elbow and right shin.

## 2025-04-06 NOTE — DISCHARGE INSTRUCTIONS
Thank you for choosing our Emergency Department for your care.  It is our privilege to care for you in your time of need.  In the next several days, you may receive a survey via email or mailed to your home about your experience with our team.  We would greatly appreciate you taking a few minutes to complete the survey, as we use this information to learn what we have done well and what we could be doing better. Thank you for trusting us with your care!    Below you will find a list of your tests from today's visit.   Labs and Radiology Studies  No results found for this or any previous visit (from the past 12 hours).  CT LUMBAR SPINE WO CONTRAST  Result Date: 4/6/2025  INDICATION: Lower back pain, status post fall. Noncontrast CT of the lumbar spine is performed with 2.5 mm collimation. Sagittal and coronal reformatted images were also performed. CT dose reduction was achieved through use of a standardized protocol tailored for this examination and automatic exposure control for dose modulation. Direct comparison is made to prior CT of the abdomen and pelvis dated December 2021 and CT of the chest, dated April 2023. The osseous structures are diffusely demineralized. There is an acute appearing moderate L1 compression deformity. There is approximately 3 mm of osseous retropulsion superiorly. Fracture also appears to extend through the T12 component of a bridging anterior osteophyte. No additional fracture is seen. There is minimal anterior listhesis of L4 with respect to L5 unchanged compared to prior CT dated December 2021. Facet hypertrophy is noted at the L4-L5 and L5-S1 levels. There is an incompletely visualized right pleural effusion which appears to be small, but could be moderate in size. There is right basilar compressive atelectasis, incompletely visualized. There is incompletely visualized perisplenic free intraperitoneal fluid. Visualized kidneys are atrophic bilaterally. There is an incompletely

## 2025-04-06 NOTE — ED PROVIDER NOTES
[facility] EMERGENCY DEPT  EMERGENCY DEPARTMENT HISTORY AND PHYSICAL EXAM      Date: 4/6/2025  Patient Name: Shlomo Santiago  MRN: [id number]  YOB: 1940  Date of evaluation: 4/6/2025  Provider: Harika Knutson MD   Note Started: 7:13 AM EDT 4/6/25    HISTORY OF PRESENT ILLNESS     Chief Complaint   Patient presents with    Fall       History Provided By: Patient    HPI: Shlomo Santiago is a 84 y.o. male with past medical history as reviewed below, including A-fib on warfarin, who presents with back pain status post ground-level fall.  Patient reports that he slipped in urine while using the bathroom, he fell back against the wall and slid down the wall.  Currently complaining of lower back pain; he does report a fall several weeks ago which she also fell onto his bottom and had back pain after that as well.  Denies hitting his head or losing consciousness.  Also has some skin tears on his arms and legs.  PAST MEDICAL HISTORY   Past Medical History:  Past Medical History:   Diagnosis Date    A-fib ([facility])     CHF (congestive heart failure) ([facility])     last ef of 5-10%    Fall     H/O mitral valve replacement     with mechanical valve    Neuropathy        Past Surgical History:  Past Surgical History:   Procedure Laterality Date    CARDIAC VALVE SURGERY      PACEMAKER         Family History:  History reviewed. No pertinent family history.    Social History:  Social History     Tobacco Use    Smoking status: Former    Smokeless tobacco: Current   Vaping Use    Vaping status: Never Used   Substance Use Topics    Alcohol use: Not Currently    Drug use: Never       Allergies:  No Known Allergies    PCP: Danyelle Smith APRN - NP    Current Meds:   No current facility-administered medications for this encounter.     Current Outpatient Medications   Medication Sig Dispense Refill    oxyCODONE (ROXICODONE) 5 MG immediate release tablet Take 1 tablet by mouth every 6 hours as needed for Pain for up to 3 days.

## 2025-06-05 ENCOUNTER — APPOINTMENT (OUTPATIENT)
Facility: HOSPITAL | Age: 85
End: 2025-06-05
Payer: MEDICARE

## 2025-06-05 ENCOUNTER — HOSPITAL ENCOUNTER (EMERGENCY)
Facility: HOSPITAL | Age: 85
Discharge: HOME | End: 2025-06-05
Attending: EMERGENCY MEDICINE
Payer: MEDICARE

## 2025-06-05 VITALS
BODY MASS INDEX: 23.46 KG/M2 | TEMPERATURE: 97.5 F | DIASTOLIC BLOOD PRESSURE: 82 MMHG | HEART RATE: 92 BPM | RESPIRATION RATE: 24 BRPM | OXYGEN SATURATION: 98 % | WEIGHT: 146 LBS | HEIGHT: 66 IN | SYSTOLIC BLOOD PRESSURE: 122 MMHG

## 2025-06-05 DIAGNOSIS — R06.02 SHORTNESS OF BREATH: Primary | ICD-10-CM

## 2025-06-05 DIAGNOSIS — D68.59 HYPERCOAGULOPATHY: ICD-10-CM

## 2025-06-05 LAB
ALBUMIN SERPL-MCNC: 3.4 G/DL (ref 3.5–5)
ALBUMIN/GLOB SERPL: 1.1 (ref 1.1–2.2)
ALP SERPL-CCNC: 123 U/L (ref 45–117)
ALT SERPL-CCNC: 15 U/L (ref 12–78)
ANION GAP SERPL CALC-SCNC: 8 MMOL/L (ref 2–12)
AST SERPL W P-5'-P-CCNC: 39 U/L (ref 15–37)
BASOPHILS # BLD: 0.04 K/UL (ref 0–0.1)
BASOPHILS NFR BLD: 0.4 % (ref 0–1)
BILIRUB SERPL-MCNC: 1.8 MG/DL (ref 0.2–1)
BNP SERPL-MCNC: ABNORMAL PG/ML
BUN SERPL-MCNC: 23 MG/DL (ref 6–20)
BUN/CREAT SERPL: 16 (ref 12–20)
CA-I BLD-MCNC: 8.3 MG/DL (ref 8.5–10.1)
CHLORIDE SERPL-SCNC: 106 MMOL/L (ref 97–108)
CO2 SERPL-SCNC: 25 MMOL/L (ref 21–32)
CREAT SERPL-MCNC: 1.42 MG/DL (ref 0.7–1.3)
DIFFERENTIAL METHOD BLD: ABNORMAL
EOSINOPHIL # BLD: 0.12 K/UL (ref 0–0.4)
EOSINOPHIL NFR BLD: 1.3 % (ref 0–7)
ERYTHROCYTE [DISTWIDTH] IN BLOOD BY AUTOMATED COUNT: 15.4 % (ref 11.5–14.5)
GLOBULIN SER CALC-MCNC: 3.2 G/DL (ref 2–4)
GLUCOSE SERPL-MCNC: 94 MG/DL (ref 65–100)
HCT VFR BLD AUTO: 43.4 % (ref 36.6–50.3)
HGB BLD-MCNC: 14.1 G/DL (ref 12.1–17)
IMM GRANULOCYTES # BLD AUTO: 0.05 K/UL (ref 0–0.04)
IMM GRANULOCYTES NFR BLD AUTO: 0.5 % (ref 0–0.5)
INR PPP: 3.7 (ref 0.9–1.1)
LYMPHOCYTES # BLD: 0.55 K/UL (ref 0.8–3.5)
LYMPHOCYTES NFR BLD: 5.8 % (ref 12–49)
MAGNESIUM SERPL-MCNC: 2.1 MG/DL (ref 1.6–2.4)
MCH RBC QN AUTO: 32.2 PG (ref 26–34)
MCHC RBC AUTO-ENTMCNC: 32.5 G/DL (ref 30–36.5)
MCV RBC AUTO: 99.1 FL (ref 80–99)
MONOCYTES # BLD: 0.67 K/UL (ref 0–1)
MONOCYTES NFR BLD: 7.1 % (ref 5–13)
NEUTS SEG # BLD: 8.02 K/UL (ref 1.8–8)
NEUTS SEG NFR BLD: 84.9 % (ref 32–75)
NRBC # BLD: 0 K/UL (ref 0–0.01)
NRBC BLD-RTO: 0 PER 100 WBC
PLATELET # BLD AUTO: 227 K/UL (ref 150–400)
PMV BLD AUTO: 10.8 FL (ref 8.9–12.9)
POTASSIUM SERPL-SCNC: 4.5 MMOL/L (ref 3.5–5.1)
PROT SERPL-MCNC: 6.6 G/DL (ref 6.4–8.2)
PROTHROMBIN TIME: 37 SEC (ref 11.9–14.6)
RBC # BLD AUTO: 4.38 M/UL (ref 4.1–5.7)
SODIUM SERPL-SCNC: 139 MMOL/L (ref 136–145)
TROPONIN I SERPL HS-MCNC: 28 NG/L (ref 0–76)
WBC # BLD AUTO: 9.5 K/UL (ref 4.1–11.1)

## 2025-06-05 PROCEDURE — 80053 COMPREHEN METABOLIC PANEL: CPT

## 2025-06-05 PROCEDURE — 99285 EMERGENCY DEPT VISIT HI MDM: CPT

## 2025-06-05 PROCEDURE — 85610 PROTHROMBIN TIME: CPT

## 2025-06-05 PROCEDURE — 71045 X-RAY EXAM CHEST 1 VIEW: CPT

## 2025-06-05 PROCEDURE — 83735 ASSAY OF MAGNESIUM: CPT

## 2025-06-05 PROCEDURE — 83880 ASSAY OF NATRIURETIC PEPTIDE: CPT

## 2025-06-05 PROCEDURE — 85025 COMPLETE CBC W/AUTO DIFF WBC: CPT

## 2025-06-05 PROCEDURE — 36415 COLL VENOUS BLD VENIPUNCTURE: CPT

## 2025-06-05 PROCEDURE — 71250 CT THORAX DX C-: CPT

## 2025-06-05 PROCEDURE — 84484 ASSAY OF TROPONIN QUANT: CPT

## 2025-06-05 ASSESSMENT — LIFESTYLE VARIABLES
HOW MANY STANDARD DRINKS CONTAINING ALCOHOL DO YOU HAVE ON A TYPICAL DAY: PATIENT DECLINED
HOW OFTEN DO YOU HAVE A DRINK CONTAINING ALCOHOL: PATIENT DECLINED

## 2025-06-05 ASSESSMENT — PAIN SCALES - GENERAL: PAINLEVEL_OUTOF10: 0

## 2025-06-05 ASSESSMENT — PAIN - FUNCTIONAL ASSESSMENT: PAIN_FUNCTIONAL_ASSESSMENT: 0-10

## 2025-06-05 NOTE — ED TRIAGE NOTES
Pt arrived via EMS from [facility] office where he was being seen for not feeling well. Pt states he wears O2 at 2l nc at home prn- but has been wearing a lot over last week. Denies pain. States he feels much better and would like a snack

## 2025-06-05 NOTE — ED PROVIDER NOTES
[facility] EMERGENCY DEPT  EMERGENCY DEPARTMENT HISTORY AND PHYSICAL EXAM      Date: 6/5/2025  Patient Name: Shlomo Santiago  MRN: [id number]  YOB: 1940  Date of evaluation: 6/5/2025  Provider: Daxa Landry MD   Note Started: 12:06 PM EDT 6/5/25    HISTORY OF PRESENT ILLNESS     Chief Complaint   Patient presents with    Shortness of Breath       History Provided By: Patient    HPI: Shlomo Santiago is a 84 y.o. male     PAST MEDICAL HISTORY   Past Medical History:  Past Medical History:   Diagnosis Date    A-fib ([facility])     CHF (congestive heart failure) ([facility])     last ef of 5-10%    Fall     H/O mitral valve replacement     with mechanical valve    Neuropathy        Past Surgical History:  Past Surgical History:   Procedure Laterality Date    CARDIAC VALVE SURGERY      PACEMAKER         Family History:  History reviewed. No pertinent family history.    Social History:  Social History     Tobacco Use    Smoking status: Former    Smokeless tobacco: Current   Vaping Use    Vaping status: Never Used   Substance Use Topics    Alcohol use: Not Currently    Drug use: Never       Allergies:  No Known Allergies    PCP: Danyelle Smith APRN - NP    Current Meds:   No current facility-administered medications for this encounter.     Current Outpatient Medications   Medication Sig Dispense Refill    famotidine (PEPCID) 20 MG tablet Take 1 tablet by mouth daily 60 tablet 0    aspirin 81 MG EC tablet Take 1 tablet by mouth daily      furosemide (LASIX) 40 MG tablet Take 1 tablet by mouth 2 times daily 60 tablet 0    gabapentin (NEURONTIN) 300 MG capsule Take 1 capsule by mouth 2 times daily. Max Daily Amount: 600 mg 60 capsule 0    gemfibrozil (LOPID) 600 MG tablet Take 1 tablet by mouth 2 times daily      levothyroxine (SYNTHROID) 25 MCG tablet Take 1 tablet by mouth daily      magnesium cl-calcium carbonate (SLOW-MAG) 71.5-119 MG TBEC tablet Take 71.5 mg by mouth daily      potassium chloride (KLOR-CON) 10 MEQ  return to ER should their symptoms worsen.      PATIENT REFERRED TO:  No follow-up provider specified.      DISCHARGE MEDICATIONS:     Medication List        ASK your doctor about these medications      aspirin 81 MG EC tablet     famotidine 20 MG tablet  Commonly known as: Pepcid  Take 1 tablet by mouth daily     furosemide 40 MG tablet  Commonly known as: LASIX     gabapentin 300 MG capsule  Commonly known as: NEURONTIN     gemfibrozil 600 MG tablet  Commonly known as: LOPID     levothyroxine 25 MCG tablet  Commonly known as: SYNTHROID     magnesium cl-calcium carbonate 71.5-119 MG Tbec tablet  Commonly known as: SLOW-MAG     potassium chloride 10 MEQ extended release tablet  Commonly known as: KLOR-CON     spironolactone 25 MG tablet  Commonly known as: ALDACTONE     tamsulosin 0.4 MG capsule  Commonly known as: FLOMAX     warfarin 5 MG tablet  Commonly known as: COUMADIN                DISCONTINUED MEDICATIONS:  Current Discharge Medication List          I am the Primary Clinician of Record: Daxa Landry MD (electronically signed)    (Please note that parts of this dictation were completed with voice recognition software. Quite often unanticipated grammatical, syntax, homophones, and other interpretive errors are inadvertently transcribed by the computer software. Please disregards these errors. Please excuse any errors that have escaped final proofreading.)     Daxa Landry MD  06/06/25 7550

## 2025-06-06 LAB
EKG ATRIAL RATE: 83 BPM
EKG DIAGNOSIS: NORMAL
EKG P-R INTERVAL: 64 MS
EKG Q-T INTERVAL: 431 MS
EKG QRS DURATION: 136 MS
EKG QTC CALCULATION (BAZETT): 534 MS
EKG R AXIS: 256 DEGREES
EKG T AXIS: 99 DEGREES
EKG VENTRICULAR RATE: 92 BPM

## 2025-06-17 ENCOUNTER — HOSPITAL ENCOUNTER (EMERGENCY)
Facility: HOSPITAL | Age: 85
Discharge: HOME | End: 2025-06-17
Attending: FAMILY MEDICINE
Payer: MEDICARE

## 2025-06-17 ENCOUNTER — APPOINTMENT (OUTPATIENT)
Facility: HOSPITAL | Age: 85
End: 2025-06-17
Payer: MEDICARE

## 2025-06-17 VITALS
OXYGEN SATURATION: 99 % | WEIGHT: 147 LBS | TEMPERATURE: 97.2 F | HEART RATE: 88 BPM | RESPIRATION RATE: 24 BRPM | SYSTOLIC BLOOD PRESSURE: 116 MMHG | HEIGHT: 66 IN | DIASTOLIC BLOOD PRESSURE: 91 MMHG | BODY MASS INDEX: 23.63 KG/M2

## 2025-06-17 DIAGNOSIS — R06.09 OTHER FORM OF DYSPNEA: Primary | ICD-10-CM

## 2025-06-17 DIAGNOSIS — N18.9 CHRONIC KIDNEY DISEASE, UNSPECIFIED CKD STAGE: ICD-10-CM

## 2025-06-17 LAB
ALBUMIN SERPL-MCNC: 3.8 G/DL (ref 3.5–5)
ALBUMIN/GLOB SERPL: 1.4 (ref 1.1–2.2)
ALP SERPL-CCNC: 118 U/L (ref 45–117)
ALT SERPL-CCNC: 13 U/L (ref 12–78)
ANION GAP SERPL CALC-SCNC: 13 MMOL/L (ref 2–12)
AST SERPL W P-5'-P-CCNC: 14 U/L (ref 15–37)
BASOPHILS # BLD: 0.05 K/UL (ref 0–0.1)
BASOPHILS NFR BLD: 0.6 % (ref 0–1)
BILIRUB SERPL-MCNC: 1.5 MG/DL (ref 0.2–1)
BUN SERPL-MCNC: 27 MG/DL (ref 6–20)
BUN/CREAT SERPL: 15 (ref 12–20)
CA-I BLD-MCNC: 8.9 MG/DL (ref 8.5–10.1)
CHLORIDE SERPL-SCNC: 101 MMOL/L (ref 97–108)
CO2 SERPL-SCNC: 23 MMOL/L (ref 21–32)
CREAT SERPL-MCNC: 1.75 MG/DL (ref 0.7–1.3)
DIFFERENTIAL METHOD BLD: ABNORMAL
EOSINOPHIL # BLD: 0.03 K/UL (ref 0–0.4)
EOSINOPHIL NFR BLD: 0.3 % (ref 0–7)
ERYTHROCYTE [DISTWIDTH] IN BLOOD BY AUTOMATED COUNT: 14.6 % (ref 11.5–14.5)
GLOBULIN SER CALC-MCNC: 2.8 G/DL (ref 2–4)
GLUCOSE SERPL-MCNC: 103 MG/DL (ref 65–100)
HCT VFR BLD AUTO: 39.5 % (ref 36.6–50.3)
HGB BLD-MCNC: 13 G/DL (ref 12.1–17)
IMM GRANULOCYTES # BLD AUTO: 0.04 K/UL (ref 0–0.04)
IMM GRANULOCYTES NFR BLD AUTO: 0.4 % (ref 0–0.5)
INR PPP: 2.4 (ref 0.9–1.1)
LYMPHOCYTES # BLD: 0.49 K/UL (ref 0.8–3.5)
LYMPHOCYTES NFR BLD: 5.4 % (ref 12–49)
MCH RBC QN AUTO: 32 PG (ref 26–34)
MCHC RBC AUTO-ENTMCNC: 32.9 G/DL (ref 30–36.5)
MCV RBC AUTO: 97.3 FL (ref 80–99)
MONOCYTES # BLD: 0.69 K/UL (ref 0–1)
MONOCYTES NFR BLD: 7.6 % (ref 5–13)
NEUTS SEG # BLD: 7.72 K/UL (ref 1.8–8)
NEUTS SEG NFR BLD: 85.7 % (ref 32–75)
NRBC # BLD: 0 K/UL (ref 0–0.01)
NRBC BLD-RTO: 0 PER 100 WBC
PLATELET # BLD AUTO: 252 K/UL (ref 150–400)
PMV BLD AUTO: 11.5 FL (ref 8.9–12.9)
POTASSIUM SERPL-SCNC: 3.7 MMOL/L (ref 3.5–5.1)
PROT SERPL-MCNC: 6.6 G/DL (ref 6.4–8.2)
PROTHROMBIN TIME: 26.3 SEC (ref 11.9–14.6)
RBC # BLD AUTO: 4.06 M/UL (ref 4.1–5.7)
SODIUM SERPL-SCNC: 137 MMOL/L (ref 136–145)
TROPONIN I SERPL HS-MCNC: 37 NG/L (ref 0–76)
WBC # BLD AUTO: 9 K/UL (ref 4.1–11.1)

## 2025-06-17 PROCEDURE — 99285 EMERGENCY DEPT VISIT HI MDM: CPT

## 2025-06-17 PROCEDURE — 85025 COMPLETE CBC W/AUTO DIFF WBC: CPT

## 2025-06-17 PROCEDURE — 80053 COMPREHEN METABOLIC PANEL: CPT

## 2025-06-17 PROCEDURE — 85610 PROTHROMBIN TIME: CPT

## 2025-06-17 PROCEDURE — 93005 ELECTROCARDIOGRAM TRACING: CPT | Performed by: FAMILY MEDICINE

## 2025-06-17 PROCEDURE — 84484 ASSAY OF TROPONIN QUANT: CPT

## 2025-06-17 PROCEDURE — 71045 X-RAY EXAM CHEST 1 VIEW: CPT

## 2025-06-17 PROCEDURE — 36415 COLL VENOUS BLD VENIPUNCTURE: CPT

## 2025-06-17 ASSESSMENT — PAIN SCALES - GENERAL: PAINLEVEL_OUTOF10: 0

## 2025-06-17 ASSESSMENT — PAIN - FUNCTIONAL ASSESSMENT: PAIN_FUNCTIONAL_ASSESSMENT: 0-10

## 2025-06-17 NOTE — CARE COORDINATION
LEONOR got call from KALINA Tim who stated the patient did not have any home O2 portable tanks. LEONOR called [facility] and talked to Shaun who stated they will bring a tank to the ED for the patient and that they were out at the patients home on 6.15.25 and the patient did not tell them he was out of oxygen at that time. They checked his home concentrator and provided him with extra O2 tubing at that time. Per Shaun, the patient did not call [facility] today to let them know he was out of oxygen and advised to have the patent call them if he runs out or is getting low on oxygen. Informed KALINA Tim of this.

## 2025-06-17 NOTE — ED PROVIDER NOTES
MEDICATIONS:  Current Discharge Medication List          I am the Primary Clinician of Record. Shlomo Sher MD (electronically signed)    (Please note that parts of this dictation were completed with voice recognition software. Quite often unanticipated grammatical, syntax, homophones, and other interpretive errors are inadvertently transcribed by the computer software. Please disregards these errors. Please excuse any errors that have escaped final proofreading.)      Shlomo Sher MD  06/17/25 0472

## 2025-06-17 NOTE — ED NOTES
Day from  called and requested assistance with pt's home oxygen. Pt wears oxygen at home PRN and uses [employer].

## 2025-06-17 NOTE — ED TRIAGE NOTES
Pt reports SOB that has been ongoing for a few months, feels worse today. Pt reports he is on home O2, uses 2l NC. Pt does not have portable tank. Pt denies chest pain. Pt also reports nasal congestion.

## 2025-06-18 LAB
EKG ATRIAL RATE: 0 BPM
EKG DIAGNOSIS: NORMAL
EKG P AXIS: 0 DEGREES
EKG P-R INTERVAL: 74 MS
EKG Q-T INTERVAL: 402 MS
EKG QRS DURATION: 107 MS
EKG QTC CALCULATION (BAZETT): 453 MS
EKG R AXIS: 175 DEGREES
EKG T AXIS: 119 DEGREES
EKG VENTRICULAR RATE: 76 BPM

## 2025-07-15 ENCOUNTER — HOSPITAL ENCOUNTER (INPATIENT)
Facility: HOSPITAL | Age: 85
LOS: 9 days | Discharge: HOSPICE | DRG: 291 | End: 2025-07-24
Payer: MEDICARE

## 2025-07-15 ENCOUNTER — APPOINTMENT (OUTPATIENT)
Facility: HOSPITAL | Age: 85
DRG: 291 | End: 2025-07-15
Payer: MEDICARE

## 2025-07-15 DIAGNOSIS — I50.23 ACUTE ON CHRONIC SYSTOLIC CONGESTIVE HEART FAILURE (HCC): ICD-10-CM

## 2025-07-15 DIAGNOSIS — I50.9 ACUTE ON CHRONIC CONGESTIVE HEART FAILURE, UNSPECIFIED HEART FAILURE TYPE (HCC): Primary | ICD-10-CM

## 2025-07-15 DIAGNOSIS — I50.21 CHF (CONGESTIVE HEART FAILURE), NYHA CLASS II, ACUTE, SYSTOLIC (HCC): ICD-10-CM

## 2025-07-15 DIAGNOSIS — D68.9 COAGULOPATHY: ICD-10-CM

## 2025-07-15 LAB
ALBUMIN SERPL-MCNC: 3.4 G/DL (ref 3.5–5)
ALBUMIN/GLOB SERPL: 1.4 (ref 1.1–2.2)
ALP SERPL-CCNC: 104 U/L (ref 45–117)
ALT SERPL-CCNC: 19 U/L (ref 12–78)
ANION GAP SERPL CALC-SCNC: 13 MMOL/L (ref 2–12)
AST SERPL W P-5'-P-CCNC: 26 U/L (ref 15–37)
BASOPHILS # BLD: 0.02 K/UL (ref 0–0.1)
BASOPHILS NFR BLD: 0.3 % (ref 0–1)
BILIRUB SERPL-MCNC: 3 MG/DL (ref 0.2–1)
BNP SERPL-MCNC: ABNORMAL PG/ML
BUN SERPL-MCNC: 30 MG/DL (ref 6–20)
BUN/CREAT SERPL: 13 (ref 12–20)
CA-I BLD-MCNC: 8.8 MG/DL (ref 8.5–10.1)
CHLORIDE SERPL-SCNC: 100 MMOL/L (ref 97–108)
CO2 SERPL-SCNC: 23 MMOL/L (ref 21–32)
CREAT SERPL-MCNC: 2.35 MG/DL (ref 0.7–1.3)
DIFFERENTIAL METHOD BLD: ABNORMAL
EOSINOPHIL # BLD: 0.05 K/UL (ref 0–0.4)
EOSINOPHIL NFR BLD: 0.7 % (ref 0–7)
ERYTHROCYTE [DISTWIDTH] IN BLOOD BY AUTOMATED COUNT: 15 % (ref 11.5–14.5)
GLOBULIN SER CALC-MCNC: 2.5 G/DL (ref 2–4)
GLUCOSE SERPL-MCNC: 106 MG/DL (ref 65–100)
HCT VFR BLD AUTO: 42.9 % (ref 36.6–50.3)
HGB BLD-MCNC: 14.2 G/DL (ref 12.1–17)
IMM GRANULOCYTES # BLD AUTO: 0.04 K/UL (ref 0–0.04)
IMM GRANULOCYTES NFR BLD AUTO: 0.6 % (ref 0–0.5)
LYMPHOCYTES # BLD: 0.49 K/UL (ref 0.8–3.5)
LYMPHOCYTES NFR BLD: 6.9 % (ref 12–49)
MCH RBC QN AUTO: 30.7 PG (ref 26–34)
MCHC RBC AUTO-ENTMCNC: 33.1 G/DL (ref 30–36.5)
MCV RBC AUTO: 92.7 FL (ref 80–99)
MONOCYTES # BLD: 0.5 K/UL (ref 0–1)
MONOCYTES NFR BLD: 7 % (ref 5–13)
NEUTS SEG # BLD: 6.01 K/UL (ref 1.8–8)
NEUTS SEG NFR BLD: 84.5 % (ref 32–75)
NRBC # BLD: 0 K/UL (ref 0–0.01)
NRBC BLD-RTO: 0 PER 100 WBC
PLATELET # BLD AUTO: 192 K/UL (ref 150–400)
PMV BLD AUTO: 11.4 FL (ref 8.9–12.9)
POTASSIUM SERPL-SCNC: 3.2 MMOL/L (ref 3.5–5.1)
PROT SERPL-MCNC: 5.9 G/DL (ref 6.4–8.2)
RBC # BLD AUTO: 4.63 M/UL (ref 4.1–5.7)
SODIUM SERPL-SCNC: 136 MMOL/L (ref 136–145)
TROPONIN I SERPL HS-MCNC: 42 NG/L (ref 0–76)
WBC # BLD AUTO: 7.1 K/UL (ref 4.1–11.1)

## 2025-07-15 PROCEDURE — 99285 EMERGENCY DEPT VISIT HI MDM: CPT

## 2025-07-15 PROCEDURE — 83880 ASSAY OF NATRIURETIC PEPTIDE: CPT

## 2025-07-15 PROCEDURE — 85025 COMPLETE CBC W/AUTO DIFF WBC: CPT

## 2025-07-15 PROCEDURE — 93005 ELECTROCARDIOGRAM TRACING: CPT

## 2025-07-15 PROCEDURE — 36415 COLL VENOUS BLD VENIPUNCTURE: CPT

## 2025-07-15 PROCEDURE — 2500000003 HC RX 250 WO HCPCS

## 2025-07-15 PROCEDURE — 71045 X-RAY EXAM CHEST 1 VIEW: CPT

## 2025-07-15 PROCEDURE — 80053 COMPREHEN METABOLIC PANEL: CPT

## 2025-07-15 PROCEDURE — 1100000000 HC RM PRIVATE

## 2025-07-15 PROCEDURE — 84484 ASSAY OF TROPONIN QUANT: CPT

## 2025-07-15 RX ORDER — SODIUM CHLORIDE 0.9 % (FLUSH) 0.9 %
5-40 SYRINGE (ML) INJECTION PRN
Status: DISCONTINUED | OUTPATIENT
Start: 2025-07-15 | End: 2025-07-24 | Stop reason: HOSPADM

## 2025-07-15 RX ORDER — ACETAMINOPHEN 325 MG/1
650 TABLET ORAL EVERY 6 HOURS PRN
Status: DISCONTINUED | OUTPATIENT
Start: 2025-07-15 | End: 2025-07-24 | Stop reason: HOSPADM

## 2025-07-15 RX ORDER — ACETAMINOPHEN 650 MG/1
650 SUPPOSITORY RECTAL EVERY 6 HOURS PRN
Status: DISCONTINUED | OUTPATIENT
Start: 2025-07-15 | End: 2025-07-24 | Stop reason: HOSPADM

## 2025-07-15 RX ORDER — ENOXAPARIN SODIUM 100 MG/ML
30 INJECTION SUBCUTANEOUS DAILY
Status: DISCONTINUED | OUTPATIENT
Start: 2025-07-15 | End: 2025-07-15

## 2025-07-15 RX ORDER — FUROSEMIDE 10 MG/ML
40 INJECTION INTRAMUSCULAR; INTRAVENOUS DAILY
Status: DISCONTINUED | OUTPATIENT
Start: 2025-07-16 | End: 2025-07-20

## 2025-07-15 RX ORDER — MIDODRINE HYDROCHLORIDE 5 MG/1
5 TABLET ORAL
Status: DISCONTINUED | OUTPATIENT
Start: 2025-07-15 | End: 2025-07-19

## 2025-07-15 RX ORDER — BUMETANIDE 1 MG/1
1 TABLET ORAL DAILY
Status: ON HOLD | COMMUNITY
End: 2025-07-23 | Stop reason: HOSPADM

## 2025-07-15 RX ORDER — ONDANSETRON 4 MG/1
4 TABLET, ORALLY DISINTEGRATING ORAL EVERY 8 HOURS PRN
Status: DISCONTINUED | OUTPATIENT
Start: 2025-07-15 | End: 2025-07-24 | Stop reason: HOSPADM

## 2025-07-15 RX ORDER — TAMSULOSIN HYDROCHLORIDE 0.4 MG/1
0.4 CAPSULE ORAL DAILY
Status: DISCONTINUED | OUTPATIENT
Start: 2025-07-15 | End: 2025-07-24 | Stop reason: HOSPADM

## 2025-07-15 RX ORDER — WARFARIN SODIUM 2.5 MG/1
2.5 TABLET ORAL
Status: ON HOLD | COMMUNITY
End: 2025-07-23 | Stop reason: HOSPADM

## 2025-07-15 RX ORDER — FUROSEMIDE 10 MG/ML
40 INJECTION INTRAMUSCULAR; INTRAVENOUS
Status: ACTIVE | OUTPATIENT
Start: 2025-07-15 | End: 2025-07-16

## 2025-07-15 RX ORDER — METOLAZONE 5 MG/1
5 TABLET ORAL DAILY
Status: ON HOLD | COMMUNITY
End: 2025-07-23 | Stop reason: HOSPADM

## 2025-07-15 RX ORDER — SODIUM CHLORIDE 0.9 % (FLUSH) 0.9 %
5-40 SYRINGE (ML) INJECTION EVERY 12 HOURS SCHEDULED
Status: DISCONTINUED | OUTPATIENT
Start: 2025-07-15 | End: 2025-07-24 | Stop reason: HOSPADM

## 2025-07-15 RX ORDER — ONDANSETRON 2 MG/ML
4 INJECTION INTRAMUSCULAR; INTRAVENOUS EVERY 6 HOURS PRN
Status: DISCONTINUED | OUTPATIENT
Start: 2025-07-15 | End: 2025-07-24 | Stop reason: HOSPADM

## 2025-07-15 RX ORDER — SODIUM CHLORIDE 9 MG/ML
INJECTION, SOLUTION INTRAVENOUS PRN
Status: DISCONTINUED | OUTPATIENT
Start: 2025-07-15 | End: 2025-07-24 | Stop reason: HOSPADM

## 2025-07-15 RX ORDER — POLYETHYLENE GLYCOL 3350 17 G/17G
17 POWDER, FOR SOLUTION ORAL DAILY PRN
Status: DISCONTINUED | OUTPATIENT
Start: 2025-07-15 | End: 2025-07-22 | Stop reason: SDUPTHER

## 2025-07-15 RX ORDER — FAMOTIDINE 20 MG/1
20 TABLET, FILM COATED ORAL DAILY
Status: DISCONTINUED | OUTPATIENT
Start: 2025-07-15 | End: 2025-07-18

## 2025-07-15 RX ORDER — MIDODRINE HYDROCHLORIDE 2.5 MG/1
2.5 TABLET ORAL 2 TIMES DAILY PRN
COMMUNITY

## 2025-07-15 RX ADMIN — SODIUM CHLORIDE, PRESERVATIVE FREE 10 ML: 5 INJECTION INTRAVENOUS at 22:46

## 2025-07-15 ASSESSMENT — PAIN SCALES - GENERAL: PAINLEVEL_OUTOF10: 0

## 2025-07-15 ASSESSMENT — LIFESTYLE VARIABLES
HOW MANY STANDARD DRINKS CONTAINING ALCOHOL DO YOU HAVE ON A TYPICAL DAY: PATIENT DOES NOT DRINK
HOW OFTEN DO YOU HAVE A DRINK CONTAINING ALCOHOL: NEVER

## 2025-07-15 ASSESSMENT — PAIN - FUNCTIONAL ASSESSMENT: PAIN_FUNCTIONAL_ASSESSMENT: NONE - DENIES PAIN

## 2025-07-15 NOTE — ED NOTES
Med rec updated with the exception of synthroid. Waiting until day nurse is back to verify that med. All the rest is updated

## 2025-07-15 NOTE — ED PROVIDER NOTES
[facility] EMERGENCY DEPT  EMERGENCY DEPARTMENT HISTORY AND PHYSICAL EXAM      Date of evaluation: 7/15/2025  Patient Name: Shlomo Santiago  Birthdate 1940  MRN: [id number]  ED Provider: Harika Knutson MD   Note Started: 3:50 PM EDT 7/15/25    HISTORY OF PRESENT ILLNESS     Chief Complaint   Patient presents with    Leg Swelling       History Provided By: Patient, EMS     HPI: Shlomo Santiago is a 84 y.o. male with past medical history as reviewed below, who presents with lower extremity swelling.  Per EMS, patient's nurse reported that patient has had worsening swelling in his legs, has been on Bumex.  Patient however reports that he has also been feeling very weak, may have had prior syncopal episodes.  Denies any chest pain or shortness of breath.    PAST MEDICAL HISTORY   Past Medical History:  Past Medical History:   Diagnosis Date    A-fib ([facility])     CHF (congestive heart failure) ([facility])     last ef of 5-10%    Fall     H/O mitral valve replacement     with mechanical valve    Neuropathy        Past Surgical History:  Past Surgical History:   Procedure Laterality Date    CARDIAC VALVE SURGERY      PACEMAKER         Family History:  No family history on file.    Social History:  Social History     Tobacco Use    Smoking status: Former    Smokeless tobacco: Current   Vaping Use    Vaping status: Never Used   Substance Use Topics    Alcohol use: Not Currently    Drug use: Never       Allergies:  No Known Allergies    PCP: Danyelle Smith, DEE DEE - NP    Current Meds:   Current Facility-Administered Medications   Medication Dose Route Frequency Provider Last Rate Last Admin    furosemide (LASIX) injection 40 mg  40 mg IntraVENous NOW Harika Knutson MD         Current Outpatient Medications   Medication Sig Dispense Refill    famotidine (PEPCID) 20 MG tablet Take 1 tablet by mouth daily 60 tablet 0    aspirin 81 MG EC tablet Take 1 tablet by mouth daily      furosemide (LASIX) 40 MG tablet Take 1

## 2025-07-15 NOTE — H&P
stay.      Code Status: Full, extensively discussed with the patient and the family regarding risks of getting CPR with his end-stage heart disease.  Patient understands however still wants to be a full code.  DVT Prophylaxis: Warfarin  Baseline:     Subjective:   CHIEF COMPLAINT: Weakness, lightheadedness    HISTORY OF PRESENT ILLNESS:     Shlomo Santiago is a 84 y.o.  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  Associated with worsening lower extremity edema  No chest pain  Patient follows Dr. Phelps from the cardiology, recently his Lasix was changed to Bumex.  He had appointment 1 week ago  Patient was recently hospitalized 1/2-month ago together hospital for CHF exacerbation  On admission workup significant for acute on chronic heart failure exacerbation.  Blood pressure soft 99/82, pulse 91.  BMP with BRYAN likely secondary to cardiorenal syndrome.  NT proBNP 35,000 comparing to 22K last June.  Last echo done in 2023 with a EF of 9%.  Troponin is negative  X-ray consistent with pulmonary edema      Past Medical History:   Diagnosis Date    A-fib ([facility])     CHF (congestive heart failure) ([facility])     last ef of 5-10%    Fall     H/O mitral valve replacement     with mechanical valve    Neuropathy         Past Surgical History:   Procedure Laterality Date    CARDIAC VALVE SURGERY      PACEMAKER         Social History     Tobacco Use    Smoking status: Former    Smokeless tobacco: Current   Substance Use Topics    Alcohol use: Not Currently        No family history on file.    No Known Allergies     Prior to Admission medications    Medication Sig Start Date End Date Taking? Authorizing Provider   famotidine (PEPCID) 20 MG tablet Take 1 tablet by mouth daily

## 2025-07-16 LAB
ANION GAP SERPL CALC-SCNC: 16 MMOL/L (ref 2–12)
BUN SERPL-MCNC: 30 MG/DL (ref 6–20)
BUN/CREAT SERPL: 15 (ref 12–20)
CA-I BLD-MCNC: 8.9 MG/DL (ref 8.5–10.1)
CHLORIDE SERPL-SCNC: 104 MMOL/L (ref 97–108)
CO2 SERPL-SCNC: 17 MMOL/L (ref 21–32)
CREAT SERPL-MCNC: 2.02 MG/DL (ref 0.7–1.3)
EKG ATRIAL RATE: 79 BPM
EKG DIAGNOSIS: NORMAL
EKG Q-T INTERVAL: 430 MS
EKG QRS DURATION: 170 MS
EKG QTC CALCULATION (BAZETT): 508 MS
EKG R AXIS: -89 DEGREES
EKG T AXIS: 75 DEGREES
EKG VENTRICULAR RATE: 84 BPM
ERYTHROCYTE [DISTWIDTH] IN BLOOD BY AUTOMATED COUNT: 15.2 % (ref 11.5–14.5)
GLUCOSE SERPL-MCNC: 68 MG/DL (ref 65–100)
HCT VFR BLD AUTO: 47.4 % (ref 36.6–50.3)
HGB BLD-MCNC: 15.3 G/DL (ref 12.1–17)
INR PPP: 5.1 (ref 0.9–1.1)
MAGNESIUM SERPL-MCNC: 2.2 MG/DL (ref 1.6–2.4)
MCH RBC QN AUTO: 30.5 PG (ref 26–34)
MCHC RBC AUTO-ENTMCNC: 32.3 G/DL (ref 30–36.5)
MCV RBC AUTO: 94.4 FL (ref 80–99)
NRBC # BLD: 0 K/UL (ref 0–0.01)
NRBC BLD-RTO: 0 PER 100 WBC
PHOSPHATE SERPL-MCNC: 2.9 MG/DL (ref 2.6–4.7)
PLATELET # BLD AUTO: 156 K/UL (ref 150–400)
PMV BLD AUTO: 11.6 FL (ref 8.9–12.9)
POTASSIUM SERPL-SCNC: 3.4 MMOL/L (ref 3.5–5.1)
PROTHROMBIN TIME: 47 SEC (ref 11.9–14.1)
RBC # BLD AUTO: 5.02 M/UL (ref 4.1–5.7)
SODIUM SERPL-SCNC: 137 MMOL/L (ref 136–145)
WBC # BLD AUTO: 8.1 K/UL (ref 4.1–11.1)

## 2025-07-16 PROCEDURE — 94761 N-INVAS EAR/PLS OXIMETRY MLT: CPT

## 2025-07-16 PROCEDURE — 97161 PT EVAL LOW COMPLEX 20 MIN: CPT

## 2025-07-16 PROCEDURE — 97530 THERAPEUTIC ACTIVITIES: CPT

## 2025-07-16 PROCEDURE — 97165 OT EVAL LOW COMPLEX 30 MIN: CPT

## 2025-07-16 PROCEDURE — 1100000000 HC RM PRIVATE

## 2025-07-16 PROCEDURE — 6370000000 HC RX 637 (ALT 250 FOR IP)

## 2025-07-16 PROCEDURE — 80048 BASIC METABOLIC PNL TOTAL CA: CPT

## 2025-07-16 PROCEDURE — 2500000003 HC RX 250 WO HCPCS

## 2025-07-16 PROCEDURE — 84100 ASSAY OF PHOSPHORUS: CPT

## 2025-07-16 PROCEDURE — 6360000002 HC RX W HCPCS

## 2025-07-16 PROCEDURE — 85027 COMPLETE CBC AUTOMATED: CPT

## 2025-07-16 PROCEDURE — 83735 ASSAY OF MAGNESIUM: CPT

## 2025-07-16 PROCEDURE — 85610 PROTHROMBIN TIME: CPT

## 2025-07-16 PROCEDURE — 2700000000 HC OXYGEN THERAPY PER DAY

## 2025-07-16 RX ADMIN — TAMSULOSIN HYDROCHLORIDE 0.4 MG: 0.4 CAPSULE ORAL at 09:45

## 2025-07-16 RX ADMIN — MIDODRINE HYDROCHLORIDE 5 MG: 5 TABLET ORAL at 09:45

## 2025-07-16 RX ADMIN — SODIUM CHLORIDE, PRESERVATIVE FREE 10 ML: 5 INJECTION INTRAVENOUS at 20:15

## 2025-07-16 RX ADMIN — FUROSEMIDE 40 MG: 10 INJECTION, SOLUTION INTRAMUSCULAR; INTRAVENOUS at 09:46

## 2025-07-16 RX ADMIN — SODIUM CHLORIDE, PRESERVATIVE FREE 10 ML: 5 INJECTION INTRAVENOUS at 09:46

## 2025-07-16 RX ADMIN — FAMOTIDINE 20 MG: 20 TABLET, FILM COATED ORAL at 09:45

## 2025-07-16 RX ADMIN — MIDODRINE HYDROCHLORIDE 5 MG: 5 TABLET ORAL at 18:25

## 2025-07-16 RX ADMIN — MIDODRINE HYDROCHLORIDE 5 MG: 5 TABLET ORAL at 12:45

## 2025-07-16 NOTE — CARE COORDINATION
07/16/25 1331   Service Assessment   Patient Orientation Alert and Oriented   Cognition Alert   History Provided By Patient   Primary Caregiver Self   Accompanied By/Relationship alone   Support Systems Spouse/Significant Other;Family Members   Patient's Healthcare Decision Maker is: Legal Next of Kin   PCP Verified by CM Yes  (Danyelle Smith last seen 2-3 weeks ago)   Prior Functional Level Independent in ADLs/IADLs   Current Functional Level Assistance with the following:;Dressing;Mobility;Bathing   Can patient return to prior living arrangement Unknown at present   Ability to make needs known: Fair   Family able to assist with home care needs: Yes   Would you like for me to discuss the discharge plan with any other family members/significant others, and if so, who? Yes  (wife or sister Kimber)   Financial Resources Medicare   Community Resources None   CM/SW Referral Other (see comment)  (discharge planning)   Social/Functional History   Lives With Spouse   Type of Home House   Home Layout One level   Home Access Stairs to enter with rails   Entrance Stairs - Number of Steps 2-3   Entrance Stairs - Rails Both   Bathroom Equipment None   Home Equipment Rollator;Oxygen   Receives Help From Family     CM met with pt at bedside to verify demographics on facesheet are correct.     Pt lives with wife in a single story house with 3-4 SOCOR[place] and rails on both sides.   Up until a week ago pt was independent with ADLs, but reports he has needed assistance since he has started not feeling well.     DME: rollator, 2L Home O2 at baseline provided by Anisha    Pt reports he is currently on services with HH, but unsure which agency.   Pt reports SNF placement 4-5 months ago at either [facility] or [facility].     Rx: [facility]    Pt reports his wife can transport when cleared for dc.       Advance Care Planning   Healthcare Decision Maker:    Primary Decision Maker: Adele Santiago - Spouse -  [phone]    Click here to complete Healthcare Decision Makers including selection of the Healthcare Decision Maker Relationship (ie \"Primary\").  Today we documented Decision Maker(s) consistent with Legal Next of Kin hierarchy.

## 2025-07-16 NOTE — PLAN OF CARE
PHYSICAL THERAPY EVALUATION  Patient: Shlomo Santiago (84 y.o. male)  Date: 7/16/2025  Primary Diagnosis: CHF (congestive heart failure), NYHA class II, acute, systolic (HCC) [I50.21]  Acute on chronic congestive heart failure, unspecified heart failure type (HCC) [I50.9]       Precautions:                        Recommendations for nursing mobility: Encourage HEP in prep for ADLs/mobility; see handout for details, Frequent repositioning to prevent skin breakdown, and LE elevation for management of edema    In place during session: Peripheral IV, External Catheter, and EKG/telemetry     ASSESSMENT  Pt is a 84 y.o. male admitted on 7/15/2025 for SOB; pt currently being treated for CHF . Pt semi supine upon PT arrival, agreeable to evaluation. Pt A&O x 4.      Based on the objective data described below, the patient currently presents with impaired ability to perform high-level IADLs, impaired strength, poor body mechanics, impaired balance, and impaired posture. (See below for objective details and assist levels).     Overall pt tolerated session fair today with therapy. Pt required mod /max A for bed mobility and transfers. Pt amb 2 feet with RW, gt belt and mod A/max assist; demonstrates severe post lean. Pt will benefit from continued skilled PT to address above deficits and return to PLOF. Current PT DC recommendation Moderate intensity short-term skilled physical therapy up to 5x/week once medically appropriate.     Start of Session End of Session   SPO2 (%) 98%@RA 98   Heart Rate (BPM)       GOALS:    Problem: Physical Therapy - Adult  Goal: By Discharge: Performs mobility at highest level of function for planned discharge setting.  See evaluation for individualized goals.  Description: FUNCTIONAL STATUS PRIOR TO ADMISSION: The patient  required minimal assistance for basic and instrumental ADLs.    HOME SUPPORT PRIOR TO ADMISSION: The patient lived with wife and required moderate assistance for

## 2025-07-16 NOTE — CARE COORDINATION
DCP: from home, new admission  CHENG: 24-48 hours    Pt is new admission, CM to meet with pt and complete initial CM assessment.     CM team will continue following.

## 2025-07-16 NOTE — PROGRESS NOTES
2036; Patient received from ED , alert and oriented on oxygen at 2l via nasal cannula, breathing spontaneously, he is dyspneic at rest. Noted patient pants wet, cleaned and bed pad and gown changed. Patient noted to have bruise to the anterior chest , multiple bruise to the bilateral forearms,and ecchymosis to the back. Patient for transfer to [facility], unit called and report given.   2215; Patient transferred to [facility] and handed over.   2300; Called to inform spouse( Adele Santiago) on [phone]  regarding patient transfer call not going through. Also attempted [phone] and [phone] the person who received said its a wrong number.

## 2025-07-16 NOTE — CONSULTS
Cardiology Consult    NAME: Shlomo Santiago   :  1940   MRN:  [id number]     Date/Time:  2025 7:13 PM    Patient PCP: Danyelle Smith APRN - NP  ________________________________________________________________________    Problem List  - Admitted to the hospital with leg swelling.  - Initial cardiology consult was invited secondary to history of cardiomyopathy and valvular heart disease.  - Paroxysmal atrial fibrillation.  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           Assessment and Plan:   Note dictated 2025  -84-year-old white male with history of mitral valve disease status post mechanical valve replacement more than 30 years ago admitted to the hospital with leg swelling and and a preliminary diagnosis of made for acute on congestive heart failure.  - When I saw the patient he was lying comfortably in the bed and in no acute distress and able to communicate with me meaningfully.  - Last known ejection fraction of 10%.  - Will check serial cardiac enzymes and repeat echocardiogram and continue to monitor him on telemetry.  - Based on my current clinical assessment no further cardiovascular testing is warranted other than echocardiogram for the evaluation of heart function and its management as mandated by guidelines.  - Will follow while patient is in the hospital along with the team with conservative approach at this time.    Thank you for the consultation and letting me participate in the care of your patient.      []        High complexity decision making was performed        Subjective:   CHIEF COMPLAINT: Leg swelling      REASON FOR CONSULT: Cardiomyopathy details not known      HISTORY OF PRESENT ILLNESS:     Shlomo Santiago is a 84 y.o. White (non-) male who has valvular heart disease status post mitral valve replacement with

## 2025-07-16 NOTE — PLAN OF CARE
OCCUPATIONAL THERAPY EVALUATION  Patient: Shlomo Santiago (84 y.o. male)  Date: 7/16/2025  Primary Diagnosis: CHF (congestive heart failure), NYHA class II, acute, systolic (HCC) [I50.21]  Acute on chronic congestive heart failure, unspecified heart failure type (HCC) [I50.9]       Precautions: assist x1, Fall Risk, General Precautions                Recommendations for nursing mobility: Out of bed to chair for meals, Frequent repositioning to prevent skin breakdown, Use of bed/chair alarm for safety, Use of BSC for toileting , and Assist x1    In place during session:Peripheral IV and External Catheter  ASSESSMENT  Pt is a 84 y.o. male presenting to [facility] with c/o le edema, weak and syncope, admitted 7/15/25 and currently being treated for CHF. Pt received semi-supine in bed upon arrival, AXO x4, and agreeable to OT evaluation.     Based on current observations, pt presents with decreased  functional mobility, independence in ADLs, strength, activity tolerance, endurance, safety awareness, balance (see below for objective details and assist levels).     Overall, pt tolerates session well with patient reporting 0/10 pain. Pt grumpy but participated without issues. Pt requested to get into the chair. Pt with weeping edema in BUEs and around torso causing the pt and pt's bed to be saturated. Pt completed supine to sit eob with mod(A). Pt completed sit to stand with min(A) and transfer to chair with mod(A) using hand held assist. Pt presents with decreased IND with bathing, dressing, toileting, and grooming. Pt will benefit from continued skilled OT services to address current impairments and improve IND and safety with self cares and functional transfers/mobility. Current OT d/c recommendation Moderate intensity short-term skilled occupational therapy up to 5x/week once medically appropriate.     Start of Session End of Session   SPO2 (%) 100 98   Heart Rate (BPM) 78 82       GOALS:    Problem: Occupational Therapy -

## 2025-07-16 NOTE — PLAN OF CARE
Problem: Chronic Conditions and Co-morbidities  Goal: Patient's chronic conditions and co-morbidity symptoms are monitored and maintained or improved  Outcome: Progressing  Flowsheets (Taken 7/15/2025 5825)  Care Plan - Patient's Chronic Conditions and Co-Morbidity Symptoms are Monitored and Maintained or Improved: Monitor and assess patient's chronic conditions and comorbid symptoms for stability, deterioration, or improvement

## 2025-07-16 NOTE — PROGRESS NOTES
4 Eyes Skin Assessment     NAME:  Shlomo Santiago  YOB: 1940  MEDICAL RECORD NUMBER:  [id number]    The patient is being assessed for  Admission    I agree that at least one RN has performed a thorough Head to Toe Skin Assessment on the patient. ALL assessment sites listed below have been assessed.      Areas assessed by both nurses:    Head, Face, Ears, Shoulders, Back, Chest, Arms, Elbows, Hands, Sacrum. Buttock, Coccyx, Ischium, and Legs. Feet and Heels        Does the Patient have a Wound? Yes wound(s) were present on assessment. LDA wound assessment was Initiated and completed by KALINA Wilcox Prevention initiated by RN: Yes  Wound Care Orders initiated by RN: No    Pressure Injury (Stage 1,2,3,4, Unstageable, DTI, NWPT, and Complex wounds) if present, place Wound referral order by RN under : No    New Ostomies, if present place, Ostomy referral order under : No     Nurse 1 eSignature: Electronically signed by Halina Soto RN on 7/16/25 at 3:12 AM EDT    **SHARE this note so that the co-signing nurse can place an eSignature**    Nurse 2 eSignature: Electronically signed by Woo Brown RN on 7/16/25 at 6:24 AM EDT

## 2025-07-16 NOTE — PROGRESS NOTES
Hospitalist Progress Note    NAME:   Shlomo Santiago   : 1940   MRN: [id number]     Date/Time: 2025 9:35 AM  Patient PCP: Danyelle Smith APRN - NP    Estimated discharge date: 24-48 hours  Barriers:  IV diuresis, cards consult, TTE, abdominal ultrasound liver/gallbladder, resolution of BRYAN    Hospital course: Patient is a 84-year-old male with past medical history significant for A-fib, HFrEF with EF 5-10% and grade 2 diastolic dysfunction, recurrent falls with recent L1 compression fracture on 25, mechanical AVR on warfarin, hypothyroidism and neuropathy presented to the [facility] ED with complaints of worsening dyspnea on exertion, weakness and lightheadedness progressively increasing BLE edema.  Patient denied chest pain, diaphoresis and nausea.  On arrival to ED patient with soft pressures, although asymptomatic, did not require intervention and overall was hemodynamically stable, afebrile on room air.  Admission labs significant for BNP > 35,000, T. bili 3.0, potassium 3.2, creatinine 2.35 with GFR 27, CBC and troponin unremarkable.  CXR with increased mild bilateral pleural effusions, unchanged mild pulmonary edema.  Of note, patient was seen twice last month at [facility] ED for shortness of breath/dyspnea and  and .  Last TTE in  with severely reduced EF =  9%.  Patient follows with Dr. Phelps, cardiology with [facility], reports he recently had his diuretic switched from Lasix to Bumex last week.  Patient was transferred to [place] and admitted to medicine service for continued management of acute HFrEF exacerbation and BRYAN, likely due to cardiorenal syndrome.  Will continue aggressive diuresis with IV Lasix, obtain TTE, consult cardiology.      Assessment / Plan:  Acute on chronic heart failure with reduced EF exacerbation  Admit to telemetry floor  Strict I's and O's, fluid restriction  Start IV Lasix 40 IV daily, goal is -1500 negative  Monitor creatinine function,

## 2025-07-16 NOTE — PROGRESS NOTES
Warfarin Dosing Consult  Shlomo Santiago is a 84 y.o. 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 Pharmacy was consulted by Dr. Fernandes to dose and monitor warfarin.    INR Goal: 2.5 - 3.5    PTA Dose: warfarin 2.5mg (Tu, Th) and 5mg (Rodrigez, Mo, We, Fr, Sa)    Drugs that may increase INR:None  Drugs that may decrease INR: None  Other current anticoagulants/ drugs that may increase bleeding risk: None  Risk factors: Age > 65 and Decompensated Heart Failure  Daily INR ordered: Yes    Recent Labs     07/15/25  1551 07/16/25  0857   HGB 14.2 15.3    156     Recent Labs     07/15/25  1551   ALT 19   AST 26     Recent Labs     07/16/25  0857   INR 5.1*       Date INR Previous Dose   7/16  5.1 PTA     Assessment/ Plan:  Pt with hx of Afib, 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 Initial INR on admission of 5.1. Possibly related to home regimen, but also could be acute CHF that could explain elevated INR (no reports of active bleeding)  H/H & platelets stable; watch trend in platelets, currently at 156  No significant DDIs with warfarin  HOLD warfarin dose tonight in light of supratherapeutic INR  INR ordered through 7/22  Pharmacy will continue to monitor daily and adjust therapy as indicated.

## 2025-07-16 NOTE — ED NOTES
ED TO INPATIENT SBAR HANDOFF    Patient Name: Shlomo Santiago   Preferred Name: Shlomo  : 1940  84 y.o.   Family/Caregiver Present: no   Code Status Order: Full Code  PO Status: NPO:No  Telemetry Order: Yes  C-SSRS: Risk of Suicide: No Risk  Sitter no   Restraints:     Sepsis Risk Score Sepsis V2 Risk Score: 39    Situation  Chief Complaint   Patient presents with    Leg Swelling     Brief Description of Patient's Condition: swelling in legs since starting on bumex  Mental Status: oriented and alert  Arrived from:Home  Imaging:   XR CHEST PORTABLE   Final Result      Increased mild bilateral pleural effusions. Unchanged mild edema.         Electronically signed by FUAD STEIN        Abnormal labs:   Abnormal Labs Reviewed   CBC WITH AUTO DIFFERENTIAL - Abnormal; Notable for the following components:       Result Value    RDW 15.0 (*)     Neutrophils % 84.5 (*)     Lymphocytes % 6.9 (*)     Immature Granulocytes % 0.6 (*)     Lymphocytes Absolute 0.49 (*)     All other components within normal limits   COMPREHENSIVE METABOLIC PANEL - Abnormal; Notable for the following components:    Potassium 3.2 (*)     Anion Gap 13 (*)     Glucose 106 (*)     BUN 30 (*)     Creatinine 2.35 (*)     Est, Glom Filt Rate 27 (*)     Total Bilirubin 3.0 (*)     Total Protein 5.9 (*)     Albumin 3.4 (*)     All other components within normal limits   BRAIN NATRIURETIC PEPTIDE - Abnormal; Notable for the following components:    NT Pro-BNP >35,000 (*)     All other components within normal limits       Background  Allergies: No Known Allergies  History:   Past Medical History:   Diagnosis Date    A-fib ([facility])     CHF (congestive heart failure) ([facility])     last ef of 5-10%    Fall     H/O mitral valve replacement     with mechanical valve    Neuropathy        Assessment  Vitals: MEWS Score: 2  Level of Consciousness: Alert (0)   Vitals:    07/15/25 1815 07/15/25 1830 07/15/25 1845 07/15/25 1900   BP:  101/75     Pulse:       Resp:

## 2025-07-16 NOTE — PROGRESS NOTES
Educated family about importance of 1200ml fluid restriction and urged them not to purchase more soft drinks.  Family members not aware patient was on restriction but agreed to abide with it.

## 2025-07-17 ENCOUNTER — APPOINTMENT (OUTPATIENT)
Facility: HOSPITAL | Age: 85
DRG: 291 | End: 2025-07-17
Payer: MEDICARE

## 2025-07-17 ENCOUNTER — HOSPITAL ENCOUNTER (INPATIENT)
Facility: HOSPITAL | Age: 85
Discharge: HOME | DRG: 291 | End: 2025-07-20
Payer: MEDICARE

## 2025-07-17 LAB
ALBUMIN SERPL-MCNC: 3.3 G/DL (ref 3.5–5)
ALBUMIN/GLOB SERPL: 1.3 (ref 1.1–2.2)
ALP SERPL-CCNC: 103 U/L (ref 45–117)
ALT SERPL-CCNC: 21 U/L (ref 12–78)
ANION GAP SERPL CALC-SCNC: 11 MMOL/L (ref 2–12)
AST SERPL W P-5'-P-CCNC: 27 U/L (ref 15–37)
BILIRUB SERPL-MCNC: 2.6 MG/DL (ref 0.2–1)
BUN SERPL-MCNC: 29 MG/DL (ref 6–20)
BUN/CREAT SERPL: 15 (ref 12–20)
CA-I BLD-MCNC: 8.9 MG/DL (ref 8.5–10.1)
CHLORIDE SERPL-SCNC: 100 MMOL/L (ref 97–108)
CO2 SERPL-SCNC: 24 MMOL/L (ref 21–32)
CREAT SERPL-MCNC: 1.88 MG/DL (ref 0.7–1.3)
ECHO AO ASC DIAM: 3.3 CM
ECHO AO ASCENDING AORTA INDEX: 1.81 CM/M2
ECHO AO ROOT DIAM: 3.3 CM
ECHO AO ROOT INDEX: 1.81 CM/M2
ECHO AV AREA PEAK VELOCITY: 1.9 CM2
ECHO AV AREA VTI: 2.3 CM2
ECHO AV AREA/BSA PEAK VELOCITY: 1 CM2/M2
ECHO AV AREA/BSA VTI: 1.3 CM2/M2
ECHO AV MEAN GRADIENT: 2 MMHG
ECHO AV MEAN VELOCITY: 0.6 M/S
ECHO AV PEAK GRADIENT: 4 MMHG
ECHO AV PEAK VELOCITY: 1 M/S
ECHO AV VELOCITY RATIO: 0.3
ECHO AV VTI: 10.9 CM
ECHO BSA: 1.82 M2
ECHO EST RA PRESSURE: 15 MMHG
ECHO LA AREA 4C: 24.7 CM2
ECHO LA DIAMETER INDEX: 2.91 CM/M2
ECHO LA DIAMETER: 5.3 CM
ECHO LA MAJOR AXIS: 6.7 CM
ECHO LA TO AORTIC ROOT RATIO: 1.61
ECHO LA VOL MOD A4C: 72 ML (ref 18–58)
ECHO LA VOLUME INDEX MOD A4C: 40 ML/M2 (ref 16–34)
ECHO LV E' LATERAL VELOCITY: 2.08 CM/S
ECHO LV E' SEPTAL VELOCITY: 3.09 CM/S
ECHO LV EDV A2C: 102 ML
ECHO LV EDV NDEX A2C: 56 ML/M2
ECHO LV EF PHYSICIAN: 5 %
ECHO LV EJECTION FRACTION A2C: 33 %
ECHO LV ESV A2C: 69 ML
ECHO LV ESV INDEX A2C: 38 ML/M2
ECHO LV FRACTIONAL SHORTENING: 8 % (ref 28–44)
ECHO LV INTERNAL DIMENSION DIASTOLE INDEX: 3.57 CM/M2
ECHO LV INTERNAL DIMENSION DIASTOLIC: 6.5 CM (ref 4.2–5.9)
ECHO LV INTERNAL DIMENSION SYSTOLIC INDEX: 3.3 CM/M2
ECHO LV INTERNAL DIMENSION SYSTOLIC: 6 CM
ECHO LV IVSD: 1.1 CM (ref 0.6–1)
ECHO LV MASS 2D: 339.1 G (ref 88–224)
ECHO LV MASS INDEX 2D: 186.3 G/M2 (ref 49–115)
ECHO LV POSTERIOR WALL DIASTOLIC: 1.2 CM (ref 0.6–1)
ECHO LV RELATIVE WALL THICKNESS RATIO: 0.37
ECHO LVOT AREA: 5.7 CM2
ECHO LVOT AV VTI INDEX: 0.4
ECHO LVOT DIAM: 2.7 CM
ECHO LVOT MEAN GRADIENT: 0 MMHG
ECHO LVOT PEAK GRADIENT: 0 MMHG
ECHO LVOT PEAK VELOCITY: 0.3 M/S
ECHO LVOT STROKE VOLUME INDEX: 13.8 ML/M2
ECHO LVOT SV: 25.2 ML
ECHO LVOT VTI: 4.4 CM
ECHO MV AREA VTI: 1.9 CM2
ECHO MV E DECELERATION TIME (DT): 119 MS
ECHO MV E VELOCITY: 1.07 M/S
ECHO MV E/E' LATERAL: 51.44
ECHO MV E/E' RATIO (AVERAGED): 43.04
ECHO MV E/E' SEPTAL: 34.63
ECHO MV LVOT VTI INDEX: 3.07
ECHO MV MAX VELOCITY: 0.7 M/S
ECHO MV MEAN GRADIENT: 1 MMHG
ECHO MV MEAN VELOCITY: 0.4 M/S
ECHO MV PEAK GRADIENT: 2 MMHG
ECHO MV VTI: 13.5 CM
ECHO PULMONARY ARTERY END DIASTOLIC PRESSURE: 11 MMHG
ECHO PV MAX VELOCITY: 0.5 M/S
ECHO PV MEAN GRADIENT: 0 MMHG
ECHO PV MEAN VELOCITY: 0.2 M/S
ECHO PV PEAK GRADIENT: 1 MMHG
ECHO PV REGURGITANT MAX VELOCITY: 1.6 M/S
ECHO PV VTI: 6.1 CM
ECHO RA AREA 4C: 21.2 CM2
ECHO RA END SYSTOLIC VOLUME APICAL 4 CHAMBER INDEX BSA: 37 ML/M2
ECHO RA VOLUME: 68 ML
ECHO RIGHT VENTRICULAR SYSTOLIC PRESSURE (RVSP): 59 MMHG
ECHO RV BASAL DIMENSION: 4.1 CM
ECHO RV FREE WALL PEAK S': 5 CM/S
ECHO RV MID DIMENSION: 3.3 CM
ECHO TV REGURGITANT MAX VELOCITY: 3.3 M/S
ECHO TV REGURGITANT PEAK GRADIENT: 44 MMHG
ERYTHROCYTE [DISTWIDTH] IN BLOOD BY AUTOMATED COUNT: 14.9 % (ref 11.5–14.5)
GLOBULIN SER CALC-MCNC: 2.5 G/DL (ref 2–4)
GLUCOSE BLD STRIP.AUTO-MCNC: 95 MG/DL (ref 65–100)
GLUCOSE SERPL-MCNC: 82 MG/DL (ref 65–100)
HCT VFR BLD AUTO: 45.9 % (ref 36.6–50.3)
HGB BLD-MCNC: 15 G/DL (ref 12.1–17)
INR PPP: 4.3 (ref 0.9–1.1)
LV EF: 5 ML
MCH RBC QN AUTO: 30.4 PG (ref 26–34)
MCHC RBC AUTO-ENTMCNC: 32.7 G/DL (ref 30–36.5)
MCV RBC AUTO: 92.9 FL (ref 80–99)
NRBC # BLD: 0 K/UL (ref 0–0.01)
NRBC BLD-RTO: 0 PER 100 WBC
PERFORMED BY:: NORMAL
PLATELET # BLD AUTO: 185 K/UL (ref 150–400)
PMV BLD AUTO: 11.7 FL (ref 8.9–12.9)
POTASSIUM SERPL-SCNC: 3.2 MMOL/L (ref 3.5–5.1)
POTASSIUM SERPL-SCNC: 3.2 MMOL/L (ref 3.5–5.1)
PROT SERPL-MCNC: 5.8 G/DL (ref 6.4–8.2)
PROTHROMBIN TIME: 40.9 SEC (ref 11.9–14.1)
RBC # BLD AUTO: 4.94 M/UL (ref 4.1–5.7)
SODIUM SERPL-SCNC: 135 MMOL/L (ref 136–145)
WBC # BLD AUTO: 7.3 K/UL (ref 4.1–11.1)

## 2025-07-17 PROCEDURE — 6370000000 HC RX 637 (ALT 250 FOR IP): Performed by: NURSE PRACTITIONER

## 2025-07-17 PROCEDURE — 76705 ECHO EXAM OF ABDOMEN: CPT

## 2025-07-17 PROCEDURE — 6360000004 HC RX CONTRAST MEDICATION

## 2025-07-17 PROCEDURE — 84132 ASSAY OF SERUM POTASSIUM: CPT

## 2025-07-17 PROCEDURE — 2500000003 HC RX 250 WO HCPCS

## 2025-07-17 PROCEDURE — 80053 COMPREHEN METABOLIC PANEL: CPT

## 2025-07-17 PROCEDURE — 85027 COMPLETE CBC AUTOMATED: CPT

## 2025-07-17 PROCEDURE — 97535 SELF CARE MNGMENT TRAINING: CPT

## 2025-07-17 PROCEDURE — 94761 N-INVAS EAR/PLS OXIMETRY MLT: CPT

## 2025-07-17 PROCEDURE — 82962 GLUCOSE BLOOD TEST: CPT

## 2025-07-17 PROCEDURE — 1100000000 HC RM PRIVATE

## 2025-07-17 PROCEDURE — 83880 ASSAY OF NATRIURETIC PEPTIDE: CPT

## 2025-07-17 PROCEDURE — 36415 COLL VENOUS BLD VENIPUNCTURE: CPT

## 2025-07-17 PROCEDURE — 6370000000 HC RX 637 (ALT 250 FOR IP)

## 2025-07-17 PROCEDURE — C8929 TTE W OR WO FOL WCON,DOPPLER: HCPCS

## 2025-07-17 PROCEDURE — 85610 PROTHROMBIN TIME: CPT

## 2025-07-17 PROCEDURE — 2700000000 HC OXYGEN THERAPY PER DAY

## 2025-07-17 PROCEDURE — 6360000002 HC RX W HCPCS

## 2025-07-17 RX ORDER — POTASSIUM CHLORIDE 1500 MG/1
40 TABLET, EXTENDED RELEASE ORAL ONCE
Status: COMPLETED | OUTPATIENT
Start: 2025-07-17 | End: 2025-07-17

## 2025-07-17 RX ORDER — POTASSIUM CHLORIDE 1500 MG/1
20 TABLET, EXTENDED RELEASE ORAL 2 TIMES DAILY
Status: DISCONTINUED | OUTPATIENT
Start: 2025-07-17 | End: 2025-07-22

## 2025-07-17 RX ADMIN — FUROSEMIDE 40 MG: 10 INJECTION, SOLUTION INTRAMUSCULAR; INTRAVENOUS at 09:05

## 2025-07-17 RX ADMIN — MIDODRINE HYDROCHLORIDE 5 MG: 5 TABLET ORAL at 12:24

## 2025-07-17 RX ADMIN — MIDODRINE HYDROCHLORIDE 5 MG: 5 TABLET ORAL at 17:25

## 2025-07-17 RX ADMIN — POTASSIUM CHLORIDE 40 MEQ: 1500 TABLET, EXTENDED RELEASE ORAL at 09:03

## 2025-07-17 RX ADMIN — POTASSIUM CHLORIDE 20 MEQ: 1500 TABLET, EXTENDED RELEASE ORAL at 21:42

## 2025-07-17 RX ADMIN — SODIUM CHLORIDE, PRESERVATIVE FREE 10 ML: 5 INJECTION INTRAVENOUS at 21:42

## 2025-07-17 RX ADMIN — TAMSULOSIN HYDROCHLORIDE 0.4 MG: 0.4 CAPSULE ORAL at 09:04

## 2025-07-17 RX ADMIN — SULFUR HEXAFLUORIDE 2 ML: 60.7; .19; .19 INJECTION, POWDER, LYOPHILIZED, FOR SUSPENSION INTRAVENOUS; INTRAVESICAL at 19:43

## 2025-07-17 RX ADMIN — FAMOTIDINE 20 MG: 20 TABLET, FILM COATED ORAL at 09:04

## 2025-07-17 RX ADMIN — SODIUM CHLORIDE, PRESERVATIVE FREE 10 ML: 5 INJECTION INTRAVENOUS at 09:05

## 2025-07-17 NOTE — CONSULTS
Gastroenterology Consult Note        Patient: Shlomo Santiago MRN: [id number]  SSN: [id number]    YOB: 1940  Age: 84 y.o.  Sex: male      Subjective:      Shlomo Santiago is a 84 y.o. male who is being seen for Abnormal CT abnormal function tests.  4-year-old male with past medical history significant for A-fib, HFrEF with EF 5-10% and grade 2 diastolic dysfunction, mechanical AVR on warfarin, hypothyroidism and neuropathy presented to the [facility] ED with complaints of worsening dyspnea on exertion, ER labs significant for BNP > 35,000, T. bili 3.0, potassium 3.2, creatinine 2.35 with GFR 27,  Last TTE in 2023 with severely reduced EF =  9%.  Abdominal ultrasound with mildly distended gallbladder with wall thickening, gallstones, and pericholecystic fluid, no signs of biliary ductal dilation.    GI consultation placed for the abnormal liver function test,  patient denies abdominal pain, nausea or vomiting, no tenderness to palpation of abdominal exam, patient denies any history of liver or gallbladder disease.    Past Medical History:   Diagnosis Date    A-fib (HCC)     CHF (congestive heart failure) (HCC)     last ef of 5-10%    Fall     H/O mitral valve replacement     with mechanical valve    Neuropathy      Past Surgical History:   Procedure Laterality Date    CARDIAC VALVE SURGERY      PACEMAKER        No family history on file.  Social History     Tobacco Use    Smoking status: Former    Smokeless tobacco: Current   Substance Use Topics    Alcohol use: Not Currently      Current Facility-Administered Medications   Medication Dose Route Frequency Provider Last Rate Last Admin    potassium chloride (KLOR-CON M) extended release tablet 20 mEq  20 mEq Oral BID Mani Allen ACNP        Warfarin - Pharmacy to Dose   Oral RX Placeholder Ann Coronel MD        sodium chloride (OCEAN, BABY AYR) 0.65 % nasal spray 1 spray  1 spray Each Nostril Q2H PRN Mani Allen ACNP        sodium

## 2025-07-17 NOTE — PROGRESS NOTES
Progress Note      7/17/2025 8:22 AM  NAME: Shlomo Santiago   MRN:  [id number]   Admit Diagnosis: CHF (congestive heart failure), NYHA class II, acute, systolic (HCC) [I50.21]  Acute on chronic congestive heart failure, unspecified heart failure type (HCC) [I50.9]      Problem List:   - Admitted to the hospital with leg swelling.  - Initial cardiology consult was invited secondary to history of cardiomyopathy and valvular heart disease.  - Paroxysmal atrial fibrillation.  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         Assessment/Plan:   Note dictated on July 17th, 2025  -No acute cardiac events overnight.  -Patient is lying comfortably on the bed but was sleeping during exam.  -No further cardiac testing indicated at this time.  -Continue current medication regimen and we will follow the patient along with the team while he is in the hospital.  No further cardiovascular testing warranted based on my current clinical assessment-as it would not change my cardiac management.    Note dictated July 16th 2025  -84-year-old white male with history of mitral valve disease status post mechanical valve replacement more than 30 years ago admitted to the hospital with leg swelling and and a preliminary diagnosis of made for acute on congestive heart failure.  - When I saw the patient he was lying comfortably in the bed and in no acute distress and able to communicate with me meaningfully.  - Last known ejection fraction of 10%.  - Will check serial cardiac enzymes and repeat echocardiogram and continue to monitor him on telemetry.  - Based on my current clinical assessment no further cardiovascular testing is warranted other than echocardiogram for the evaluation of heart function and its management as mandated by guidelines.  - Will follow while patient is in the hospital along with

## 2025-07-17 NOTE — CARE COORDINATION
DCP: home with spouse and HH; has SNF recs  CHENG: 24-48 hours    Pt seen by PT/OT 7/16/2025 with recs for \"Moderate intensity short-term skilled occupational therapy up to 5x/week.\"     CM to meet with pt and wife to discuss SNF recs.     CM team will continue following.

## 2025-07-17 NOTE — PLAN OF CARE
Problem: Chronic Conditions and Co-morbidities  Goal: Patient's chronic conditions and co-morbidity symptoms are monitored and maintained or improved  Outcome: Progressing     Problem: Discharge Planning  Goal: Discharge to home or other facility with appropriate resources  Outcome: Progressing     Problem: Skin/Tissue Integrity  Goal: Skin integrity remains intact  Description: 1.  Monitor for areas of redness and/or skin breakdown  2.  Assess vascular access sites hourly  3.  Every 4-6 hours minimum:  Change oxygen saturation probe site  4.  Every 4-6 hours:  If on nasal continuous positive airway pressure, respiratory therapy assess nares and determine need for appliance change or resting period  Outcome: Progressing     Problem: Safety - Adult  Goal: Free from fall injury  Outcome: Progressing     Problem: Occupational Therapy - Adult  Goal: By Discharge: Performs self-care activities at highest level of function for planned discharge setting.  See evaluation for individualized goals.  Description: FUNCTIONAL STATUS PRIOR TO ADMISSION:  Patient was ambulatory using rollator    HOME SUPPORT: The patient lived with spouse but did not require assistance.    Occupational Therapy Goals:  Initiated 7/16/2025  Patient/Family stated goal: to regain function and go home.   1.  Patient will perform grooming with Stand by Assist standing at sink side within 7 day(s).  2.  Patient will perform upper body dressing with Set-up within 7 day(s).  3.  Patient will perform lower body dressing with Stand by Assist using a/e prn  within 7 day(s).  4.  Patient will perform toilet transfers with Contact Guard Assist  within 7 day(s).  5.  Patient will perform all aspects of toileting with Stand by Assist within 7 day(s).  6.  Patient will participate in upper extremity therapeutic exercise/activities with Stand by Assist for within 7 day(s).    7.  Patient will utilize energy conservation techniques during functional activities

## 2025-07-17 NOTE — PLAN OF CARE
OCCUPATIONAL THERAPY TREATMENT  Patient: Shlomo Santiago (84 y.o. male)  Date: 7/17/2025  Primary Diagnosis: CHF (congestive heart failure), NYHA class II, acute, systolic (HCC) [I50.21]  Acute on chronic congestive heart failure, unspecified heart failure type (HCC) [I50.9]       Precautions: Fall Risk, General Precautions                Recommendations for nursing mobility: Out of bed to chair for meals, Encourage HEP in prep for ADLs/mobility; see handout for details, Frequent repositioning to prevent skin breakdown, Use of bed/chair alarm for safety, AD and gt belt for bed to chair , and Assist x1    In place during session: Nasal Cannula 2L, External Catheter, and EKG/telemetry   Chart, occupational therapy assessment, plan of care, and goals were reviewed.    ASSESSMENT  Patient continues with skilled OT services and is progressing towards goals.  Pt received semi-supine in bed upon arrival, AXO x 2 and agreeable to COATS tx at this time. Pt cooperative and demonstrated fair effort during activities with much encouragement and increased time. Pt noted with improved bed mobility with decreased physical assistance. Pt req'd CGA/min A for sitting balance d/t weakness. Pt req'd assistance with UB dressing and grooming d/t decreased dynamic sitting balance and vc's for sleeve orientation. Pt declined oral care.  Pt noted with improved  STS with min A and CGA to sit using AD with vc's for correct hand placement in prep for toileting tf. Pt declined to ambulate.  (See below for objective details and assist levels)     Overall, pt limited by impaired cognition, generalized weakness and decreased activity tolerance that interferes with performance in ADL's and functional tf's safely.  Will continue to progress. Current OT recommendations for discharge Moderate intensity short-term skilled occupational therapy up to 5x/week.           Start of session End of session   SPO2 (%) 100 100   Heart Rate (BPM) 77 80

## 2025-07-17 NOTE — PLAN OF CARE
Problem: Chronic Conditions and Co-morbidities  Goal: Patient's chronic conditions and co-morbidity symptoms are monitored and maintained or improved  7/16/2025 2250 by Halina Soto RN  Outcome: Progressing  7/16/2025 1017 by Argentina Samayoa RN  Outcome: Progressing     Problem: Discharge Planning  Goal: Discharge to home or other facility with appropriate resources  7/16/2025 2250 by Halina Soto RN  Outcome: Progressing  7/16/2025 1017 by Argentina Samayoa RN  Outcome: Progressing  Flowsheets (Taken 7/16/2025 0327 by Halina Soto RN)  Discharge to home or other facility with appropriate resources: Identify barriers to discharge with patient and caregiver     Problem: Skin/Tissue Integrity  Goal: Skin integrity remains intact  Description: 1.  Monitor for areas of redness and/or skin breakdown  2.  Assess vascular access sites hourly  3.  Every 4-6 hours minimum:  Change oxygen saturation probe site  4.  Every 4-6 hours:  If on nasal continuous positive airway pressure, respiratory therapy assess nares and determine need for appliance change or resting period  7/16/2025 2250 by Halina Soto RN  Outcome: Progressing  7/16/2025 1017 by Argentina Samayoa RN  Outcome: Progressing  Flowsheets (Taken 7/16/2025 0320 by Halina Soto RN)  Skin Integrity Remains Intact: Monitor for areas of redness and/or skin breakdown     Problem: Safety - Adult  Goal: Free from fall injury  7/16/2025 2250 by Halina Soto RN  Outcome: Progressing  7/16/2025 1017 by Argentina Samayoa RN  Outcome: Progressing  Flowsheets (Taken 7/16/2025 0320 by Halina Soto RN)  Free From Fall Injury:   Based on caregiver fall risk screen, instruct family/caregiver to ask for assistance with transferring infant if caregiver noted to have fall risk factors   Instruct family/caregiver on patient safety     Problem: Physical Therapy - Adult  Goal: By Discharge: Performs mobility at highest level of function for planned discharge setting.  See  evaluation for individualized goals.  Description: FUNCTIONAL STATUS PRIOR TO ADMISSION: The patient  required minimal assistance for basic and instrumental ADLs.    HOME SUPPORT PRIOR TO ADMISSION: The patient lived with wife and required moderate assistance for ADLs.    Physical Therapy Goals  Initiated 7/16/2025  Pt stated goal: Get better  Pt will be I with LE HEP in 7 days.  Pt will perform bed mobility with Minimal Assist in 7 days.  Pt will perform transfers with Minimal Assist in 7 days.   Pt will amb 20 feet with LRAD safely with Moderate Assist in 7 days.  Pt will verbalize and demonstrate compliance with fall precautions  in 7 days.   Pt will demonstrate improvement in standing/gait balance from fair/poor to fair in 7 days.    7/16/2025 1350 by Sandie Lamar, PT  Outcome: Not Progressing     Problem: Occupational Therapy - Adult  Goal: By Discharge: Performs self-care activities at highest level of function for planned discharge setting.  See evaluation for individualized goals.  Description: FUNCTIONAL STATUS PRIOR TO ADMISSION:  Patient was ambulatory using rollator    HOME SUPPORT: The patient lived with spouse but did not require assistance.    Occupational Therapy Goals:  Initiated 7/16/2025  Patient/Family stated goal: to regain function and go home.   1.  Patient will perform grooming with Stand by Assist standing at sink side within 7 day(s).  2.  Patient will perform upper body dressing with Set-up within 7 day(s).  3.  Patient will perform lower body dressing with Stand by Assist using a/e prn  within 7 day(s).  4.  Patient will perform toilet transfers with Contact Guard Assist  within 7 day(s).  5.  Patient will perform all aspects of toileting with Stand by Assist within 7 day(s).  6.  Patient will participate in upper extremity therapeutic exercise/activities with Stand by Assist for within 7 day(s).    7.  Patient will utilize energy conservation techniques during functional activities

## 2025-07-17 NOTE — PROGRESS NOTES
Warfarin Dosing Consult  Shlomo Santiago is a 84 y.o. 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 Pharmacy was consulted by Dr. Fernandes to dose and monitor warfarin.    INR Goal: 2.5 - 3.5    PTA Dose: warfarin 2.5mg (Tu, Th) and 5mg (Rodrigez, Mo, We, Fr, Sa)    Drugs that may increase INR:None  Drugs that may decrease INR: None  Other current anticoagulants/ drugs that may increase bleeding risk: None  Risk factors: Age > 65 and Decompensated Heart Failure  Daily INR ordered: Yes    Recent Labs     07/15/25  1551 07/16/25  0857 07/17/25  0533   HGB 14.2 15.3 15.0    156 185     Recent Labs     07/15/25  1551 07/17/25  0533   ALT 19 21   AST 26 27     Recent Labs     07/16/25  0857 07/17/25  0533   INR 5.1* 4.3*       Date INR Previous Dose   7/16  5.1 PTA   7/17 4.3 PTA     Assessment/ Plan:  Pt with hx of Afib, 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 Initial INR on admission of 5.1, down to 4.3 today. Supratherapeutic INR possibly related to home regimen, but also could be acute CHF (no reports of active bleeding).  H/H & platelets stable; watch trend in platelets, currently at 185  No significant DDIs with warfarin  HOLD warfarin dose tonight in light of supratherapeutic INR  INR ordered through 7/22  Pharmacy will continue to monitor daily and adjust therapy as indicated.

## 2025-07-17 NOTE — DISCHARGE INSTRUCTIONS
You have been given a copy of the American Heart Association's Heart Failure Educational Booklet.  Please read over this booklet and take it with you to your next physician appointment with any questions you may have.     For additional resources and to access the American Heart Association's Interactive Workbook \"Healthier Living with Heart Failure - Managing Symptoms and Reducing Risk,\" please scan the QR code below.               Download the Heart Failure [place] Jennifer: Search in your Google Play Store (Hello Chair) or [employer] Jennifer Store ([employer]): Search for- HF [place] Jennifer.    https://www.heart.org/en/health-topics/heart-failure/heart-failure-tools-resources/nr-myznpn-khl    HF [place] is a brand-new phone jennifer that helps you track daily symptoms, vitals, mood, energy level and more. You can even add your heart failure care team members to view your data and monitor your condition at home.    HF [place] Lets You:  Track symptoms, medications and more  Share health information with your health care team  Connect with others living with heart failure

## 2025-07-17 NOTE — FLOWSHEET NOTE
07/17/25 1427   Unsuccessful Attempt Documentation   Type of line Peripheral IV   Number of attempts 2   Was the attempt ultrasound-guided?  No   Site(s) where line(s) attempted Right antecubital

## 2025-07-17 NOTE — PROGRESS NOTES
Hospitalist Progress Note    NAME:   Shlomo Santiago   : 1940   MRN: [id number]     Date/Time: 2025 8:51 AM  Patient PCP: Danyelle Smith, DEE D[facility] - [facility]    Estimated discharge date: 24-48 hours  Barriers:  IV diuresis, cards consult, TTE, abdominal ultrasound liver/gallbladder, resolution of BRYAN    Hospital course: Patient is a 84-year-old male with past medical history significant for A-fib, HFrEF with EF 5-10% and grade 2 diastolic dysfunction, recurrent falls with recent L1 compression fracture on 25, mechanical AVR on warfarin, hypothyroidism and neuropathy presented to the [facility] ED with complaints of worsening dyspnea on exertion, weakness and lightheadedness progressively increasing BLE edema.  Patient denied chest pain, diaphoresis and nausea.  On arrival to ED patient with soft pressures, although asymptomatic, did not require intervention and overall was hemodynamically stable, afebrile on room air.  Admission labs significant for BNP > 35,000, T. bili 3.0, potassium 3.2, creatinine 2.35 with GFR 27, CBC and troponin unremarkable.  CXR with increased mild bilateral pleural effusions, unchanged mild pulmonary edema.  Of note, patient was seen twice last month at [facility] ED for shortness of breath/dyspnea and  and .  Last TTE in  with severely reduced EF =  9%.  Patient follows with Dr. Phelps, cardiology with [facility], reports he recently had his diuretic switched from Lasix to Bumex last week.  Patient was transferred to [facility] and admitted to medicine service for continued management of acute HFrEF exacerbation and BRYAN, likely due to cardiorenal syndrome.  Cards consulted, recs appreciated.  Abdominal ultrasound with mildly distended gallbladder with wall thickening, gallstones, and pericholecystic fluid, no signs of biliary ductal dilation, patient denies abdominal pain, nausea or vomiting, no tenderness to palpation of abdominal exam, patient denies any history of

## 2025-07-18 ENCOUNTER — APPOINTMENT (OUTPATIENT)
Facility: HOSPITAL | Age: 85
DRG: 291 | End: 2025-07-18
Payer: MEDICARE

## 2025-07-18 LAB
-: NORMAL
ALBUMIN SERPL-MCNC: 3.2 G/DL (ref 3.5–5)
ALBUMIN/GLOB SERPL: 1.1 (ref 1.1–2.2)
ALP SERPL-CCNC: 102 U/L (ref 45–117)
ALT SERPL-CCNC: 20 U/L (ref 12–78)
ANION GAP SERPL CALC-SCNC: 16 MMOL/L (ref 2–12)
AST SERPL W P-5'-P-CCNC: 33 U/L (ref 15–37)
BILIRUB SERPL-MCNC: 3.5 MG/DL (ref 0.2–1)
BNP SERPL-MCNC: ABNORMAL PG/ML
BUN SERPL-MCNC: 27 MG/DL (ref 6–20)
BUN/CREAT SERPL: 12 (ref 12–20)
CA-I BLD-MCNC: 8.8 MG/DL (ref 8.5–10.1)
CHLORIDE SERPL-SCNC: 98 MMOL/L (ref 97–108)
CO2 SERPL-SCNC: 18 MMOL/L (ref 21–32)
CREAT SERPL-MCNC: 2.17 MG/DL (ref 0.7–1.3)
ECHO BSA: 1.82 M2
ERYTHROCYTE [DISTWIDTH] IN BLOOD BY AUTOMATED COUNT: 15 % (ref 11.5–14.5)
GLOBULIN SER CALC-MCNC: 2.8 G/DL (ref 2–4)
GLUCOSE SERPL-MCNC: 98 MG/DL (ref 65–100)
HAV IGM SER QL: NONREACTIVE
HBV CORE IGM SER QL: NONREACTIVE
HBV SURFACE AG SER QL: 0.3 INDEX
HBV SURFACE AG SER QL: NEGATIVE
HCT VFR BLD AUTO: 46.4 % (ref 36.6–50.3)
HCV AB SER IA-ACNC: <0.02 INDEX
HCV AB SERPL QL IA: NONREACTIVE
HGB BLD-MCNC: 15.1 G/DL (ref 12.1–17)
INR PPP: 3.6 (ref 0.9–1.1)
MCH RBC QN AUTO: 30.8 PG (ref 26–34)
MCHC RBC AUTO-ENTMCNC: 32.5 G/DL (ref 30–36.5)
MCV RBC AUTO: 94.7 FL (ref 80–99)
NRBC # BLD: 0 K/UL (ref 0–0.01)
NRBC BLD-RTO: 0 PER 100 WBC
PLATELET # BLD AUTO: 222 K/UL (ref 150–400)
PMV BLD AUTO: 11.1 FL (ref 8.9–12.9)
POTASSIUM SERPL-SCNC: 4.5 MMOL/L (ref 3.5–5.1)
PROT SERPL-MCNC: 6 G/DL (ref 6.4–8.2)
PROTHROMBIN TIME: 36.1 SEC (ref 11.9–14.1)
RBC # BLD AUTO: 4.9 M/UL (ref 4.1–5.7)
SODIUM SERPL-SCNC: 132 MMOL/L (ref 136–145)
WBC # BLD AUTO: 9.2 K/UL (ref 4.1–11.1)

## 2025-07-18 PROCEDURE — 6360000002 HC RX W HCPCS: Performed by: NURSE PRACTITIONER

## 2025-07-18 PROCEDURE — 85610 PROTHROMBIN TIME: CPT

## 2025-07-18 PROCEDURE — 80053 COMPREHEN METABOLIC PANEL: CPT

## 2025-07-18 PROCEDURE — 6370000000 HC RX 637 (ALT 250 FOR IP): Performed by: NURSE PRACTITIONER

## 2025-07-18 PROCEDURE — 2500000003 HC RX 250 WO HCPCS

## 2025-07-18 PROCEDURE — 99221 1ST HOSP IP/OBS SF/LOW 40: CPT | Performed by: SURGERY

## 2025-07-18 PROCEDURE — 80074 ACUTE HEPATITIS PANEL: CPT

## 2025-07-18 PROCEDURE — 85027 COMPLETE CBC AUTOMATED: CPT

## 2025-07-18 PROCEDURE — 6370000000 HC RX 637 (ALT 250 FOR IP)

## 2025-07-18 PROCEDURE — 36415 COLL VENOUS BLD VENIPUNCTURE: CPT

## 2025-07-18 PROCEDURE — 93971 EXTREMITY STUDY: CPT

## 2025-07-18 PROCEDURE — 1100000000 HC RM PRIVATE

## 2025-07-18 PROCEDURE — 73060 X-RAY EXAM OF HUMERUS: CPT

## 2025-07-18 PROCEDURE — 6360000002 HC RX W HCPCS

## 2025-07-18 RX ORDER — PANTOPRAZOLE SODIUM 40 MG/10ML
40 INJECTION, POWDER, LYOPHILIZED, FOR SOLUTION INTRAVENOUS DAILY
Status: DISCONTINUED | OUTPATIENT
Start: 2025-07-18 | End: 2025-07-22

## 2025-07-18 RX ADMIN — FUROSEMIDE 40 MG: 10 INJECTION, SOLUTION INTRAMUSCULAR; INTRAVENOUS at 10:02

## 2025-07-18 RX ADMIN — SODIUM CHLORIDE, PRESERVATIVE FREE 10 ML: 5 INJECTION INTRAVENOUS at 10:03

## 2025-07-18 RX ADMIN — TAMSULOSIN HYDROCHLORIDE 0.4 MG: 0.4 CAPSULE ORAL at 10:03

## 2025-07-18 RX ADMIN — PANTOPRAZOLE SODIUM 40 MG: 40 INJECTION, POWDER, FOR SOLUTION INTRAVENOUS at 16:22

## 2025-07-18 RX ADMIN — MIDODRINE HYDROCHLORIDE 5 MG: 5 TABLET ORAL at 13:19

## 2025-07-18 RX ADMIN — MIDODRINE HYDROCHLORIDE 5 MG: 5 TABLET ORAL at 16:22

## 2025-07-18 RX ADMIN — SODIUM CHLORIDE, PRESERVATIVE FREE 10 ML: 5 INJECTION INTRAVENOUS at 20:45

## 2025-07-18 RX ADMIN — POTASSIUM CHLORIDE 20 MEQ: 1500 TABLET, EXTENDED RELEASE ORAL at 20:43

## 2025-07-18 RX ADMIN — POTASSIUM CHLORIDE 20 MEQ: 1500 TABLET, EXTENDED RELEASE ORAL at 10:03

## 2025-07-18 RX ADMIN — FAMOTIDINE 20 MG: 20 TABLET, FILM COATED ORAL at 10:03

## 2025-07-18 ASSESSMENT — PAIN SCALES - GENERAL: PAINLEVEL_OUTOF10: 0

## 2025-07-18 NOTE — PROGRESS NOTES
Hospitalist Progress Note    NAME:   Shlomo Santiago   : 1940   MRN: [id number]     Date/Time: 2025 11:17 AM  Patient PCP: Danyelle Smith APRN - NP      Assessment / Plan:  Acute on chronic HFrEF  Last TTE/ with EF of 9%  Admission BNP > 35,000  Troponin negative  Strict I's and O's, fluid restriction  IV Lasix 40  Midodrine scheduled  Monitor creatinine function, expect to be better with diuresis  Cardiology consult, Dr. Reeder, recs appreciated- identifies no additional treatment  Limited GDMT options because of hypotension   palliative consulted - poor prognosis       BRYAN, improving  Likely secondary to cardiorenal syndrome  Creatinine 1.88 from 2.35 on admission, 1.7-1.4 to baseline  Daily BMP, monitor with diuresis  Replete lites as needed, goal K>4  mg >2  Get urinalysis- remain outstanding as of     Left arm swelling   Significant edema, bruising - US / xray   Wound team consulted        Hepatomegaly 2/2 Systolic heart failure   Elevated T. bili 3.0 on admission  Patient denies history of liver or gallbladder disease  No history of cholecystitis,   T. bili decreased to 2.6-->3.5   Was elevated last month at [facility] to 1.8 and 1.5 on  and  respectively  Abdominal ultrasound gallbladder, liver with mildly distended gallbladder with wall thickening, gallstones, and pericholecystic fluid, no findings of biliary ductal dilation  Daily LFTs  Consult GI, recs to follow- no further studies   Ask surgery to weigh in      Sinus congestion  Start as needed saline nasal spray     Chronic/stable medical problems  Hypotension-continue midodrine increased to 5 3 times daily as patient will be on IV diuretics  Mechanical aortic valve-pharmacy consult to warfarin- pharm dosing 2.5-3.5  Atrial fibrillation secondary hypercoagulable state  Hypothyroidism- repeat TSH  Neuropathy       Medical Decision Making:   I personally reviewed labs:jennifer BOOTHE personally reviewed imaging:jennifer BOOTHE

## 2025-07-18 NOTE — CARE COORDINATION
Chart reviewed. SNF is being recommended for discharge. CM has attempted to reach patient's wife, Adele Santiago, at [phone], to discuss plan. No answer at this time.

## 2025-07-18 NOTE — PROGRESS NOTES
Progress Note      7/18/2025 8:48 AM  NAME: Shlomo Santiago   MRN:  [id number]   Admit Diagnosis: CHF (congestive heart failure), NYHA class II, acute, systolic (HCC) [I50.21]  Acute on chronic congestive heart failure, unspecified heart failure type (HCC) [I50.9]      Problem List:   - Admitted to the hospital with leg swelling.  - Initial cardiology consult was invited secondary to history of cardiomyopathy and valvular heart disease.  - Paroxysmal atrial fibrillation.  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         Assessment/Plan:   Note dictated July 18, 2025  - Follow-up visit from cardiology.  Patient was lying comfortably in the bed and is responding to vocal commands.  - No overnight cardiovascular events were reported by the patient or by the nursing staff.  -Patient has swelling and redness consistent with a an infection in his left arm.  - No lower extremity edema was noted.  - Patient has bleeding secondary to anticoagulation so unfortunately he has a high risk for valve thrombosis.  Poor prognosis based on my current clinical assessment.  -Based on my overall current clinical assessment comfort care management should be considered on this patient as based on my overall assessment I have nothing to offer him at this time except conservative medical management.  -May try heparin but bleeding risk is still high  - No further cardiovascular testing is warranted upon my assessment as it would not change my cardiovascular treatment plan.  - Will continue with conservative approach.  We will follow-up with the patient along with the rest of the team while she is at the hospital once again with conservative approach.  ===============================================================Note dictated on July 17th, 2025  -No acute cardiac events overnight.  -Patient is lying

## 2025-07-18 NOTE — CONSULTS
Surgery Consult    Patient: Shlomo Santiago MRN: [id number]  SSN: [id number]    YOB: 1940  Age: 84 y.o.  Sex: male      Subjective:      Shlomo Santiago is a 84 y.o. male who is being seen for possible acute cholecystitis. He has a PMHx significant for atrial fibrillation, congestive heart failure with EF 5 to 10% and grade 2 diastolic dysfunction, metallic aortic valve replacement on Coumadin. He presented to the ED complaining of worsening weakness and lightheadedness associated with worsening lower extremity edema  Patient was recently hospitalized 1/2-month ago together hospital for CHF exacerbation    On admission here, workup was significant for elevated bilirubin of 3.0 with normal transaminases.  Right upper quadrant ultrasound was positive for gallstones, gallbladder wall thickening, and pericholecystic edema possibly consistent with acute cholecystitis.    Patient denies any abdominal pain of any sort.  He has a decreased appetite at home, but generally tolerates a regular diet without pain, nausea, or bloating.  Gastroenterology was consulted for abnormal LFTs.  It was felt that the hyperbilirubinemia was secondary to hepatic congestion related to CHF.  No further workup was recommended.    Surgery is now consulted to evaluate for possible cholecystitis.    Past Medical History:   Diagnosis Date    A-fib (HCC)     CHF (congestive heart failure) (HCC)     last ef of 5-10%    Fall     H/O mitral valve replacement     with mechanical valve    Neuropathy      Past Surgical History:   Procedure Laterality Date    CARDIAC VALVE SURGERY      PACEMAKER        No family history on file.  Social History     Tobacco Use    Smoking status: Former    Smokeless tobacco: Current   Vaping Use    Vaping status: Never Used   Substance Use Topics    Alcohol use: Not Currently    Drug use: Never      Current Facility-Administered Medications   Medication Dose Route Frequency Provider Last Rate Last Admin

## 2025-07-18 NOTE — PLAN OF CARE
Problem: Chronic Conditions and Co-morbidities  Goal: Patient's chronic conditions and co-morbidity symptoms are monitored and maintained or improved  7/17/2025 2241 by Halina Soto RN  Outcome: Progressing  7/17/2025 1344 by Marisol Elena RN  Outcome: Progressing  7/17/2025 1344 by Marisol Elena RN  Outcome: Progressing     Problem: Discharge Planning  Goal: Discharge to home or other facility with appropriate resources  7/17/2025 2241 by Halina Soto RN  Outcome: Progressing  7/17/2025 1344 by Marisol Elena RN  Outcome: Progressing  7/17/2025 1344 by Marisol Elena RN  Outcome: Progressing     Problem: Skin/Tissue Integrity  Goal: Skin integrity remains intact  Description: 1.  Monitor for areas of redness and/or skin breakdown  2.  Assess vascular access sites hourly  3.  Every 4-6 hours minimum:  Change oxygen saturation probe site  4.  Every 4-6 hours:  If on nasal continuous positive airway pressure, respiratory therapy assess nares and determine need for appliance change or resting period  7/17/2025 2241 by Halina Soto RN  Outcome: Progressing  7/17/2025 1344 by Marisol Elena RN  Outcome: Progressing  7/17/2025 1344 by Marisol Elena RN  Outcome: Progressing     Problem: Safety - Adult  Goal: Free from fall injury  7/17/2025 2241 by Halina Soto RN  Outcome: Progressing  7/17/2025 1344 by Marisol Elena RN  Outcome: Progressing  7/17/2025 1344 by Marisol Elena RN  Outcome: Progressing     Problem: Occupational Therapy - Adult  Goal: By Discharge: Performs self-care activities at highest level of function for planned discharge setting.  See evaluation for individualized goals.  Description: FUNCTIONAL STATUS PRIOR TO ADMISSION:  Patient was ambulatory using rollator    HOME SUPPORT: The patient lived with spouse but did not require assistance.    Occupational Therapy Goals:  Initiated 7/16/2025  Patient/Family stated goal: to regain function and go home.   1.   Patient will perform grooming with Stand by Assist standing at sink side within 7 day(s).  2.  Patient will perform upper body dressing with Set-up within 7 day(s).  3.  Patient will perform lower body dressing with Stand by Assist using a/e prn  within 7 day(s).  4.  Patient will perform toilet transfers with Contact Guard Assist  within 7 day(s).  5.  Patient will perform all aspects of toileting with Stand by Assist within 7 day(s).  6.  Patient will participate in upper extremity therapeutic exercise/activities with Stand by Assist for within 7 day(s).    7.  Patient will utilize energy conservation techniques during functional activities with verbal cues within 7 day(s).    7/17/2025 1244 by Dora Gutierrez OTA  Outcome: Progressing

## 2025-07-18 NOTE — PROGRESS NOTES
PT treatment session attempted at 1431, however pt off the floor at this time. Will continue to check on pt and see them for treatment session at a later time. Thank you.

## 2025-07-18 NOTE — PROGRESS NOTES
Warfarin Dosing Consult  Shlomo Santiago is a 84 y.o. 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 Pharmacy was consulted by Dr. Fernandes to dose and monitor warfarin.    INR Goal: 2.5 - 3.5    PTA Dose: warfarin 2.5mg (Tu, Th) and 5mg (Rodrigez, Mo, We, Fr, Sa)    Drugs that may increase INR:None  Drugs that may decrease INR: None  Other current anticoagulants/ drugs that may increase bleeding risk: None  Risk factors: Age > 65 and Decompensated Heart Failure  Daily INR ordered: Yes    Recent Labs     07/15/25  1551 07/16/25  0857 07/17/25  0533 07/18/25  0505   HGB 14.2 15.3 15.0 15.1    156 185 222     Recent Labs     07/15/25  1551 07/17/25  0533 07/18/25  0505   ALT 19 21 20   AST 26 27 33     Recent Labs     07/16/25  0857 07/17/25  0533 07/18/25  0505   INR 5.1* 4.3* 3.6*       Date INR Previous Dose   7/16  5.1 PTA   7/17 4.3 PTA   7/18 3.6 PTA     Assessment/ Plan:  Pt with hx of Afib, 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 Initial INR on admission of 5.1, down to 3.6 today. Supratherapeutic INR possibly related to home regimen, but also could be acute CHF (no reports of active bleeding).  H/H & platelets stable; watch trend in platelets normalizing  No significant DDIs with warfarin  HOLD warfarin dose tonight in light of supratherapeutic INR. Consider re-initiation tomorrow at reduced weekly dose.   INR ordered through 7/22  Pharmacy will continue to monitor daily and adjust therapy as indicated.

## 2025-07-19 LAB
ALBUMIN SERPL-MCNC: 3.2 G/DL (ref 3.5–5)
ALBUMIN/GLOB SERPL: 1.2 (ref 1.1–2.2)
ALP SERPL-CCNC: 100 U/L (ref 45–117)
ALT SERPL-CCNC: 25 U/L (ref 12–78)
ANION GAP SERPL CALC-SCNC: 16 MMOL/L (ref 2–12)
AST SERPL W P-5'-P-CCNC: 58 U/L (ref 15–37)
BILIRUB SERPL-MCNC: 4.3 MG/DL (ref 0.2–1)
BUN SERPL-MCNC: 33 MG/DL (ref 6–20)
BUN/CREAT SERPL: 16 (ref 12–20)
CA-I BLD-MCNC: 9.1 MG/DL (ref 8.5–10.1)
CHLORIDE SERPL-SCNC: 97 MMOL/L (ref 97–108)
CO2 SERPL-SCNC: 17 MMOL/L (ref 21–32)
CREAT SERPL-MCNC: 2.11 MG/DL (ref 0.7–1.3)
ERYTHROCYTE [DISTWIDTH] IN BLOOD BY AUTOMATED COUNT: 15.4 % (ref 11.5–14.5)
GLOBULIN SER CALC-MCNC: 2.7 G/DL (ref 2–4)
GLUCOSE BLD STRIP.AUTO-MCNC: 105 MG/DL (ref 65–100)
GLUCOSE BLD STRIP.AUTO-MCNC: 136 MG/DL (ref 65–100)
GLUCOSE BLD STRIP.AUTO-MCNC: 162 MG/DL (ref 65–100)
GLUCOSE BLD STRIP.AUTO-MCNC: 63 MG/DL (ref 65–100)
GLUCOSE BLD STRIP.AUTO-MCNC: 67 MG/DL (ref 65–100)
GLUCOSE SERPL-MCNC: 40 MG/DL (ref 65–100)
HCT VFR BLD AUTO: 46 % (ref 36.6–50.3)
HGB BLD-MCNC: 15.2 G/DL (ref 12.1–17)
INR PPP: 3.9 (ref 0.9–1.1)
MCH RBC QN AUTO: 31.3 PG (ref 26–34)
MCHC RBC AUTO-ENTMCNC: 33 G/DL (ref 30–36.5)
MCV RBC AUTO: 94.8 FL (ref 80–99)
NRBC # BLD: 0 K/UL (ref 0–0.01)
NRBC BLD-RTO: 0 PER 100 WBC
PERFORMED BY:: ABNORMAL
PERFORMED BY:: NORMAL
PLATELET # BLD AUTO: 161 K/UL (ref 150–400)
PMV BLD AUTO: 11 FL (ref 8.9–12.9)
POTASSIUM SERPL-SCNC: 4.5 MMOL/L (ref 3.5–5.1)
PROT SERPL-MCNC: 5.9 G/DL (ref 6.4–8.2)
PROTHROMBIN TIME: 38.2 SEC (ref 11.9–14.1)
RBC # BLD AUTO: 4.85 M/UL (ref 4.1–5.7)
SODIUM SERPL-SCNC: 130 MMOL/L (ref 136–145)
T4 FREE SERPL-MCNC: 1.2 NG/DL (ref 0.8–1.5)
TSH SERPL DL<=0.05 MIU/L-ACNC: 2.63 UIU/ML (ref 0.36–3.74)
WBC # BLD AUTO: 15.8 K/UL (ref 4.1–11.1)

## 2025-07-19 PROCEDURE — 84443 ASSAY THYROID STIM HORMONE: CPT

## 2025-07-19 PROCEDURE — 85027 COMPLETE CBC AUTOMATED: CPT

## 2025-07-19 PROCEDURE — 84439 ASSAY OF FREE THYROXINE: CPT

## 2025-07-19 PROCEDURE — 94761 N-INVAS EAR/PLS OXIMETRY MLT: CPT

## 2025-07-19 PROCEDURE — 2500000003 HC RX 250 WO HCPCS

## 2025-07-19 PROCEDURE — 6360000002 HC RX W HCPCS: Performed by: NURSE PRACTITIONER

## 2025-07-19 PROCEDURE — 82962 GLUCOSE BLOOD TEST: CPT

## 2025-07-19 PROCEDURE — 6370000000 HC RX 637 (ALT 250 FOR IP): Performed by: NURSE PRACTITIONER

## 2025-07-19 PROCEDURE — 6370000000 HC RX 637 (ALT 250 FOR IP): Performed by: INTERNAL MEDICINE

## 2025-07-19 PROCEDURE — 94640 AIRWAY INHALATION TREATMENT: CPT

## 2025-07-19 PROCEDURE — 6370000000 HC RX 637 (ALT 250 FOR IP)

## 2025-07-19 PROCEDURE — 51798 US URINE CAPACITY MEASURE: CPT

## 2025-07-19 PROCEDURE — 85610 PROTHROMBIN TIME: CPT

## 2025-07-19 PROCEDURE — 6360000002 HC RX W HCPCS

## 2025-07-19 PROCEDURE — 2700000000 HC OXYGEN THERAPY PER DAY

## 2025-07-19 PROCEDURE — 1100000000 HC RM PRIVATE

## 2025-07-19 PROCEDURE — 80053 COMPREHEN METABOLIC PANEL: CPT

## 2025-07-19 PROCEDURE — 36415 COLL VENOUS BLD VENIPUNCTURE: CPT

## 2025-07-19 PROCEDURE — 2580000003 HC RX 258: Performed by: NURSE PRACTITIONER

## 2025-07-19 RX ORDER — IPRATROPIUM BROMIDE AND ALBUTEROL SULFATE 2.5; .5 MG/3ML; MG/3ML
1 SOLUTION RESPIRATORY (INHALATION) EVERY 4 HOURS PRN
Status: DISCONTINUED | OUTPATIENT
Start: 2025-07-19 | End: 2025-07-24 | Stop reason: HOSPADM

## 2025-07-19 RX ORDER — DEXTROSE MONOHYDRATE 100 MG/ML
INJECTION, SOLUTION INTRAVENOUS CONTINUOUS PRN
Status: DISCONTINUED | OUTPATIENT
Start: 2025-07-19 | End: 2025-07-24 | Stop reason: HOSPADM

## 2025-07-19 RX ORDER — MIDODRINE HYDROCHLORIDE 5 MG/1
10 TABLET ORAL
Status: DISCONTINUED | OUTPATIENT
Start: 2025-07-19 | End: 2025-07-22

## 2025-07-19 RX ORDER — IPRATROPIUM BROMIDE AND ALBUTEROL SULFATE 2.5; .5 MG/3ML; MG/3ML
1 SOLUTION RESPIRATORY (INHALATION)
Status: DISCONTINUED | OUTPATIENT
Start: 2025-07-19 | End: 2025-07-19

## 2025-07-19 RX ORDER — GLUCAGON 1 MG/ML
1 KIT INJECTION PRN
Status: DISCONTINUED | OUTPATIENT
Start: 2025-07-19 | End: 2025-07-24 | Stop reason: HOSPADM

## 2025-07-19 RX ORDER — DEXTROSE MONOHYDRATE AND SODIUM CHLORIDE 5; .45 G/100ML; G/100ML
INJECTION, SOLUTION INTRAVENOUS CONTINUOUS
Status: DISCONTINUED | OUTPATIENT
Start: 2025-07-19 | End: 2025-07-21

## 2025-07-19 RX ORDER — IPRATROPIUM BROMIDE AND ALBUTEROL SULFATE 2.5; .5 MG/3ML; MG/3ML
1 SOLUTION RESPIRATORY (INHALATION)
Status: DISCONTINUED | OUTPATIENT
Start: 2025-07-19 | End: 2025-07-24 | Stop reason: HOSPADM

## 2025-07-19 RX ADMIN — ACETAMINOPHEN 650 MG: 325 TABLET ORAL at 17:29

## 2025-07-19 RX ADMIN — MIDODRINE HYDROCHLORIDE 10 MG: 5 TABLET ORAL at 11:27

## 2025-07-19 RX ADMIN — MIDODRINE HYDROCHLORIDE 10 MG: 5 TABLET ORAL at 17:29

## 2025-07-19 RX ADMIN — POTASSIUM CHLORIDE 20 MEQ: 1500 TABLET, EXTENDED RELEASE ORAL at 10:41

## 2025-07-19 RX ADMIN — DEXTROSE AND SODIUM CHLORIDE: 5; .45 INJECTION, SOLUTION INTRAVENOUS at 22:32

## 2025-07-19 RX ADMIN — SODIUM CHLORIDE, PRESERVATIVE FREE 10 ML: 5 INJECTION INTRAVENOUS at 10:41

## 2025-07-19 RX ADMIN — POTASSIUM CHLORIDE 20 MEQ: 1500 TABLET, EXTENDED RELEASE ORAL at 20:26

## 2025-07-19 RX ADMIN — FUROSEMIDE 40 MG: 10 INJECTION, SOLUTION INTRAMUSCULAR; INTRAVENOUS at 10:41

## 2025-07-19 RX ADMIN — SODIUM CHLORIDE, PRESERVATIVE FREE 10 ML: 5 INJECTION INTRAVENOUS at 20:26

## 2025-07-19 RX ADMIN — PANTOPRAZOLE SODIUM 40 MG: 40 INJECTION, POWDER, FOR SOLUTION INTRAVENOUS at 10:41

## 2025-07-19 RX ADMIN — DEXTROSE AND SODIUM CHLORIDE: 5; .45 INJECTION, SOLUTION INTRAVENOUS at 10:46

## 2025-07-19 RX ADMIN — IPRATROPIUM BROMIDE AND ALBUTEROL SULFATE 1 DOSE: .5; 2.5 SOLUTION RESPIRATORY (INHALATION) at 20:00

## 2025-07-19 ASSESSMENT — PAIN DESCRIPTION - LOCATION: LOCATION: GENERALIZED

## 2025-07-19 ASSESSMENT — PAIN SCALES - GENERAL
PAINLEVEL_OUTOF10: 0
PAINLEVEL_OUTOF10: 8

## 2025-07-19 ASSESSMENT — PAIN DESCRIPTION - DESCRIPTORS: DESCRIPTORS: ACHING;BURNING

## 2025-07-19 NOTE — PROGRESS NOTES
Hospitalist Progress Note    NAME:   Shlomo Santiago   : 1940   MRN: [id number]     Date/Time: 2025 7:16 AM  Patient PCP: Danyelle Smith APRN - NP      Assessment / Plan:  Acute on chronic HFrEF  Last TTE/ with EF of 9%  Admission BNP > 35,000  Troponin negative  Strict I's and O's, fluid restriction  IV Lasix 40 daily   Midodrine  increased to 10 mg  Monitor creatinine function, expect to be better with diuresis  Cardiology consult, Dr. Reeder, recs appreciated- identifies no additional treatment  Limited GDMT options because of hypotension hold Tamsulsoin    palliative consulted  poor prognosis    BRYAN, improving  Likely secondary to cardiorenal syndrome  Creatinine 1.88 from 2.35 on admission, 1.7-1.4 to baseline  Daily BMP, monitor with diuresis  Replete lites as needed, goal K>4  mg >2  Get urinalysis- remain outstanding as of     Left arm swelling   Significant edema, bruising - US / xray   Wound team consulted   L arm XR shows no acute abnormality   Duplex of L am negative ror DVT        Hepatomegaly 2/2 Systolic heart failure   Elevated T. bili 3.0 on admission  Patient denies history of liver or gallbladder disease  No history of cholecystitis,   T. bili decreased to 2.6-->3.5 -->now 4.3  Was elevated last month at [facility] to 1.8 and 1.5 on  and  respectively  Abdominal ultrasound gallbladder, liver with mildly distended gallbladder with wall thickening, gallstones, and pericholecystic fluid, no findings of biliary ductal dilation  INR 3.9  Daily LFTs  Consult GI, recs to follow- no further studies   Appreciate surgery input - no role for intervention at this time       Sinus congestion  Start as needed saline nasal spray       Hypoglycemia    BS 40 on lab draw  then 63 -->67   Hypoglycemia protocol initiated   IV D 51/2 started at 50 ml /hr      Hyponatremia   (133)  Pt on 1200 ml fluid restriction   Daily labs      Chronic/stable medical  current lab test results and cultures  YES  Reviewed most current radiology test results   YES  Review and summation of old records today    NO  Reviewed patient's current orders and MAR    YES  PMH/SH reviewed - no change compared to H&P    Procedures: see electronic medical records for all procedures/Xrays and details which were not copied into this note but were reviewed prior to creation of Plan.      LABS:  I reviewed today's most current labs and imaging studies.  Pertinent labs include:  Recent Labs     07/17/25  0533 07/18/25  0505 07/19/25  0619   WBC 7.3 9.2 15.8*   HGB 15.0 15.1 15.2   HCT 45.9 46.4 46.0    222 161     Recent Labs     07/16/25  0857 07/17/25  0533 07/17/25  1224 07/18/25  0505    135*  --  132*   K 3.4* 3.2* 3.2* 4.5    100  --  98   CO2 17* 24  --  18*   GLUCOSE 68 82  --  98   BUN 30* 29*  --  27*   CREATININE 2.02* 1.88*  --  2.17*   CALCIUM 8.9 8.9  --  8.8   MG 2.2  --   --   --    PHOS 2.9  --   --   --    BILITOT  --  2.6*  --  3.5*   AST  --  27  --  33   ALT  --  21  --  20   INR 5.1* 4.3*  --  3.6*       Signed: DEE DEE Craft NP

## 2025-07-19 NOTE — PLAN OF CARE
Problem: Chronic Conditions and Co-morbidities  Goal: Patient's chronic conditions and co-morbidity symptoms are monitored and maintained or improved  7/18/2025 2301 by Gale Stout RN  Outcome: Progressing  7/18/2025 1449 by Shahla Chairez RN  Outcome: Progressing  Flowsheets (Taken 7/18/2025 0959)  Care Plan - Patient's Chronic Conditions and Co-Morbidity Symptoms are Monitored and Maintained or Improved: Monitor and assess patient's chronic conditions and comorbid symptoms for stability, deterioration, or improvement     Problem: Discharge Planning  Goal: Discharge to home or other facility with appropriate resources  7/18/2025 2301 by Gale Stout RN  Outcome: Progressing  7/18/2025 1449 by Shahla Chairez RN  Outcome: Progressing  Flowsheets (Taken 7/18/2025 0959)  Discharge to home or other facility with appropriate resources: Identify barriers to discharge with patient and caregiver     Problem: Skin/Tissue Integrity  Goal: Skin integrity remains intact  Description: 1.  Monitor for areas of redness and/or skin breakdown  2.  Assess vascular access sites hourly  3.  Every 4-6 hours minimum:  Change oxygen saturation probe site  4.  Every 4-6 hours:  If on nasal continuous positive airway pressure, respiratory therapy assess nares and determine need for appliance change or resting period  7/18/2025 2301 by Gale Stout RN  Outcome: Progressing  7/18/2025 1449 by Shahla Chairez RN  Outcome: Progressing  Flowsheets (Taken 7/18/2025 0959)  Skin Integrity Remains Intact: Monitor for areas of redness and/or skin breakdown     Problem: Safety - Adult  Goal: Free from fall injury  7/18/2025 2301 by Gale Stout RN  Outcome: Progressing  7/18/2025 1449 by Shahla Chairez RN  Outcome: Progressing

## 2025-07-19 NOTE — PROGRESS NOTES
Warfarin Dosing Consult  Shlomo Santiago is a 84 y.o. 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 Pharmacy was consulted by Dr. Fernandes to dose and monitor warfarin.    INR Goal: 2.5 - 3.5    PTA Dose: warfarin 2.5mg (Tu, Th) and 5mg (Rodrigez, Mo, We, Fr, Sa)    Drugs that may increase INR:None  Drugs that may decrease INR: None  Other current anticoagulants/ drugs that may increase bleeding risk: None  Risk factors: Liver dysfunction, Age > 65, and Decompensated Heart Failure  Daily INR ordered: Yes    Recent Labs     07/17/25  0533 07/18/25  0505 07/19/25  0619   HGB 15.0 15.1 15.2    222 161     Recent Labs     07/17/25  0533 07/18/25  0505 07/19/25  0619   ALT 21 20 25   AST 27 33 58*     Recent Labs     07/17/25  0533 07/18/25  0505 07/19/25  0619   INR 4.3* 3.6* 3.9*       Date INR Previous Dose   7/16  5.1 PTA   7/17 4.3 Held   7/18 3.6 Held   7/19 3.9 Held     Assessment/ Plan:  Pt with hx of Afib, 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    Mildly elevated AST, hepatomegaly, worsening hyperbilirubinemia  INR increased today despite warfarin being held, would suspect level is in part elevated due to hepatic injury. Would continue to monitor liver function as INR is less reliable in context of liver disease  No significant DDIs with warfarin  HOLD warfarin dose tonight in light of supratherapeutic INR.  INR ordered through 7/22  Pharmacy will continue to monitor daily and adjust therapy as indicated.

## 2025-07-19 NOTE — PROGRESS NOTES
RT Nebulizer Bronchodilator Protocol Note    There is a bronchodilator order in the chart from a provider indicating to follow the RT Bronchodilator Protocol and there is an “Initiate RT Bronchodilator Protocol” order as well (see protocol at bottom of note).    CXR Findings:  No results found.    The findings from the last RT Protocol Assessment were as follows:  Smoking: None or smoker <15 pack years  Respiratory Pattern: Dyspnea on exertion or RR 21-25 bpm  Breath Sounds: Intermittent or unilateral wheezes  Cough: Strong, spontaneous, non-productive  Indication for Bronchodilator Therapy: Decreased or absent breath sounds, Reversible obstructive defect on PFTs responsive to bronchodilators, Mucolytic ordered, Unable to speak in sentences  Bronchodilator Assessment Score: 6    Aerosolized bronchodilator medication orders have been revised according to the RT Nebulizer Bronchodilator Protocol below.    Respiratory Therapist to perform RT Therapy Protocol Assessment initially then follow the protocol.  Repeat RT Therapy Protocol Assessment PRN for score 0-3 or on second treatment, BID, and PRN for scores above 3.    No Indications - adjust the frequency to every 6 hours PRN wheezing or bronchospasm, if no treatments needed after 48 hours then discontinue using Per Protocol order mode.     If indication present, adjust the RT bronchodilator orders based on the Bronchodilator Assessment Score as indicated below.  If a patient is on this medication at home then do not decrease Frequency below that used at home.    0-3 - enter or revise RT bronchodilator order(s) to equivalent RT Bronchodilator order with Frequency of every 4 hours PRN for wheezing or increased work of breathing using Per Protocol order mode.       4-6 - enter or revise RT Bronchodilator order(s) to two equivalent RT bronchodilator orders with one order with BID Frequency and one order with Frequency of every 4 hours PRN wheezing or increased work of

## 2025-07-20 LAB
ALBUMIN SERPL-MCNC: 2.8 G/DL (ref 3.5–5)
ALBUMIN/GLOB SERPL: 1.2 (ref 1.1–2.2)
ALP SERPL-CCNC: 90 U/L (ref 45–117)
ALT SERPL-CCNC: 20 U/L (ref 12–78)
ANION GAP SERPL CALC-SCNC: 11 MMOL/L (ref 2–12)
AST SERPL W P-5'-P-CCNC: 32 U/L (ref 15–37)
BILIRUB SERPL-MCNC: 3.1 MG/DL (ref 0.2–1)
BUN SERPL-MCNC: 34 MG/DL (ref 6–20)
BUN/CREAT SERPL: 17 (ref 12–20)
CA-I BLD-MCNC: 8.7 MG/DL (ref 8.5–10.1)
CHLORIDE SERPL-SCNC: 98 MMOL/L (ref 97–108)
CO2 SERPL-SCNC: 21 MMOL/L (ref 21–32)
CREAT SERPL-MCNC: 2.04 MG/DL (ref 0.7–1.3)
ERYTHROCYTE [DISTWIDTH] IN BLOOD BY AUTOMATED COUNT: 15.8 % (ref 11.5–14.5)
GLOBULIN SER CALC-MCNC: 2.4 G/DL (ref 2–4)
GLUCOSE BLD STRIP.AUTO-MCNC: 120 MG/DL (ref 65–100)
GLUCOSE BLD STRIP.AUTO-MCNC: 126 MG/DL (ref 65–100)
GLUCOSE BLD STRIP.AUTO-MCNC: 131 MG/DL (ref 65–100)
GLUCOSE BLD STRIP.AUTO-MCNC: 179 MG/DL (ref 65–100)
GLUCOSE SERPL-MCNC: 129 MG/DL (ref 65–100)
HCT VFR BLD AUTO: 42.5 % (ref 36.6–50.3)
HGB BLD-MCNC: 14 G/DL (ref 12.1–17)
INR PPP: 4.1 (ref 0.9–1.1)
MCH RBC QN AUTO: 31.1 PG (ref 26–34)
MCHC RBC AUTO-ENTMCNC: 32.9 G/DL (ref 30–36.5)
MCV RBC AUTO: 94.4 FL (ref 80–99)
NRBC # BLD: 0 K/UL (ref 0–0.01)
NRBC BLD-RTO: 0 PER 100 WBC
PERFORMED BY:: ABNORMAL
PLATELET # BLD AUTO: 160 K/UL (ref 150–400)
PMV BLD AUTO: 11.5 FL (ref 8.9–12.9)
POTASSIUM SERPL-SCNC: 4.6 MMOL/L (ref 3.5–5.1)
PROT SERPL-MCNC: 5.2 G/DL (ref 6.4–8.2)
PROTHROMBIN TIME: 39.9 SEC (ref 11.9–14.1)
RBC # BLD AUTO: 4.5 M/UL (ref 4.1–5.7)
SODIUM SERPL-SCNC: 130 MMOL/L (ref 136–145)
WBC # BLD AUTO: 15.7 K/UL (ref 4.1–11.1)

## 2025-07-20 PROCEDURE — 85027 COMPLETE CBC AUTOMATED: CPT

## 2025-07-20 PROCEDURE — 85610 PROTHROMBIN TIME: CPT

## 2025-07-20 PROCEDURE — 2500000003 HC RX 250 WO HCPCS

## 2025-07-20 PROCEDURE — 94640 AIRWAY INHALATION TREATMENT: CPT

## 2025-07-20 PROCEDURE — 82962 GLUCOSE BLOOD TEST: CPT

## 2025-07-20 PROCEDURE — 6370000000 HC RX 637 (ALT 250 FOR IP): Performed by: NURSE PRACTITIONER

## 2025-07-20 PROCEDURE — 80053 COMPREHEN METABOLIC PANEL: CPT

## 2025-07-20 PROCEDURE — 36415 COLL VENOUS BLD VENIPUNCTURE: CPT

## 2025-07-20 PROCEDURE — 6360000002 HC RX W HCPCS: Performed by: NURSE PRACTITIONER

## 2025-07-20 PROCEDURE — 2700000000 HC OXYGEN THERAPY PER DAY

## 2025-07-20 PROCEDURE — 97530 THERAPEUTIC ACTIVITIES: CPT

## 2025-07-20 PROCEDURE — 2580000003 HC RX 258: Performed by: NURSE PRACTITIONER

## 2025-07-20 PROCEDURE — 1100000000 HC RM PRIVATE

## 2025-07-20 PROCEDURE — 6360000002 HC RX W HCPCS

## 2025-07-20 PROCEDURE — 6370000000 HC RX 637 (ALT 250 FOR IP)

## 2025-07-20 PROCEDURE — 6370000000 HC RX 637 (ALT 250 FOR IP): Performed by: INTERNAL MEDICINE

## 2025-07-20 PROCEDURE — 94761 N-INVAS EAR/PLS OXIMETRY MLT: CPT

## 2025-07-20 RX ADMIN — SODIUM CHLORIDE, PRESERVATIVE FREE 10 ML: 5 INJECTION INTRAVENOUS at 21:34

## 2025-07-20 RX ADMIN — DEXTROSE AND SODIUM CHLORIDE: 5; .45 INJECTION, SOLUTION INTRAVENOUS at 14:28

## 2025-07-20 RX ADMIN — IPRATROPIUM BROMIDE AND ALBUTEROL SULFATE 1 DOSE: .5; 2.5 SOLUTION RESPIRATORY (INHALATION) at 08:24

## 2025-07-20 RX ADMIN — MIDODRINE HYDROCHLORIDE 10 MG: 5 TABLET ORAL at 10:20

## 2025-07-20 RX ADMIN — POTASSIUM CHLORIDE 20 MEQ: 1500 TABLET, EXTENDED RELEASE ORAL at 10:20

## 2025-07-20 RX ADMIN — MIDODRINE HYDROCHLORIDE 10 MG: 5 TABLET ORAL at 17:24

## 2025-07-20 RX ADMIN — PANTOPRAZOLE SODIUM 40 MG: 40 INJECTION, POWDER, FOR SOLUTION INTRAVENOUS at 10:20

## 2025-07-20 RX ADMIN — IPRATROPIUM BROMIDE AND ALBUTEROL SULFATE 1 DOSE: .5; 2.5 SOLUTION RESPIRATORY (INHALATION) at 19:52

## 2025-07-20 RX ADMIN — SODIUM CHLORIDE, PRESERVATIVE FREE 10 ML: 5 INJECTION INTRAVENOUS at 10:21

## 2025-07-20 RX ADMIN — ACETAMINOPHEN 650 MG: 325 TABLET ORAL at 10:34

## 2025-07-20 RX ADMIN — FUROSEMIDE 40 MG: 10 INJECTION, SOLUTION INTRAMUSCULAR; INTRAVENOUS at 10:20

## 2025-07-20 ASSESSMENT — PAIN DESCRIPTION - DESCRIPTORS: DESCRIPTORS: ACHING;BURNING

## 2025-07-20 ASSESSMENT — PAIN SCALES - WONG BAKER: WONGBAKER_NUMERICALRESPONSE: NO HURT

## 2025-07-20 ASSESSMENT — PAIN SCALES - GENERAL
PAINLEVEL_OUTOF10: 3
PAINLEVEL_OUTOF10: 9

## 2025-07-20 ASSESSMENT — PAIN DESCRIPTION - LOCATION: LOCATION: ARM;GENERALIZED

## 2025-07-20 NOTE — PROGRESS NOTES
Per lab, pt's bs on lab was 40. Patients bs was rechecked and came back as 63.. Patient was given OJ and rechecked. The recheck was 67. The patient was started on D5 1/2 at 100 and another recheck was done. The patients recheck came back at 105.

## 2025-07-20 NOTE — PLAN OF CARE
Problem: Chronic Conditions and Co-morbidities  Goal: Patient's chronic conditions and co-morbidity symptoms are monitored and maintained or improved  7/20/2025 0130 by Donna Treviño RN  Outcome: Progressing  7/19/2025 2100 by Kaylie Rhoades, RN  Outcome: Progressing     Problem: Discharge Planning  Goal: Discharge to home or other facility with appropriate resources  7/20/2025 0130 by Donna Treviño RN  Outcome: Progressing  7/19/2025 2100 by Kaylie Rhoades, RN  Outcome: Progressing     Problem: Skin/Tissue Integrity  Goal: Skin integrity remains intact  Description: 1.  Monitor for areas of redness and/or skin breakdown  2.  Assess vascular access sites hourly  3.  Every 4-6 hours minimum:  Change oxygen saturation probe site  4.  Every 4-6 hours:  If on nasal continuous positive airway pressure, respiratory therapy assess nares and determine need for appliance change or resting period  7/20/2025 0130 by Donna Treviño RN  Outcome: Progressing  7/19/2025 2100 by Kaylie Rhoades, RN  Outcome: Progressing     Problem: Safety - Adult  Goal: Free from fall injury  7/20/2025 0130 by Donna Treviño RN  Outcome: Progressing  7/19/2025 2100 by Kaylie Rhoades, RN  Outcome: Progressing

## 2025-07-20 NOTE — PROGRESS NOTES
Hospitalist Progress Note    NAME:   Shlomo Santiago   : 1940   MRN: [id number]     Date/Time: 2025 7:21 AM  Patient PCP: Danyelle Smith APRN - MIRLANDE      Shlomo Santiago is a 84 y.o.  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  Associated with worsening lower extremity edema  No chest pain  Patient follows Dr. Phelps from the cardiology, recently his Lasix was changed to Bumex.  He had appointment 1 week ago  Patient was recently hospitalized 1/2-month ago together hospital for CHF exacerbation  On admission workup significant for acute on chronic heart failure exacerbation.  Blood pressure soft 99/82, pulse 91.  BMP with BRYAN likely secondary to cardiorenal syndrome.  NT proBNP 35,000 comparing to 22K last .  Last echo done in  with a EF of 9%.  Troponin is negative  X-ray consistent with pulmonary edema   attempted to call wife to discusses poor prognosis and next steps . No answer     Assessment / Plan:  Acute on chronic HFrEF  Last TTE/ with EF of 9%  Admission BNP > 35,000  Troponin negative  ECHO   5-10%  Strict I's and O's, fluid restriction  Midodrine  increased to 10 mg  Monitor creatinine function, expect to be better with diuresis  Cardiology consult, Dr. Reeder, recs appreciated- identifies no additional treatment  Limited GDMT options because of hypotension hold Tamsulsoin    palliative consulted  poor prognosis    BRYAN, due to hyponatremia  Likely secondary to cardiorenal syndrome  Creatinine 2.04 (1.88)   Daily BMP, monitor with diuresis  Replete lites as needed, goal K>4  mg >2   urinalysis- remain outstanding as of  DC Lasix     Left arm swelling   Significant edema, bruising - US / xray    BUN  --  27* 33*   CREATININE  --  2.17* 2.11*   CALCIUM  --  8.8 9.1   BILITOT  --  3.5* 4.3*   AST  --  33 58*   ALT  --  20 25   INR  --  3.6* 3.9*       Signed: DEE DEE Craft NP

## 2025-07-20 NOTE — PLAN OF CARE
OCCUPATIONAL THERAPY TREATMENT  Patient: Shlomo Santiago (84 y.o. male)  Date: 7/20/2025  Primary Diagnosis: CHF (congestive heart failure), NYHA class II, acute, systolic (HCC) [I50.21]  Acute on chronic congestive heart failure, unspecified heart failure type (HCC) [I50.9]  Precautions: Fall Risk, General Precautions  Recommendations for nursing mobility: Encourage HEP in prep for ADLs/mobility; see handout for details, Frequent repositioning to prevent skin breakdown, Use of bed/chair alarm for safety, and Assist x2    In place during session: Peripheral IV and Nasal Cannula 1L  Chart, occupational therapy assessment, plan of care, and goals were reviewed.  ASSESSMENT  Patient continues with skilled OT services and is slowly progressing towards goals. Pt presented semi supine upon COATS arrival, asleep but once roused agreeable to session w/ Mod encouragement and increased time. Pt A&O x 3. Pt completed facial hygiene while semi supine in bed w/ Min A for thoroughness. Pt given skilled guidance through completion of HEP consisting of BLE TherEx targeting improved ROM and reduced pain in prep for completion of functional mobility/ADLs/IADLs w/ consistent, soothing multimodal cues throughout for initiation, sequencing and anxiety reduction via narration of task at hand in real time (see below chart for TherEx details.) Pt completed bed mobility, coming to sit EOB w/ overall Max A x2 and presenting w/ poor static sitting balance, requiring Mod/Max A support to maintain ~4 min before impulsively attempting to return to supine. Pt responded to continued narration of task at hand in soothing tones w/ decreased impulsivity, slowed pace and improved attendance to directions, w/ Pt requiring Max A x2 to bring BLEs back into bed and lower trunk into centered position. Pt required Max A x2 to boost to HOB via lala pads. Pt positioned comfortably w/ L UE elevated on pillow for swelling/pain reduction while semi supine in  ~3 bites of breakfast this AM.     Grooming: Minimal assistance   Grooming Skilled Clinical Factors: Pt completed facial hygiene while semi supine in bed w/ Min A for thoroughness.    Therapeutic exercise:  Pt given skilled guidance through completion of HEP consisting of BLE TherEx targeting improved ROM and reduced pain in prep for completion of functional mobility/ADLs/IADLs w/ consistent, soothing multimodal cues throughout for initiation, sequencing and anxiety reduction via narration of task at hand in real time, including:  Exercise Sets Reps AROM AAROM PROM Self PROM Comments   Ron Knee Flex/Ext 1 10 [] [x] [] [] Pt required Mod A     Pain Rating:  Pt did not quantify but c/o of Ron knee pain w/ movement    Pain Intervention(s):   nursing notified and repositioning    Activity Tolerance:   Poor and requires frequent rest breaks    After treatment patient left in no apparent distress:   Bed locked and returned to lowest position, Patient left in no apparent distress in bed, Call bell within reach, Bed/ chair alarm activated, Side rails x3, and Updated patient's board on functional status and mobility recommendations, and nsg updated     COMMUNICATION/EDUCATION:   The patient’s plan of care was discussed with: Registered nurse and 1:1 Pt     Patient Education  Education Given To: Patient  Education Provided: Energy Conservation;Mobility Training;ADL Adaptive Strategies;Home Exercise Program;Orientation;Plan of Care;Role of Therapy;IADL Safety  Education Provided Comments: Pt oriented and provided skilled edu on HEP to reduce stiffness in arthritic knees to improve pain management during functional mobility, energy conservation, the role of occupational therapy, alternative pain management breathing techniques, safety during functional mobility and safe swallowing positioning w/ Pt endorsing fair/poor verbal understanding and intent to follow through at this time.  Education Method:

## 2025-07-20 NOTE — PROGRESS NOTES
Warfarin Dosing Consult  Shlomo Santiago is a 84 y.o. 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 Pharmacy was consulted by Dr. Fernandes to dose and monitor warfarin.    INR Goal: 2.5 - 3.5    PTA Dose: warfarin 2.5mg (Tu, Th) and 5mg (Rodrigez, Mo, We, Fr, Sa)    Drugs that may increase INR:None  Drugs that may decrease INR: None  Other current anticoagulants/ drugs that may increase bleeding risk: None  Risk factors: Liver dysfunction, Age > 65, and Decompensated Heart Failure  Daily INR ordered: Yes    Recent Labs     07/18/25  0505 07/19/25  0619 07/20/25  0956   HGB 15.1 15.2 14.0    161 160     Recent Labs     07/18/25  0505 07/19/25  0619 07/20/25  0956   ALT 20 25 20   AST 33 58* 32     Recent Labs     07/18/25  0505 07/19/25  0619 07/20/25  0956   INR 3.6* 3.9* 4.1*       Date INR Previous Dose   7/16  5.1 PTA   7/17 4.3 Held   7/18 3.6 Held   7/19 3.9 Held   7/20 4.1 Held     Assessment/ Plan:  Pt with hx of Afib, 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    Hepatomegaly noted, AST and hyperbilirubinemia improved  INR increased again today despite warfarin being held, would suspect level is in part elevated due to hepatic injury. Would continue to monitor liver function as INR is less reliable in context of liver disease  No significant DDIs with warfarin  HOLD warfarin dose tonight in light of supratherapeutic INR.  INR ordered through 7/22  Pharmacy will continue to monitor daily and adjust therapy as indicated.

## 2025-07-21 ENCOUNTER — APPOINTMENT (OUTPATIENT)
Facility: HOSPITAL | Age: 85
DRG: 291 | End: 2025-07-21
Payer: MEDICARE

## 2025-07-21 LAB
ALBUMIN SERPL-MCNC: 2.7 G/DL (ref 3.5–5)
ALBUMIN/GLOB SERPL: 1.1 (ref 1.1–2.2)
ALP SERPL-CCNC: 91 U/L (ref 45–117)
ALT SERPL-CCNC: 17 U/L (ref 12–78)
AMMONIA PLAS-SCNC: 21 UMOL/L
ANION GAP SERPL CALC-SCNC: 9 MMOL/L (ref 2–12)
AST SERPL W P-5'-P-CCNC: 22 U/L (ref 15–37)
BILIRUB SERPL-MCNC: 3 MG/DL (ref 0.2–1)
BUN SERPL-MCNC: 37 MG/DL (ref 6–20)
BUN/CREAT SERPL: 17 (ref 12–20)
CA-I BLD-MCNC: 8.7 MG/DL (ref 8.5–10.1)
CHLORIDE SERPL-SCNC: 101 MMOL/L (ref 97–108)
CO2 SERPL-SCNC: 20 MMOL/L (ref 21–32)
CREAT SERPL-MCNC: 2.12 MG/DL (ref 0.7–1.3)
ERYTHROCYTE [DISTWIDTH] IN BLOOD BY AUTOMATED COUNT: 15.4 % (ref 11.5–14.5)
GLOBULIN SER CALC-MCNC: 2.5 G/DL (ref 2–4)
GLUCOSE SERPL-MCNC: 107 MG/DL (ref 65–100)
HCT VFR BLD AUTO: 42.4 % (ref 36.6–50.3)
HGB BLD-MCNC: 14.4 G/DL (ref 12.1–17)
INR PPP: 4.5 (ref 0.9–1.1)
MCH RBC QN AUTO: 31 PG (ref 26–34)
MCHC RBC AUTO-ENTMCNC: 34 G/DL (ref 30–36.5)
MCV RBC AUTO: 91.2 FL (ref 80–99)
NRBC # BLD: 0 K/UL (ref 0–0.01)
NRBC BLD-RTO: 0 PER 100 WBC
PLATELET # BLD AUTO: 157 K/UL (ref 150–400)
PMV BLD AUTO: 12 FL (ref 8.9–12.9)
POTASSIUM SERPL-SCNC: 4.4 MMOL/L (ref 3.5–5.1)
PROT SERPL-MCNC: 5.2 G/DL (ref 6.4–8.2)
PROTHROMBIN TIME: 42.7 SEC (ref 11.9–14.1)
RBC # BLD AUTO: 4.65 M/UL (ref 4.1–5.7)
SODIUM SERPL-SCNC: 130 MMOL/L (ref 136–145)
WBC # BLD AUTO: 13.5 K/UL (ref 4.1–11.1)

## 2025-07-21 PROCEDURE — 36415 COLL VENOUS BLD VENIPUNCTURE: CPT

## 2025-07-21 PROCEDURE — 71045 X-RAY EXAM CHEST 1 VIEW: CPT

## 2025-07-21 PROCEDURE — 85610 PROTHROMBIN TIME: CPT

## 2025-07-21 PROCEDURE — 6370000000 HC RX 637 (ALT 250 FOR IP): Performed by: INTERNAL MEDICINE

## 2025-07-21 PROCEDURE — 80053 COMPREHEN METABOLIC PANEL: CPT

## 2025-07-21 PROCEDURE — 2500000003 HC RX 250 WO HCPCS

## 2025-07-21 PROCEDURE — 85027 COMPLETE CBC AUTOMATED: CPT

## 2025-07-21 PROCEDURE — 6370000000 HC RX 637 (ALT 250 FOR IP): Performed by: NURSE PRACTITIONER

## 2025-07-21 PROCEDURE — 6360000002 HC RX W HCPCS: Performed by: NURSE PRACTITIONER

## 2025-07-21 PROCEDURE — 97530 THERAPEUTIC ACTIVITIES: CPT

## 2025-07-21 PROCEDURE — 94761 N-INVAS EAR/PLS OXIMETRY MLT: CPT

## 2025-07-21 PROCEDURE — 1100000000 HC RM PRIVATE

## 2025-07-21 PROCEDURE — 94640 AIRWAY INHALATION TREATMENT: CPT

## 2025-07-21 PROCEDURE — 82140 ASSAY OF AMMONIA: CPT

## 2025-07-21 RX ADMIN — IPRATROPIUM BROMIDE AND ALBUTEROL SULFATE 1 DOSE: .5; 2.5 SOLUTION RESPIRATORY (INHALATION) at 19:44

## 2025-07-21 RX ADMIN — IPRATROPIUM BROMIDE AND ALBUTEROL SULFATE 1 DOSE: .5; 2.5 SOLUTION RESPIRATORY (INHALATION) at 08:10

## 2025-07-21 RX ADMIN — POTASSIUM CHLORIDE 20 MEQ: 1500 TABLET, EXTENDED RELEASE ORAL at 21:38

## 2025-07-21 RX ADMIN — SODIUM CHLORIDE, PRESERVATIVE FREE 10 ML: 5 INJECTION INTRAVENOUS at 09:07

## 2025-07-21 RX ADMIN — MIDODRINE HYDROCHLORIDE 10 MG: 5 TABLET ORAL at 09:07

## 2025-07-21 RX ADMIN — SODIUM CHLORIDE, PRESERVATIVE FREE 10 ML: 5 INJECTION INTRAVENOUS at 21:39

## 2025-07-21 RX ADMIN — PANTOPRAZOLE SODIUM 40 MG: 40 INJECTION, POWDER, FOR SOLUTION INTRAVENOUS at 09:07

## 2025-07-21 RX ADMIN — MIDODRINE HYDROCHLORIDE 10 MG: 5 TABLET ORAL at 16:37

## 2025-07-21 RX ADMIN — POTASSIUM CHLORIDE 20 MEQ: 1500 TABLET, EXTENDED RELEASE ORAL at 09:07

## 2025-07-21 NOTE — PLAN OF CARE
Resuscitation/Code Status Note on Shlomo Santiago (YOB: 1940)    At 1:44 pm on July 21, 2025, resuscitation/code status decision was based on a thorough discussion with the patient, patient's spouse - Adele, and patient's adult sibling -  .  The code status was made DNR No additional code details.    Electronically signed by Shay Lai PA-C on 7/21/25 at 1:44 PM EDT

## 2025-07-21 NOTE — PLAN OF CARE
Problem: Chronic Conditions and Co-morbidities  Goal: Patient's chronic conditions and co-morbidity symptoms are monitored and maintained or improved  7/20/2025 2326 by Kel Quiroz RN  Outcome: Progressing  7/20/2025 1647 by Kaylie Rhoades RN  Outcome: Progressing     Problem: Discharge Planning  Goal: Discharge to home or other facility with appropriate resources  7/20/2025 2326 by Kel Quiroz RN  Outcome: Progressing  7/20/2025 1647 by Kaylie Rhoades RN  Outcome: Progressing     Problem: Skin/Tissue Integrity  Goal: Skin integrity remains intact  7/20/2025 2326 by Kel Quiroz RN  Outcome: Progressing  7/20/2025 1647 by Kaylie Rhoades RN  Outcome: Progressing     Problem: Safety - Adult  Goal: Free from fall injury  7/20/2025 2326 by Kel Quiroz RN  Outcome: Progressing  7/20/2025 1647 by Kaylie Rhoades RN  Outcome: Progressing     Problem: Occupational Therapy - Adult  Goal: By Discharge: Performs self-care activities at highest level of function for planned discharge setting.  See evaluation for individualized goals.  7/20/2025 1406 by Betty Mcguire OTA  Outcome: Progressing     Problem: Pain  Goal: Verbalizes/displays adequate comfort level or baseline comfort level  7/20/2025 2326 by Kel Quiroz RN  Outcome: Progressing  7/20/2025 1647 by Kaylie Rhoades RN  Outcome: Progressing

## 2025-07-21 NOTE — CARE COORDINATION
Hospice consult received, CM met with patient, spouse, and patient's two sisters in room to discuss DCP.    CM discussed hospice in LTC facility vs home, patient has no Medicaid, LTC and PCA would be private pay.    CM discussed HH vs SNF, however appears patient has done SNF in the past as well as HH and it was not helpful for them,    Patient has no children and no other help at this time, patient's wife reported to have memory issues as well.    Family agreeable to having hospice liaison come in to discuss hospice and possible plans, referral sent to [facility].    CM continues to follow.

## 2025-07-21 NOTE — PLAN OF CARE
Problem: Chronic Conditions and Co-morbidities  Goal: Patient's chronic conditions and co-morbidity symptoms are monitored and maintained or improved  7/21/2025 1143 by Shahla Chairez RN  Outcome: Progressing  Flowsheets (Taken 7/21/2025 0902)  Care Plan - Patient's Chronic Conditions and Co-Morbidity Symptoms are Monitored and Maintained or Improved: Monitor and assess patient's chronic conditions and comorbid symptoms for stability, deterioration, or improvement  7/20/2025 2326 by Kel Quiroz RN  Outcome: Progressing     Problem: Discharge Planning  Goal: Discharge to home or other facility with appropriate resources  7/21/2025 1143 by Shahla Chairez RN  Outcome: Progressing  Flowsheets (Taken 7/21/2025 0902)  Discharge to home or other facility with appropriate resources: Arrange for needed discharge resources and transportation as appropriate  7/20/2025 2326 by Kel Quiroz RN  Outcome: Progressing     Problem: Skin/Tissue Integrity  Goal: Skin integrity remains intact  Description: 1.  Monitor for areas of redness and/or skin breakdown  2.  Assess vascular access sites hourly  3.  Every 4-6 hours minimum:  Change oxygen saturation probe site  4.  Every 4-6 hours:  If on nasal continuous positive airway pressure, respiratory therapy assess nares and determine need for appliance change or resting period  7/21/2025 1143 by Shahla Chairez RN  Outcome: Progressing  7/20/2025 2326 by Kel Quiroz RN  Outcome: Progressing     Problem: Safety - Adult  Goal: Free from fall injury  7/21/2025 1143 by Shahla Chairez RN  Outcome: Progressing  7/20/2025 2326 by Kel Quiroz RN  Outcome: Progressing     Problem: Occupational Therapy - Adult  Goal: By Discharge: Performs self-care activities at highest level of function for planned discharge setting.  See evaluation for individualized goals.  Description: FUNCTIONAL STATUS PRIOR TO ADMISSION:  Patient was ambulatory using rollator    HOME SUPPORT: The

## 2025-07-21 NOTE — PROGRESS NOTES
Hospitalist Progress Note    NAME:   Shlomo Santiago   : 1940   MRN: [id number]     Date/Time: 2025 1:49 PM  Patient PCP: Danyelle Smith APRN - NP      Shlomo Santiago is a 84 y.o.  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  Associated with worsening lower extremity edema. Patient was recently hospitalized 1/2-month ago for CHF exacerbation. On admission workup significant for acute on chronic heart failure exacerbation.  Blood pressure soft 99/82, pulse 91.  BMP with BRYAN likely secondary to cardiorenal syndrome.  NT proBNP 35,000 comparing to 22K last . Last echo done in  with a EF of 9%. Troponin is negative. X-ray consistent with pulmonary edema. Patient was noted to have elevated liver function tests, abdominal ultrasound revealed mildly distended gallbladder with wall thickening, gallstones, empirical assisted fluid, and G.I. recommended no further study needed at this point. Surgery consulted and recommended low suspicion for acute cholecystitis, no surgical intervention planned. Repeat was completed with EF 5 to 10%, severe centric hypertrophy, severe global hypokinesis. Cardiology recommended the patient has bleeding secondary anticoagulation so unfortunately has a high risk for valve thrombosis of his mechanical valve. Bleeding risk is too high. Recommendations included consideration of comfort care management as nothing further can be offered to improve cardiac function.    Extensive conversation held with patient, patient's wife Adele, to patient's sister and his nephew. Poor prognosis related, patient and family is in agreement to change code status to DNR and pursue hospice/palliative arrangement of discharge.

## 2025-07-21 NOTE — PROGRESS NOTES
Progress Note      7/21/2025 7:39 AM  NAME: Shlomo Santiago   MRN:  [id number]   Admit Diagnosis: CHF (congestive heart failure), NYHA class II, acute, systolic (HCC) [I50.21]  Acute on chronic congestive heart failure, unspecified heart failure type (HCC) [I50.9]      Problem List:   - Admitted to the hospital with leg swelling.  - Initial cardiology consult was invited secondary to history of cardiomyopathy and valvular heart disease.  - Paroxysmal atrial fibrillation.  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         Assessment/Plan:   Note dictated July 21, 2025  -Follow-up visit from cardiology after the weekend.  -Patient is lying comfortably in the bed.  No overnight cardiovascular events were noted by the patient or by the nursing staff.  -Patient has bleeding secondary to anticoagulation so unfortunately he has a high risk for valve thrombosis.  Poor prognosis based on my current clinical assessment.  -No further cardiovascular testing is warranted upon my current assessment as it would not change my current conservative guideline based treatment approach  -We will follow-up with the patient along with the rest of the same while he is at the hospital.  ===============================================================  Note dictated July 18, 2025  - Follow-up visit from cardiology.  Patient was lying comfortably in the bed and is responding to vocal commands.  - No overnight cardiovascular events were reported by the patient or by the nursing staff.  -Patient has swelling and redness consistent with a an infection in his left arm.  - No lower extremity edema was noted.  - Patient has bleeding secondary to anticoagulation so unfortunately he has a high risk for valve thrombosis.  Poor prognosis based on my current clinical assessment.  -Based on my overall current  with conservative approach at this time.     Thank you for the consultation and letting me participate in the care of your patient.             []       High complexity decision making was performed in this patient at high risk for decompensation with multiple organ involvement.    Subjective:     Shlomo Santiago is a 84 y.o. White (non-) male who has valvular heart disease status post mitral valve replacement with mechanical valve in the remote past admitted to the hospital with leg edema and requirement for the oxygen per my understanding by review of the chart.  When I saw the patient he was lying comfortably in the bed and denies any symptoms.  Troponin appears to be stable.  He is scheduled for an abdominal ultrasound.  INR is elevated from his last visit.  Will check serial cardiac enzymes obtain an echocardiogram and continue to monitor him on telemetry and then make further cardiovascular management decision as clinically warranted.  Patient is status post AICD implanted the details of which is not available to me at this time.  Patient sees Dr. Phelps from [facility] cardiology on a regular basis.       Review of Systems:   Negative except for as noted above.    Objective:      Physical Exam:    Last 24hrs VS reviewed since prior progress note. Most recent are:    BP 96/72   Pulse 74   Temp 97.3 °F (36.3 °C) (Axillary)   Resp 17   Ht 1.727 m (5' 8\")   Wt 68.9 kg (152 lb)   SpO2 98%   BMI 23.11 kg/m²     Intake/Output Summary (Last 24 hours) at 7/21/2025 0739  Last data filed at 7/21/2025 0527  Gross per 24 hour   Intake 1661.82 ml   Output 20 ml   Net 1641.82 ml        General Appearance: Alert; no acute distress.  Ears/Nose/Mouth/Throat: moist mucous membranes  Neck: Supple.  Chest: Lungs clear to auscultation bilaterally.  Cardiovascular: Regular rate and rhythm, S1S2 normal  Abdomen: Soft, non-tender, bowel sounds are active.  Extremities: No edema bilaterally.  Skin: Warm and dry.      PMH/SH

## 2025-07-21 NOTE — PROGRESS NOTES
Warfarin Dosing Consult  Shlomo Santiago is a 84 y.o. 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 Pharmacy was consulted by Dr. Fernandes to dose and monitor warfarin.    INR Goal: 2.5 - 3.5    PTA Dose: warfarin 2.5mg (Tu, Th) and 5mg (Rodrigez, Mo, We, Fr, Sa)    Drugs that may increase INR:None  Drugs that may decrease INR: None  Other current anticoagulants/ drugs that may increase bleeding risk: None  Risk factors: Liver dysfunction, Age > 65, and Decompensated Heart Failure  Daily INR ordered: Yes    Recent Labs     07/19/25  0619 07/20/25  0956 07/21/25  0556   HGB 15.2 14.0 14.4    160 157     Recent Labs     07/19/25  0619 07/20/25  0956 07/21/25  0556   ALT 25 20 17   AST 58* 32 22     Recent Labs     07/19/25  0619 07/20/25  0956 07/21/25  0556   INR 3.9* 4.1* 4.5*       Date INR Previous Dose   7/16  5.1 PTA   7/17 4.3 Held   7/18 3.6 Held   7/19 3.9 Held   7/20 4.1 Held   7/21 4.5 HELD     Assessment/ Plan:  Pt with hx of Afib, 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    Hepatomegaly noted.  INR still increased today despite warfarin being held, would suspect level is in part elevated due to hepatic injury.   No significant DDIs with warfarin  H&H and platelets continue to remain stable.   HOLD warfarin dose tonight in light of supratherapeutic INR.  INR ordered through 7/26  Pharmacy will continue to monitor daily and adjust therapy as indicated.

## 2025-07-21 NOTE — PLAN OF CARE
PHYSICAL THERAPY TREATMENT     Patient: Shlomo Santiago (84 y.o. male)  Date: 7/21/2025  Diagnosis: CHF (congestive heart failure), NYHA class II, acute, systolic (HCC) [I50.21]  Acute on chronic congestive heart failure, unspecified heart failure type (HCC) [I50.9] CHF (congestive heart failure), NYHA class II, acute, systolic (HCC)      Precautions: Fall Risk, General Precautions                      Recommendations for nursing mobility: Encourage HEP in prep for ADLs/mobility; see handout for details, Frequent repositioning to prevent skin breakdown, Use of bed/chair alarm for safety, and Assist x2    In place during session: External Catheter and EKG/telemetry   Chart, physical therapy assessment, plan of care and goals were reviewed.  ASSESSMENT  Patient continues with skilled PT services and is progressing towards goals. Pt supine in bed upon PT arrival, agreeable to session. (See below for objective details and assist levels).     Overall pt tolerated session fair/poor today. AAROM heel slides on bilateral LE for loosening bilateral knees before mobility. Pt rolled back an forth in bed multiple times due to being incontinent of bowel. Pt c/o pain in LUE and LLE to touch and with mobility. Pt requires max Ax2 for all bed mobility. Pt transferred to sitting eob initially requiring max A due to R lateral lean. Pt given visual cue for midline and was able to maintain sitting up with bilateral hand rails. Pt requested to lay back down. Pt returned supine in bed with all needs met and 1:1 sitter present. Will continue to benefit from skilled PT services, and will continue to progress as tolerated. Current PT DC recommendation Moderate intensity short-term skilled physical therapy up to 5x/week once medically appropriate.    GOALS:  Problem: Physical Therapy - Adult  Goal: By Discharge: Performs mobility at highest level of function for planned discharge setting.  See evaluation for individualized

## 2025-07-21 NOTE — NURSE NAVIGATOR
Heart Failure Education/Evaluation/Recommendations      RECOMMENDATIONS:    Recommendations Hospice/ Palliative talk due to low EF 5-10% and confusion  Spoke to hospitalist Shay Lai PA-C about plan of care and potentially placing on palliative and hospice care.  Hospitalist called the spouse to have the discussion.         --------------------------      RN performs hand hygiene. RN Introduces herself to the patient and informs the patient of the reasoning for the visit.    Patient Verbalizes Understanding for visit, is accepting of discussion    Persons present for Education: Patient    Time Spent: At least 20minutes    Method of teaching:  Teach-back, demonstration, verbal, visual    Education Packets Given: Heart Failure symptom management calendar/tracker, AHA HF education booklet, HF magnet    -Unable to educate the below information due to in & out of confusion.     RN-NN provided education on Daily weights to include same time daily, on same scale, and documenting weights on calendar. RN-NN provided education trigger management/ signs and symptoms of Heart Failure. Patient is advised on “Yellow Days” to call MD office and on “Red Days” to come to the ER or call 911.   RN provides Nutritional education that includes how to calculate and restrict sodium and fluid intake. Encouraged fresh vegetable choice over canned/processed goods. Demonstrated how to log meals/intake. RN discusses lifestyle changes including cessation of smoking and increasing activity level.   In addition, RN discusses the importance of keeping/scheduling appointments and adherence to guideline directed medical therapies.

## 2025-07-21 NOTE — WOUND CARE
IP WOUND CONSULT    Shlomo Santiago  MEDICAL RECORD NUMBER:  [id number]  AGE: 84 y.o.   GENDER: male  : 1940  TODAY'S DATE:  2025    GENERAL     [] Follow-up   [x] New Consult    Shlomo Santiago is a 84 y.o. male referred by:   [x] Physician  [] Nursing  [] Other:         PAST MEDICAL HISTORY    Past Medical History:   Diagnosis Date    A-fib (HCC)     CHF (congestive heart failure) (HCC)     last ef of 5-10%    Fall     H/O mitral valve replacement     with mechanical valve    Neuropathy         PAST SURGICAL HISTORY    Past Surgical History:   Procedure Laterality Date    CARDIAC VALVE SURGERY      PACEMAKER         FAMILY HISTORY    No family history on file.      ALLERGIES    No Known Allergies    MEDICATIONS    No current facility-administered medications on file prior to encounter.     Current Outpatient Medications on File Prior to Encounter   Medication Sig Dispense Refill    metOLazone (ZAROXOLYN) 5 MG tablet Take 1 tablet by mouth daily      bumetanide (BUMEX) 1 MG tablet Take 1 tablet by mouth daily      midodrine (PROAMATINE) 2.5 MG tablet Take 1 tablet by mouth 2 times daily as needed Do not take if systolic is > 150      warfarin (COUMADIN) 2.5 MG tablet Take 1 tablet by mouth Twice a Week Tuesday and thursday      gabapentin (NEURONTIN) 300 MG capsule Take 100 mg by mouth 2 times daily. 60 capsule 0    magnesium cl-calcium carbonate (SLOW-MAG) 71.5-119 MG TBEC tablet Take 71.5 mg by mouth daily      potassium chloride (KLOR-CON) 10 MEQ extended release tablet Take 1 tablet by mouth 2 times daily      warfarin (COUMADIN) 5 MG tablet Take 1 tablet by mouth Five times weekly  mon, wed, fri, sat, sun      famotidine (PEPCID) 20 MG tablet Take 1 tablet by mouth daily (Patient not taking: Reported on 7/15/2025) 60 tablet 0    furosemide (LASIX) 40 MG tablet Take 1 tablet by mouth 2 times daily 60 tablet 0    gemfibrozil (LOPID) 600 MG tablet Take 1 tablet by mouth 2 times daily (Patient not  taking: Reported on 7/15/2025)      levothyroxine (SYNTHROID) 25 MCG tablet Take 1 tablet by mouth daily (Patient not taking: Reported on 7/16/2025)      spironolactone (ALDACTONE) 25 MG tablet Take 1 tablet by mouth daily 30 tablet 0    tamsulosin (FLOMAX) 0.4 MG capsule Take 1 capsule by mouth daily 30 capsule 0          /80   Pulse 75   Temp 97.3 °F (36.3 °C) (Oral)   Resp 16   Ht 1.727 m (5' 8\")   Wt 68.9 kg (152 lb)   SpO2 98%   BMI 23.11 kg/m²     Lab Results   Component Value Date/Time    WBC 13.5 (H) 07/21/2025 05:56 AM    POCGLU 126 (H) 07/20/2025 08:11 PM    POCGLU 179 (H) 07/20/2025 03:44 PM    HGB 14.4 07/21/2025 05:56 AM    HCT 42.4 07/21/2025 05:56 AM     07/21/2025 05:56 AM     ADULT DIET; Regular; Low Sodium (2 gm); 1200 ml         ASSESSMENT     Wound Identification & Type: MARSI to left chest r/t telemetry electrode; skin tears to LLA with weeping  Dressing change: see flow chart  Verbal consent for picture: Yes    Contributing Factors: edema, shear force, and anticoagulation therapy    Wound 07/15/25 Sternum Mid pink/red. Round in shape. Granulating. No drainage . No odor. (Active)   Wound Image   07/21/25 1222   Wound Etiology Skin Tear 07/21/25 1222   Dressing Status New dressing applied 07/21/25 1222   Wound Cleansed Cleansed with saline 07/21/25 1222   Dressing/Treatment Xeroform;Foam 07/21/25 1222   Dressing Change Due 07/23/25 07/21/25 1222   Wound Assessment Erythema 07/21/25 1222   Drainage Amount Scant (moist but unmeasurable) 07/21/25 1222   Odor None 07/21/25 1222   Azra-wound Assessment Fragile 07/21/25 1222   Margins Flat/open edges 07/21/25 1222   Wound Thickness Description not for Pressure Injury Partial thickness 07/21/25 1222   Number of days: 6       Wound 07/17/25 Subclavian Lateral;Left;Anterior skin tear from removing ECG electrodes (Active)   Dressing Status Intact;Dry;Clean 07/21/25 0902   Wound Cleansed Cleansed with saline 07/17/25 9037

## 2025-07-22 LAB
ALBUMIN SERPL-MCNC: 3.2 G/DL (ref 3.5–5)
ALBUMIN/GLOB SERPL: 1.2 (ref 1.1–2.2)
ALP SERPL-CCNC: 110 U/L (ref 45–117)
ALT SERPL-CCNC: 31 U/L (ref 12–78)
ANION GAP SERPL CALC-SCNC: 16 MMOL/L (ref 2–12)
AST SERPL W P-5'-P-CCNC: 49 U/L (ref 15–37)
BILIRUB SERPL-MCNC: 4.7 MG/DL (ref 0.2–1)
BUN SERPL-MCNC: 39 MG/DL (ref 6–20)
BUN/CREAT SERPL: 17 (ref 12–20)
CA-I BLD-MCNC: 9 MG/DL (ref 8.5–10.1)
CHLORIDE SERPL-SCNC: 99 MMOL/L (ref 97–108)
CO2 SERPL-SCNC: 16 MMOL/L (ref 21–32)
CREAT SERPL-MCNC: 2.24 MG/DL (ref 0.7–1.3)
ERYTHROCYTE [DISTWIDTH] IN BLOOD BY AUTOMATED COUNT: 15.5 % (ref 11.5–14.5)
GLOBULIN SER CALC-MCNC: 2.6 G/DL (ref 2–4)
GLUCOSE SERPL-MCNC: 82 MG/DL (ref 65–100)
HCT VFR BLD AUTO: 47.4 % (ref 36.6–50.3)
HGB BLD-MCNC: 15.9 G/DL (ref 12.1–17)
INR PPP: 5.7 (ref 0.9–1.1)
MCH RBC QN AUTO: 31.1 PG (ref 26–34)
MCHC RBC AUTO-ENTMCNC: 33.5 G/DL (ref 30–36.5)
MCV RBC AUTO: 92.6 FL (ref 80–99)
NRBC # BLD: 0 K/UL (ref 0–0.01)
NRBC BLD-RTO: 0 PER 100 WBC
PLATELET # BLD AUTO: 184 K/UL (ref 150–400)
PMV BLD AUTO: 12.2 FL (ref 8.9–12.9)
POTASSIUM SERPL-SCNC: 5.2 MMOL/L (ref 3.5–5.1)
PROT SERPL-MCNC: 5.8 G/DL (ref 6.4–8.2)
PROTHROMBIN TIME: 51.4 SEC (ref 11.9–14.1)
RBC # BLD AUTO: 5.12 M/UL (ref 4.1–5.7)
SODIUM SERPL-SCNC: 131 MMOL/L (ref 136–145)
WBC # BLD AUTO: 10.2 K/UL (ref 4.1–11.1)

## 2025-07-22 PROCEDURE — 85027 COMPLETE CBC AUTOMATED: CPT

## 2025-07-22 PROCEDURE — 80053 COMPREHEN METABOLIC PANEL: CPT

## 2025-07-22 PROCEDURE — 94640 AIRWAY INHALATION TREATMENT: CPT

## 2025-07-22 PROCEDURE — 6370000000 HC RX 637 (ALT 250 FOR IP): Performed by: NURSE PRACTITIONER

## 2025-07-22 PROCEDURE — 94761 N-INVAS EAR/PLS OXIMETRY MLT: CPT

## 2025-07-22 PROCEDURE — 1100000000 HC RM PRIVATE

## 2025-07-22 PROCEDURE — 6360000002 HC RX W HCPCS: Performed by: NURSE PRACTITIONER

## 2025-07-22 PROCEDURE — 85610 PROTHROMBIN TIME: CPT

## 2025-07-22 PROCEDURE — 36415 COLL VENOUS BLD VENIPUNCTURE: CPT

## 2025-07-22 PROCEDURE — 6370000000 HC RX 637 (ALT 250 FOR IP): Performed by: PHYSICIAN ASSISTANT

## 2025-07-22 PROCEDURE — 2500000003 HC RX 250 WO HCPCS

## 2025-07-22 PROCEDURE — 6370000000 HC RX 637 (ALT 250 FOR IP): Performed by: INTERNAL MEDICINE

## 2025-07-22 RX ORDER — POLYETHYLENE GLYCOL 3350 17 G/17G
17 POWDER, FOR SOLUTION ORAL DAILY PRN
Status: DISCONTINUED | OUTPATIENT
Start: 2025-07-22 | End: 2025-07-24 | Stop reason: HOSPADM

## 2025-07-22 RX ORDER — SODIUM BICARBONATE 650 MG/1
650 TABLET ORAL 3 TIMES DAILY
Status: DISCONTINUED | OUTPATIENT
Start: 2025-07-22 | End: 2025-07-22

## 2025-07-22 RX ORDER — LORAZEPAM 2 MG/ML
1 INJECTION INTRAMUSCULAR EVERY 6 HOURS PRN
Status: DISCONTINUED | OUTPATIENT
Start: 2025-07-22 | End: 2025-07-24 | Stop reason: HOSPADM

## 2025-07-22 RX ORDER — MORPHINE SULFATE 4 MG/ML
4 INJECTION, SOLUTION INTRAMUSCULAR; INTRAVENOUS
Refills: 0 | Status: DISCONTINUED | OUTPATIENT
Start: 2025-07-22 | End: 2025-07-24 | Stop reason: HOSPADM

## 2025-07-22 RX ORDER — MORPHINE SULFATE 2 MG/ML
2 INJECTION, SOLUTION INTRAMUSCULAR; INTRAVENOUS
Refills: 0 | Status: DISCONTINUED | OUTPATIENT
Start: 2025-07-22 | End: 2025-07-24 | Stop reason: HOSPADM

## 2025-07-22 RX ORDER — GLYCOPYRROLATE 0.2 MG/ML
0.2 INJECTION INTRAMUSCULAR; INTRAVENOUS EVERY 4 HOURS PRN
Status: DISCONTINUED | OUTPATIENT
Start: 2025-07-22 | End: 2025-07-24 | Stop reason: HOSPADM

## 2025-07-22 RX ADMIN — MIDODRINE HYDROCHLORIDE 10 MG: 5 TABLET ORAL at 09:48

## 2025-07-22 RX ADMIN — SODIUM CHLORIDE, PRESERVATIVE FREE 10 ML: 5 INJECTION INTRAVENOUS at 09:49

## 2025-07-22 RX ADMIN — IPRATROPIUM BROMIDE AND ALBUTEROL SULFATE 1 DOSE: .5; 2.5 SOLUTION RESPIRATORY (INHALATION) at 08:10

## 2025-07-22 RX ADMIN — SODIUM CHLORIDE, PRESERVATIVE FREE 10 ML: 5 INJECTION INTRAVENOUS at 20:01

## 2025-07-22 RX ADMIN — POTASSIUM CHLORIDE 20 MEQ: 1500 TABLET, EXTENDED RELEASE ORAL at 09:48

## 2025-07-22 RX ADMIN — IPRATROPIUM BROMIDE AND ALBUTEROL SULFATE 1 DOSE: .5; 2.5 SOLUTION RESPIRATORY (INHALATION) at 19:52

## 2025-07-22 RX ADMIN — PANTOPRAZOLE SODIUM 40 MG: 40 INJECTION, POWDER, FOR SOLUTION INTRAVENOUS at 09:49

## 2025-07-22 RX ADMIN — SODIUM BICARBONATE 650 MG: 650 TABLET ORAL at 09:55

## 2025-07-22 ASSESSMENT — PAIN SCALES - GENERAL
PAINLEVEL_OUTOF10: 0
PAINLEVEL_OUTOF10: 0

## 2025-07-22 NOTE — CARE COORDINATION
Hospice to meet with family tomorrow 7/23/25 to discuss hospice options.    CM called niece, at families request, Jerica Frazier [phone], she confirmed plan to meet with hospice, expressed concern about him returning home and needing time to discuss with family plan for care.    CM continues to follow.

## 2025-07-22 NOTE — PLAN OF CARE
Problem: Nutrition Deficit:  Goal: Optimize nutritional status  Flowsheets (Taken 7/22/2025 8525)  Nutrient intake appropriate for improving, restoring, or maintaining nutritional needs:   Assess nutritional status and recommend course of action   Monitor oral intake, labs, and treatment plans   Recommend appropriate diets, oral nutritional supplements, and vitamin/mineral supplements

## 2025-07-22 NOTE — PROGRESS NOTES
Hospitalist Progress Note    NAME:   Shlomo Santiago   : 1940   MRN: [id number]     Date/Time: 2025 12:27 PM  Patient PCP: Danyelle Smith APRN - NP      Shlomo Santiago is a 84 y.o.  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  Associated with worsening lower extremity edema. Patient was recently hospitalized 1/2-month ago for CHF exacerbation. On admission workup significant for acute on chronic heart failure exacerbation.  Blood pressure soft 99/82, pulse 91.  BMP with BRYAN likely secondary to cardiorenal syndrome.  NT proBNP 35,000 comparing to 22K last . Last echo done in  with a EF of 9%. Troponin is negative. X-ray consistent with pulmonary edema. Patient was noted to have elevated liver function tests, abdominal ultrasound revealed mildly distended gallbladder with wall thickening, gallstones, empirical assisted fluid, and G.I. recommended no further study needed at this point. Surgery consulted and recommended low suspicion for acute cholecystitis, no surgical intervention planned. Repeat was completed with EF 5 to 10%, severe centric hypertrophy, severe global hypokinesis. Cardiology recommended the patient has bleeding secondary anticoagulation so unfortunately has a high risk for valve thrombosis of his mechanical valve. Bleeding risk is too high. Recommendations included consideration of comfort care management as nothing further can be offered to improve cardiac function.    Extensive conversation held with patient, patient's wife Adele, to patient's sister and his nephew. Poor prognosis related, patient and family is in agreement to change code status to DNR and pursue hospice/palliative arrangement of discharge.

## 2025-07-22 NOTE — PROGRESS NOTES
Comprehensive Nutrition Assessment    Type and Reason for Visit:  Initial, LOS    Nutrition Recommendations/Plan:   Continue Current Diet  Encourage PO intakes >50%  Monitor/document wt, BM, PO+ONS% in I/O  Start Ensure HCHP TID (350cals prot)     Malnutrition Assessment:  Malnutrition Status:  Insufficient data (07/22/25 1337)    Context:  Chronic Illness     Findings of the 6 clinical characteristics of malnutrition:  Energy Intake:  No decrease in energy intake  Weight Loss:  No weight loss     Body Fat Loss:  Unable to assess     Muscle Mass Loss:  Unable to assess    Fluid Accumulation:  Severe     Strength:  Not Performed    Nutrition Assessment:    pt p/w LE swelling, worsening weakness/lightheadedness; noted cardiomyopathy w/ EJ 10%; cardiology note recommended comfort care; visited pt in room for initial/LOS; pt reported a poor appetite and okay intake, po intake documented as either 0% or 1-25%; pt was agreeable to trying Ensure HCHP to promote po intake; pt is on regular low sodium diet w/ 1,200ml fluid restriction; labs and meds reviewed, Na low, K elevated    Nutrition Related Findings:    NFPE deferred, no obvious signs of malnutrition; LM 7/22; LE +2 pitting edema, LUE +2 weeping edema; Skin tears noted Wound Type: Skin Tears       Current Nutrition Intake & Therapies:    Average Meal Intake: 0%, 1-25%  Average Supplements Intake: None Ordered  ADULT DIET; Regular; Low Sodium (2 gm); 1200 ml    Anthropometric Measures:  Height: 171.2 cm (5' 7.4\")  Ideal Body Weight (IBW): 150 lbs (68 kg)       Current Body Weight: 68.9 kg (151 lb 14.4 oz), 101.3 % IBW. Weight Source: Stated  Current BMI (kg/m2): 23.5                             BMI Categories: Normal Weight (BMI 22.0 to 24.9) age over 65    Estimated Daily Nutrient Needs:  Energy Requirements Based On: Kcal/kg  Weight Used for Energy Requirements: Current  Energy (kcal/day): 1,723-2,067 (25-30kcal/kg)  Weight Used for Protein Requirements:

## 2025-07-22 NOTE — PROGRESS NOTES
Progress Note      7/22/2025 7:16 AM  NAME: Shlomo Santiago   MRN:  [id number]   Admit Diagnosis: CHF (congestive heart failure), NYHA class II, acute, systolic (HCC) [I50.21]  Acute on chronic congestive heart failure, unspecified heart failure type (HCC) [I50.9]      Problem List:   - Admitted to the hospital with leg swelling.  - Initial cardiology consult was invited secondary to history of cardiomyopathy and valvular heart disease.  - Paroxysmal atrial fibrillation.  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  - Patient is lying comfortably in the bed.  No overnight cardiovascular events were noted by the patient or by the nursing staff.  -For now, we will continue current conservative medical management from cardiac point of view with no further cardiovascular testing as it would not change my cardiac management.  - We will follow-up with the patient along with the rest of the same while he is at the hospital.  ===============================================================Note dictated July 21, 2025  -Follow-up visit from cardiology after the weekend.  -Patient is lying comfortably in the bed.  No overnight cardiovascular events were noted by the patient or by the nursing staff.  -Patient has bleeding secondary to anticoagulation so unfortunately he has a high risk for valve thrombosis.  Poor prognosis based on my current clinical assessment.  -No further cardiovascular testing is warranted upon my current assessment as it would not change my current conservative guideline based treatment approach  -We will follow-up with the patient along with the rest of the same while he is at the

## 2025-07-23 PROCEDURE — 6370000000 HC RX 637 (ALT 250 FOR IP): Performed by: INTERNAL MEDICINE

## 2025-07-23 PROCEDURE — 2500000003 HC RX 250 WO HCPCS

## 2025-07-23 PROCEDURE — 94761 N-INVAS EAR/PLS OXIMETRY MLT: CPT

## 2025-07-23 PROCEDURE — 94640 AIRWAY INHALATION TREATMENT: CPT

## 2025-07-23 PROCEDURE — 2700000000 HC OXYGEN THERAPY PER DAY

## 2025-07-23 PROCEDURE — 6360000002 HC RX W HCPCS: Performed by: PHYSICIAN ASSISTANT

## 2025-07-23 PROCEDURE — 1100000000 HC RM PRIVATE

## 2025-07-23 RX ADMIN — MORPHINE SULFATE 2 MG: 2 INJECTION, SOLUTION INTRAMUSCULAR; INTRAVENOUS at 18:48

## 2025-07-23 RX ADMIN — IPRATROPIUM BROMIDE AND ALBUTEROL SULFATE 1 DOSE: .5; 2.5 SOLUTION RESPIRATORY (INHALATION) at 23:39

## 2025-07-23 RX ADMIN — IPRATROPIUM BROMIDE AND ALBUTEROL SULFATE 1 DOSE: .5; 2.5 SOLUTION RESPIRATORY (INHALATION) at 07:43

## 2025-07-23 RX ADMIN — SODIUM CHLORIDE, PRESERVATIVE FREE 10 ML: 5 INJECTION INTRAVENOUS at 10:24

## 2025-07-23 RX ADMIN — SODIUM CHLORIDE, PRESERVATIVE FREE 10 ML: 5 INJECTION INTRAVENOUS at 19:32

## 2025-07-23 ASSESSMENT — PAIN DESCRIPTION - DESCRIPTORS: DESCRIPTORS: STABBING

## 2025-07-23 ASSESSMENT — PAIN SCALES - GENERAL
PAINLEVEL_OUTOF10: 0
PAINLEVEL_OUTOF10: 7
PAINLEVEL_OUTOF10: 0

## 2025-07-23 ASSESSMENT — PAIN DESCRIPTION - LOCATION: LOCATION: ANKLE

## 2025-07-23 ASSESSMENT — PAIN DESCRIPTION - ORIENTATION: ORIENTATION: RIGHT

## 2025-07-23 ASSESSMENT — PAIN SCALES - WONG BAKER: WONGBAKER_NUMERICALRESPONSE: NO HURT

## 2025-07-23 NOTE — PLAN OF CARE
Problem: Chronic Conditions and Co-morbidities  Goal: Patient's chronic conditions and co-morbidity symptoms are monitored and maintained or improved  7/23/2025 0315 by Theresa Farnsworth RN  Outcome: Progressing  7/22/2025 2342 by Dafne Medeiros RN  Outcome: Progressing     Problem: Discharge Planning  Goal: Discharge to home or other facility with appropriate resources  7/23/2025 0315 by Theresa Farnsworth RN  Outcome: Progressing  7/22/2025 2342 by Dafne Medeiros RN  Outcome: Progressing     Problem: Skin/Tissue Integrity  Goal: Skin integrity remains intact  Description: 1.  Monitor for areas of redness and/or skin breakdown  2.  Assess vascular access sites hourly  3.  Every 4-6 hours minimum:  Change oxygen saturation probe site  4.  Every 4-6 hours:  If on nasal continuous positive airway pressure, respiratory therapy assess nares and determine need for appliance change or resting period  7/23/2025 0315 by Theresa Farnsworth RN  Outcome: Progressing  7/22/2025 2342 by Dafne Medeiros RN  Outcome: Progressing     Problem: Safety - Adult  Goal: Free from fall injury  7/23/2025 0315 by Theresa Farnsworth RN  Outcome: Progressing  7/22/2025 2342 by Dafne Medeiros RN  Outcome: Progressing     Problem: Pain  Goal: Verbalizes/displays adequate comfort level or baseline comfort level  7/23/2025 0315 by Theresa Farnsworth RN  Outcome: Progressing  7/22/2025 2342 by Dafne Medeiros RN  Outcome: Progressing     Problem: Nutrition Deficit:  Goal: Optimize nutritional status  7/23/2025 0315 by Theresa Farnsworth RN  Outcome: Progressing  7/22/2025 2342 by Dafne Medeiros RN  Outcome: Progressing  7/22/2025 1351 by Rizwana Carrasquillo RD  Flowsheets (Taken 7/22/2025 1351)  Nutrient intake appropriate for improving, restoring, or maintaining nutritional needs:   Assess nutritional status and recommend course of action   Monitor oral intake, labs, and treatment plans   Recommend appropriate diets, oral nutritional supplements, and vitamin/mineral

## 2025-07-23 NOTE — PLAN OF CARE
Problem: Chronic Conditions and Co-morbidities  Goal: Patient's chronic conditions and co-morbidity symptoms are monitored and maintained or improved  7/23/2025 1655 by Miracle Fernandez LPN  Outcome: Progressing  7/23/2025 0526 by Theresa Farnsworth RN  Outcome: Progressing  7/23/2025 0315 by Theresa Farnsworth RN  Outcome: Progressing     Problem: Discharge Planning  Goal: Discharge to home or other facility with appropriate resources  7/23/2025 1655 by Miracle Fernandez LPN  Outcome: Progressing  7/23/2025 0526 by Theresa Farnsworth RN  Outcome: Progressing  7/23/2025 0315 by Theresa Farnsworth RN  Outcome: Progressing     Problem: Skin/Tissue Integrity  Goal: Skin integrity remains intact  Description: 1.  Monitor for areas of redness and/or skin breakdown  2.  Assess vascular access sites hourly  3.  Every 4-6 hours minimum:  Change oxygen saturation probe site  4.  Every 4-6 hours:  If on nasal continuous positive airway pressure, respiratory therapy assess nares and determine need for appliance change or resting period  7/23/2025 1655 by Miracle Fernandez LPN  Outcome: Progressing  7/23/2025 0526 by Theresa Farnsworth RN  Outcome: Progressing  7/23/2025 0315 by Theresa Farnsworth RN  Outcome: Progressing     Problem: Safety - Adult  Goal: Free from fall injury  7/23/2025 1655 by Miracle Fernandez LPN  Outcome: Progressing  7/23/2025 0526 by Theresa Farnsworth RN  Outcome: Progressing  7/23/2025 0315 by Theresa Farnsworth RN  Outcome: Progressing     Problem: Pain  Goal: Verbalizes/displays adequate comfort level or baseline comfort level  7/23/2025 1655 by Miracle Fernandez LPN  Outcome: Progressing  7/23/2025 0526 by Theresa Farnsworth RN  Outcome: Progressing  7/23/2025 0315 by Theresa Farnsworth RN  Outcome: Progressing     Problem: Nutrition Deficit:  Goal: Optimize nutritional status  7/23/2025 1655 by Miracle Fernandez LPN  Outcome: Not Progressing  7/23/2025 0526 by Theresa Farnsworth RN  Outcome:

## 2025-07-23 NOTE — CARE COORDINATION
[facility] hospice liaison has met with patient and multiple family members in room to discuss hospice.    Family agreeable to home hospice, family has talked and decided on PCA and some family assistance for patient.    Plan for DME delivery today and dc tomorrow.    CM continues to follow and monitor for needs.

## 2025-07-23 NOTE — PLAN OF CARE
Problem: Chronic Conditions and Co-morbidities  Goal: Patient's chronic conditions and co-morbidity symptoms are monitored and maintained or improved  7/23/2025 0526 by Theresa Farnsworth RN  Outcome: Progressing  7/23/2025 0315 by Theresa Farnsworth RN  Outcome: Progressing  7/22/2025 2342 by Dafne Medeiros RN  Outcome: Progressing     Problem: Discharge Planning  Goal: Discharge to home or other facility with appropriate resources  7/23/2025 0526 by Theresa Farnsworth RN  Outcome: Progressing  7/23/2025 0315 by Theresa Farnsworth RN  Outcome: Progressing  7/22/2025 2342 by Dafne Medeiros RN  Outcome: Progressing     Problem: Skin/Tissue Integrity  Goal: Skin integrity remains intact  Description: 1.  Monitor for areas of redness and/or skin breakdown  2.  Assess vascular access sites hourly  3.  Every 4-6 hours minimum:  Change oxygen saturation probe site  4.  Every 4-6 hours:  If on nasal continuous positive airway pressure, respiratory therapy assess nares and determine need for appliance change or resting period  7/23/2025 0526 by Theresa Farnsworth RN  Outcome: Progressing  7/23/2025 0315 by Theresa Farnsworth RN  Outcome: Progressing  7/22/2025 2342 by Dafne Medeiros RN  Outcome: Progressing     Problem: Safety - Adult  Goal: Free from fall injury  7/23/2025 0526 by Theresa Farnsworth RN  Outcome: Progressing  7/23/2025 0315 by Theresa Farnsworth RN  Outcome: Progressing  7/22/2025 2342 by Dafne Medeiros RN  Outcome: Progressing     Problem: Pain  Goal: Verbalizes/displays adequate comfort level or baseline comfort level  7/23/2025 0526 by Theresa Farnsworth RN  Outcome: Progressing  7/23/2025 0315 by Theresa Farnsworth RN  Outcome: Progressing  7/22/2025 2342 by Dafne Medeiros RN  Outcome: Progressing     Problem: Nutrition Deficit:  Goal: Optimize nutritional status  7/23/2025 0526 by Theresa Farnsworth RN  Outcome: Progressing  7/23/2025 0315 by Theresa Farnsworth RN  Outcome: Progressing  7/22/2025 2342 by Dafne Medeiros RN  Outcome:  Progressing

## 2025-07-23 NOTE — PROGRESS NOTES
Chart reviewed and history obtained. Noted that patient is going home with Hospice. Spoke to family and family wishes to have PT/OT discharged at this time. Will complete order and discharge patient from PT/OT services at this time. Please reorder if there is a change  in medical status. Thank  you.

## 2025-07-23 NOTE — PLAN OF CARE
Problem: Chronic Conditions and Co-morbidities  Goal: Patient's chronic conditions and co-morbidity symptoms are monitored and maintained or improved  Outcome: Progressing     Problem: Discharge Planning  Goal: Discharge to home or other facility with appropriate resources  Outcome: Progressing     Problem: Skin/Tissue Integrity  Goal: Skin integrity remains intact  Description: 1.  Monitor for areas of redness and/or skin breakdown  2.  Assess vascular access sites hourly  3.  Every 4-6 hours minimum:  Change oxygen saturation probe site  4.  Every 4-6 hours:  If on nasal continuous positive airway pressure, respiratory therapy assess nares and determine need for appliance change or resting period  Outcome: Progressing     Problem: Safety - Adult  Goal: Free from fall injury  Outcome: Progressing     Problem: Pain  Goal: Verbalizes/displays adequate comfort level or baseline comfort level  Outcome: Progressing     Problem: Nutrition Deficit:  Goal: Optimize nutritional status  7/22/2025 2342 by Dafne Medeiros RN  Outcome: Progressing  7/22/2025 1351 by Rizwana Carrasquillo RD  Flowsheets (Taken 7/22/2025 1351)  Nutrient intake appropriate for improving, restoring, or maintaining nutritional needs:   Assess nutritional status and recommend course of action   Monitor oral intake, labs, and treatment plans   Recommend appropriate diets, oral nutritional supplements, and vitamin/mineral supplements

## 2025-07-23 NOTE — PROGRESS NOTES
Progress Note      7/23/2025 7:40 AM  NAME: Shlomo Santiago   MRN:  [id number]   Admit Diagnosis: CHF (congestive heart failure), NYHA class II, acute, systolic (HCC) [I50.21]  Acute on chronic congestive heart failure, unspecified heart failure type (HCC) [I50.9]      Problem List:   - Admitted to the hospital with leg swelling.  - Initial cardiology consult was invited secondary to history of cardiomyopathy and valvular heart disease.  - Paroxysmal atrial fibrillation.  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  - Patient is lying comfortably in the bed.  No overnight cardiovascular events were noted by the patient or by the nursing staff.  -When I visited the patient this afternoon family present in the room and I was informed that patient is going for hospice.  Will sign off for now  ===============================================================Note dictated July 22, 2025  -Follow-up visit from cardiology.  - Patient is lying comfortably in the bed.  No overnight cardiovascular events were noted by the patient or by the nursing staff.  -For now, we will continue current conservative medical management from cardiac point of view with no further cardiovascular testing as it would not change my cardiac management.  - We will follow-up with the patient along with the rest of the same while he is at the hospital.  ===============================================================Note dictated July 21, 2025  -Follow-up visit from cardiology after the weekend.  -Patient is lying comfortably in the bed.  No overnight cardiovascular events were noted by the patient or by the nursing staff.  -Patient has bleeding secondary to anticoagulation so unfortunately

## 2025-07-24 VITALS
DIASTOLIC BLOOD PRESSURE: 76 MMHG | SYSTOLIC BLOOD PRESSURE: 108 MMHG | OXYGEN SATURATION: 99 % | RESPIRATION RATE: 16 BRPM | HEART RATE: 86 BPM | BODY MASS INDEX: 23.86 KG/M2 | HEIGHT: 67 IN | WEIGHT: 152 LBS | TEMPERATURE: 97.3 F

## 2025-07-24 PROCEDURE — 2700000000 HC OXYGEN THERAPY PER DAY

## 2025-07-24 PROCEDURE — 94761 N-INVAS EAR/PLS OXIMETRY MLT: CPT

## 2025-07-24 PROCEDURE — 94640 AIRWAY INHALATION TREATMENT: CPT

## 2025-07-24 PROCEDURE — 6370000000 HC RX 637 (ALT 250 FOR IP): Performed by: INTERNAL MEDICINE

## 2025-07-24 RX ADMIN — IPRATROPIUM BROMIDE AND ALBUTEROL SULFATE 1 DOSE: .5; 2.5 SOLUTION RESPIRATORY (INHALATION) at 07:17

## 2025-07-24 ASSESSMENT — PAIN SCALES - WONG BAKER
WONGBAKER_NUMERICALRESPONSE: NO HURT
WONGBAKER_NUMERICALRESPONSE: NO HURT

## 2025-07-24 ASSESSMENT — PAIN SCALES - GENERAL
PAINLEVEL_OUTOF10: 0
PAINLEVEL_OUTOF10: 0

## 2025-07-24 NOTE — CARE COORDINATION
DME has been delivered to patient's home, patient clear to d/c from CM to home with hospice, [facility], confirmed with liaison.    Transition of Care Plan:    RUR: 16%  Prior Level of Functioning: assistance  Disposition: home with hospice  CHENG: 7/24/25  If SNF or IPR: Date FOC offered: na  Date FOC received: na  Accepting facility: [facility]  Date authorization started with reference number: na  Date authorization received and expires: na  Follow up appointments: na  DME needed: na  Transportation at discharge: stretcher  /IM[facility] Medicare/[facility] letter given: n/a  Is patient a  and connected with [facility]? na   If yes, was [place] transfer form completed and [facility] notified? na  Caregiver Contact: michael, Jerica  Discharge Caregiver contacted prior to discharge? Yes, family in room also notified.  Care Conference needed? na  Barriers to discharge: na

## 2025-07-24 NOTE — PLAN OF CARE
Problem: Nutrition Deficit:  Goal: Optimize nutritional status  7/23/2025 2108 by Jennifer Singer RN  Outcome: Not Progressing  7/23/2025 1655 by Miracle Fernandez LPN  Outcome: Not Progressing     Problem: Nutrition Deficit:  Goal: Optimize nutritional status  7/23/2025 2108 by Jennifer Singer RN  Outcome: Not Progressing  7/23/2025 1655 by Miracle Fernandez LPN  Outcome: Not Progressing

## 2025-07-24 NOTE — DISCHARGE SUMMARY
Discharge Summary    Name: Shlomo Santiago  [id number]  YOB: 1940 (Age: 84 y.o.)   Date of Admission: 7/15/2025  Date of Discharge: 7/23/2025  Attending Physician: Arlette Lamar MD    Discharge Diagnosis:   Principal Problem:    CHF (congestive heart failure), NYHA class II, acute, systolic (HCC)  Resolved Problems:    * No resolved hospital problems. *  Acute on chronic HFrEF  BRYAN, due to hyponatremia  Left arm swelling   Hepatomegaly 2/2 Systolic heart failure   Elevated T. bili 3.0 on admission  Sinus congestion  Hypoglycemia   Hyponatremia  Hypotension  Mechanical aortic valve  Atrial fibrillation  Secondary hypercoagulable state  Hypothyroidism  Comfort care measures    Consultations:  IP CONSULT TO CARDIOLOGY  IP CONSULT TO PHARMACY  IP CONSULT TO GI  IP CONSULT TO PALLIATIVE CARE  IP CONSULT TO GENERAL SURGERY  IP CONSULT TO CASE MANAGEMENT  IP CONSULT TO HOSPICE    Brief Hospital Course by Main Problems:   Shlomo Santiago is a 84 y.o.  male with PMHx significant for atrial fibrillation, congestive heart failure with EF 9% and grade 2 diastolic dysfunction, history of falls, metallic aortic valve replacement on Coumadin, neuropathy and hypothyroidism presented to the hospital complaining of worsening weakness and lightheadedness.  Associated with worsening lower extremity edema. Patient was recently hospitalized 1/2-month ago for CHF exacerbation. On admission workup significant for acute on chronic heart failure exacerbation.  Blood pressure soft 99/82, pulse 91.  BMP with BRYAN likely secondary to cardiorenal syndrome.  NT proBNP 35,000 comparing to 22K last June. Last echo done in 2023 with a EF of 9%. Troponin is negative. X-ray consistent with pulmonary edema. Patient was noted to have elevated liver function tests, abdominal ultrasound revealed mildly distended gallbladder with wall thickening, gallstones, empirical assisted fluid, and G.I. 
109/74 97.2 °F (36.2 °C) Axillary 83 -- --   07/23/25 1023 118/85 97.7 °F (36.5 °C) Axillary 86 18 100 %       Physical Exam  Constitutional:       General: He is not in acute distress.     Appearance: Normal appearance. He is ill-appearing.   Cardiovascular:      Rate and Rhythm: Normal rate and regular rhythm.      Pulses: Normal pulses.   Pulmonary:      Effort: Pulmonary effort is normal. No respiratory distress.      Breath sounds: No stridor. No wheezing.   Abdominal:      General: Abdomen is flat.      Palpations: Abdomen is soft.   Musculoskeletal:         General: Normal range of motion.   Skin:     General: Skin is warm and dry.   Neurological:      Mental Status: He is alert. Mental status is at baseline.   Psychiatric:         Mood and Affect: Mood normal.         Behavior: Behavior normal.         Discharge/Recent Laboratory Results:  Recent Labs     07/22/25  0625   *   K 5.2*   CL 99   CO2 16*   BUN 39*   CREATININE 2.24*   GLUCOSE 82   CALCIUM 9.0     Recent Labs     07/22/25  0625   HGB 15.9   HCT 47.4   WBC 10.2          Discharge Medications:     Medication List        CONTINUE taking these medications      midodrine 2.5 MG tablet  Commonly known as: PROAMATINE            STOP taking these medications      aspirin 81 MG EC tablet     bumetanide 1 MG tablet  Commonly known as: BUMEX     famotidine 20 MG tablet  Commonly known as: Pepcid     furosemide 40 MG tablet  Commonly known as: LASIX     gabapentin 300 MG capsule  Commonly known as: NEURONTIN     gemfibrozil 600 MG tablet  Commonly known as: LOPID     levothyroxine 25 MCG tablet  Commonly known as: SYNTHROID     magnesium cl-calcium carbonate 71.5-119 MG Tbec tablet  Commonly known as: SLOW-MAG     metOLazone 5 MG tablet  Commonly known as: ZAROXOLYN     potassium chloride 10 MEQ extended release tablet  Commonly known as: KLOR-CON     spironolactone 25 MG tablet  Commonly known as: ALDACTONE     tamsulosin 0.4 MG

## 2025-07-24 NOTE — NURSE NAVIGATOR
Heart Failure Education/Evaluation/Recommendations      Patient's family met with Hospice company and accepted. PT to discharge on 07/24/2025 with [facility] via [facility].

## 2025-07-24 NOTE — PLAN OF CARE
Problem: Chronic Conditions and Co-morbidities  Goal: Patient's chronic conditions and co-morbidity symptoms are monitored and maintained or improved  Outcome: Adequate for Discharge     Problem: Discharge Planning  Goal: Discharge to home or other facility with appropriate resources  Outcome: Adequate for Discharge     Problem: Skin/Tissue Integrity  Goal: Skin integrity remains intact  Description: 1.  Monitor for areas of redness and/or skin breakdown  2.  Assess vascular access sites hourly  3.  Every 4-6 hours minimum:  Change oxygen saturation probe site  4.  Every 4-6 hours:  If on nasal continuous positive airway pressure, respiratory therapy assess nares and determine need for appliance change or resting period  Outcome: Adequate for Discharge     Problem: Safety - Adult  Goal: Free from fall injury  Outcome: Adequate for Discharge     Problem: Pain  Goal: Verbalizes/displays adequate comfort level or baseline comfort level  Outcome: Adequate for Discharge  Flowsheets (Taken 7/24/2025 [id number] by Jennifer Singer RN)  Verbalizes/displays adequate comfort level or baseline comfort level: Encourage patient to monitor pain and request assistance     Problem: Nutrition Deficit:  Goal: Optimize nutritional status  Outcome: Adequate for Discharge

## 2025-07-25 ENCOUNTER — FOLLOWUP TELEPHONE ENCOUNTER (OUTPATIENT)
Facility: HOSPITAL | Age: 85
End: 2025-07-25

## 2025-07-25 NOTE — TELEPHONE ENCOUNTER
No follow-up call due to Home w/ Hospice. [facility]: hospice are now seeing the patient in the outpt setting.

## 2025-07-29 NOTE — PROGRESS NOTES
Physician Progress Note      PATIENT:               TYRONE PIERRE  CSN #:                  [id number]  :                       1940  ADMIT DATE:       7/15/2025 2:53 PM  DISCH DATE:        2025 3:05 PM  RESPONDING  PROVIDER #:        Arlette Lamar MD          QUERY TEXT:    BPH is documented in the medical record Progress Notes by Keena Cowan APRN - NP at 2025. Please specify the associated urinary conditions:    The clinical indicators include:  Patient presented with Leg Swelling:  BRYAN, Calculus    -\"BPH - bladder scan nino for PVR > 350 ml\"(Progress Notes by Keena Cowan APRN - NP at 2025)    -\"tamsulosin (FLOMAX) 0.4 MG capsule\"(H&P by Rosa Fernandes MD at 7/15/2025)    Thank you  Alan castañeda HAS CDS  Options provided:  -- BPH with partial/complete urinary obstruction  -- Other - I will add my own diagnosis  -- Disagree - Not applicable / Not valid  -- Disagree - Clinically unable to determine / Unknown  -- Refer to Clinical Documentation Reviewer    PROVIDER RESPONSE TEXT:    This patient has BPH with partial/complete urinary obstruction.    Query created by: Alan Castañeda on 2025 11:36 PM      Electronically signed by:  Arlette Lamar MD 2025 7:02 AM